# Patient Record
Sex: FEMALE | Race: BLACK OR AFRICAN AMERICAN | NOT HISPANIC OR LATINO | URBAN - METROPOLITAN AREA
[De-identification: names, ages, dates, MRNs, and addresses within clinical notes are randomized per-mention and may not be internally consistent; named-entity substitution may affect disease eponyms.]

---

## 2023-02-22 LAB
KEPPRA: 38 UG/ML (ref 10–40)
PHENYTOIN (UG/ML) IN SER/PLAS: 18.2 UG/ML (ref 10–20)

## 2023-09-27 DIAGNOSIS — I10 HYPERTENSION, UNSPECIFIED TYPE: ICD-10-CM

## 2023-09-27 DIAGNOSIS — E78.5 HYPERLIPIDEMIA, UNSPECIFIED HYPERLIPIDEMIA TYPE: Primary | ICD-10-CM

## 2023-09-27 RX ORDER — LOSARTAN POTASSIUM 25 MG/1
25 TABLET ORAL DAILY
Qty: 30 TABLET | Refills: 0 | Status: SHIPPED | OUTPATIENT
Start: 2023-09-27 | End: 2023-09-29

## 2023-09-27 RX ORDER — ATORVASTATIN CALCIUM 80 MG/1
80 TABLET, FILM COATED ORAL DAILY
Qty: 30 TABLET | Refills: 0 | Status: SHIPPED | OUTPATIENT
Start: 2023-09-27 | End: 2023-09-29

## 2023-09-27 RX ORDER — FAMOTIDINE 20 MG/1
20 TABLET, FILM COATED ORAL DAILY
COMMUNITY
Start: 2023-03-29 | End: 2023-09-27 | Stop reason: SDUPTHER

## 2023-09-27 RX ORDER — TRAZODONE HYDROCHLORIDE 50 MG/1
50 TABLET ORAL NIGHTLY
Qty: 30 TABLET | Refills: 0 | Status: SHIPPED | OUTPATIENT
Start: 2023-09-27 | End: 2023-09-29

## 2023-09-27 RX ORDER — ATORVASTATIN CALCIUM 80 MG/1
80 TABLET, FILM COATED ORAL DAILY
COMMUNITY
Start: 2023-03-02 | End: 2023-09-27 | Stop reason: SDUPTHER

## 2023-09-27 RX ORDER — TRAZODONE HYDROCHLORIDE 50 MG/1
50 TABLET ORAL NIGHTLY
COMMUNITY
Start: 2015-04-21 | End: 2023-09-27 | Stop reason: SDUPTHER

## 2023-09-27 RX ORDER — LOSARTAN POTASSIUM 25 MG/1
25 TABLET ORAL DAILY
COMMUNITY
Start: 2023-08-18 | End: 2023-09-27 | Stop reason: SDUPTHER

## 2023-09-27 RX ORDER — FAMOTIDINE 20 MG/1
20 TABLET, FILM COATED ORAL DAILY
Qty: 30 TABLET | Refills: 0 | Status: SHIPPED | OUTPATIENT
Start: 2023-09-27 | End: 2023-09-29

## 2023-09-28 DIAGNOSIS — I10 HYPERTENSION, UNSPECIFIED TYPE: ICD-10-CM

## 2023-09-28 DIAGNOSIS — E78.5 HYPERLIPIDEMIA, UNSPECIFIED HYPERLIPIDEMIA TYPE: ICD-10-CM

## 2023-09-29 DIAGNOSIS — E78.5 HYPERLIPIDEMIA, UNSPECIFIED HYPERLIPIDEMIA TYPE: ICD-10-CM

## 2023-09-29 DIAGNOSIS — I10 HYPERTENSION, UNSPECIFIED TYPE: ICD-10-CM

## 2023-09-29 RX ORDER — FAMOTIDINE 20 MG/1
20 TABLET, FILM COATED ORAL DAILY
Qty: 30 TABLET | Refills: 10 | Status: SHIPPED | OUTPATIENT
Start: 2023-09-29 | End: 2023-09-29 | Stop reason: SDUPTHER

## 2023-09-29 RX ORDER — ATORVASTATIN CALCIUM 80 MG/1
80 TABLET, FILM COATED ORAL DAILY
Qty: 14 TABLET | Refills: 0 | Status: SHIPPED | OUTPATIENT
Start: 2023-09-29 | End: 2023-11-27 | Stop reason: SDUPTHER

## 2023-09-29 RX ORDER — LOSARTAN POTASSIUM 25 MG/1
25 TABLET ORAL DAILY
Qty: 14 TABLET | Refills: 0 | Status: SHIPPED | OUTPATIENT
Start: 2023-09-29 | End: 2023-11-27 | Stop reason: SDUPTHER

## 2023-09-29 RX ORDER — LOSARTAN POTASSIUM 25 MG/1
25 TABLET ORAL DAILY
Qty: 30 TABLET | Refills: 10 | Status: SHIPPED | OUTPATIENT
Start: 2023-09-29 | End: 2023-09-29 | Stop reason: SDUPTHER

## 2023-09-29 RX ORDER — FAMOTIDINE 20 MG/1
20 TABLET, FILM COATED ORAL DAILY
Qty: 14 TABLET | Refills: 0 | Status: SHIPPED | OUTPATIENT
Start: 2023-09-29 | End: 2023-11-27 | Stop reason: SDUPTHER

## 2023-09-29 RX ORDER — ATORVASTATIN CALCIUM 80 MG/1
80 TABLET, FILM COATED ORAL DAILY
Qty: 30 TABLET | Refills: 10 | Status: SHIPPED | OUTPATIENT
Start: 2023-09-29 | End: 2023-09-29 | Stop reason: SDUPTHER

## 2023-09-29 RX ORDER — TRAZODONE HYDROCHLORIDE 50 MG/1
50 TABLET ORAL NIGHTLY
Qty: 14 TABLET | Refills: 0 | Status: SHIPPED | OUTPATIENT
Start: 2023-09-29 | End: 2023-11-27 | Stop reason: SDUPTHER

## 2023-09-29 RX ORDER — TRAZODONE HYDROCHLORIDE 50 MG/1
50 TABLET ORAL NIGHTLY
Qty: 30 TABLET | Refills: 10 | Status: SHIPPED | OUTPATIENT
Start: 2023-09-29 | End: 2023-09-29 | Stop reason: SDUPTHER

## 2023-10-03 DIAGNOSIS — G40.909 SEIZURE DISORDER (MULTI): Primary | ICD-10-CM

## 2023-10-03 RX ORDER — LEVETIRACETAM 1000 MG/1
1000 TABLET ORAL DAILY
Qty: 30 TABLET | Refills: 0 | Status: SHIPPED | OUTPATIENT
Start: 2023-10-03 | End: 2023-10-05

## 2023-10-03 RX ORDER — LEVETIRACETAM 1000 MG/1
1000 TABLET ORAL DAILY
COMMUNITY
End: 2023-10-03 | Stop reason: SDUPTHER

## 2023-10-04 DIAGNOSIS — G40.909 SEIZURE DISORDER (MULTI): ICD-10-CM

## 2023-10-05 RX ORDER — LEVETIRACETAM 1000 MG/1
1000 TABLET ORAL DAILY
Qty: 30 TABLET | Refills: 10 | Status: SHIPPED | OUTPATIENT
Start: 2023-10-05 | End: 2023-11-27 | Stop reason: WASHOUT

## 2023-10-06 ENCOUNTER — OFFICE VISIT (OUTPATIENT)
Dept: PRIMARY CARE | Facility: CLINIC | Age: 68
End: 2023-10-06
Payer: MEDICARE

## 2023-10-06 ENCOUNTER — LAB (OUTPATIENT)
Dept: LAB | Facility: LAB | Age: 68
End: 2023-10-06
Payer: MEDICARE

## 2023-10-06 VITALS — WEIGHT: 151 LBS | SYSTOLIC BLOOD PRESSURE: 139 MMHG | DIASTOLIC BLOOD PRESSURE: 83 MMHG | BODY MASS INDEX: 24.37 KG/M2

## 2023-10-06 DIAGNOSIS — I63.9 CEREBROVASCULAR ACCIDENT (CVA), UNSPECIFIED MECHANISM (MULTI): ICD-10-CM

## 2023-10-06 DIAGNOSIS — Z23 NEED FOR IMMUNIZATION AGAINST INFLUENZA: ICD-10-CM

## 2023-10-06 DIAGNOSIS — M17.0 PRIMARY OSTEOARTHRITIS OF BOTH KNEES: ICD-10-CM

## 2023-10-06 DIAGNOSIS — G40.909 SEIZURE DISORDER (MULTI): ICD-10-CM

## 2023-10-06 DIAGNOSIS — I10 ESSENTIAL HYPERTENSION: ICD-10-CM

## 2023-10-06 DIAGNOSIS — E78.49 OTHER HYPERLIPIDEMIA: ICD-10-CM

## 2023-10-06 DIAGNOSIS — D64.9 ANEMIA, UNSPECIFIED TYPE: ICD-10-CM

## 2023-10-06 DIAGNOSIS — Z12.11 SCREENING FOR COLON CANCER: ICD-10-CM

## 2023-10-06 DIAGNOSIS — E55.9 VITAMIN D DEFICIENCY: ICD-10-CM

## 2023-10-06 DIAGNOSIS — Z00.00 ENCOUNTER FOR ANNUAL WELLNESS VISIT (AWV) IN MEDICARE PATIENT: Primary | ICD-10-CM

## 2023-10-06 DIAGNOSIS — Z13.820 ENCOUNTER FOR OSTEOPOROSIS SCREENING IN ASYMPTOMATIC POSTMENOPAUSAL PATIENT: ICD-10-CM

## 2023-10-06 DIAGNOSIS — Z78.0 ENCOUNTER FOR OSTEOPOROSIS SCREENING IN ASYMPTOMATIC POSTMENOPAUSAL PATIENT: ICD-10-CM

## 2023-10-06 DIAGNOSIS — Z00.00 ENCOUNTER FOR ANNUAL WELLNESS VISIT (AWV) IN MEDICARE PATIENT: ICD-10-CM

## 2023-10-06 DIAGNOSIS — Z13.820 OSTEOPOROSIS SCREENING: ICD-10-CM

## 2023-10-06 DIAGNOSIS — J45.909 ASTHMA, UNSPECIFIED ASTHMA SEVERITY, UNSPECIFIED WHETHER COMPLICATED, UNSPECIFIED WHETHER PERSISTENT (HHS-HCC): ICD-10-CM

## 2023-10-06 PROBLEM — M17.9 OSTEOARTHRITIS, KNEE: Status: ACTIVE | Noted: 2023-10-06

## 2023-10-06 PROCEDURE — 82570 ASSAY OF URINE CREATININE: CPT

## 2023-10-06 PROCEDURE — 1170F FXNL STATUS ASSESSED: CPT | Performed by: INTERNAL MEDICINE

## 2023-10-06 PROCEDURE — 1159F MED LIST DOCD IN RCRD: CPT | Performed by: INTERNAL MEDICINE

## 2023-10-06 PROCEDURE — 3077F SYST BP >= 140 MM HG: CPT | Performed by: INTERNAL MEDICINE

## 2023-10-06 PROCEDURE — 80053 COMPREHEN METABOLIC PANEL: CPT

## 2023-10-06 PROCEDURE — 80061 LIPID PANEL: CPT

## 2023-10-06 PROCEDURE — 3075F SYST BP GE 130 - 139MM HG: CPT | Performed by: INTERNAL MEDICINE

## 2023-10-06 PROCEDURE — 36415 COLL VENOUS BLD VENIPUNCTURE: CPT

## 2023-10-06 PROCEDURE — 3079F DIAST BP 80-89 MM HG: CPT | Performed by: INTERNAL MEDICINE

## 2023-10-06 PROCEDURE — 1160F RVW MEDS BY RX/DR IN RCRD: CPT | Performed by: INTERNAL MEDICINE

## 2023-10-06 PROCEDURE — 90662 IIV NO PRSV INCREASED AG IM: CPT | Performed by: INTERNAL MEDICINE

## 2023-10-06 PROCEDURE — 1126F AMNT PAIN NOTED NONE PRSNT: CPT | Performed by: INTERNAL MEDICINE

## 2023-10-06 PROCEDURE — G0008 ADMIN INFLUENZA VIRUS VAC: HCPCS | Performed by: INTERNAL MEDICINE

## 2023-10-06 PROCEDURE — G0439 PPPS, SUBSEQ VISIT: HCPCS | Performed by: INTERNAL MEDICINE

## 2023-10-06 PROCEDURE — 82043 UR ALBUMIN QUANTITATIVE: CPT

## 2023-10-06 PROCEDURE — 1036F TOBACCO NON-USER: CPT | Performed by: INTERNAL MEDICINE

## 2023-10-06 PROCEDURE — 99214 OFFICE O/P EST MOD 30 MIN: CPT | Performed by: INTERNAL MEDICINE

## 2023-10-06 ASSESSMENT — ACTIVITIES OF DAILY LIVING (ADL)
BATHING: NEEDS ASSISTANCE
TAKING_MEDICATION: NEEDS ASSISTANCE
MANAGING_FINANCES: NEEDS ASSISTANCE
GROCERY_SHOPPING: NEEDS ASSISTANCE
DRESSING: NEEDS ASSISTANCE
DOING_HOUSEWORK: NEEDS ASSISTANCE

## 2023-10-06 ASSESSMENT — ENCOUNTER SYMPTOMS
TREMORS: 0
HEADACHES: 0
PALPITATIONS: 0
DIZZINESS: 0
NECK STIFFNESS: 0
FREQUENCY: 0
FEVER: 0
CHEST TIGHTNESS: 0
NAUSEA: 0
UNEXPECTED WEIGHT CHANGE: 0
APPETITE CHANGE: 0
DIAPHORESIS: 0
BLOOD IN STOOL: 0
JOINT SWELLING: 1
CHILLS: 0
WOUND: 0
ARTHRALGIAS: 0
BACK PAIN: 0
OCCASIONAL FEELINGS OF UNSTEADINESS: 1
ACTIVITY CHANGE: 0
FATIGUE: 0
DYSURIA: 0
NECK PAIN: 0
SLEEP DISTURBANCE: 0
MYALGIAS: 0
HEMATURIA: 0
COUGH: 0
ABDOMINAL PAIN: 0
APNEA: 0
VOMITING: 0
WEAKNESS: 0
FLANK PAIN: 0
CONSTIPATION: 0
NERVOUS/ANXIOUS: 0
DIFFICULTY URINATING: 0
DIARRHEA: 0
ABDOMINAL DISTENTION: 0
WHEEZING: 0
SHORTNESS OF BREATH: 0

## 2023-10-06 ASSESSMENT — PATIENT HEALTH QUESTIONNAIRE - PHQ9
1. LITTLE INTEREST OR PLEASURE IN DOING THINGS: SEVERAL DAYS
SUM OF ALL RESPONSES TO PHQ9 QUESTIONS 1 AND 2: 2
2. FEELING DOWN, DEPRESSED OR HOPELESS: SEVERAL DAYS
10. IF YOU CHECKED OFF ANY PROBLEMS, HOW DIFFICULT HAVE THESE PROBLEMS MADE IT FOR YOU TO DO YOUR WORK, TAKE CARE OF THINGS AT HOME, OR GET ALONG WITH OTHER PEOPLE: NOT DIFFICULT AT ALL

## 2023-10-06 NOTE — PROGRESS NOTES
Subjective   Patient ID: Janine Sandoval is a 67 y.o. female who presents for No chief complaint on file..  Jaime Mehta, geovanny 66 y/o F  deaf/non verbal with PMHx of HTN, HLD, stroke s/p residual right sided weakness, depression, schizophrenia, bilateral chronic knee osteoarthritis, GERD, Anemia, and Asthma came to the clinic for a wellness visit. Patient deaf/non verbal and caretaker was present. Patient lives at assisted living facility. No acute concerns or new complaints except the patient pointed to her right knee. She does have bilateral knee OA and follows with orthopedic surgery and gets injections. She follows with Neurology as well. She does feel safe at the facility. She did have a fall in last year but no LOC or trauma. She does miss her parents and have depressed mood but not always. Eat and sleep good.   No recent illness, fever, chills, sweats, CP, SOB, palpitations, cough, N/V, abdominal pain, melena, hematochezia, diarrhea, constipation, hematuria, urinary symptoms, or leg swelling.         Review of Systems   Constitutional:  Negative for activity change, appetite change, chills, diaphoresis, fatigue, fever and unexpected weight change.   Eyes:  Negative for visual disturbance.   Respiratory:  Negative for apnea, cough, chest tightness, shortness of breath and wheezing.    Cardiovascular:  Negative for chest pain, palpitations and leg swelling.   Gastrointestinal:  Negative for abdominal distention, abdominal pain, blood in stool, constipation, diarrhea, nausea and vomiting.   Endocrine: Negative for cold intolerance and heat intolerance.   Genitourinary:  Negative for decreased urine volume, difficulty urinating, dysuria, enuresis, flank pain, frequency, hematuria and urgency.   Musculoskeletal:  Positive for joint swelling. Negative for arthralgias, back pain, gait problem, myalgias, neck pain and neck stiffness.   Skin:  Negative for rash and wound.   Neurological:  Negative for dizziness,  tremors, syncope, weakness and headaches.   Psychiatric/Behavioral:  Negative for behavioral problems and sleep disturbance. The patient is not nervous/anxious.        Objective   Physical Exam  Constitutional:       General: She is not in acute distress.     Appearance: She is not ill-appearing, toxic-appearing or diaphoretic.   HENT:      Head: Normocephalic and atraumatic.      Right Ear: There is no impacted cerumen.      Left Ear: There is no impacted cerumen.      Nose: Nose normal. No congestion or rhinorrhea.      Mouth/Throat:      Mouth: Mucous membranes are moist.      Pharynx: No oropharyngeal exudate or posterior oropharyngeal erythema.   Eyes:      General:         Right eye: No discharge.         Left eye: No discharge.      Extraocular Movements: Extraocular movements intact.      Conjunctiva/sclera: Conjunctivae normal.      Pupils: Pupils are equal, round, and reactive to light.   Neck:      Vascular: No carotid bruit.   Cardiovascular:      Rate and Rhythm: Normal rate and regular rhythm.      Heart sounds: No murmur heard.     No friction rub. No gallop.   Pulmonary:      Effort: No respiratory distress.      Breath sounds: No stridor. No wheezing, rhonchi or rales.   Chest:      Chest wall: No tenderness.   Abdominal:      General: Abdomen is flat. Bowel sounds are normal. There is no distension.      Palpations: Abdomen is soft. There is no mass.      Tenderness: There is no abdominal tenderness. There is no guarding or rebound.      Hernia: No hernia is present.   Musculoskeletal:         General: Swelling, tenderness and deformity present. No signs of injury. Normal range of motion.      Cervical back: Normal range of motion and neck supple. No rigidity or tenderness.      Right lower leg: No edema.      Left lower leg: No edema.   Lymphadenopathy:      Cervical: No cervical adenopathy.   Skin:     Coloration: Skin is not jaundiced or pale.      Findings: No bruising, erythema, lesion or  rash.   Neurological:      General: No focal deficit present.      Mental Status: She is oriented to person, place, and time. Mental status is at baseline.      Cranial Nerves: No cranial nerve deficit.      Sensory: No sensory deficit.      Motor: Weakness present.      Coordination: Coordination abnormal.      Gait: Gait abnormal.      Deep Tendon Reflexes: Reflexes abnormal.   Psychiatric:         Mood and Affect: Mood normal.         Behavior: Behavior normal.         Thought Content: Thought content normal.         Judgment: Judgment normal.         Assessment/Plan   Problem List Items Addressed This Visit       Seizure disorder (CMS/[facility])    Encounter for annual wellness visit (AWV) in Medicare patient - Primary    Relevant Orders    Albumin , Urine Random    Hemoglobin A1C    Lipid Panel    TSH with reflex to Free T4 if abnormal    Vitamin D 25-Hydroxy,Total (for eval of Vitamin D levels)    CBC    Comprehensive Metabolic Panel    XR DEXA bone density    Essential hypertension    Relevant Orders    Comprehensive Metabolic Panel    Other hyperlipidemia    Stroke (CMS/[facility])    Osteoarthritis, knee    Asthma    Anemia    Relevant Orders    CBC    Vitamin D deficiency    Relevant Orders    Vitamin D 25-Hydroxy,Total (for eval of Vitamin D levels)     Other Visit Diagnoses       Screening for colon cancer        Relevant Orders    Colonoscopy Screening    Osteoporosis screening        Relevant Orders    XR DEXA bone density    Encounter for osteoporosis screening in asymptomatic postmenopausal patient        Relevant Orders    XR DEXA bone density    Need for immunization against influenza        Relevant Orders    Flu vaccine, quadrivalent, high-dose, preservative free, age 65y+ (FLUZONE) (Completed)             geovanny Tariq 66 y/o F  deaf/non verbal with PMHx of HTN, HLD, stroke s/p residual right sided weakness, depression, schizophrenia, bilateral chronic knee osteoarthritis, GERD, Anemia, and Asthma  came to the clinic for a wellness visit. Patient deaf/non verbal and caretaker was present. Patient lives at assisted living facility.         #Anemia   -CBC today      #Asthma  - PRN albuterol nebulizer     #Chronic Back Pain  - Prev. prescribed lidocaine patch     #Depression  - Continue sertraline and trazodone    #Fatigue  - Anemia and thyroid work-up     #GERD  - Continue famotidine     #Stroke with residual weakness   - Continue aspirin and atorvastatin   - Follows with Neurology     #HTN  - BP today 141/83   - At facility BP in the 130s/80s   - Continue losartan     #Occasional Nausea  - Take Zofran as needed     #OA of Knees bilateral   - F/U with orthopedics, gets injections      #Schizophrenia  - Continue Haldol and Seroquel     #Epilepsy  - Continue Keppra and phenytoin  - F/U with neurology      #Health Maintenance  -Immunizations: Flu shot today / Discussed others   -Screen:   Mammo: UTD repeat 02/24   Colonoscopy due now; ordered today   Dexa due now; ordered today   -Wellness 2023 done today      Dispo: RTC in 3 months, completed blood work today. Will review labs and imaging when available.       Medicare Wellness Billing Compliance Satisfied    *This is a visual tool to show completion of required items on the day of the visit. Green checks will only appear on the date of visit.    Review all medications by prescribing practitioner or clinical pharmacist (such as prescriptions, OTCs, herbal therapies and supplements) documented in the medical record    Past Medical, Surgical, and Family History reviewed and updated in chart    Tobacco Use Reviewed    Alcohol Use Reviewed    Illicit Drug Use Reviewed    PHQ2/9    Falls in Last Year Reviewed    Home Safety Risk Factors Reviewed    Cognitive Impairment Reviewed    Patient Self Assessment and Health Status    Current Diet Reviewed    Exercise Frequency    ADL - Hearing Impairment    ADL - Bathing    ADL - Dressing    ADL - Walks in Home     IADL - Managing Finances    IADL - Grocery Shopping    IADL - Taking Medications    IADL - Doing Housework

## 2023-10-06 NOTE — PROGRESS NOTES
I saw and evaluated the patient. I personally obtained the key and critical portions of the history and physical exam or was physically present for key and critical portions performed by the resident/fellow. I reviewed the resident/fellow's documentation and discussed the patient with the resident/fellow. I agree with the resident/fellow's medical decision making as documented in the note.    Jesi Ramsey MD

## 2023-10-07 LAB
ALBUMIN SERPL BCP-MCNC: 3.9 G/DL (ref 3.4–5)
ALP SERPL-CCNC: 102 U/L (ref 33–136)
ALT SERPL W P-5'-P-CCNC: 30 U/L (ref 7–45)
ANION GAP SERPL CALC-SCNC: 19 MMOL/L
AST SERPL W P-5'-P-CCNC: 21 U/L (ref 9–39)
BILIRUB SERPL-MCNC: 0.3 MG/DL (ref 0–1.2)
BUN SERPL-MCNC: 17 MG/DL (ref 6–23)
CALCIUM SERPL-MCNC: 9.2 MG/DL (ref 8.6–10.6)
CHLORIDE SERPL-SCNC: 106 MMOL/L (ref 98–107)
CHOLEST SERPL-MCNC: 114 MG/DL (ref 0–199)
CHOLESTEROL/HDL RATIO: 2.2
CO2 SERPL-SCNC: 19 MMOL/L (ref 21–32)
CREAT SERPL-MCNC: 0.61 MG/DL (ref 0.5–1.05)
CREAT UR-MCNC: 50.4 MG/DL (ref 20–320)
GFR SERPL CREATININE-BSD FRML MDRD: >90 ML/MIN/1.73M*2
GLUCOSE SERPL-MCNC: 138 MG/DL (ref 74–99)
HDLC SERPL-MCNC: 51.5 MG/DL
LDLC SERPL CALC-MCNC: 50 MG/DL (ref 140–190)
MICROALBUMIN UR-MCNC: <7 MG/L
MICROALBUMIN/CREAT UR: NORMAL MG/G{CREAT}
NON HDL CHOLESTEROL: 63 MG/DL (ref 0–149)
POTASSIUM SERPL-SCNC: 3.8 MMOL/L (ref 3.5–5.3)
PROT SERPL-MCNC: 6.7 G/DL (ref 6.4–8.2)
SODIUM SERPL-SCNC: 140 MMOL/L (ref 136–145)
TRIGL SERPL-MCNC: 61 MG/DL (ref 0–149)
VLDL: 12 MG/DL (ref 0–40)

## 2023-10-07 NOTE — RESULT ENCOUNTER NOTE
I reviewed your results.  Everything looks good.  Please continue with current treatment.  Best,  Dr. Dennison

## 2023-10-17 DIAGNOSIS — Z12.11 COLON CANCER SCREENING: ICD-10-CM

## 2023-10-17 RX ORDER — POLYETHYLENE GLYCOL 3350, SODIUM SULFATE ANHYDROUS, SODIUM BICARBONATE, SODIUM CHLORIDE, POTASSIUM CHLORIDE 236; 22.74; 6.74; 5.86; 2.97 G/4L; G/4L; G/4L; G/4L; G/4L
4000 POWDER, FOR SOLUTION ORAL ONCE
Qty: 4000 ML | Refills: 0 | Status: SHIPPED | OUTPATIENT
Start: 2023-10-17 | End: 2023-10-17

## 2023-10-30 PROBLEM — R32 URINARY INCONTINENCE: Status: ACTIVE | Noted: 2023-10-30

## 2023-10-30 PROBLEM — Z00.00 ENCOUNTER FOR ANNUAL WELLNESS VISIT (AWV) IN MEDICARE PATIENT: Status: RESOLVED | Noted: 2023-10-06 | Resolved: 2023-10-30

## 2023-10-30 PROBLEM — E78.5 HYPERLIPEMIA: Status: ACTIVE | Noted: 2023-10-30

## 2023-10-30 PROBLEM — E78.49 OTHER HYPERLIPIDEMIA: Status: RESOLVED | Noted: 2023-10-06 | Resolved: 2023-10-30

## 2023-10-30 PROBLEM — H61.21 IMPACTED CERUMEN OF RIGHT EAR: Status: ACTIVE | Noted: 2023-10-30

## 2023-10-30 PROBLEM — Z86.73 HISTORY OF STROKE: Status: RESOLVED | Noted: 2023-10-30 | Resolved: 2023-10-30

## 2023-10-30 PROBLEM — M19.90 ARTHRITIS, DEGENERATIVE: Status: ACTIVE | Noted: 2017-10-09

## 2023-10-30 PROBLEM — R05.3 CHRONIC COUGH: Status: ACTIVE | Noted: 2023-10-30

## 2023-10-30 PROBLEM — H90.3 BILATERAL SENSORINEURAL HEARING LOSS: Status: ACTIVE | Noted: 2023-10-30

## 2023-10-30 PROBLEM — Z86.73 HISTORY OF STROKE: Status: ACTIVE | Noted: 2023-10-30

## 2023-10-30 PROBLEM — K21.9 GASTROESOPHAGEAL REFLUX DISEASE WITHOUT ESOPHAGITIS: Status: ACTIVE | Noted: 2023-10-30

## 2023-10-30 PROBLEM — T43.505A NEUROLEPTIC INDUCED PARKINSONISM (MULTI): Status: ACTIVE | Noted: 2022-09-21

## 2023-10-30 PROBLEM — G40.909 SEIZURE DISORDER (MULTI): Status: RESOLVED | Noted: 2023-10-03 | Resolved: 2023-10-30

## 2023-10-30 PROBLEM — F32.A DEPRESSION: Status: ACTIVE | Noted: 2023-10-30

## 2023-10-30 PROBLEM — R07.9 CHEST PAIN: Status: ACTIVE | Noted: 2023-10-30

## 2023-10-30 PROBLEM — G21.11 NEUROLEPTIC INDUCED PARKINSONISM (MULTI): Status: ACTIVE | Noted: 2022-09-21

## 2023-10-30 RX ORDER — HYPROMELLOSE 2910 5 MG/ML
SOLUTION OPHTHALMIC
COMMUNITY
Start: 2015-10-01 | End: 2024-01-26 | Stop reason: WASHOUT

## 2023-10-30 RX ORDER — SERTRALINE HYDROCHLORIDE 100 MG/1
100 TABLET, FILM COATED ORAL
COMMUNITY
Start: 2023-08-21 | End: 2023-11-27 | Stop reason: SDUPTHER

## 2023-10-30 RX ORDER — ALBUTEROL SULFATE 0.83 MG/ML
SOLUTION RESPIRATORY (INHALATION)
COMMUNITY
Start: 2022-01-13 | End: 2023-11-27 | Stop reason: SDUPTHER

## 2023-10-30 RX ORDER — QUETIAPINE FUMARATE 300 MG/1
TABLET, FILM COATED ORAL
COMMUNITY
End: 2023-11-27 | Stop reason: WASHOUT

## 2023-10-30 RX ORDER — ONDANSETRON 4 MG/1
TABLET, ORALLY DISINTEGRATING ORAL
COMMUNITY
Start: 2020-05-25 | End: 2024-01-26 | Stop reason: WASHOUT

## 2023-10-30 RX ORDER — BENZTROPINE MESYLATE 0.5 MG/1
TABLET ORAL
COMMUNITY
End: 2023-11-27 | Stop reason: WASHOUT

## 2023-10-30 RX ORDER — BENZTROPINE MESYLATE 1 MG/1
TABLET ORAL
COMMUNITY
Start: 2017-04-04 | End: 2023-11-27 | Stop reason: WASHOUT

## 2023-10-30 RX ORDER — HALOPERIDOL DECANOATE 50 MG/ML
INJECTION INTRAMUSCULAR
COMMUNITY
Start: 2023-03-08 | End: 2024-01-26 | Stop reason: WASHOUT

## 2023-10-30 RX ORDER — LIDOCAINE 50 MG/G
1 PATCH TOPICAL EVERY 24 HOURS
COMMUNITY
Start: 2022-11-08

## 2023-10-30 RX ORDER — DICLOFENAC SODIUM 10 MG/G
GEL TOPICAL 4 TIMES DAILY
COMMUNITY

## 2023-10-30 RX ORDER — PHENYTOIN SODIUM 300 MG/1
300 CAPSULE, EXTENDED RELEASE ORAL NIGHTLY
COMMUNITY
Start: 2023-07-28 | End: 2023-11-27 | Stop reason: WASHOUT

## 2023-10-30 RX ORDER — PHENYTOIN SODIUM 100 MG/1
CAPSULE, EXTENDED RELEASE ORAL
COMMUNITY
End: 2023-11-27 | Stop reason: WASHOUT

## 2023-10-30 RX ORDER — ACETAMINOPHEN 500 MG
TABLET ORAL
COMMUNITY
Start: 2022-11-08 | End: 2023-11-27 | Stop reason: SDUPTHER

## 2023-10-30 RX ORDER — HALOPERIDOL DECANOATE 100 MG/ML
INJECTION INTRAMUSCULAR
COMMUNITY
Start: 2023-04-06 | End: 2024-02-02

## 2023-10-30 RX ORDER — QUETIAPINE FUMARATE 200 MG/1
1 TABLET, FILM COATED ORAL NIGHTLY
COMMUNITY
Start: 2015-07-08 | End: 2023-11-27 | Stop reason: WASHOUT

## 2023-10-30 RX ORDER — CLOPIDOGREL BISULFATE 75 MG/1
TABLET ORAL
COMMUNITY
Start: 2017-07-21 | End: 2023-11-27 | Stop reason: SDUPTHER

## 2023-10-30 RX ORDER — PHENYTOIN 50 MG/1
TABLET, CHEWABLE ORAL
COMMUNITY
Start: 2015-03-20 | End: 2023-11-27 | Stop reason: WASHOUT

## 2023-10-30 RX ORDER — LEVETIRACETAM 500 MG/1
TABLET ORAL
COMMUNITY
Start: 2016-07-28 | End: 2023-11-27 | Stop reason: WASHOUT

## 2023-10-30 RX ORDER — CHOLECALCIFEROL (VITAMIN D3) 50 MCG
2000 TABLET ORAL
COMMUNITY
Start: 2022-09-22 | End: 2023-11-27 | Stop reason: SDUPTHER

## 2023-10-30 RX ORDER — HALOPERIDOL 10 MG/1
5 TABLET ORAL 2 TIMES DAILY
COMMUNITY
End: 2024-01-26 | Stop reason: WASHOUT

## 2023-10-30 RX ORDER — PRAVASTATIN SODIUM 80 MG/1
TABLET ORAL
COMMUNITY
Start: 2014-09-04 | End: 2023-11-27 | Stop reason: WASHOUT

## 2023-10-30 RX ORDER — BENZTROPINE MESYLATE 2 MG/1
1 TABLET ORAL DAILY
COMMUNITY
Start: 2015-01-05 | End: 2023-11-27 | Stop reason: WASHOUT

## 2023-10-31 ENCOUNTER — DOCUMENTATION (OUTPATIENT)
Dept: GASTROENTEROLOGY | Facility: CLINIC | Age: 68
End: 2023-10-31

## 2023-10-31 ENCOUNTER — APPOINTMENT (OUTPATIENT)
Dept: GASTROENTEROLOGY | Facility: EXTERNAL LOCATION | Age: 68
End: 2023-10-31
Payer: MEDICARE

## 2023-10-31 NOTE — PROGRESS NOTES
Patient came in for an open-access screening colonoscopy today to [facility].  She is nonverbal and deaf, and came with a caregiver from her group home, however we were unable to effectively communicate.  Unfortunately, her past history was not accurately relayed to us at the time of scheduling the procedure.  After a long discussion with patient, caregiver, and nursing staff, we did not feel that the patient understood the procedure or the risks (or was unable to communicate to us that she understood), and we did not feel comfortable obtaining informed consent.  Because the procedure is elective, we decided to cancel the procedure until we could get a better sense of how we can better communicate with her or a guardian to obtain consent.    Additionally, she was noted to have bruises covering her back when she was in her hospital gown.  Because of this, we will be notifying adult protective services.

## 2023-11-09 DIAGNOSIS — Z12.11 SCREENING FOR COLON CANCER: Primary | ICD-10-CM

## 2023-11-09 RX ORDER — POLYETHYLENE GLYCOL 3350, SODIUM SULFATE ANHYDROUS, SODIUM BICARBONATE, SODIUM CHLORIDE, POTASSIUM CHLORIDE 236; 22.74; 6.74; 5.86; 2.97 G/4L; G/4L; G/4L; G/4L; G/4L
4000 POWDER, FOR SOLUTION ORAL ONCE
Qty: 4000 ML | Refills: 0 | Status: SHIPPED | OUTPATIENT
Start: 2023-11-09 | End: 2023-11-09

## 2023-11-21 ENCOUNTER — APPOINTMENT (OUTPATIENT)
Dept: PRIMARY CARE | Facility: CLINIC | Age: 68
End: 2023-11-21
Payer: MEDICARE

## 2023-11-27 ENCOUNTER — OFFICE VISIT (OUTPATIENT)
Dept: PRIMARY CARE | Facility: CLINIC | Age: 68
End: 2023-11-27
Payer: MEDICARE

## 2023-11-27 ENCOUNTER — LAB (OUTPATIENT)
Dept: LAB | Facility: LAB | Age: 68
End: 2023-11-27
Payer: MEDICARE

## 2023-11-27 VITALS
DIASTOLIC BLOOD PRESSURE: 85 MMHG | WEIGHT: 152 LBS | HEART RATE: 86 BPM | BODY MASS INDEX: 24.53 KG/M2 | SYSTOLIC BLOOD PRESSURE: 148 MMHG

## 2023-11-27 DIAGNOSIS — T43.505A NEUROLEPTIC INDUCED PARKINSONISM (MULTI): ICD-10-CM

## 2023-11-27 DIAGNOSIS — G40.909 SEIZURE DISORDER (MULTI): ICD-10-CM

## 2023-11-27 DIAGNOSIS — I10 ESSENTIAL HYPERTENSION: ICD-10-CM

## 2023-11-27 DIAGNOSIS — E55.9 VITAMIN D DEFICIENCY: ICD-10-CM

## 2023-11-27 DIAGNOSIS — F32.A DEPRESSION, UNSPECIFIED DEPRESSION TYPE: ICD-10-CM

## 2023-11-27 DIAGNOSIS — G21.11 NEUROLEPTIC INDUCED PARKINSONISM (MULTI): ICD-10-CM

## 2023-11-27 DIAGNOSIS — I10 ESSENTIAL HYPERTENSION: Primary | ICD-10-CM

## 2023-11-27 DIAGNOSIS — I63.9 CEREBRAL INFARCTION, UNSPECIFIED MECHANISM (MULTI): ICD-10-CM

## 2023-11-27 DIAGNOSIS — I10 HYPERTENSION, UNSPECIFIED TYPE: ICD-10-CM

## 2023-11-27 DIAGNOSIS — T14.8XXA BRUISE: ICD-10-CM

## 2023-11-27 DIAGNOSIS — F20.0 PARANOID SCHIZOPHRENIA (MULTI): ICD-10-CM

## 2023-11-27 DIAGNOSIS — R73.03 PREDIABETES: ICD-10-CM

## 2023-11-27 DIAGNOSIS — G40.209 PARTIAL EPILEPSY WITH IMPAIRMENT OF CONSCIOUSNESS (MULTI): ICD-10-CM

## 2023-11-27 DIAGNOSIS — E78.5 HYPERLIPIDEMIA, UNSPECIFIED HYPERLIPIDEMIA TYPE: ICD-10-CM

## 2023-11-27 DIAGNOSIS — K21.9 GASTROESOPHAGEAL REFLUX DISEASE WITHOUT ESOPHAGITIS: ICD-10-CM

## 2023-11-27 DIAGNOSIS — J45.909 ASTHMA, UNSPECIFIED ASTHMA SEVERITY, UNSPECIFIED WHETHER COMPLICATED, UNSPECIFIED WHETHER PERSISTENT (HHS-HCC): ICD-10-CM

## 2023-11-27 DIAGNOSIS — R01.1 MURMUR, CARDIAC: ICD-10-CM

## 2023-11-27 LAB
25(OH)D3 SERPL-MCNC: 42 NG/ML (ref 30–100)
ALBUMIN SERPL BCP-MCNC: 3.8 G/DL (ref 3.4–5)
ALP SERPL-CCNC: 101 U/L (ref 33–136)
ALT SERPL W P-5'-P-CCNC: 37 U/L (ref 7–45)
ANION GAP SERPL CALC-SCNC: 14 MMOL/L (ref 10–20)
AST SERPL W P-5'-P-CCNC: 29 U/L (ref 9–39)
BILIRUB SERPL-MCNC: 0.3 MG/DL (ref 0–1.2)
BUN SERPL-MCNC: 11 MG/DL (ref 6–23)
CALCIUM SERPL-MCNC: 9.2 MG/DL (ref 8.6–10.6)
CHLORIDE SERPL-SCNC: 104 MMOL/L (ref 98–107)
CO2 SERPL-SCNC: 27 MMOL/L (ref 21–32)
CREAT SERPL-MCNC: 0.72 MG/DL (ref 0.5–1.05)
EST. AVERAGE GLUCOSE BLD GHB EST-MCNC: 117 MG/DL
GFR SERPL CREATININE-BSD FRML MDRD: >90 ML/MIN/1.73M*2
GLUCOSE SERPL-MCNC: 161 MG/DL (ref 74–99)
HBA1C MFR BLD: 5.7 %
POTASSIUM SERPL-SCNC: 3.8 MMOL/L (ref 3.5–5.3)
PROT SERPL-MCNC: 6.5 G/DL (ref 6.4–8.2)
SODIUM SERPL-SCNC: 141 MMOL/L (ref 136–145)
TSH SERPL-ACNC: 2.46 MIU/L (ref 0.44–3.98)

## 2023-11-27 PROCEDURE — 82306 VITAMIN D 25 HYDROXY: CPT

## 2023-11-27 PROCEDURE — 3079F DIAST BP 80-89 MM HG: CPT | Performed by: INTERNAL MEDICINE

## 2023-11-27 PROCEDURE — 99214 OFFICE O/P EST MOD 30 MIN: CPT | Performed by: INTERNAL MEDICINE

## 2023-11-27 PROCEDURE — 83036 HEMOGLOBIN GLYCOSYLATED A1C: CPT

## 2023-11-27 PROCEDURE — 1126F AMNT PAIN NOTED NONE PRSNT: CPT | Performed by: INTERNAL MEDICINE

## 2023-11-27 PROCEDURE — 84443 ASSAY THYROID STIM HORMONE: CPT

## 2023-11-27 PROCEDURE — 80053 COMPREHEN METABOLIC PANEL: CPT

## 2023-11-27 PROCEDURE — 1160F RVW MEDS BY RX/DR IN RCRD: CPT | Performed by: INTERNAL MEDICINE

## 2023-11-27 PROCEDURE — 36415 COLL VENOUS BLD VENIPUNCTURE: CPT

## 2023-11-27 PROCEDURE — 1159F MED LIST DOCD IN RCRD: CPT | Performed by: INTERNAL MEDICINE

## 2023-11-27 PROCEDURE — 3077F SYST BP >= 140 MM HG: CPT | Performed by: INTERNAL MEDICINE

## 2023-11-27 PROCEDURE — 1036F TOBACCO NON-USER: CPT | Performed by: INTERNAL MEDICINE

## 2023-11-27 RX ORDER — CHOLECALCIFEROL (VITAMIN D3) 50 MCG
2000 TABLET ORAL
Qty: 90 TABLET | Refills: 1 | Status: SHIPPED | OUTPATIENT
Start: 2023-11-27 | End: 2024-01-26 | Stop reason: SDUPTHER

## 2023-11-27 RX ORDER — PHENYTOIN SODIUM 100 MG/1
300 CAPSULE, EXTENDED RELEASE ORAL NIGHTLY
Qty: 180 CAPSULE | Refills: 2 | Status: SHIPPED | OUTPATIENT
Start: 2023-11-27 | End: 2024-01-26 | Stop reason: SDUPTHER

## 2023-11-27 RX ORDER — ACETAMINOPHEN 500 MG
TABLET ORAL
Qty: 180 TABLET | Refills: 1 | Status: SHIPPED | OUTPATIENT
Start: 2023-11-27 | End: 2024-05-17 | Stop reason: SDUPTHER

## 2023-11-27 RX ORDER — ATORVASTATIN CALCIUM 80 MG/1
80 TABLET, FILM COATED ORAL DAILY
Qty: 90 TABLET | Refills: 1 | Status: SHIPPED | OUTPATIENT
Start: 2023-11-27 | End: 2024-01-26 | Stop reason: SDUPTHER

## 2023-11-27 RX ORDER — TRAZODONE HYDROCHLORIDE 50 MG/1
50 TABLET ORAL NIGHTLY
Qty: 90 TABLET | Refills: 1 | Status: SHIPPED | OUTPATIENT
Start: 2023-11-27 | End: 2024-01-26 | Stop reason: SDUPTHER

## 2023-11-27 RX ORDER — FAMOTIDINE 20 MG/1
20 TABLET, FILM COATED ORAL DAILY
Qty: 90 TABLET | Refills: 1 | Status: SHIPPED | OUTPATIENT
Start: 2023-11-27 | End: 2024-01-26 | Stop reason: SDUPTHER

## 2023-11-27 RX ORDER — SERTRALINE HYDROCHLORIDE 100 MG/1
100 TABLET, FILM COATED ORAL
Qty: 90 TABLET | Refills: 1 | Status: SHIPPED | OUTPATIENT
Start: 2023-11-27 | End: 2024-01-26 | Stop reason: SDUPTHER

## 2023-11-27 RX ORDER — LEVETIRACETAM 1000 MG/1
1000 TABLET ORAL DAILY
Qty: 90 TABLET | Refills: 1 | Status: SHIPPED | OUTPATIENT
Start: 2023-11-27 | End: 2024-01-26 | Stop reason: SDUPTHER

## 2023-11-27 RX ORDER — CLOPIDOGREL BISULFATE 75 MG/1
TABLET ORAL
Qty: 90 TABLET | Refills: 1 | Status: SHIPPED | OUTPATIENT
Start: 2023-11-27 | End: 2024-01-26 | Stop reason: SDUPTHER

## 2023-11-27 RX ORDER — LOSARTAN POTASSIUM 25 MG/1
25 TABLET ORAL DAILY
Qty: 90 TABLET | Refills: 1 | Status: SHIPPED | OUTPATIENT
Start: 2023-11-27 | End: 2024-01-26 | Stop reason: SDUPTHER

## 2023-11-27 RX ORDER — BENZTROPINE MESYLATE 0.5 MG/1
TABLET ORAL
Qty: 90 TABLET | Refills: 4 | Status: SHIPPED | OUTPATIENT
Start: 2023-11-27 | End: 2023-11-30 | Stop reason: SDUPTHER

## 2023-11-27 RX ORDER — ALBUTEROL SULFATE 0.83 MG/ML
SOLUTION RESPIRATORY (INHALATION)
Qty: 75 ML | Refills: 2 | Status: SHIPPED | OUTPATIENT
Start: 2023-11-27

## 2023-11-27 RX ORDER — QUETIAPINE FUMARATE 300 MG/1
TABLET, FILM COATED ORAL
Qty: 90 TABLET | Refills: 1 | Status: SHIPPED | OUTPATIENT
Start: 2023-11-27 | End: 2024-01-26 | Stop reason: SDUPTHER

## 2023-11-27 ASSESSMENT — PATIENT HEALTH QUESTIONNAIRE - PHQ9
1. LITTLE INTEREST OR PLEASURE IN DOING THINGS: NOT AT ALL
2. FEELING DOWN, DEPRESSED OR HOPELESS: NOT AT ALL
SUM OF ALL RESPONSES TO PHQ9 QUESTIONS 1 AND 2: 0

## 2023-11-27 NOTE — ASSESSMENT & PLAN NOTE
Sees psych at [facility]  Refill meds for now: sertraline 100, trazodone 50   Consider adjusting medications (at psych office) to reduce falls

## 2023-11-27 NOTE — PROGRESS NOTES
Subjective   Patient ID: Janine Sandoval is a 68 y.o. female who presents for Med Refill.    68f who is deaf with HTN, HLD, stroke s/p residual right sided weakness, depression, schizophrenia, bilateral chronic knee osteoarthritis, GERD, Anemia, seizures, and Asthma who presents for a visit for a skin discoloration under her breast. Skip her caretaker is here with her and she arrived in a wheelchair.   Discoloration first noticed 2 weeks ago by other caretakers. Phone picture was viewed, it showed yellowish discoloration in lateral T7-T8 rib skin area under ~R breast that appears to be a healing bruise. Today, there is no discoloration anywhere in her thoracic region. Pt notes some pain to anterior chest wall, caretaker denies pt complained of any pain recently.   Pt noted to have fell in the bathroom from a mechanical fall a few weeks ago.   There is a request to refill all of her current medications this visit.       Med Refill         Review of Systems   Reason unable to perform ROS: pt deaf and nonverbal at baseline.   Skin:         Skin discoloration area        Objective   /85   Pulse 86   Wt 68.9 kg (152 lb)   BMI 24.53 kg/m²     Physical Exam  Vitals reviewed. Exam conducted with a chaperone present.   Constitutional:       General: She is not in acute distress.     Comments: Sitting in wheelchair.    HENT:      Head: Normocephalic and atraumatic.   Eyes:      Extraocular Movements: Extraocular movements intact.      Conjunctiva/sclera: Conjunctivae normal.   Cardiovascular:      Rate and Rhythm: Normal rate and regular rhythm.      Heart sounds: Murmur heard.      No friction rub. No gallop.      Comments: 2/6 systolic murmur   Pulmonary:      Effort: Pulmonary effort is normal.      Breath sounds: Normal breath sounds. No wheezing, rhonchi or rales.   Abdominal:      General: Bowel sounds are normal.      Palpations: Abdomen is soft. There is no mass.      Tenderness: There is no abdominal  tenderness. There is no guarding or rebound.   Musculoskeletal:         General: No tenderness.      Cervical back: Neck supple.      Right lower leg: No edema.      Left lower leg: No edema.   Skin:     General: Skin is warm and dry.      Findings: No bruising, lesion or rash.   Neurological:      Mental Status: She is alert. Mental status is at baseline.      Comments: Answering questions, following commands. Moving all extremities.    Psychiatric:         Mood and Affect: Mood and affect normal.         Assessment/Plan   Problem List Items Addressed This Visit       Essential hypertension - Primary     148/85 this visit  Refill losartan 25         Relevant Orders    Comprehensive metabolic panel    CBC    TSH with reflex to Free T4 if abnormal    Asthma     Refill albuterol          Relevant Medications    albuterol 2.5 mg /3 mL (0.083 %) nebulizer solution    Vitamin D deficiency     Refill vit D supplements  Recheck vit D          Relevant Medications    cholecalciferol (Vitamin D-3) 50 MCG (2000 UT) tablet    Other Relevant Orders    Vitamin D 25-Hydroxy,Total (for eval of Vitamin D levels)    Cerebral infarction (CMS/[facility])     Refill plavix 75          Relevant Medications    clopidogrel (Plavix) 75 mg tablet    Other Relevant Orders    CBC    Hemoglobin A1c    Depression     Sees psych at [facility]  Refill meds for now: sertraline 100, trazodone 50   Consider adjusting medications (at psych office) to reduce falls            Relevant Medications    sertraline (Zoloft) 100 mg tablet    Other Relevant Orders    TSH with reflex to Free T4 if abnormal    Gastroesophageal reflux disease without esophagitis     Refill famotidine            Relevant Medications    acetaminophen (Tylenol) 500 mg tablet    Neuroleptic induced parkinsonism (CMS/HCC)     Refill meds for now: benztropine TID   Consider adjusting medications (at neuro office) to reduce falls            Relevant Medications    benztropine (Cogentin) 0.5  mg tablet    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/HCC)     Refill meds for now: phenytoin 300mg and keppra 1000mg   Follow up with neuro          Relevant Medications    phenytoin ER (Dilantin) 100 mg capsule    Other Relevant Orders    CBC    Paranoid schizophrenia (CMS/HCC)     Sees psych at [facility]  Refill meds for now: quetiapine 300  Consider adjusting medications (at psych office) to reduce falls          Relevant Medications    QUEtiapine (SEROquel) 300 mg tablet    Hyperlipemia     Refill atorvastatin 80mg  Reviewed recent lipid labs          Relevant Medications    losartan (Cozaar) 25 mg tablet    famotidine (Pepcid) 20 mg tablet    atorvastatin (Lipitor) 80 mg tablet    traZODone (Desyrel) 50 mg tablet    Bruise     Reviewed note from 10/2023 where GI office noticed bruises, apparent APS contacted.   Area of skin discoloration from 2w ago likely a healing bruise also. Could be due to fall(s).   Review medications to reduce risk of falls. Consider seeing comprehensive geriatric clinic next visit to help.            Murmur, cardiac     2/6 systolic murmur this visit. Not known to have prior murmur  Consider echo next visit.             Other Visit Diagnoses       Seizure disorder (CMS/HCC)        Relevant Medications    levETIRAcetam (Keppra) 1,000 mg tablet    Hypertension, unspecified type        Relevant Medications    losartan (Cozaar) 25 mg tablet    famotidine (Pepcid) 20 mg tablet    atorvastatin (Lipitor) 80 mg tablet    traZODone (Desyrel) 50 mg tablet    Other Relevant Orders    Comprehensive metabolic panel    CBC    TSH with reflex to Free T4 if abnormal    Prediabetes        Relevant Orders    Hemoglobin A1c          #health Maintenance  Colonoscopy - no prior found. Will need to see GI again.   Mammogram - 2/2023, next 2/2024  DEXA - none found, has been ordered 6/2023 and 10/2023   PAP - none found.   Labs: done this visit.    Immunizations: Shingrix 1/2021, pneumonia  1/2021. Flu shot obtained this year. Next tetanus due 1/2024.        Patient was identified as a fall risk. Risk prevention instructions provided.

## 2023-11-27 NOTE — ASSESSMENT & PLAN NOTE
Refill meds for now: benztropine TID   Consider adjusting medications (at neuro office) to reduce falls

## 2023-11-27 NOTE — PATIENT INSTRUCTIONS

## 2023-11-27 NOTE — ASSESSMENT & PLAN NOTE
Reviewed note from 10/2023 where GI office noticed bruises, apparent APS contacted.   Area of skin discoloration from 2w ago likely a healing bruise also. Could be due to fall(s).   Review medications to reduce risk of falls. Consider seeing comprehensive geriatric clinic next visit to help.

## 2023-11-27 NOTE — ASSESSMENT & PLAN NOTE
Sees psych at [facility]  Refill meds for now: quetiapine 300  Consider adjusting medications (at psych office) to reduce falls

## 2023-11-30 DIAGNOSIS — T43.505A NEUROLEPTIC INDUCED PARKINSONISM (MULTI): ICD-10-CM

## 2023-11-30 DIAGNOSIS — G21.11 NEUROLEPTIC INDUCED PARKINSONISM (MULTI): ICD-10-CM

## 2023-11-30 RX ORDER — BENZTROPINE MESYLATE 0.5 MG/1
TABLET ORAL
Qty: 120 TABLET | Refills: 0 | Status: SHIPPED | OUTPATIENT
Start: 2023-11-30 | End: 2023-12-02

## 2023-12-01 DIAGNOSIS — G21.11 NEUROLEPTIC INDUCED PARKINSONISM (MULTI): ICD-10-CM

## 2023-12-01 DIAGNOSIS — T43.505A NEUROLEPTIC INDUCED PARKINSONISM (MULTI): ICD-10-CM

## 2023-12-02 RX ORDER — BENZTROPINE MESYLATE 0.5 MG/1
TABLET ORAL
Qty: 120 TABLET | Refills: 10 | Status: SHIPPED | OUTPATIENT
Start: 2023-12-02 | End: 2024-01-26 | Stop reason: SDUPTHER

## 2023-12-06 ENCOUNTER — OFFICE VISIT (OUTPATIENT)
Dept: GASTROENTEROLOGY | Facility: CLINIC | Age: 68
End: 2023-12-06
Payer: MEDICARE

## 2023-12-06 VITALS — BODY MASS INDEX: 24.43 KG/M2 | OXYGEN SATURATION: 94 % | WEIGHT: 152 LBS | HEIGHT: 66 IN | HEART RATE: 83 BPM

## 2023-12-06 DIAGNOSIS — Z12.11 SCREENING FOR COLON CANCER: Primary | ICD-10-CM

## 2023-12-06 PROCEDURE — 1036F TOBACCO NON-USER: CPT | Performed by: INTERNAL MEDICINE

## 2023-12-06 PROCEDURE — 1126F AMNT PAIN NOTED NONE PRSNT: CPT | Performed by: INTERNAL MEDICINE

## 2023-12-06 PROCEDURE — 1159F MED LIST DOCD IN RCRD: CPT | Performed by: INTERNAL MEDICINE

## 2023-12-06 PROCEDURE — 1160F RVW MEDS BY RX/DR IN RCRD: CPT | Performed by: INTERNAL MEDICINE

## 2023-12-06 NOTE — PROGRESS NOTES
Subjective     History of Present Illness:   Janine Sandoval is a 68 y.o. female who presents to GI clinic for counseling about screening colonoscopy.  Previously, she had been prepped and come for a colonoscopy to [facility] but there was no  available and it was unclear whether she understood what she was therefore or what was involved in the procedure.  She does not have any GI symptoms (visit conducted through a sign ).  Specifically, she does not have heartburn, abdominal pain, or problems with her bowel movement.  Went through the risks and benefits of colonoscopy including risks of bleeding, perforation, medication reaction, and the potential consequences of those.    Review of Systems  Review of Systems    Past Medical History   has a past medical history of Anemia, Depression, Gastro-esophageal reflux disease without esophagitis (12/21/2022), HL (hearing loss), Hyperlipidemia, unspecified (09/25/2019), Hypertension, and Personal history of transient ischemic attack (TIA), and cerebral infarction without residual deficits (12/21/2022).     Social History   reports that she has never smoked. She has never used smokeless tobacco. She reports that she does not drink alcohol and does not use drugs.     Family History  family history includes Glaucoma in her father; Parkinsonism in her father and mother; cardiac disorder in her father.     Allergies  Allergies   Allergen Reactions    Levofloxacin Dizziness     Question seizure       Medications  Current Outpatient Medications   Medication Instructions    acetaminophen (Tylenol) 500 mg tablet Take 1-2 Tablets by mouth every 8 hours as needed.    albuterol 2.5 mg /3 mL (0.083 %) nebulizer solution 1 VIAL VIA AEROSOL EVERY 4 HOURS AS NEEDED FOR SHORTNESS OF BREATH / CHEST TIGHTNESS    artificial tears, hypromellose, (Isopto Tears) 0.5 % ophthalmic solution PLACE 1 DROP INTO BOTH EYES 4 TIMES A DAY AS NEEDED AT 8:00AM, 12:00PM    atorvastatin  (LIPITOR) 80 mg, oral, Daily    benztropine (Cogentin) 0.5 mg tablet TAKE 1 TABLET BY MOUTH IN THE MORNING AND AFTERNOON ~108Q2 TAKE 2 TABLETS BY MOUTH EVERY NIGHT AT BEDTIME    cholecalciferol (VITAMIN D-3) 2,000 Units, oral, Daily RT    clopidogrel (Plavix) 75 mg tablet TAKE ONE TABLET BY MOUTH EVERY DAY (DUE FOR APPT)    diclofenac sodium (Voltaren) 1 % gel gel Topical, 4 times daily    famotidine (PEPCID) 20 mg, oral, Daily    haloperidol (HALDOL) 5 mg, oral, 2 times daily    haloperidol decanoate (Haldol Decanoate) 100 mg/mL injection Inject 2 mL ( 200 MG) into the muscle every 28 days.    haloperidol decanoate (Haldol Decanoate) 50 mg/mL injection     levETIRAcetam (KEPPRA) 1,000 mg, oral, Daily    lidocaine (Lidoderm) 5 % patch 1 patch, transdermal, Every 24 hours    losartan (COZAAR) 25 mg, oral, Daily    ondansetron ODT (Zofran-ODT) 4 mg disintegrating tablet     phenytoin ER (DILANTIN) 300 mg, oral, Nightly    QUEtiapine (SEROquel) 300 mg tablet TAKE 1 TABLET BY MOUTH DAILY *DOSE INCREASE*    sertraline (ZOLOFT) 100 mg, oral, Daily RT    traZODone (DESYREL) 50 mg, oral, Nightly        Objective   Visit Vitals  Pulse 83      Physical Exam    Appears comfortable.  Counseled.      Assessment/Plan   Janine Sandoval is a 68 y.o. female who presents to GI clinic for counseling for colonoscopy prep.    Explained risk benefits and alternatives and she would like to proceed.    Hold clopidogrel for 5 days prior.      Kody Watt MD

## 2023-12-15 ENCOUNTER — ANCILLARY PROCEDURE (OUTPATIENT)
Dept: RADIOLOGY | Facility: CLINIC | Age: 68
End: 2023-12-15
Payer: MEDICARE

## 2023-12-15 DIAGNOSIS — E55.9 VITAMIN D DEFICIENCY, UNSPECIFIED: ICD-10-CM

## 2023-12-15 DIAGNOSIS — Z00.00 ENCOUNTER FOR GENERAL ADULT MEDICAL EXAMINATION WITHOUT ABNORMAL FINDINGS: ICD-10-CM

## 2023-12-21 ENCOUNTER — OFFICE VISIT (OUTPATIENT)
Dept: GASTROENTEROLOGY | Facility: EXTERNAL LOCATION | Age: 68
End: 2023-12-21
Payer: MEDICARE

## 2023-12-21 DIAGNOSIS — Z12.11 SCREENING FOR COLON CANCER: ICD-10-CM

## 2023-12-21 DIAGNOSIS — D12.3 BENIGN NEOPLASM OF TRANSVERSE COLON: Primary | ICD-10-CM

## 2023-12-21 PROCEDURE — 1036F TOBACCO NON-USER: CPT | Performed by: INTERNAL MEDICINE

## 2023-12-21 PROCEDURE — 88305 TISSUE EXAM BY PATHOLOGIST: CPT | Performed by: PATHOLOGY

## 2023-12-21 PROCEDURE — 1160F RVW MEDS BY RX/DR IN RCRD: CPT | Performed by: INTERNAL MEDICINE

## 2023-12-21 PROCEDURE — 45385 COLONOSCOPY W/LESION REMOVAL: CPT | Performed by: INTERNAL MEDICINE

## 2023-12-21 PROCEDURE — 88305 TISSUE EXAM BY PATHOLOGIST: CPT

## 2023-12-21 PROCEDURE — 0753T DGTZ GLS MCRSCP SLD LEVEL IV: CPT

## 2023-12-21 PROCEDURE — 1159F MED LIST DOCD IN RCRD: CPT | Performed by: INTERNAL MEDICINE

## 2023-12-21 PROCEDURE — 1126F AMNT PAIN NOTED NONE PRSNT: CPT | Performed by: INTERNAL MEDICINE

## 2023-12-22 ENCOUNTER — LAB REQUISITION (OUTPATIENT)
Dept: LAB | Facility: HOSPITAL | Age: 68
End: 2023-12-22
Payer: MEDICARE

## 2024-01-02 LAB
LABORATORY COMMENT REPORT: NORMAL
PATH REPORT.FINAL DX SPEC: NORMAL
PATH REPORT.GROSS SPEC: NORMAL
PATH REPORT.RELEVANT HX SPEC: NORMAL
PATH REPORT.TOTAL CANCER: NORMAL

## 2024-01-26 ENCOUNTER — OFFICE VISIT (OUTPATIENT)
Dept: PRIMARY CARE | Facility: CLINIC | Age: 69
End: 2024-01-26
Payer: MEDICARE

## 2024-01-26 VITALS — SYSTOLIC BLOOD PRESSURE: 120 MMHG | WEIGHT: 153 LBS | BODY MASS INDEX: 24.69 KG/M2 | DIASTOLIC BLOOD PRESSURE: 80 MMHG

## 2024-01-26 DIAGNOSIS — K21.9 GASTROESOPHAGEAL REFLUX DISEASE WITHOUT ESOPHAGITIS: ICD-10-CM

## 2024-01-26 DIAGNOSIS — I10 ESSENTIAL HYPERTENSION: ICD-10-CM

## 2024-01-26 DIAGNOSIS — H91.3 DEAF-MUTISM: ICD-10-CM

## 2024-01-26 DIAGNOSIS — E78.5 HYPERLIPIDEMIA, UNSPECIFIED HYPERLIPIDEMIA TYPE: ICD-10-CM

## 2024-01-26 DIAGNOSIS — Z12.31 BREAST CANCER SCREENING BY MAMMOGRAM: Primary | ICD-10-CM

## 2024-01-26 DIAGNOSIS — G40.909 SEIZURE DISORDER (MULTI): ICD-10-CM

## 2024-01-26 DIAGNOSIS — I10 HYPERTENSION, UNSPECIFIED TYPE: ICD-10-CM

## 2024-01-26 DIAGNOSIS — I63.9 CEREBRAL INFARCTION, UNSPECIFIED MECHANISM (MULTI): ICD-10-CM

## 2024-01-26 DIAGNOSIS — F20.0 PARANOID SCHIZOPHRENIA (MULTI): ICD-10-CM

## 2024-01-26 DIAGNOSIS — T43.505A NEUROLEPTIC INDUCED PARKINSONISM (MULTI): ICD-10-CM

## 2024-01-26 DIAGNOSIS — G40.209 PARTIAL EPILEPSY WITH IMPAIRMENT OF CONSCIOUSNESS (MULTI): ICD-10-CM

## 2024-01-26 DIAGNOSIS — F32.A DEPRESSION, UNSPECIFIED DEPRESSION TYPE: ICD-10-CM

## 2024-01-26 DIAGNOSIS — E55.9 VITAMIN D DEFICIENCY: ICD-10-CM

## 2024-01-26 DIAGNOSIS — G21.11 NEUROLEPTIC INDUCED PARKINSONISM (MULTI): ICD-10-CM

## 2024-01-26 PROBLEM — D12.3 BENIGN NEOPLASM OF TRANSVERSE COLON: Status: ACTIVE | Noted: 2023-12-21

## 2024-01-26 PROBLEM — M54.9 CHRONIC BACK PAIN: Status: ACTIVE | Noted: 2022-10-08

## 2024-01-26 PROBLEM — R29.6 RECURRENT FALLS: Status: ACTIVE | Noted: 2024-01-26

## 2024-01-26 PROBLEM — G89.29 CHRONIC BACK PAIN: Status: ACTIVE | Noted: 2022-10-08

## 2024-01-26 PROBLEM — N18.9 ANEMIA DUE TO CHRONIC KIDNEY DISEASE: Status: ACTIVE | Noted: 2024-01-26

## 2024-01-26 PROBLEM — D63.1 ANEMIA DUE TO CHRONIC KIDNEY DISEASE: Status: ACTIVE | Noted: 2024-01-26

## 2024-01-26 PROCEDURE — 1036F TOBACCO NON-USER: CPT | Performed by: INTERNAL MEDICINE

## 2024-01-26 PROCEDURE — 3079F DIAST BP 80-89 MM HG: CPT | Performed by: INTERNAL MEDICINE

## 2024-01-26 PROCEDURE — 99214 OFFICE O/P EST MOD 30 MIN: CPT | Performed by: INTERNAL MEDICINE

## 2024-01-26 PROCEDURE — 1126F AMNT PAIN NOTED NONE PRSNT: CPT | Performed by: INTERNAL MEDICINE

## 2024-01-26 PROCEDURE — 3074F SYST BP LT 130 MM HG: CPT | Performed by: INTERNAL MEDICINE

## 2024-01-26 RX ORDER — BENZTROPINE MESYLATE 0.5 MG/1
TABLET ORAL
Qty: 120 TABLET | Refills: 5 | Status: SHIPPED | OUTPATIENT
Start: 2024-01-26

## 2024-01-26 RX ORDER — TRAZODONE HYDROCHLORIDE 50 MG/1
50 TABLET ORAL NIGHTLY
Qty: 90 TABLET | Refills: 1 | Status: SHIPPED | OUTPATIENT
Start: 2024-01-26 | End: 2024-05-17 | Stop reason: SDUPTHER

## 2024-01-26 RX ORDER — LOSARTAN POTASSIUM 25 MG/1
25 TABLET ORAL DAILY
Qty: 90 TABLET | Refills: 1 | Status: SHIPPED | OUTPATIENT
Start: 2024-01-26 | End: 2024-05-17 | Stop reason: SDUPTHER

## 2024-01-26 RX ORDER — ATORVASTATIN CALCIUM 80 MG/1
80 TABLET, FILM COATED ORAL DAILY
Qty: 90 TABLET | Refills: 1 | Status: SHIPPED | OUTPATIENT
Start: 2024-01-26 | End: 2024-05-17 | Stop reason: SDUPTHER

## 2024-01-26 RX ORDER — CHOLECALCIFEROL (VITAMIN D3) 50 MCG
2000 TABLET ORAL
Qty: 90 TABLET | Refills: 1 | Status: SHIPPED | OUTPATIENT
Start: 2024-01-26 | End: 2024-05-17 | Stop reason: SDUPTHER

## 2024-01-26 RX ORDER — FAMOTIDINE 20 MG/1
20 TABLET, FILM COATED ORAL DAILY
Qty: 90 TABLET | Refills: 1 | Status: SHIPPED | OUTPATIENT
Start: 2024-01-26 | End: 2024-05-17 | Stop reason: SDUPTHER

## 2024-01-26 RX ORDER — CLOPIDOGREL BISULFATE 75 MG/1
TABLET ORAL
Qty: 90 TABLET | Refills: 1 | Status: SHIPPED | OUTPATIENT
Start: 2024-01-26 | End: 2024-04-12 | Stop reason: SDUPTHER

## 2024-01-26 RX ORDER — LEVETIRACETAM 1000 MG/1
1000 TABLET ORAL DAILY
Qty: 90 TABLET | Refills: 1 | Status: SHIPPED | OUTPATIENT
Start: 2024-01-26

## 2024-01-26 RX ORDER — SERTRALINE HYDROCHLORIDE 100 MG/1
100 TABLET, FILM COATED ORAL
Qty: 90 TABLET | Refills: 1 | Status: SHIPPED | OUTPATIENT
Start: 2024-01-26 | End: 2024-05-17 | Stop reason: SDUPTHER

## 2024-01-26 RX ORDER — PHENYTOIN SODIUM 100 MG/1
300 CAPSULE, EXTENDED RELEASE ORAL NIGHTLY
Qty: 180 CAPSULE | Refills: 2 | Status: SHIPPED | OUTPATIENT
Start: 2024-01-26 | End: 2024-05-17 | Stop reason: SDUPTHER

## 2024-01-26 RX ORDER — QUETIAPINE FUMARATE 300 MG/1
TABLET, FILM COATED ORAL
Qty: 90 TABLET | Refills: 1 | Status: SHIPPED | OUTPATIENT
Start: 2024-01-26 | End: 2024-05-17 | Stop reason: SDUPTHER

## 2024-01-26 ASSESSMENT — ENCOUNTER SYMPTOMS: BACK PAIN: 1

## 2024-01-26 ASSESSMENT — PATIENT HEALTH QUESTIONNAIRE - PHQ9
2. FEELING DOWN, DEPRESSED OR HOPELESS: NOT AT ALL
1. LITTLE INTEREST OR PLEASURE IN DOING THINGS: NOT AT ALL
SUM OF ALL RESPONSES TO PHQ9 QUESTIONS 1 AND 2: 0

## 2024-01-26 NOTE — PATIENT INSTRUCTIONS

## 2024-01-26 NOTE — PROGRESS NOTES
Subjective   Patient ID: Janine Sandoval is a 68 y.o. female who presents for Follow-up and Med Refill.    68f who is deaf with HTN, HLD, hx stroke with residual right sided weakness, depression, schizophrenia, bilateral chronic knee osteoarthritis, GERD, Anemia, seizures, and Asthma who presents for a follow up visit. Grace, caretaker, here today with her. Alexa uses a phone parisa 1stdibs to communicate sign language with pt.    120/80 BP today.   Lost 30 to 40 lbs in last 1.5 years, knee pain has improved after.   FALLS : pt fell outside today, slid down to ground while walking on rollator. Does fall about once a month per caregiver. Said she had some lower back pain, though can walk.          Review of Systems   Musculoskeletal:  Positive for back pain.   All other systems reviewed and are negative.      Objective   /80   Wt 69.4 kg (153 lb)   BMI 24.69 kg/m²     Physical Exam  Vitals reviewed.   Constitutional:       General: She is not in acute distress.     Comments: Communicates with sign language and nods / head shaking.      HENT:      Head: Normocephalic and atraumatic.   Eyes:      Extraocular Movements: Extraocular movements intact.      Conjunctiva/sclera: Conjunctivae normal.   Cardiovascular:      Rate and Rhythm: Normal rate and regular rhythm.      Heart sounds: No murmur heard.     No friction rub. No gallop.   Pulmonary:      Effort: Pulmonary effort is normal.      Breath sounds: Normal breath sounds. No wheezing, rhonchi or rales.   Abdominal:      General: Bowel sounds are normal.      Palpations: Abdomen is soft. There is no mass.      Tenderness: There is no abdominal tenderness. There is no guarding or rebound.   Musculoskeletal:         General: No tenderness.      Cervical back: Neck supple.      Right lower leg: No edema.      Left lower leg: No edema.   Skin:     General: Skin is warm and dry.      Findings: No bruising or rash.   Neurological:      Mental Status: She is alert.  Mental status is at baseline.   Psychiatric:         Mood and Affect: Mood and affect normal.         Assessment/Plan   Problem List Items Addressed This Visit       Essential hypertension    Vitamin D deficiency    Relevant Medications    cholecalciferol (Vitamin D-3) 50 MCG (2000 UT) tablet    Cerebral infarction (CMS/HCC)    Relevant Medications    clopidogrel (Plavix) 75 mg tablet    Deaf-mutism    Depression    Relevant Medications    sertraline (Zoloft) 100 mg tablet    Gastroesophageal reflux disease without esophagitis     Famotidine 20 daily          Neuroleptic induced parkinsonism (CMS/HCC)    Relevant Medications    benztropine (Cogentin) 0.5 mg tablet    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/HCC)    Relevant Medications    phenytoin ER (Dilantin) 100 mg capsule    Paranoid schizophrenia (CMS/HCC)    Relevant Medications    QUEtiapine (SEROquel) 300 mg tablet    Hyperlipemia    Relevant Medications    traZODone (Desyrel) 50 mg tablet    losartan (Cozaar) 25 mg tablet    famotidine (Pepcid) 20 mg tablet    atorvastatin (Lipitor) 80 mg tablet    Breast cancer screening by mammogram - Primary    Relevant Orders    BI mammo bilateral screening tomosynthesis     Other Visit Diagnoses       Hypertension, unspecified type        Relevant Medications    traZODone (Desyrel) 50 mg tablet    losartan (Cozaar) 25 mg tablet    famotidine (Pepcid) 20 mg tablet    atorvastatin (Lipitor) 80 mg tablet    Seizure disorder (CMS/HCC)        Relevant Medications    levETIRAcetam (Keppra) 1,000 mg tablet             Patient was identified as a fall risk. Risk prevention instructions provided.    #health Maintenance  Colonoscopy - done 12/21/23. Path showed tubular adenoma. Repeat in 5 years.   Mammogram - 2/2023, next 2/2024, ordered.   DEXA - none found, has been ordered 6/2023 and 10/2023. Printed referral.   PAP - none found.   Labs: none this visit.  Consider adding hep C screening lab next visit.    Immunizations: Shingrix 1/2021, pneumonia 1/2021. Next tetanus due 8/2024.

## 2024-02-26 ENCOUNTER — HOSPITAL ENCOUNTER (OUTPATIENT)
Dept: RADIOLOGY | Facility: CLINIC | Age: 69
Discharge: HOME | End: 2024-02-26
Payer: MEDICARE

## 2024-02-26 VITALS — BODY MASS INDEX: 24.59 KG/M2 | WEIGHT: 153 LBS | HEIGHT: 66 IN

## 2024-02-26 DIAGNOSIS — Z12.31 BREAST CANCER SCREENING BY MAMMOGRAM: ICD-10-CM

## 2024-02-26 PROCEDURE — 77067 SCR MAMMO BI INCL CAD: CPT | Performed by: RADIOLOGY

## 2024-02-26 PROCEDURE — 77067 SCR MAMMO BI INCL CAD: CPT

## 2024-02-26 PROCEDURE — 77063 BREAST TOMOSYNTHESIS BI: CPT | Performed by: RADIOLOGY

## 2024-03-13 ENCOUNTER — TELEPHONE (OUTPATIENT)
Dept: PRIMARY CARE | Facility: CLINIC | Age: 69
End: 2024-03-13
Payer: MEDICARE

## 2024-03-13 DIAGNOSIS — G47.00 INSOMNIA, UNSPECIFIED TYPE: Primary | ICD-10-CM

## 2024-03-13 RX ORDER — MELATONIN 5 MG
5 CAPSULE ORAL DAILY
Qty: 90 CAPSULE | Refills: 0 | Status: SHIPPED | OUTPATIENT
Start: 2024-03-13 | End: 2024-05-07

## 2024-03-13 NOTE — TELEPHONE ENCOUNTER
Caretaker is calling she is having difficulty sleeping wanted to know if you could put in a script for melatonin.    Pharmacy is [facility]

## 2024-04-12 DIAGNOSIS — I63.9 CEREBRAL INFARCTION, UNSPECIFIED MECHANISM (MULTI): ICD-10-CM

## 2024-04-12 RX ORDER — CLOPIDOGREL BISULFATE 75 MG/1
75 TABLET ORAL DAILY
Qty: 90 TABLET | Refills: 0 | Status: SHIPPED | OUTPATIENT
Start: 2024-04-12 | End: 2024-05-17 | Stop reason: SDUPTHER

## 2024-05-07 DIAGNOSIS — G47.00 INSOMNIA, UNSPECIFIED TYPE: ICD-10-CM

## 2024-05-07 RX ORDER — MELATONIN 5 MG
5 CAPSULE ORAL DAILY
Qty: 30 CAPSULE | Refills: 10 | Status: SHIPPED | OUTPATIENT
Start: 2024-05-07 | End: 2024-05-07

## 2024-05-07 RX ORDER — MELATONIN 5 MG
5 CAPSULE ORAL DAILY
Qty: 30 CAPSULE | Refills: 0 | Status: SHIPPED | OUTPATIENT
Start: 2024-05-07 | End: 2024-05-17 | Stop reason: SDUPTHER

## 2024-05-17 ENCOUNTER — LAB (OUTPATIENT)
Dept: LAB | Facility: LAB | Age: 69
End: 2024-05-17
Payer: COMMERCIAL

## 2024-05-17 ENCOUNTER — OFFICE VISIT (OUTPATIENT)
Dept: PRIMARY CARE | Facility: CLINIC | Age: 69
End: 2024-05-17
Payer: COMMERCIAL

## 2024-05-17 VITALS — DIASTOLIC BLOOD PRESSURE: 80 MMHG | SYSTOLIC BLOOD PRESSURE: 130 MMHG | BODY MASS INDEX: 24.55 KG/M2 | WEIGHT: 152 LBS

## 2024-05-17 DIAGNOSIS — F20.0 PARANOID SCHIZOPHRENIA (MULTI): ICD-10-CM

## 2024-05-17 DIAGNOSIS — I10 HYPERTENSION, UNSPECIFIED TYPE: ICD-10-CM

## 2024-05-17 DIAGNOSIS — N39.0 URINARY TRACT INFECTION WITHOUT HEMATURIA, SITE UNSPECIFIED: ICD-10-CM

## 2024-05-17 DIAGNOSIS — G40.909 NONINTRACTABLE EPILEPSY WITHOUT STATUS EPILEPTICUS, UNSPECIFIED EPILEPSY TYPE (MULTI): ICD-10-CM

## 2024-05-17 DIAGNOSIS — I63.9 CEREBRAL INFARCTION, UNSPECIFIED MECHANISM (MULTI): ICD-10-CM

## 2024-05-17 DIAGNOSIS — K21.9 GASTROESOPHAGEAL REFLUX DISEASE WITHOUT ESOPHAGITIS: ICD-10-CM

## 2024-05-17 DIAGNOSIS — G40.209 PARTIAL EPILEPSY WITH IMPAIRMENT OF CONSCIOUSNESS (MULTI): ICD-10-CM

## 2024-05-17 DIAGNOSIS — E55.9 VITAMIN D DEFICIENCY: ICD-10-CM

## 2024-05-17 DIAGNOSIS — I10 ESSENTIAL HYPERTENSION: Primary | ICD-10-CM

## 2024-05-17 DIAGNOSIS — G47.00 INSOMNIA, UNSPECIFIED TYPE: ICD-10-CM

## 2024-05-17 DIAGNOSIS — I10 ESSENTIAL HYPERTENSION: ICD-10-CM

## 2024-05-17 DIAGNOSIS — E78.5 HYPERLIPIDEMIA, UNSPECIFIED HYPERLIPIDEMIA TYPE: ICD-10-CM

## 2024-05-17 DIAGNOSIS — F32.A DEPRESSION, UNSPECIFIED DEPRESSION TYPE: ICD-10-CM

## 2024-05-17 LAB
ALBUMIN SERPL BCP-MCNC: 4.1 G/DL (ref 3.4–5)
ALP SERPL-CCNC: 86 U/L (ref 33–136)
ALT SERPL W P-5'-P-CCNC: 30 U/L (ref 7–45)
ANION GAP SERPL CALC-SCNC: 16 MMOL/L (ref 10–20)
AST SERPL W P-5'-P-CCNC: 20 U/L (ref 9–39)
BILIRUB SERPL-MCNC: 0.3 MG/DL (ref 0–1.2)
BUN SERPL-MCNC: 15 MG/DL (ref 6–23)
CALCIUM SERPL-MCNC: 9 MG/DL (ref 8.6–10.6)
CHLORIDE SERPL-SCNC: 107 MMOL/L (ref 98–107)
CO2 SERPL-SCNC: 23 MMOL/L (ref 21–32)
CREAT SERPL-MCNC: 0.65 MG/DL (ref 0.5–1.05)
EGFRCR SERPLBLD CKD-EPI 2021: >90 ML/MIN/1.73M*2
GLUCOSE SERPL-MCNC: 113 MG/DL (ref 74–99)
PHENYTOIN SERPL-MCNC: 22.3 UG/ML (ref 10–20)
POTASSIUM SERPL-SCNC: 4.1 MMOL/L (ref 3.5–5.3)
PROT SERPL-MCNC: 6.7 G/DL (ref 6.4–8.2)
SODIUM SERPL-SCNC: 142 MMOL/L (ref 136–145)

## 2024-05-17 PROCEDURE — 99214 OFFICE O/P EST MOD 30 MIN: CPT | Performed by: INTERNAL MEDICINE

## 2024-05-17 PROCEDURE — 1159F MED LIST DOCD IN RCRD: CPT | Performed by: INTERNAL MEDICINE

## 2024-05-17 PROCEDURE — 3075F SYST BP GE 130 - 139MM HG: CPT | Performed by: INTERNAL MEDICINE

## 2024-05-17 PROCEDURE — 1160F RVW MEDS BY RX/DR IN RCRD: CPT | Performed by: INTERNAL MEDICINE

## 2024-05-17 PROCEDURE — 1036F TOBACCO NON-USER: CPT | Performed by: INTERNAL MEDICINE

## 2024-05-17 PROCEDURE — 80053 COMPREHEN METABOLIC PANEL: CPT

## 2024-05-17 PROCEDURE — 3079F DIAST BP 80-89 MM HG: CPT | Performed by: INTERNAL MEDICINE

## 2024-05-17 PROCEDURE — 80185 ASSAY OF PHENYTOIN TOTAL: CPT

## 2024-05-17 PROCEDURE — 93000 ELECTROCARDIOGRAM COMPLETE: CPT | Performed by: INTERNAL MEDICINE

## 2024-05-17 PROCEDURE — 36415 COLL VENOUS BLD VENIPUNCTURE: CPT

## 2024-05-17 PROCEDURE — 80186 ASSAY OF PHENYTOIN FREE: CPT

## 2024-05-17 RX ORDER — SERTRALINE HYDROCHLORIDE 100 MG/1
100 TABLET, FILM COATED ORAL
Qty: 90 TABLET | Refills: 1 | Status: SHIPPED | OUTPATIENT
Start: 2024-05-17

## 2024-05-17 RX ORDER — TRAZODONE HYDROCHLORIDE 50 MG/1
50 TABLET ORAL NIGHTLY
Qty: 90 TABLET | Refills: 1 | Status: SHIPPED | OUTPATIENT
Start: 2024-05-17

## 2024-05-17 RX ORDER — LOSARTAN POTASSIUM 25 MG/1
25 TABLET ORAL DAILY
Qty: 90 TABLET | Refills: 1 | Status: SHIPPED | OUTPATIENT
Start: 2024-05-17

## 2024-05-17 RX ORDER — CLOPIDOGREL BISULFATE 75 MG/1
75 TABLET ORAL DAILY
Qty: 90 TABLET | Refills: 0 | Status: SHIPPED | OUTPATIENT
Start: 2024-05-17

## 2024-05-17 RX ORDER — FAMOTIDINE 20 MG/1
20 TABLET, FILM COATED ORAL DAILY
Qty: 90 TABLET | Refills: 1 | Status: SHIPPED | OUTPATIENT
Start: 2024-05-17

## 2024-05-17 RX ORDER — MELATONIN 5 MG
5 CAPSULE ORAL DAILY
Qty: 30 CAPSULE | Refills: 0 | Status: SHIPPED | OUTPATIENT
Start: 2024-05-17

## 2024-05-17 RX ORDER — ATORVASTATIN CALCIUM 80 MG/1
80 TABLET, FILM COATED ORAL DAILY
Qty: 90 TABLET | Refills: 1 | Status: SHIPPED | OUTPATIENT
Start: 2024-05-17

## 2024-05-17 RX ORDER — CHOLECALCIFEROL (VITAMIN D3) 50 MCG
2000 TABLET ORAL
Qty: 90 TABLET | Refills: 1 | Status: SHIPPED | OUTPATIENT
Start: 2024-05-17

## 2024-05-17 RX ORDER — ACETAMINOPHEN 500 MG
TABLET ORAL
Qty: 180 TABLET | Refills: 1 | Status: SHIPPED | OUTPATIENT
Start: 2024-05-17

## 2024-05-17 RX ORDER — PHENYTOIN SODIUM 100 MG/1
300 CAPSULE, EXTENDED RELEASE ORAL NIGHTLY
Qty: 180 CAPSULE | Refills: 2 | Status: SHIPPED | OUTPATIENT
Start: 2024-05-17

## 2024-05-17 RX ORDER — QUETIAPINE FUMARATE 300 MG/1
TABLET, FILM COATED ORAL
Qty: 90 TABLET | Refills: 1 | Status: SHIPPED | OUTPATIENT
Start: 2024-05-17

## 2024-05-17 NOTE — PROGRESS NOTES
Chief Complaint:   Chief Complaint   Patient presents with    Follow-up    Med Refill      HPI    Janine Sandoval is a 68 y.o. year old female who presents to the clinic for a regular follow-up visit. She is deaf-mutism at baseline and therefore her caretaker provides the majority of the information. She states that over the last few weeks the patient has been more irritable. It seems like she is suffering from worsening hallucinations as well. She is not mean to her because she seems to trust her (the caretaker) but to other staff she has been more aggressive.    Denies chest pain/pressure, abdominal pain, nausea, vomiting, fever, chills, headaches.     Past Medical History   Past Medical History:   Diagnosis Date    Anemia     Depression     Gastro-esophageal reflux disease without esophagitis 12/21/2022    Gastroesophageal reflux disease without esophagitis    HL (hearing loss)     Hyperlipidemia, unspecified 09/25/2019    Hyperlipemia    Hypertension     Personal history of transient ischemic attack (TIA), and cerebral infarction without residual deficits 12/21/2022    History of stroke      Past Surgical History:   History reviewed. No pertinent surgical history.  Family History:   Family History   Problem Relation Name Age of Onset    Parkinsonism Mother      Other (cardiac disorder) Father      Glaucoma Father      Parkinsonism Father       Social History:   Tobacco Use: Low Risk  (5/17/2024)    Patient History     Smoking Tobacco Use: Never     Smokeless Tobacco Use: Never     Passive Exposure: Not on file      Social History     Substance and Sexual Activity   Alcohol Use Never      Allergies:   Allergies   Allergen Reactions    Levofloxacin Dizziness     Question seizure      Objective   Vitals:    05/17/24 0904   BP: 130/80      BMI Readings from Last 15 Encounters:   05/17/24 24.55 kg/m²   02/26/24 24.71 kg/m²   01/26/24 24.69 kg/m²   12/06/23 24.53 kg/m²   11/27/23 24.53 kg/m²   10/06/23 24.37 kg/m²    02/20/23 27.92 kg/m²   12/21/22 27.92 kg/m²      68.9 kg (152 lb) (5/17/2024  9:04 AM)    Physical Exam  Physical Exam  HENT:      Mouth/Throat:      Mouth: Mucous membranes are moist.      Pharynx: Oropharynx is clear.   Cardiovascular:      Rate and Rhythm: Normal rate and regular rhythm.      Pulses: Normal pulses.      Heart sounds: Normal heart sounds.   Pulmonary:      Effort: Pulmonary effort is normal.      Breath sounds: Normal breath sounds.   Abdominal:      General: Abdomen is flat. Bowel sounds are normal.      Palpations: Abdomen is soft.   Neurological:      Mental Status: She is alert.        Labs:  CBC:  Recent Labs     10/08/22  1046   WBC 5.2   HGB 11.7*   HCT 37.6      MCV 92     CMP:  Recent Labs     11/27/23  1011 10/06/23  0944 10/08/22  1046    140 137   K 3.8 3.8 4.2    106 103   CO2 27 19* 26   ANIONGAP 14 19 12   BUN 11 17 11   CREATININE 0.72 0.61 0.65   EGFR >90 >90  --    GLUCOSE 161* 138* 123*     Recent Labs     11/27/23  1011 10/06/23  0944 10/08/22  1046   ALBUMIN 3.8 3.9 4.0   ALKPHOS 101 102 99   ALT 37 30 24   AST 29 21 28   BILITOT 0.3 0.3 0.4     Calcium/Phos:   Lab Results   Component Value Date    CALCIUM 9.2 11/27/2023     ENDO:  Recent Labs     11/27/23  1011 02/04/21  1443   TSH 2.46  --    HGBA1C 5.7* 6.0*      CARDIAC:   Recent Labs     10/06/23  0944   CHOL 114   LDLCALC 50*   HDL 51.5   TRIG 61     No data recorded    Current Medications:  Current Outpatient Medications   Medication Sig Dispense Refill    acetaminophen (Tylenol) 500 mg tablet Take 1-2 Tablets by mouth every 8 hours as needed. 180 tablet 1    albuterol 2.5 mg /3 mL (0.083 %) nebulizer solution 1 VIAL VIA AEROSOL EVERY 4 HOURS AS NEEDED FOR SHORTNESS OF BREATH / CHEST TIGHTNESS 75 mL 2    atorvastatin (Lipitor) 80 mg tablet Take 1 tablet (80 mg) by mouth once daily. 90 tablet 1    benztropine (Cogentin) 0.5 mg tablet TAKE 1 TABLET BY MOUTH IN THE MORNING AND AFTERNOON ~108Q2 TAKE 2  TABLETS BY MOUTH EVERY NIGHT AT BEDTIME 120 tablet 5    cholecalciferol (Vitamin D-3) 50 MCG (2000 UT) tablet Take 1 tablet (2,000 Units) by mouth once daily. 90 tablet 1    clopidogrel (Plavix) 75 mg tablet Take 1 tablet (75 mg) by mouth once daily. 90 tablet 0    diclofenac sodium (Voltaren) 1 % gel gel Apply topically 4 times a day.      famotidine (Pepcid) 20 mg tablet Take 1 tablet (20 mg) by mouth once daily. 90 tablet 1    haloperidol decanoate (Haldol Decanoate) 100 mg/mL injection Inject 2 mL ( 200 MG) into the muscle every 28 days.      levETIRAcetam (Keppra) 1,000 mg tablet Take 1 tablet (1,000 mg) by mouth once daily. 90 tablet 1    lidocaine (Lidoderm) 5 % patch Place 1 patch on the skin once every 24 hours.      losartan (Cozaar) 25 mg tablet Take 1 tablet (25 mg) by mouth once daily. 90 tablet 1    melatonin 5 mg capsule TAKE 1 CAPSULE BY MOUTH ONCE DAILY 30 capsule 0    phenytoin ER (Dilantin) 100 mg capsule Take 3 capsules (300 mg) by mouth once daily at bedtime. 180 capsule 2    QUEtiapine (SEROquel) 300 mg tablet TAKE 1 TABLET BY MOUTH DAILY *DOSE INCREASE* 90 tablet 1    sertraline (Zoloft) 100 mg tablet Take 1 tablet (100 mg) by mouth once daily. 90 tablet 1    traZODone (Desyrel) 50 mg tablet Take 1 tablet (50 mg) by mouth once daily at bedtime. 90 tablet 1     No current facility-administered medications for this visit.     Assessment and Plan  Janine was seen today for follow-up and med refill.  Diagnoses and all orders for this visit:  Essential hypertension (Primary)  -     Comprehensive metabolic panel; Future  -     Albumin , Urine Random; Future  Hyperlipidemia, unspecified hyperlipidemia type  Urinary tract infection without hematuria, site unspecified  -     Urinalysis with Reflex Culture and Microscopic; Future  Paranoid schizophrenia (Multi)  Nonintractable epilepsy without status epilepticus, unspecified epilepsy type (Multi)  -     Phenytoin level, free; Future  -     Phenytoin  level, total; Future     # HTN  - Current Regimen: Losartan 25 mg every day   - Last CMP (11/2023): Cr 0.72, GFR > 90  - Last Urine Albumin (10/2023): < 7    # Schizophrenia   # Depression  - Mentions worsening hallucinations  - Haldol 100 mg every 28 days   - c/w Sertraline 100 mg every day + Seroquel 100 mg every day   [ ] UA to rule out UTI as a cause     # Extrapyramidal Side Effects from 1st Generation Antipsychotic  - c/w Benztropine     # Partial Epilepsy   - c/w Phenytoin  mg every day + Keppra 1000 mg every day     # HLD   - Atorvastatin 80 mg tablet     # Pre-diabetes   - HbA1C (11/2023): 5.7    Labs:  - CBC, CMP, Urine Albumin, TSH w/ T4, Lipid Panel, HbA1c, Vitamin D (11/2023), repeat 11/2024  - CMP, Urine Albumin today    Routine Screening:  - Mammogram (2/2024): no mammographic evidence of malignancy, repeat 2/2025  - Colonoscopy/Cologuard: requisite form provided on previous visit   - DEXA Scan: requisite form provided on previous visit     Immunizations:  Immunization History   Administered Date(s) Administered    Flu vaccine (IIV4), preservative free *Check age/dose* 03/03/2019, 01/26/2021    Flu vaccine, quadrivalent, high-dose, preservative free, age 65y+ (FLUZONE) 03/14/2022, 11/08/2022, 10/06/2023    Influenza Whole 11/15/2012    Influenza, seasonal, injectable 02/22/2012, 10/21/2013, 09/24/2014, 10/24/2015, 09/01/2016    Moderna SARS-CoV-2 Vaccination 01/23/2021, 02/19/2021    Pneumococcal conjugate vaccine, 13-valent (PREVNAR 13) 01/26/2021    Pneumococcal polysaccharide vaccine, 23-valent, age 2 years and older (PNEUMOVAX 23) 03/14/2022    Td (adult), unspecified 06/14/1996    Tdap vaccine, age 7 year and older (BOOSTRIX, ADACEL) 08/01/2014, 08/08/2014    Zoster vaccine, recombinant, adult (SHINGRIX) 01/26/2021    Zoster, live 08/15/2016     Please follow up in 3 months.

## 2024-05-17 NOTE — PROGRESS NOTES
I reviewed the resident/fellow's documentation and discussed the patient with the resident/fellow. I agree with the resident/fellow's medical decision making as documented in the note.  As the attending physician, I certify that I personally reviewed the patient's history and personally examined the patient to confirm the physical findings described above, and that I reviewed the relevant imaging studies and available reports. I also discussed the differential diagnosis and all of the proposed management plans with the patient and individuals accompanying the patient to this visit. They had the opportunity to ask questions about the proposed management plans and to have those questions answered.     Behavioral changes over the past few weeks  No changes in meds  Check UA, rule out UTI  Check electrolytes  If all normal, discuss with psych about addressing meds    Jesi Ramsey MD

## 2024-05-18 LAB
APPEARANCE UR: CLEAR
BILIRUB UR STRIP.AUTO-MCNC: NEGATIVE MG/DL
COLOR UR: ABNORMAL
GLUCOSE UR STRIP.AUTO-MCNC: NORMAL MG/DL
HOLD SPECIMEN: NORMAL
KETONES UR STRIP.AUTO-MCNC: NEGATIVE MG/DL
LEUKOCYTE ESTERASE UR QL STRIP.AUTO: ABNORMAL
NITRITE UR QL STRIP.AUTO: NEGATIVE
PH UR STRIP.AUTO: 6.5 [PH]
PROT UR STRIP.AUTO-MCNC: NEGATIVE MG/DL
RBC # UR STRIP.AUTO: NEGATIVE /UL
RBC #/AREA URNS AUTO: NORMAL /HPF
SP GR UR STRIP.AUTO: 1.01
SQUAMOUS #/AREA URNS AUTO: NORMAL /HPF
UROBILINOGEN UR STRIP.AUTO-MCNC: NORMAL MG/DL
WBC #/AREA URNS AUTO: NORMAL /HPF

## 2024-05-18 PROCEDURE — 87086 URINE CULTURE/COLONY COUNT: CPT

## 2024-05-18 PROCEDURE — 81001 URINALYSIS AUTO W/SCOPE: CPT

## 2024-05-20 LAB — BACTERIA UR CULT: NO GROWTH

## 2024-05-21 LAB
PHENYTOIN FREE SERPL-MCNC: 2 UG/ML (ref 1–2)
SCAN RESULT: NORMAL

## 2024-05-24 ENCOUNTER — APPOINTMENT (OUTPATIENT)
Dept: PRIMARY CARE | Facility: CLINIC | Age: 69
End: 2024-05-24
Payer: COMMERCIAL

## 2024-07-29 DIAGNOSIS — G62.9 NEUROPATHY: Primary | ICD-10-CM

## 2024-07-29 DIAGNOSIS — I10 HYPERTENSION, UNSPECIFIED TYPE: ICD-10-CM

## 2024-07-29 DIAGNOSIS — G40.909 SEIZURE DISORDER (MULTI): ICD-10-CM

## 2024-07-29 DIAGNOSIS — E78.5 HYPERLIPIDEMIA, UNSPECIFIED HYPERLIPIDEMIA TYPE: ICD-10-CM

## 2024-07-29 RX ORDER — LIDOCAINE 50 MG/G
1 PATCH TOPICAL DAILY
Qty: 30 PATCH | Refills: 0 | Status: SHIPPED | OUTPATIENT
Start: 2024-07-29 | End: 2025-07-29

## 2024-07-29 RX ORDER — TRAZODONE HYDROCHLORIDE 50 MG/1
50 TABLET ORAL NIGHTLY
Qty: 90 TABLET | Refills: 0 | Status: SHIPPED | OUTPATIENT
Start: 2024-07-29

## 2024-07-29 RX ORDER — FAMOTIDINE 20 MG/1
20 TABLET, FILM COATED ORAL DAILY
Qty: 90 TABLET | Refills: 0 | Status: SHIPPED | OUTPATIENT
Start: 2024-07-29

## 2024-07-29 RX ORDER — LEVETIRACETAM 1000 MG/1
1000 TABLET ORAL DAILY
Qty: 90 TABLET | Refills: 0 | Status: SHIPPED | OUTPATIENT
Start: 2024-07-29

## 2024-07-29 RX ORDER — LOSARTAN POTASSIUM 25 MG/1
25 TABLET ORAL DAILY
Qty: 90 TABLET | Refills: 0 | Status: SHIPPED | OUTPATIENT
Start: 2024-07-29

## 2024-07-29 RX ORDER — ATORVASTATIN CALCIUM 80 MG/1
80 TABLET, FILM COATED ORAL DAILY
Qty: 90 TABLET | Refills: 0 | Status: SHIPPED | OUTPATIENT
Start: 2024-07-29

## 2024-09-16 ENCOUNTER — APPOINTMENT (OUTPATIENT)
Dept: CARDIOLOGY | Facility: HOSPITAL | Age: 69
End: 2024-09-16
Payer: MEDICARE

## 2024-09-16 ENCOUNTER — APPOINTMENT (OUTPATIENT)
Dept: RADIOLOGY | Facility: HOSPITAL | Age: 69
End: 2024-09-16
Payer: MEDICARE

## 2024-09-16 ENCOUNTER — HOSPITAL ENCOUNTER (OUTPATIENT)
Facility: HOSPITAL | Age: 69
Setting detail: OBSERVATION
Discharge: HOME | End: 2024-09-17
Attending: EMERGENCY MEDICINE | Admitting: INTERNAL MEDICINE
Payer: MEDICARE

## 2024-09-16 DIAGNOSIS — R29.6 FREQUENT FALLS: ICD-10-CM

## 2024-09-16 DIAGNOSIS — R53.1 WEAKNESS: ICD-10-CM

## 2024-09-16 DIAGNOSIS — S09.90XA INJURY OF HEAD, INITIAL ENCOUNTER: ICD-10-CM
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
## 2024-09-16 DIAGNOSIS — R07.9 CHEST PAIN, UNSPECIFIED TYPE: ICD-10-CM

## 2024-09-16 LAB
ALBUMIN SERPL BCP-MCNC: 3.9 G/DL (ref 3.4–5)
ALP SERPL-CCNC: 87 U/L (ref 33–136)
ALT SERPL W P-5'-P-CCNC: 34 U/L (ref 7–45)
ANION GAP SERPL CALC-SCNC: 10 MMOL/L (ref 10–20)
APPEARANCE UR: CLEAR
AST SERPL W P-5'-P-CCNC: 18 U/L (ref 9–39)
ATRIAL RATE: 80 BPM
BASOPHILS # BLD AUTO: 0.01 X10*3/UL (ref 0–0.1)
BASOPHILS NFR BLD AUTO: 0.2 %
BILIRUB SERPL-MCNC: 0.4 MG/DL (ref 0–1.2)
BILIRUB UR STRIP.AUTO-MCNC: NEGATIVE MG/DL
BNP SERPL-MCNC: 20 PG/ML (ref 0–99)
BUN SERPL-MCNC: 12 MG/DL (ref 6–23)
CALCIUM SERPL-MCNC: 8.6 MG/DL (ref 8.6–10.3)
CARDIAC TROPONIN I PNL SERPL HS: 15 NG/L (ref 0–13)
CARDIAC TROPONIN I PNL SERPL HS: 16 NG/L (ref 0–13)
CARDIAC TROPONIN I PNL SERPL HS: 17 NG/L (ref 0–13)
CHLORIDE SERPL-SCNC: 104 MMOL/L (ref 98–107)
CO2 SERPL-SCNC: 31 MMOL/L (ref 21–32)
COLOR UR: COLORLESS
CREAT SERPL-MCNC: 0.95 MG/DL (ref 0.5–1.05)
EGFRCR SERPLBLD CKD-EPI 2021: 65 ML/MIN/1.73M*2
EOSINOPHIL # BLD AUTO: 0.09 X10*3/UL (ref 0–0.7)
EOSINOPHIL NFR BLD AUTO: 1.7 %
ERYTHROCYTE [DISTWIDTH] IN BLOOD BY AUTOMATED COUNT: 11.9 % (ref 11.5–14.5)
GLUCOSE SERPL-MCNC: 153 MG/DL (ref 74–99)
GLUCOSE UR STRIP.AUTO-MCNC: NORMAL MG/DL
HCT VFR BLD AUTO: 34.3 % (ref 36–46)
HGB BLD-MCNC: 11 G/DL (ref 12–16)
HOLD SPECIMEN: NORMAL
IMM GRANULOCYTES # BLD AUTO: 0.01 X10*3/UL (ref 0–0.7)
IMM GRANULOCYTES NFR BLD AUTO: 0.2 % (ref 0–0.9)
KETONES UR STRIP.AUTO-MCNC: NEGATIVE MG/DL
LACTATE SERPL-SCNC: 0.9 MMOL/L (ref 0.4–2)
LEUKOCYTE ESTERASE UR QL STRIP.AUTO: NEGATIVE
LEVETIRACETAM SERPL-MCNC: 6 UG/ML (ref 10–40)
LYMPHOCYTES # BLD AUTO: 1.89 X10*3/UL (ref 1.2–4.8)
LYMPHOCYTES NFR BLD AUTO: 34.7 %
MCH RBC QN AUTO: 29.6 PG (ref 26–34)
MCHC RBC AUTO-ENTMCNC: 32.1 G/DL (ref 32–36)
MCV RBC AUTO: 92 FL (ref 80–100)
MONOCYTES # BLD AUTO: 0.51 X10*3/UL (ref 0.1–1)
MONOCYTES NFR BLD AUTO: 9.4 %
NEUTROPHILS # BLD AUTO: 2.93 X10*3/UL (ref 1.2–7.7)
NEUTROPHILS NFR BLD AUTO: 53.8 %
NITRITE UR QL STRIP.AUTO: NEGATIVE
NRBC BLD-RTO: 0 /100 WBCS (ref 0–0)
P AXIS: 90 DEGREES
P OFFSET: 181 MS
P ONSET: 124 MS
PH UR STRIP.AUTO: 6.5 [PH]
PHENYTOIN SERPL-MCNC: 20.5 UG/ML (ref 10–20)
PLATELET # BLD AUTO: 130 X10*3/UL (ref 150–450)
POTASSIUM SERPL-SCNC: 3.5 MMOL/L (ref 3.5–5.3)
PR INTERVAL: 194 MS
PROT SERPL-MCNC: 6.7 G/DL (ref 6.4–8.2)
PROT UR STRIP.AUTO-MCNC: NEGATIVE MG/DL
Q ONSET: 221 MS
QRS COUNT: 13 BEATS
QRS DURATION: 100 MS
QT INTERVAL: 380 MS
QTC CALCULATION(BAZETT): 438 MS
QTC FREDERICIA: 418 MS
R AXIS: 109 DEGREES
RBC # BLD AUTO: 3.72 X10*6/UL (ref 4–5.2)
RBC # UR STRIP.AUTO: NEGATIVE /UL
SODIUM SERPL-SCNC: 141 MMOL/L (ref 136–145)
SP GR UR STRIP.AUTO: 1.01
T AXIS: 106 DEGREES
T OFFSET: 411 MS
UROBILINOGEN UR STRIP.AUTO-MCNC: NORMAL MG/DL
VENTRICULAR RATE: 80 BPM
WBC # BLD AUTO: 5.4 X10*3/UL (ref 4.4–11.3)

## 2024-09-16 PROCEDURE — 80177 DRUG SCRN QUAN LEVETIRACETAM: CPT | Mod: AHULAB

## 2024-09-16 PROCEDURE — 36415 COLL VENOUS BLD VENIPUNCTURE: CPT

## 2024-09-16 PROCEDURE — 71046 X-RAY EXAM CHEST 2 VIEWS: CPT

## 2024-09-16 PROCEDURE — 99222 1ST HOSP IP/OBS MODERATE 55: CPT | Performed by: INTERNAL MEDICINE

## 2024-09-16 PROCEDURE — 80185 ASSAY OF PHENYTOIN TOTAL: CPT

## 2024-09-16 PROCEDURE — 73030 X-RAY EXAM OF SHOULDER: CPT | Mod: RIGHT SIDE | Performed by: RADIOLOGY

## 2024-09-16 PROCEDURE — 80053 COMPREHEN METABOLIC PANEL: CPT

## 2024-09-16 PROCEDURE — G0378 HOSPITAL OBSERVATION PER HR: HCPCS

## 2024-09-16 PROCEDURE — 72125 CT NECK SPINE W/O DYE: CPT | Performed by: RADIOLOGY

## 2024-09-16 PROCEDURE — 83605 ASSAY OF LACTIC ACID: CPT

## 2024-09-16 PROCEDURE — 83880 ASSAY OF NATRIURETIC PEPTIDE: CPT

## 2024-09-16 PROCEDURE — 73502 X-RAY EXAM HIP UNI 2-3 VIEWS: CPT | Mod: RT

## 2024-09-16 PROCEDURE — 99285 EMERGENCY DEPT VISIT HI MDM: CPT | Mod: 25

## 2024-09-16 PROCEDURE — 73030 X-RAY EXAM OF SHOULDER: CPT | Mod: RT

## 2024-09-16 PROCEDURE — 96372 THER/PROPH/DIAG INJ SC/IM: CPT | Performed by: HOSPITALIST

## 2024-09-16 PROCEDURE — 2500000004 HC RX 250 GENERAL PHARMACY W/ HCPCS (ALT 636 FOR OP/ED): Performed by: HOSPITALIST

## 2024-09-16 PROCEDURE — 84484 ASSAY OF TROPONIN QUANT: CPT

## 2024-09-16 PROCEDURE — 73552 X-RAY EXAM OF FEMUR 2/>: CPT | Mod: RIGHT SIDE | Performed by: RADIOLOGY

## 2024-09-16 PROCEDURE — 2500000001 HC RX 250 WO HCPCS SELF ADMINISTERED DRUGS (ALT 637 FOR MEDICARE OP): Performed by: HOSPITALIST

## 2024-09-16 PROCEDURE — 84484 ASSAY OF TROPONIN QUANT: CPT | Performed by: EMERGENCY MEDICINE

## 2024-09-16 PROCEDURE — 2500000002 HC RX 250 W HCPCS SELF ADMINISTERED DRUGS (ALT 637 FOR MEDICARE OP, ALT 636 FOR OP/ED): Performed by: HOSPITALIST

## 2024-09-16 PROCEDURE — 71046 X-RAY EXAM CHEST 2 VIEWS: CPT | Mod: FOREIGN READ | Performed by: RADIOLOGY

## 2024-09-16 PROCEDURE — 93005 ELECTROCARDIOGRAM TRACING: CPT

## 2024-09-16 PROCEDURE — 70450 CT HEAD/BRAIN W/O DYE: CPT

## 2024-09-16 PROCEDURE — 70450 CT HEAD/BRAIN W/O DYE: CPT | Performed by: RADIOLOGY

## 2024-09-16 PROCEDURE — 85025 COMPLETE CBC W/AUTO DIFF WBC: CPT

## 2024-09-16 PROCEDURE — 73552 X-RAY EXAM OF FEMUR 2/>: CPT | Mod: RT

## 2024-09-16 PROCEDURE — 73502 X-RAY EXAM HIP UNI 2-3 VIEWS: CPT | Mod: RIGHT SIDE | Performed by: RADIOLOGY

## 2024-09-16 PROCEDURE — 2500000001 HC RX 250 WO HCPCS SELF ADMINISTERED DRUGS (ALT 637 FOR MEDICARE OP): Performed by: EMERGENCY MEDICINE

## 2024-09-16 PROCEDURE — 81003 URINALYSIS AUTO W/O SCOPE: CPT

## 2024-09-16 PROCEDURE — 72125 CT NECK SPINE W/O DYE: CPT

## 2024-09-16 RX ORDER — CHOLECALCIFEROL (VITAMIN D3) 25 MCG
2000 TABLET ORAL DAILY
Status: DISCONTINUED | OUTPATIENT
Start: 2024-09-16 | End: 2024-09-17 | Stop reason: HOSPADM

## 2024-09-16 RX ORDER — ALBUTEROL SULFATE 0.83 MG/ML
2.5 SOLUTION RESPIRATORY (INHALATION) EVERY 4 HOURS PRN
Status: DISCONTINUED | OUTPATIENT
Start: 2024-09-16 | End: 2024-09-16

## 2024-09-16 RX ORDER — ACETAMINOPHEN 325 MG/1
650 TABLET ORAL EVERY 4 HOURS PRN
Status: DISCONTINUED | OUTPATIENT
Start: 2024-09-16 | End: 2024-09-17 | Stop reason: HOSPADM

## 2024-09-16 RX ORDER — BENZTROPINE MESYLATE 1 MG/1
0.5 TABLET ORAL 2 TIMES DAILY
Status: DISCONTINUED | OUTPATIENT
Start: 2024-09-16 | End: 2024-09-17 | Stop reason: HOSPADM

## 2024-09-16 RX ORDER — ENOXAPARIN SODIUM 100 MG/ML
40 INJECTION SUBCUTANEOUS EVERY 24 HOURS
Status: DISCONTINUED | OUTPATIENT
Start: 2024-09-16 | End: 2024-09-17 | Stop reason: HOSPADM

## 2024-09-16 RX ORDER — BENZTROPINE MESYLATE 1 MG/1
1 TABLET ORAL NIGHTLY
Status: DISCONTINUED | OUTPATIENT
Start: 2024-09-16 | End: 2024-09-17 | Stop reason: HOSPADM

## 2024-09-16 RX ORDER — PHENYTOIN SODIUM 100 MG/1
300 CAPSULE, EXTENDED RELEASE ORAL NIGHTLY
Status: DISCONTINUED | OUTPATIENT
Start: 2024-09-16 | End: 2024-09-17 | Stop reason: HOSPADM

## 2024-09-16 RX ORDER — ALBUTEROL SULFATE 0.83 MG/ML
2.5 SOLUTION RESPIRATORY (INHALATION) EVERY 2 HOUR PRN
Status: DISCONTINUED | OUTPATIENT
Start: 2024-09-16 | End: 2024-09-17 | Stop reason: HOSPADM

## 2024-09-16 RX ORDER — BENZTROPINE MESYLATE 1 MG/1
0.5 TABLET ORAL EVERY MORNING
Status: DISCONTINUED | OUTPATIENT
Start: 2024-09-16 | End: 2024-09-16

## 2024-09-16 RX ORDER — LEVETIRACETAM 500 MG/1
1000 TABLET ORAL DAILY
Status: DISCONTINUED | OUTPATIENT
Start: 2024-09-16 | End: 2024-09-17 | Stop reason: HOSPADM

## 2024-09-16 RX ORDER — LOSARTAN POTASSIUM 25 MG/1
25 TABLET ORAL DAILY
Status: DISCONTINUED | OUTPATIENT
Start: 2024-09-16 | End: 2024-09-17 | Stop reason: HOSPADM

## 2024-09-16 RX ORDER — SERTRALINE HYDROCHLORIDE 50 MG/1
100 TABLET, FILM COATED ORAL DAILY
Status: DISCONTINUED | OUTPATIENT
Start: 2024-09-16 | End: 2024-09-17 | Stop reason: HOSPADM

## 2024-09-16 RX ORDER — FAMOTIDINE 20 MG/1
20 TABLET, FILM COATED ORAL DAILY
Status: DISCONTINUED | OUTPATIENT
Start: 2024-09-16 | End: 2024-09-17 | Stop reason: HOSPADM

## 2024-09-16 RX ORDER — TALC
3 POWDER (GRAM) TOPICAL NIGHTLY PRN
Status: DISCONTINUED | OUTPATIENT
Start: 2024-09-16 | End: 2024-09-17 | Stop reason: HOSPADM

## 2024-09-16 RX ORDER — CLOPIDOGREL BISULFATE 75 MG/1
75 TABLET ORAL DAILY
Status: DISCONTINUED | OUTPATIENT
Start: 2024-09-16 | End: 2024-09-17 | Stop reason: HOSPADM

## 2024-09-16 RX ORDER — POLYETHYLENE GLYCOL 3350 17 G/17G
17 POWDER, FOR SOLUTION ORAL DAILY
Status: DISCONTINUED | OUTPATIENT
Start: 2024-09-16 | End: 2024-09-17 | Stop reason: HOSPADM

## 2024-09-16 RX ORDER — TALC
5 POWDER (GRAM) TOPICAL DAILY
Status: DISCONTINUED | OUTPATIENT
Start: 2024-09-16 | End: 2024-09-17 | Stop reason: HOSPADM

## 2024-09-16 RX ORDER — ONDANSETRON HYDROCHLORIDE 2 MG/ML
4 INJECTION, SOLUTION INTRAVENOUS EVERY 8 HOURS PRN
Status: DISCONTINUED | OUTPATIENT
Start: 2024-09-16 | End: 2024-09-17 | Stop reason: HOSPADM

## 2024-09-16 RX ORDER — ONDANSETRON 4 MG/1
4 TABLET, ORALLY DISINTEGRATING ORAL EVERY 8 HOURS PRN
Status: DISCONTINUED | OUTPATIENT
Start: 2024-09-16 | End: 2024-09-17 | Stop reason: HOSPADM

## 2024-09-16 RX ORDER — ATORVASTATIN CALCIUM 80 MG/1
80 TABLET, FILM COATED ORAL DAILY
Status: DISCONTINUED | OUTPATIENT
Start: 2024-09-16 | End: 2024-09-17 | Stop reason: HOSPADM

## 2024-09-16 RX ORDER — QUETIAPINE FUMARATE 25 MG/1
TABLET, FILM COATED ORAL
COMMUNITY
Start: 2024-08-23

## 2024-09-16 RX ORDER — TRAZODONE HYDROCHLORIDE 50 MG/1
50 TABLET ORAL NIGHTLY
Status: DISCONTINUED | OUTPATIENT
Start: 2024-09-16 | End: 2024-09-17 | Stop reason: HOSPADM

## 2024-09-16 SDOH — SOCIAL STABILITY: SOCIAL INSECURITY: DO YOU FEEL UNSAFE GOING BACK TO THE PLACE WHERE YOU ARE LIVING?: UNABLE TO ASSESS

## 2024-09-16 SDOH — SOCIAL STABILITY: SOCIAL INSECURITY: HAVE YOU HAD ANY THOUGHTS OF HARMING ANYONE ELSE?: UNABLE TO ASSESS

## 2024-09-16 SDOH — SOCIAL STABILITY: SOCIAL INSECURITY: HAS ANYONE EVER THREATENED TO HURT YOUR FAMILY OR YOUR PETS?: UNABLE TO ASSESS

## 2024-09-16 SDOH — SOCIAL STABILITY: SOCIAL INSECURITY: DOES ANYONE TRY TO KEEP YOU FROM HAVING/CONTACTING OTHER FRIENDS OR DOING THINGS OUTSIDE YOUR HOME?: UNABLE TO ASSESS

## 2024-09-16 SDOH — SOCIAL STABILITY: SOCIAL INSECURITY: ARE YOU OR HAVE YOU BEEN THREATENED OR ABUSED PHYSICALLY, EMOTIONALLY, OR SEXUALLY BY ANYONE?: UNABLE TO ASSESS

## 2024-09-16 SDOH — SOCIAL STABILITY: SOCIAL INSECURITY: HAVE YOU HAD THOUGHTS OF HARMING ANYONE ELSE?: NO

## 2024-09-16 SDOH — SOCIAL STABILITY: SOCIAL INSECURITY: ABUSE: ADULT

## 2024-09-16 SDOH — SOCIAL STABILITY: SOCIAL INSECURITY: WERE YOU ABLE TO COMPLETE ALL THE BEHAVIORAL HEALTH SCREENINGS?: YES

## 2024-09-16 SDOH — SOCIAL STABILITY: SOCIAL INSECURITY: DO YOU FEEL ANYONE HAS EXPLOITED OR TAKEN ADVANTAGE OF YOU FINANCIALLY OR OF YOUR PERSONAL PROPERTY?: UNABLE TO ASSESS

## 2024-09-16 SDOH — SOCIAL STABILITY: SOCIAL INSECURITY: ARE THERE ANY APPARENT SIGNS OF INJURIES/BEHAVIORS THAT COULD BE RELATED TO ABUSE/NEGLECT?: UNABLE TO ASSESS

## 2024-09-16 ASSESSMENT — PATIENT HEALTH QUESTIONNAIRE - PHQ9
2. FEELING DOWN, DEPRESSED OR HOPELESS: NOT AT ALL
1. LITTLE INTEREST OR PLEASURE IN DOING THINGS: NOT AT ALL
SUM OF ALL RESPONSES TO PHQ9 QUESTIONS 1 & 2: 0

## 2024-09-16 ASSESSMENT — COGNITIVE AND FUNCTIONAL STATUS - GENERAL
DRESSING REGULAR LOWER BODY CLOTHING: A LITTLE
PERSONAL GROOMING: A LITTLE
WALKING IN HOSPITAL ROOM: A LOT
TURNING FROM BACK TO SIDE WHILE IN FLAT BAD: A LITTLE
DRESSING REGULAR UPPER BODY CLOTHING: A LITTLE
HELP NEEDED FOR BATHING: A LITTLE
EATING MEALS: A LITTLE
MOBILITY SCORE: 18
MOVING TO AND FROM BED TO CHAIR: A LITTLE
HELP NEEDED FOR BATHING: A LITTLE
STANDING UP FROM CHAIR USING ARMS: A LOT
CLIMB 3 TO 5 STEPS WITH RAILING: TOTAL
STANDING UP FROM CHAIR USING ARMS: A LITTLE
MOVING FROM LYING ON BACK TO SITTING ON SIDE OF FLAT BED WITH BEDRAILS: A LITTLE
DRESSING REGULAR UPPER BODY CLOTHING: A LITTLE
TURNING FROM BACK TO SIDE WHILE IN FLAT BAD: A LITTLE
DAILY ACTIVITIY SCORE: 18
WALKING IN HOSPITAL ROOM: A LITTLE
MOVING TO AND FROM BED TO CHAIR: A LITTLE
DRESSING REGULAR LOWER BODY CLOTHING: A LITTLE
PERSONAL GROOMING: A LITTLE
MOBILITY SCORE: 14
DAILY ACTIVITIY SCORE: 18
PATIENT BASELINE BEDBOUND: NO
TOILETING: A LOT
CLIMB 3 TO 5 STEPS WITH RAILING: A LITTLE
TOILETING: A LITTLE
MOVING FROM LYING ON BACK TO SITTING ON SIDE OF FLAT BED WITH BEDRAILS: A LITTLE

## 2024-09-16 ASSESSMENT — PAIN SCALES - GENERAL
PAINLEVEL_OUTOF10: 0 - NO PAIN
PAINLEVEL_OUTOF10: 9
PAINLEVEL_OUTOF10: 0 - NO PAIN

## 2024-09-16 ASSESSMENT — ACTIVITIES OF DAILY LIVING (ADL)
HEARING - RIGHT EAR: DEAF
PATIENT'S MEMORY ADEQUATE TO SAFELY COMPLETE DAILY ACTIVITIES?: NO
TOILETING: NEEDS ASSISTANCE
HEARING - LEFT EAR: DEAF
ASSISTIVE_DEVICE: WALKER
GROOMING: NEEDS ASSISTANCE
DRESSING YOURSELF: NEEDS ASSISTANCE
FEEDING YOURSELF: NEEDS ASSISTANCE
BATHING: NEEDS ASSISTANCE
LACK_OF_TRANSPORTATION: NO
WALKS IN HOME: NEEDS ASSISTANCE
JUDGMENT_ADEQUATE_SAFELY_COMPLETE_DAILY_ACTIVITIES: NO
ADEQUATE_TO_COMPLETE_ADL: YES

## 2024-09-16 ASSESSMENT — LIFESTYLE VARIABLES
AUDIT-C TOTAL SCORE: 0
AUDIT-C TOTAL SCORE: 0
SKIP TO QUESTIONS 9-10: 1
HOW OFTEN DO YOU HAVE 6 OR MORE DRINKS ON ONE OCCASION: NEVER
HOW MANY STANDARD DRINKS CONTAINING ALCOHOL DO YOU HAVE ON A TYPICAL DAY: PATIENT DOES NOT DRINK
HOW OFTEN DO YOU HAVE A DRINK CONTAINING ALCOHOL: NEVER

## 2024-09-16 ASSESSMENT — PAIN - FUNCTIONAL ASSESSMENT
PAIN_FUNCTIONAL_ASSESSMENT: 0-10
PAIN_FUNCTIONAL_ASSESSMENT: 0-10

## 2024-09-16 ASSESSMENT — PAIN DESCRIPTION - LOCATION: LOCATION: HIP

## 2024-09-16 ASSESSMENT — PAIN DESCRIPTION - ORIENTATION: ORIENTATION: RIGHT

## 2024-09-16 NOTE — ED TRIAGE NOTES
From [facility], Pt brought in by caregiver (Bernardo Zavala), c/o multiple falls over the past week. She last fell this evening about 1930, unwitnessed fall, but heard by the person that brought her to the ER. The caregiver states she found the pt sitting on the ground with her rollator next to her. She states at the time, the pt was denying any pain/symptoms. States she was able to get her to stand back up and eat dinner then laid back in bed at about 2130. States at about 2300, the pt started moaning in pain. C/o pain to her right thigh/hip, chest, neck and back. Also reporting a HA as well. Caregiver states she gave the pt tylenol at 2310 and then around midnight vomited the medication back up.     Of note, the pt is deaf, will need language interpretor. Info obtained from caregiver.     Pt is awake and alert. She does follow commands appropriately. She is tender to the right hip with palpation. Sensation is intact throughout. No obvious distress.    Of note, the caregiver did state that the pt was recently started on haldol injections.

## 2024-09-16 NOTE — PROGRESS NOTES
Group Home with aides      09/16/24 0710   Current Planned Discharge Disposition   Current Planned Discharge Disposition Home Health

## 2024-09-16 NOTE — PROGRESS NOTES
09/16/24 0710   [facility] Disability Status   Are you deaf or do you have serious difficulty hearing? Y  (deaf/ mutism)   Are you blind or do you have serious difficulty seeing, even when wearing glasses? N   Because of a physical, mental, or emotional condition, do you have serious difficulty concentrating, remembering, or making decisions? (5 years old or older) Y  (paranoid schizophrenia, mild intellectual disabled)   Do you have serious difficulty walking or climbing stairs? Y  (rollator, multiple falls this week)   Do you have serious difficulty dressing or bathing? Y   Because of a physical, mental, or emotional condition, do you have serious difficulty doing errands alone such as visiting the doctor? Y

## 2024-09-16 NOTE — PROGRESS NOTES
Pharmacy Medication History Review    Janine Sandoval is a 68 y.o. female admitted for Frequent falls. Pharmacy reviewed the patient's [id number] medications and allergies for accuracy.    The list below reflectives the updated PTA list. Please review each medication in order reconciliation for additional clarification and justification.  Prior to Admission Medications   Prescriptions Last Dose Informant     QUEtiapine (SEROquel) 25 mg tablet Past Week      Sig: TAKE 1 TABLET BY MOUTH AT 7AM AND TAKE 2 TABLETS BY MOUTH AT 3PM FOR RESTLESSNESS, AGITATION   QUEtiapine (SEROquel) 300 mg tablet Past Week      Sig: TAKE 1 TABLET BY MOUTH DAILY *DOSE INCREASE*   Patient taking differently: TAKE 1 TABLET BY MOUTH AT BEDTIME *DOSE INCREASE*   acetaminophen (Tylenol) 500 mg tablet       Sig: Take 1-2 Tablets by mouth every 8 hours as needed.   albuterol 2.5 mg /3 mL (0.083 %) nebulizer solution       Si VIAL VIA AEROSOL EVERY 4 HOURS AS NEEDED FOR SHORTNESS OF BREATH / CHEST TIGHTNESS   atorvastatin (Lipitor) 80 mg tablet Past Week      Sig: Take 1 tablet (80 mg) by mouth once daily.   benztropine (Cogentin) 0.5 mg tablet Past Week      Sig: TAKE 1 TABLET BY MOUTH IN THE MORNING AND AFTERNOON ~108Q2 TAKE 2 TABLETS BY MOUTH EVERY NIGHT AT BEDTIME   Patient taking differently: TAKE 1 TABLET BY MOUTH IN THE MORNING AND AFTERNOON  AND TAKE 2 TABLETS BY MOUTH EVERY NIGHT AT BEDTIME   cholecalciferol (Vitamin D-3) 50 MCG (2000 UT) tablet Past Week      Sig: Take 1 tablet (2,000 Units) by mouth once daily.   clopidogrel (Plavix) 75 mg tablet Past Week      Sig: Take 1 tablet (75 mg) by mouth once daily.   diclofenac sodium (Voltaren) 1 % gel gel Past Week      Sig: Apply topically 4 times a day.   famotidine (Pepcid) 20 mg tablet Past Week      Sig: Take 1 tablet (20 mg) by mouth once daily.   haloperidol decanoate (Haldol Decanoate) 100 mg/mL injection 2024      Sig: Inject 2 mL ( 200 MG) into the muscle every  28 days.   levETIRAcetam (Keppra) 1,000 mg tablet Past Week      Sig: Take 1 tablet (1,000 mg) by mouth once daily.   lidocaine (Lidoderm) 5 % patch       Sig: Place 1 patch on the skin once every 24 hours.   lidocaine (Lidoderm) 5 % patch       Sig: Place 1 patch over 12 hours on the skin once daily. Apply to painful area 12 hours per day, remove for 12 hours.   losartan (Cozaar) 25 mg tablet Past Week      Sig: Take 1 tablet (25 mg) by mouth once daily.   melatonin 5 mg capsule Past Week      Sig: Take 1 capsule (5 mg) by mouth once daily.   Patient taking differently: Take 1 capsule (5 mg) by mouth once daily at bedtime.   phenytoin ER (Dilantin) 100 mg capsule Past Week      Sig: Take 3 capsules (300 mg) by mouth once daily at bedtime.   sertraline (Zoloft) 100 mg tablet Past Week      Sig: Take 1 tablet (100 mg) by mouth once daily.   traZODone (Desyrel) 50 mg tablet Past Week      Sig: Take 1 tablet (50 mg) by mouth once daily at bedtime.      Facility-Administered Medications: None      Allergies  Reviewed by Suze Llanos on 9/16/2024        Severity Reactions Comments    Levofloxacin Not Specified Dizziness Question seizure            Below are additional concerns with the patient's PTA list.  The following updates were made to the Prior to Admission medication list:     Source of Information:     Medications ADDED:   QUETIAPINE 25MG  Medications CHANGED:  N/A  Medications REMOVED:   N/A  Medications NOT TAKING:   N/A    Allergy reviewed : Yes    Comments: Pharmacy verified most recent fills.      Suze Llanos

## 2024-09-16 NOTE — PROGRESS NOTES
Transitional Care Coordination Progress Note:  Plan per Medical/Surgical team: treatment of falls X 6 @ home  Status: Observation  Payor source: medicare A/B, medicaid  Discharge disposition:  Group Home with aides   Potential Barriers: trop 17, 15  ADOD: 9/16/2024   JAEL Mack RN, BSN Transitional Care Coordinator ED# [phone]      09/16/24 0711   Discharge Planning   Living Arrangements Other (Comment)   Support Systems Family members   Assistance Needed started new Haldol injections @ [facility]   Type of Residence Private residence   Number of Stairs to Enter Residence 3   Number of Stairs Within Residence 0   Do you have animals or pets at home? No   Home or Post Acute Services In home services   Type of Home Care Services Home health aide   Expected Discharge Disposition Home Health   Does the patient need discharge transport arranged? No   Financial Resource Strain   How hard is it for you to pay for the very basics like food, housing, medical care, and heating? Not hard   Housing Stability   In the last 12 months, was there a time when you were not able to pay the mortgage or rent on time? N   In the past 12 months, how many times have you moved where you were living? 1   At any time in the past 12 months, were you homeless or living in a shelter (including now)? N   Transportation Needs   In the past 12 months, has lack of transportation kept you from medical appointments or from getting medications? no   In the past 12 months, has lack of transportation kept you from meetings, work, or from getting things needed for daily living? No

## 2024-09-16 NOTE — ED PROVIDER NOTES
HPI   Chief Complaint   Patient presents with    Fall    Hip Pain    Headache    Neck Pain    Chest Pain       68-year-old female presents the ED today with a chief concern of fall.  Patient has a history of stroke with right-sided residual deficits, GERD, paranoid schizophrenia, history of cardiac murmur.  Patient is accompanied by one of her caregiver.  Patient reports that she lives at home.  She reports thats she has fallen about 6 times over the past 2 weeks.  Reports that typically it is her falling out of bed onto the ground.  She reports that she has hit her head multiple times.  Patient reports that she does not really know if she passed out or not but she fell asleep after she fell to the ground once.  She denies any fevers.  Denies nausea or vomiting or diarrhea.  Denies abdominal pain.  She reports that she has right sided shoulder pain and right leg pain.  Patient is denying any chest pain to me.  Reports headache.  No other symptoms or concerns this time.      History provided by:  Patient  History limited by:  Age (deaf)   used: Yes            Patient History   Past Medical History:   Diagnosis Date    Anemia     Depression     Gastro-esophageal reflux disease without esophagitis 12/21/2022    Gastroesophageal reflux disease without esophagitis    HL (hearing loss)     Hyperlipidemia, unspecified 09/25/2019    Hyperlipemia    Hypertension     Personal history of transient ischemic attack (TIA), and cerebral infarction without residual deficits 12/21/2022    History of stroke     No past surgical history on file.  Family History   Problem Relation Name Age of Onset    Parkinsonism Mother      Other (cardiac disorder) Father      Glaucoma Father      Parkinsonism Father       Social History     Tobacco Use    Smoking status: Never    Smokeless tobacco: Never   Vaping Use    Vaping status: Unknown   Substance Use Topics    Alcohol use: Never    Drug use: Never       Physical Exam   ED  Triage Vitals [09/16/24 0116]   Temperature Heart Rate Respirations BP   36.7 °C (98 °F) 83 19 143/83      Pulse Ox Temp Source Heart Rate Source Patient Position   98 % Oral -- --      BP Location FiO2 (%)     -- --       Physical Exam  Constitutional: Vital signs per nursing notes.  Well developed, well nourished.  No acute distress.    Eyes: PERRL; conjunctivae and lids normal  ENT: Ears normal externally; face symmetric. voice normal  Neck: neck supple, no meningismus; trachea midline without deviation  Respiratory: normal respiratory effort and excursion; no rales, rhonchi, or wheezes; equal air entry  Cardiovascular: RRR, 2+ pulses extremities systolic murmur appreciate   Neurological: normal speech; CN II-XII grossly intact, normal motor and sensory function  GI: no distention, soft, nontender  : Deferred  Musculoskeletal: normal gait and station; normal digits and nails; no pelvic instability.  Normal range of motion of right hip and knee.  2+ symmetric dorsalis status pulses.  5/5 strength in upper and lower extremities bilaterally.  Sensation intact in upper and lower extremities bilaterally.  Full range of motion of right shoulder and elbow no focal tenderness.  No C-spine tenderness.  Skin: normal to inspection; normal to palpation; no rash      ED Course & MDM   ED Course as of 09/16/24 0745   Mon Sep 16, 2024   0221  used  []   030 IMPRESSION:  No evidence of acute fracture of the cervical spine.      Multilevel degenerative change of the cervical spine.      MACRO:  None      Signed by: Facundo Olivera 9/16/2024 2:48 AM  Dictation workstation:   [id number]   [MC]   8278 FINDINGS:    There is no displaced fracture.  The alignment is anatomic.   Generalized osseous demineralization is noted.  No soft tissue  abnormality is seen.  IMPRESSION:  No acute osseous abnormality identified.  Signed by Steve Otoole   [MC]   3706 IMPRESSION:  Moderate joint space narrowing in the right hip and mild  joint space  narrowing in the left hip.  No acute osseous abnormality identified.  Signed by Steve Otoole   []   0403 IMPRESSION:  No acute osseous abnormality identified.  Moderate joint space  narrowing and mild age-related osteoarthritic change of the right  glenohumeral joint.  Signed by Steve Otoole   []   0403 IMPRESSION:  Cardiomegaly.  No definite acute cardiopulmonary disease.  Signed by Steve Otoole   []   0743 IMPRESSION:  No evidence of acute intracranial hemorrhage or depressed calvarial  fracture.      Hypodensity in left corona radiata and basal ganglia compatible with  chronic ischemic change.              MACRO:  None      Signed by: Facundo Olivera 9/16/2024 2:46 AM  Dictation workstation:   [id number]   []      ED Course User Index  [] Abhilash Reyes PA-C         Diagnoses as of 09/16/24 0745   Fall, initial encounter   Chest pain, unspecified type   Injury of head, initial encounter                 No data recorded     [place] Coma Scale Score: 9 (09/16/24 0143 : Ivette Prescott RN)                           Medical Decision Making  68-year-old female presents the ED today with a chief concern of fall.  Vital signs reassuring.  Patient overall appears well and is nontoxic-appearing. The patient has full range of motion of the neck without any meningismus.  Satting well on room air.  Nontoxic.  Not tachycardic.  Afebrile.   was used.  EKG shows no ischemic changes.  Troponins are stable at 17, 15, and 16.  Remainder of labs reassuring.  Pending levetiracetam level.  This is a send out.  Phenytoin level is similar and unchanged.  CBC shows no evidence of leukocytosis.  CMP shows no VICK or acute liver injury.  Glucose is 153.  Urinalysis shows no UTI.  BNP is normal.  CT head and C-spine are unremarkable.  No evidence of acute intracranial hemorrhage or depressed calvarial skull fracture.  Does have chronic ischemic changes noted.  CT C-spine unremarkable.  Chest  x-ray shows cardiomegaly.  X-ray of right shoulder, hip, femur normal.  No fractures noted.  She is freely moving remainder of extremities without any difficulty.  Did not sustain any other injuries.  Patient has no signs of PE.  Do-nothing further workup with D-dimer or CT PE is indicated at this time.  Would like to admit for cardiac evaluation as well as PT OT.  She is neuro intact to my exam.  At the time of signout we were pending admission.  Handoff to Dr. Bentley at 6AM.  Patient was also seen and evaluated by attending physician.    Differential diagnosis: Fracture, strain, sprain, ACS, cardiac etiology    Disposition/treatment  1. See above           Procedure  Procedures     Abhilash Reyes PA-C  09/16/24 0746       Abhilash Reyes PA-C  09/17/24 0516

## 2024-09-16 NOTE — CARE PLAN
The patient's goals for the shift include      The clinical goals for the shift include safety      Problem: Fall/Injury  Goal: Not fall by end of shift  Outcome: Progressing  Goal: Be free from injury by end of the shift  Outcome: Progressing  Goal: Verbalize understanding of personal risk factors for fall in the hospital  Outcome: Progressing  Goal: Verbalize understanding of risk factor reduction measures to prevent injury from fall in the home  Outcome: Progressing  Goal: Use assistive devices by end of the shift  Outcome: Progressing  Goal: Pace activities to prevent fatigue by end of the shift  Outcome: Progressing

## 2024-09-16 NOTE — H&P
History Of Present Illness  Janine Sandoval is a 68 y.o. female speech and hearing impaired, with past medical history of hypertension, hyperlipidemia, paranoid schizophrenia, seizure disorder, GERD, CVA, depression, who is presenting to [facility] ED from group home with complaint of post mechanical fall pain.  Patient was seen and examined at bedside in ED, with use of  via [employer].  Since stated that she was brought from group home to the ED for evaluation after she experienced muscle tightness in her right lower extremity, and try to get some help unfortunately she tried to get out of bed on her own and fell landing on her right side, with head touching the side of the bed.  Stated that she was not able to get up on her own, was helped by group home staff, experienced pain on the lateral aspect the lower extremity and at the ankle, and denied loss of consciousness.  Patient stated shortly afterwards she had 2 episode of nausea and vomiting, and this is what prompted the staff to bring her to the hospital for evaluation.  Stated that she did not feel like she had a seizure episode, as it has been years since the last time she experienced 1.  On review of systems patient admitted to right shoulder pain and lateral aspect of the right lower extremity pain, he denied having any other symptom other does mention above.  At baseline she stated that she walks with a walker, with a steady gait, denies any recent fall while ambulating.  In the ED patient reportedly with stable vitals on arrival, lab work shows CBC without leukocytosis, normal CMP, and no UTI on UA.  Imaging of the head and C-spine were unremarkable with no acute intracranial hemorrhage, depressed calvarial skull fracture or dislocation.  Chest x-ray showed cardiomegaly, x-ray of the right shoulder, right hip, right femur or normal.     Past Medical History  She has a past medical history of Anemia, Depression,  Gastro-esophageal reflux disease without esophagitis (12/21/2022), HL (hearing loss), Hyperlipidemia, unspecified (09/25/2019), Hypertension, Personal history of transient ischemic attack (TIA), and cerebral infarction without residual deficits (12/21/2022), and Stroke (Multi).    Surgical History  She has no past surgical history on file.     Social History  She reports that she has never smoked. She has never used smokeless tobacco. She reports that she does not drink alcohol and does not use drugs.    Family History  Family History   Problem Relation Name Age of Onset    Parkinsonism Mother      Other (cardiac disorder) Father      Glaucoma Father      Parkinsonism Father          Allergies  Levofloxacin    Review of Systems  A 10 point review of system was conducted with  over Stacey, with patient admitting to symptoms as described in the HPI above, he denied having any other symptom at that time.  Physical Exam   Constitutional: Pleasant elderly female, hearing and speech impaired, alert, cooperative not in acute distress  Eyes: PERRLA, clear sclera  ENMT: Moist mucosal membranes, no exudate  Head / Neck: Atraumatic, normocephalic, supple neck, JVP not visualized  Lungs: Patent airways, CTABL  Heart: RRR, S1S2, no murmurs appreciated, palpable pulses in all extremities  GI: Soft, NT, ND, bowel sounds present in all quadrants  MSK: Moves all extremities freely, but with restriction  of ROM in the right shoulder, no joint edema  Extremities: Tenderness with range of motion testing on the right shoulder, no peripheral edema  : No Staples catheter inserted  Breast: Deferred  Neurological: AAO x 3 to person, place and date, facial muscles symmetrical, sensation intact, strength 4/4, no acute focal neurological deficits appreciated  Psychological: Appropriate mood and behavior    Last Recorded Vitals  /60 (BP Location: Right arm, Patient Position: Lying)   Pulse 81   Temp 36.4 °C (97.5  °F) (Temporal)   Resp 18   Wt 68.9 kg (152 lb)   SpO2 99%     Relevant Results    Scheduled medications  atorvastatin, 80 mg, oral, Daily  benztropine, 0.5 mg, oral, BID  benztropine, 1 mg, oral, Nightly  cholecalciferol, 2,000 Units, oral, Daily  clopidogrel, 75 mg, oral, Daily  enoxaparin, 40 mg, subcutaneous, q24h  famotidine, 20 mg, oral, Daily  levETIRAcetam, 1,000 mg, oral, Daily  losartan, 25 mg, oral, Daily  melatonin, 4.5 mg, oral, Daily  phenytoin ER, 300 mg, oral, Nightly  polyethylene glycol, 17 g, oral, Daily  sertraline, 100 mg, oral, Daily  traZODone, 50 mg, oral, Nightly      Continuous medications     PRN medications  PRN medications: acetaminophen, albuterol, melatonin, ondansetron ODT **OR** ondansetron           Assessment/Plan   Assessment & Plan  Frequent falls    68 y.o. female speech and hearing impaired, with past medical history of hypertension, hyperlipidemia, paranoid schizophrenia, seizure disorder, GERD, CVA, depression, who is presenting to [facility] ED from group home with complaint of post mechanical fall pain.    Mechanical fall: Experience muscle stiffness, possible spasm, and fell while trying to get out of bed  -Skeletal survey imaging negative for fracture or dislocation  -CT of the head or cervical spine negative for intracranial process  -PT OT ordered, elevation, assessment recommendation pending    Hypertension: Elevated on presentation, now stable  -Losartan 25 mg daily    Schizophrenia  -Continue Cogentin 0.5 mg twice daily    Seizure disorder  -Keppra 1000 mg daily  -Keppra level pending    Depression  -Sertraline 100 mg daily    Hyperlipidemia  -Atorvastatin 80 mg daily    History of CVA  -Atorvastatin as above, Plavix 75 mg daily    GERD  -Famotidine 20 mg daily    Diet  -Regular    DVT prophylaxis  -Lovenox 40 mg subcu daily    Disposition: Presenting with post mechanical fall pain in the right lower extremity, need further management, PT OT evaluation  pending, discharge back to group home pending PT OT final recommendations.     Total time 60-minute dedicated to the overall care of this patient included use of sign language communication with .    Az Stuart DO

## 2024-09-17 VITALS
TEMPERATURE: 97.3 F | SYSTOLIC BLOOD PRESSURE: 151 MMHG | OXYGEN SATURATION: 96 % | WEIGHT: 152 LBS | RESPIRATION RATE: 17 BRPM | HEART RATE: 87 BPM | BODY MASS INDEX: 25.33 KG/M2 | DIASTOLIC BLOOD PRESSURE: 88 MMHG | HEIGHT: 65 IN

## 2024-09-17 LAB
ERYTHROCYTE [DISTWIDTH] IN BLOOD BY AUTOMATED COUNT: 11.8 % (ref 11.5–14.5)
HCT VFR BLD AUTO: 35.1 % (ref 36–46)
HGB BLD-MCNC: 11.4 G/DL (ref 12–16)
HOLD SPECIMEN: NORMAL
MCH RBC QN AUTO: 30 PG (ref 26–34)
MCHC RBC AUTO-ENTMCNC: 32.5 G/DL (ref 32–36)
MCV RBC AUTO: 92 FL (ref 80–100)
NRBC BLD-RTO: 0 /100 WBCS (ref 0–0)
PLATELET # BLD AUTO: 175 X10*3/UL (ref 150–450)
RBC # BLD AUTO: 3.8 X10*6/UL (ref 4–5.2)
WBC # BLD AUTO: 6.7 X10*3/UL (ref 4.4–11.3)

## 2024-09-17 PROCEDURE — 96372 THER/PROPH/DIAG INJ SC/IM: CPT | Performed by: HOSPITALIST

## 2024-09-17 PROCEDURE — 2500000002 HC RX 250 W HCPCS SELF ADMINISTERED DRUGS (ALT 637 FOR MEDICARE OP, ALT 636 FOR OP/ED): Performed by: INTERNAL MEDICINE

## 2024-09-17 PROCEDURE — 2500000004 HC RX 250 GENERAL PHARMACY W/ HCPCS (ALT 636 FOR OP/ED): Performed by: HOSPITALIST

## 2024-09-17 PROCEDURE — 85027 COMPLETE CBC AUTOMATED: CPT | Performed by: INTERNAL MEDICINE

## 2024-09-17 PROCEDURE — G0378 HOSPITAL OBSERVATION PER HR: HCPCS

## 2024-09-17 PROCEDURE — 2500000001 HC RX 250 WO HCPCS SELF ADMINISTERED DRUGS (ALT 637 FOR MEDICARE OP): Performed by: HOSPITALIST

## 2024-09-17 PROCEDURE — 97161 PT EVAL LOW COMPLEX 20 MIN: CPT | Mod: GP

## 2024-09-17 PROCEDURE — 2500000002 HC RX 250 W HCPCS SELF ADMINISTERED DRUGS (ALT 637 FOR MEDICARE OP, ALT 636 FOR OP/ED): Performed by: HOSPITALIST

## 2024-09-17 PROCEDURE — 2500000001 HC RX 250 WO HCPCS SELF ADMINISTERED DRUGS (ALT 637 FOR MEDICARE OP): Performed by: EMERGENCY MEDICINE

## 2024-09-17 PROCEDURE — 97163 PT EVAL HIGH COMPLEX 45 MIN: CPT | Mod: GP

## 2024-09-17 PROCEDURE — 36415 COLL VENOUS BLD VENIPUNCTURE: CPT | Performed by: INTERNAL MEDICINE

## 2024-09-17 PROCEDURE — 99239 HOSP IP/OBS DSCHRG MGMT >30: CPT | Performed by: INTERNAL MEDICINE

## 2024-09-17 RX ORDER — QUETIAPINE FUMARATE 300 MG/1
300 TABLET, FILM COATED ORAL NIGHTLY
Status: DISCONTINUED | OUTPATIENT
Start: 2024-09-17 | End: 2024-09-17 | Stop reason: HOSPADM

## 2024-09-17 RX ORDER — QUETIAPINE FUMARATE 25 MG/1
50 TABLET, FILM COATED ORAL EVERY 24 HOURS
Status: DISCONTINUED | OUTPATIENT
Start: 2024-09-17 | End: 2024-09-17 | Stop reason: HOSPADM

## 2024-09-17 RX ORDER — CALCIUM CARBONATE 160(400)MG
TABLET,CHEWABLE ORAL
Qty: 1 EACH | Refills: 0 | Status: SHIPPED | OUTPATIENT
Start: 2024-09-17

## 2024-09-17 RX ORDER — CALCIUM CARBONATE 160(400)MG
TABLET,CHEWABLE ORAL
Qty: 1 EACH | Refills: 0 | Status: SHIPPED | OUTPATIENT
Start: 2024-09-17 | End: 2024-09-17

## 2024-09-17 RX ORDER — QUETIAPINE FUMARATE 25 MG/1
25 TABLET, FILM COATED ORAL DAILY
Status: DISCONTINUED | OUTPATIENT
Start: 2024-09-17 | End: 2024-09-17 | Stop reason: HOSPADM

## 2024-09-17 RX ORDER — CALCIUM CARBONATE 160(400)MG
1 TABLET,CHEWABLE ORAL DAILY PRN
Qty: 1 EACH | Refills: 0 | Status: SHIPPED | OUTPATIENT
Start: 2024-09-17 | End: 2024-09-17

## 2024-09-17 ASSESSMENT — PAIN SCALES - WONG BAKER: WONGBAKER_NUMERICALRESPONSE: HURTS EVEN MORE

## 2024-09-17 ASSESSMENT — COGNITIVE AND FUNCTIONAL STATUS - GENERAL
WALKING IN HOSPITAL ROOM: A LITTLE
MOVING FROM LYING ON BACK TO SITTING ON SIDE OF FLAT BED WITH BEDRAILS: A LITTLE
WALKING IN HOSPITAL ROOM: A LITTLE
TOILETING: A LITTLE
MOVING FROM LYING ON BACK TO SITTING ON SIDE OF FLAT BED WITH BEDRAILS: A LITTLE
EATING MEALS: A LITTLE
TURNING FROM BACK TO SIDE WHILE IN FLAT BAD: A LITTLE
STANDING UP FROM CHAIR USING ARMS: A LITTLE
TURNING FROM BACK TO SIDE WHILE IN FLAT BAD: A LITTLE
DRESSING REGULAR LOWER BODY CLOTHING: A LITTLE
MOBILITY SCORE: 17
CLIMB 3 TO 5 STEPS WITH RAILING: A LITTLE
HELP NEEDED FOR BATHING: A LITTLE
DRESSING REGULAR UPPER BODY CLOTHING: A LITTLE
STANDING UP FROM CHAIR USING ARMS: A LITTLE
CLIMB 3 TO 5 STEPS WITH RAILING: A LOT
PERSONAL GROOMING: A LITTLE
MOBILITY SCORE: 18
MOVING TO AND FROM BED TO CHAIR: A LITTLE
MOVING TO AND FROM BED TO CHAIR: A LITTLE
DAILY ACTIVITIY SCORE: 18

## 2024-09-17 ASSESSMENT — PAIN - FUNCTIONAL ASSESSMENT
PAIN_FUNCTIONAL_ASSESSMENT: WONG-BAKER FACES
PAIN_FUNCTIONAL_ASSESSMENT: 0-10

## 2024-09-17 ASSESSMENT — ACTIVITIES OF DAILY LIVING (ADL): ADL_ASSISTANCE: NEEDS ASSISTANCE

## 2024-09-17 ASSESSMENT — PAIN SCALES - GENERAL: PAINLEVEL_OUTOF10: 0 - NO PAIN

## 2024-09-17 NOTE — PROGRESS NOTES
09/17/24 1451   Discharge Planning   Home or Post Acute Services In home services   Type of Home Care Services Home PT;Home OT   Expected Discharge Disposition Home H     Spoke to patient's caregiver, Stevenalessandro at bedside--they do want [facility] for pt/ot for her--they are open to any agency that accepts her insurance.  Referrals sent in [facility].    [facility] is able to accept.  Gave them the address of the [address]  Gave contact info  #1 Sherita Ravi [phone]  #2 Bernardo [phone]    Patient to discharge today.

## 2024-09-17 NOTE — DISCHARGE SUMMARY
Discharge Diagnosis  Frequent falls    Issues Requiring Follow-Up  PCP, Physical therapy    Discharge Meds     Medication List      START taking these medications     Ultra-Light Rollator misc; Generic drug: walker; 1 each once daily as   needed (Ambulation).     CHANGE how you take these medications     benztropine 0.5 mg tablet; Commonly known as: Cogentin; TAKE 1 TABLET BY   MOUTH IN THE MORNING AND AFTERNOON ~108Q2 TAKE 2 TABLETS BY MOUTH EVERY   NIGHT AT BEDTIME; What changed: additional instructions   lidocaine 5 % patch; Commonly known as: Lidoderm; Place 1 patch over 12   hours on the skin once daily. Apply to painful area 12 hours per day,   remove for 12 hours.; What changed: Another medication with the same name   was removed. Continue taking this medication, and follow the directions   you see here.   melatonin 5 mg capsule; Take 1 capsule (5 mg) by mouth once daily.; What   changed: when to take this   * QUEtiapine 300 mg tablet; Commonly known as: SEROquel; TAKE 1 TABLET   BY MOUTH DAILY *DOSE INCREASE*; What changed: additional instructions   * QUEtiapine 25 mg tablet; Commonly known as: SEROquel; What changed:   Another medication with the same name was changed. Make sure you   understand how and when to take each.  * This list has 2 medication(s) that are the same as other medications   prescribed for you. Read the directions carefully, and ask your doctor or   other care provider to review them with you.     CONTINUE taking these medications     acetaminophen 500 mg tablet; Commonly known as: Tylenol; Take 1-2   Tablets by mouth every 8 hours as needed.   albuterol 2.5 mg /3 mL (0.083 %) nebulizer solution; 1 VIAL VIA AEROSOL   EVERY 4 HOURS AS NEEDED FOR SHORTNESS OF BREATH / CHEST TIGHTNESS   atorvastatin 80 mg tablet; Commonly known as: Lipitor; Take 1 tablet (80   mg) by mouth once daily.   cholecalciferol 50 MCG (2000 UT) tablet; Commonly known as: Vitamin D-3;   Take 1 tablet (2,000 Units) by  mouth once daily.   clopidogrel 75 mg tablet; Commonly known as: Plavix; Take 1 tablet (75   mg) by mouth once daily.   diclofenac sodium 1 % gel; Commonly known as: Voltaren   famotidine 20 mg tablet; Commonly known as: Pepcid; Take 1 tablet (20   mg) by mouth once daily.   haloperidol decanoate 100 mg/mL injection; Commonly known as: Haldol   Decanoate   levETIRAcetam 1,000 mg tablet; Commonly known as: Keppra; Take 1 tablet   (1,000 mg) by mouth once daily.   losartan 25 mg tablet; Commonly known as: Cozaar; Take 1 tablet (25 mg)   by mouth once daily.   phenytoin  mg capsule; Commonly known as: Dilantin; Take 3   capsules (300 mg) by mouth once daily at bedtime.   sertraline 100 mg tablet; Commonly known as: Zoloft; Take 1 tablet (100   mg) by mouth once daily.   traZODone 50 mg tablet; Commonly known as: Desyrel; Take 1 tablet (50   mg) by mouth once daily at bedtime.       Test Results Pending At Discharge  Pending Labs       No current pending labs.            Hospital Course   Janine Sandoval is a 68 y.o. female speech and hearing impaired, with past medical history of hypertension, hyperlipidemia, paranoid schizophrenia, seizure disorder, GERD, CVA, depression, who is presenting to [facility] ED from group home with complaint of post mechanical fall pain.  Patient was seen and examined at bedside in ED, with use of  via [employer].  Since stated that she was brought from group home to the ED for evaluation after she experienced muscle tightness in her right lower extremity, and try to get some help unfortunately she tried to get out of bed on her own and fell landing on her right side, with head touching the side of the bed.  Stated that she was not able to get up on her own, was helped by group home staff, experienced pain on the lateral aspect the lower extremity and at the ankle, and denied loss of consciousness.  Patient stated shortly afterwards she had 2 episode  of nausea and vomiting, and this is what prompted the staff to bring her to the hospital for evaluation.  Stated that she did not feel like she had a seizure episode, as it has been years since the last time she experienced 1.  On review of systems patient admitted to right shoulder pain and lateral aspect of the right lower extremity pain, he denied having any other symptom other does mention above.  At baseline she stated that she walks with a walker, with a steady gait, denies any recent fall while ambulating.  In the ED patient reportedly with stable vitals on arrival, lab work shows CBC without leukocytosis, normal CMP, and no UTI on UA.  Imaging of the head and C-spine were unremarkable with no acute intracranial hemorrhage, depressed calvarial skull fracture or dislocation.  Chest x-ray showed cardiomegaly, x-ray of the right shoulder, right hip, right femur or normal.    Pt seen for further management, with consultation of PT/OT who upon evaluation and assessment found her with decreased strength, Decreased range of motion, Decreased endurance, Impaired balance, Decreased mobility, Decreased coordination, Decreased cognition, Impaired hearing. Rehab Prognosis: Fair.  Recommendations: Low intensity level of continued care. Equipment Recommended upon Discharge: Wheeled walker PT Recommended Transfer Status: Contact guard, Assist x1.  On hospital day 2 patient was found [facility].   Prescription for wheeled walker provided to be taken to pharmacy or durable medical equipment company.   Plan discussed with group home staff member.     Greater than 30 minutes were dedicated to this discharge that included discussion of discharge planning with group home staff, TCC and coordinating care.      Pertinent Physical Exam At Time of Discharge  Physical Exam  Constitutional: Pleasant elderly female, hearing and speech impaired, alert, cooperative not in acute  distress  Eyes: PERRLA, clear sclera  ENMT: Moist mucosal membranes, no exudate  Head / Neck: Atraumatic, normocephalic, supple neck, JVP not visualized  Lungs: Patent airways, CTABL  Heart: RRR, S1S2, no murmurs appreciated, palpable pulses in all extremities  GI: Soft, NT, ND, bowel sounds present in all quadrants  MSK: Moves all extremities freely, but with restriction  of ROM in the right shoulder, no joint edema  Extremities: Tenderness with range of motion testing on the right shoulder, no peripheral edema  : No Staples catheter inserted  Breast: Deferred  Neurological: AAO x 3 to person, place and date, facial muscles symmetrical, sensation intact, strength 4/4, no acute focal neurological deficits appreciated  Psychological: Appropriate mood and behavior    Outpatient Follow-Up  Future Appointments   Date Time Provider Department Center   9/18/2024  9:20 AM Jesi Ramsey MD [id number] [facility]         Az Stuart DO

## 2024-09-17 NOTE — PROGRESS NOTES
Physical Therapy    Physical Therapy Evaluation    Patient Name: Janine Sandoval  MRN: [id number]  Department: [place] [id number]  Room: [address] [place] [address]  Today's Date: 9/17/2024   Time Calculation  Start Time: 1125  Stop Time: 1145  Time Calculation (min): 20 min      Assessment/Plan   PT Assessment  PT Assessment Results: Decreased strength, Decreased range of motion, Decreased endurance, Impaired balance, Decreased mobility, Decreased coordination, Decreased cognition, Impaired hearing, Pain  Rehab Prognosis: Fair  Barriers to Discharge: Need for increased ambulation distance for endurance and dynamic balance, pain, ROM deficit in RUE.  Evaluation/Treatment Tolerance: Patient tolerated treatment well, Patient limited by pain  Medical Staff Made Aware: Yes  Strengths: Access to adaptive/assistive products, Support of Caregivers  Barriers to Participation: Ability to acquire knowledge, Comorbidities  End of Session Communication: Bedside nurse, Care Coordinator  Assessment Comment: PT eval completed. Pt limited in endurance and pain control, noted dizziness throughout session. Some hands on assist needed throughout session, but anticipate improved participation during next session. Needing increased participation in PT plan of care for progression toward functional independence with mobility.    End of Session Patient Position: Bed, 3 rail up, Alarm on  IP OR SWING BED PT PLAN  Inpatient or Swing Bed: Inpatient  PT Plan  Treatment/Interventions: Bed mobility, Transfer training, Gait training, Balance training, Neuromuscular re-education, Strengthening, Endurance training, Range of motion, Therapeutic exercise, Therapeutic activity, Positioning, Postural re-education, Stair training  PT Plan: Ongoing PT  PT Frequency: 3 times per week  PT Discharge Recommendations: Low intensity level of continued care  Equipment Recommended upon Discharge: Wheeled walker  PT Recommended Transfer Status: Contact guard, Assist x1  PT - OK to  Discharge: Yes (Per PT POC)      Subjective   General Visit Information:  General  Reason for Referral: Impaired mobility, complaint of post mechanical fall pain  Referred By: Iris Tuttle MD  Past Medical History Relevant to Rehab:   Past Medical History:   Diagnosis Date    Anemia     Depression     Gastro-esophageal reflux disease without esophagitis 12/21/2022    Gastroesophageal reflux disease without esophagitis    HL (hearing loss)     Hyperlipidemia, unspecified 09/25/2019    Hyperlipemia    Hypertension     Personal history of transient ischemic attack (TIA), and cerebral infarction without residual deficits 12/21/2022    History of stroke    Stroke (Multi)     No past surgical history on file.   Family/Caregiver Present: Yes  Caregiver Feedback: Caregiver asking appropriate questions and providing pt information regarding homeliving as well as assisting with communication to pt.  Prior to Session Communication: Bedside nurse  Patient Position Received: Bed, 3 rail up, Alarm on  Preferred Learning Style: kinesthetic, visual  General Comment: Pt supine in bed upon PT entry, cleared for therapy by nursing, pt agreeable for PT eval.  Home Living:  Home Living  Type of Home: Group home  Home Adaptive Equipment: Walker rolling or standard (Upright rollator)  Home Layout: One level  Home Access: Stairs to enter with rails  Entrance Stairs-Rails: Both  Entrance Stairs-Number of Steps: 2  Bathroom Shower/Tub: Walk-in shower  Bathroom Toilet: Handicapped height  Bathroom Equipment: Grab bars in shower, Built-in shower seat  Home Living Comments: Pt recieves assist when using entrance stairs  Prior Level of Function:  Prior Function Per Pt/Caregiver Report  Level of [place]: Needs assistance with ADLs, Needs assistance with homemaking, Needs assistance with functional transfers  Receives Help From:  (Group home aides)  ADL Assistance: Needs assistance  Homemaking Assistance: Needs assistance  Ambulatory  Assistance: Needs assistance (Able to ambulate with upright walker with supervision per pt caregiver report)  Precautions:  Precautions  Hearing/Visual Limitations: Deaf  Medical Precautions: Fall precautions    Vital Signs (Past 2hrs)        Date/Time Vitals Session Patient Position Pulse Resp SpO2 BP MAP (mmHg)    09/17/24 1217 --  --  87  17  96 %  151/88  106                         Objective   Pain:  Pain Assessment  Pain Assessment: Green-Baker FACES  Green-Baker FACES Pain Rating: Hurts even more  Pain Type: Acute pain  Pain Location: Leg  Pain Orientation: Right  Pain Interventions: Repositioned, Ambulation/increased activity  Cognition:  Cognition  Overall Cognitive Status: Impaired (Per pt chart, pt has hx of paranoid schizophrenia)  Orientation Level: Unable to assess (Not formally assessed but per pt caregiver report pt is normally A+O x 4)    General Assessments:  General Observation  General Observation: Pt cooperative with PT jose enrique, noted residual R sided weakness due to hx of CVA, increased time needed for bed mobility, transfer and OOB mobility.       Activity Tolerance  Endurance: Tolerates less than 10 min exercise, no significant change in vital signs    Sensation  Light Touch: No apparent deficits    Strength  Strength Comments: Pt able to perform bed mobility and transfers with little to no hands on assist at this time.  Strength  Strength Comments: Pt able to perform bed mobility and transfers with little to no hands on assist at this time.    Perception  Inattention/Neglect: Appears intact      Coordination  Movements are Fluid and Coordinated: Yes    Postural Control  Postural Control: Within Functional Limits    Static Sitting Balance  Static Sitting-Balance Support: Bilateral upper extremity supported, Feet supported  Static Sitting-Level of Assistance: Contact guard  Static Sitting-Comment/Number of Minutes: Pt seated at EOB </= 4 minutes, CGA needed for periodic retropulsive movement of  trunk throughout.  Dynamic Sitting Balance  Dynamic Sitting-Balance Support: Feet supported  Dynamic Sitting-Level of Assistance: Contact guard  Dynamic Sitting-Balance: Lateral lean, Forward lean  Dynamic Sitting-Comments: Pt performed lateral and forward weight shifts performing dynamic UE and LE movements while seated at EOB    Static Standing Balance  Static Standing-Balance Support: Bilateral upper extremity supported  Static Standing-Level of Assistance: Contact guard  Static Standing-Comment/Number of Minutes: Pt used FWW for BLUE support.  Dynamic Standing Balance  Dynamic Standing-Balance Support: Bilateral upper extremity supported  Dynamic Standing-Level of Assistance: Contact guard  Dynamic Standing-Balance: Forward lean, Turning  Dynamic Standing-Comments: Pt performed turning and forward weight shifts while amubulating. Noted increased dizziness in upright positioning, Pt able to self correct minor LOB.  Functional Assessments:  Bed Mobility  Bed Mobility: Yes  Bed Mobility 1  Bed Mobility 1: Supine to sitting  Level of Assistance 1: Contact guard  Bed Mobility Comments 1: Pt used therapist hand for upward trunk propulsion, HOB elevated, bedrails used.  Bed Mobility 2  Bed Mobility  2: Sitting to supine  Level of Assistance 2: Minimum assistance  Bed Mobility Comments 2: Pt needed minimal hands on assist for BLE to get back into bed due to increased pain  Bed Mobility 3  Bed Mobility 3: Scooting  Level of Assistance 3: Maximum assistance, +2  Bed Mobility Comments 3: Pt able to assist with crossing arms and flexing head forward, PT and SPT required for full assist with scooting.    Transfers  Transfer: Yes  Transfer 1  Technique 1: Sit to stand, Stand to sit  Transfer Device 1: Gait belt, Walker (FWW)  Transfer Level of Assistance 1: Contact guard  Trials/Comments 1: Pt able to complete with minimal hands on assist, utilized BLE against EOB for increased stability and decrease of retropulsive movement.  Decreased eccentric control when returning to seated. Cueing needed for hand placement and sequencing.    Ambulation/Gait Training  Ambulation/Gait Training Performed: Yes  Ambulation/Gait Training 1  Surface 1: Level tile  Device 1: Rolling walker  Gait Support Devices: Gait belt  Assistance 1: Contact guard  Quality of Gait 1: Wide base of support, Diminished heel strike, Inconsistent stride length, Decreased step length, Forward flexed posture  Comments/Distance (ft) 1: Pt ambulated in room 4ft x 2, total 8ft, decreased step height and R foot drag due to residual CVA, CGA needed for pt safety.    Stairs  Stairs: No  Extremity/Trunk Assessments:  RUE   RUE : Exceptions to WFL (R shouder flexion </= 90 deg)  LUE   LUE: Within Functional Limits  RLE   RLE : Exceptions to WFL (Residual weakness distally 3/5, proximally 4+/5, able to complete functional tasks)  LLE   LLE : Within Functional Limits  Outcome Measures:  [facility] Basic Mobility  Turning from your back to your side while in a flat bed without using bedrails: A little  Moving from lying on your back to sitting on the side of a flat bed without using bedrails: A little  Moving to and from bed to chair (including a wheelchair): A little  Standing up from a chair using your arms (e.g. wheelchair or bedside chair): A little  To walk in hospital room: A little  Climbing 3-5 steps with railing: A lot  Basic Mobility - Total Score: 17    Encounter Problems       Encounter Problems (Active)       Balance       STG - Maintains dynamic standing balance with upper extremity support At [facility], safely, without LOB, device PRN        Start:  09/17/24    Expected End:  10/01/24       INTERVENTIONS:  1. Practice standing with minimal support.  2. Educate patient about standing tolerance.  3. Educate patient about independence with gait, transfers, and ADL's.  4. Educate patient about use of assistive device.  5. Educate patient about self-directed care.             Mobility       STG - Patient will ambulate At [facility] using Wheeled walker for 100' without LOB or gross gait deviations        Start:  09/17/24    Expected End:  10/01/24            Endurance - Pt to tolerate >/= 20 minutes therex, theract, gait and/or NMR with </= 5 minutes of rest breaks        Start:  09/17/24    Expected End:  10/01/24               PT Transfers       STG - Patient will perform bed mobility At [facility], safely, without LOB, device PRN        Start:  09/17/24    Expected End:  10/01/24            STG - Patient will transfer sit to and from stand At [facility], safely, without LOB, device PRN        Start:  09/17/24    Expected End:  10/01/24               Safety       Pt will verbalize and apply safety awareness and precautions 100% of time throughout entire session         Start:  09/17/24    Expected End:  10/01/24                   Education Documentation  Body Mechanics, taught by CHARLIE Weber at 9/17/2024  1:31 PM.  Learner: Caregiver, Patient  Readiness: Acceptance  Method: Explanation, Demonstration  Response: Needs Reinforcement    Mobility Training, taught by CHARLIE Weber at 9/17/2024  1:31 PM.  Learner: Caregiver, Patient  Readiness: Acceptance  Method: Explanation, Demonstration  Response: Needs Reinforcement    Education Comments  No comments found.

## 2024-09-17 NOTE — CARE PLAN
The patient's goals for the shift include      The clinical goals for the shift include Pt. will remain safe throughout shift    Problem: Fall/Injury  Goal: Not fall by end of shift  Outcome: Progressing     Problem: Skin  Goal: Prevent/manage excess moisture  Flowsheets (Taken 9/17/2024 1531)  Prevent/manage excess moisture: Moisturize dry skin

## 2024-09-17 NOTE — PROGRESS NOTES
Transitional Care Coordination Progress Note:  Plan per Medical/Surgical team: treatment of falls X 6 @ home  Status: Observation  Payor source: medicare A/B, medicaid  Discharge disposition:  Group Home with aides   Potential Barriers:PT/OT evals pending  ADOD: 9/17/2024   JAEL Mack RN, BSN Transitional Care Coordinator ED# [phone]     09/17/24 0733   Discharge Planning   Type of Residence [facility]   Care Facility Name [facility]

## 2024-09-17 NOTE — NURSING NOTE
Patient has discharged to home with a script for a rollator. Provided a couple locations where the patient's caregiver Joel can pick  up the rollator -- [employer] or any durable medical supply company like [employer]. Discharge instructions reviewed with the caregiver Joel Maldonado verbalized understanding.

## 2024-09-18 ENCOUNTER — APPOINTMENT (OUTPATIENT)
Dept: PRIMARY CARE | Facility: CLINIC | Age: 69
End: 2024-09-18
Payer: COMMERCIAL

## 2024-09-18 DIAGNOSIS — G62.9 NEUROPATHY: ICD-10-CM

## 2024-09-18 RX ORDER — LIDOCAINE 50 MG/G
1 PATCH TOPICAL DAILY
Qty: 30 PATCH | OUTPATIENT
Start: 2024-09-18 | End: 2025-09-18

## 2024-09-25 DIAGNOSIS — J45.909 ASTHMA, UNSPECIFIED ASTHMA SEVERITY, UNSPECIFIED WHETHER COMPLICATED, UNSPECIFIED WHETHER PERSISTENT (HHS-HCC): ICD-10-CM

## 2024-09-25 DIAGNOSIS — I63.9 CEREBROVASCULAR ACCIDENT (CVA), UNSPECIFIED MECHANISM (MULTI): Primary | ICD-10-CM

## 2024-10-05 NOTE — PROGRESS NOTES
Emergency Medicine Transition of Care Note.    I received Janine Sandoval in signout from Dr. Jose Cloud.  Please see the previous ED provider note for all HPI, PE and MDM up to the time of signout at 6 AM. This is in addition to the primary record.    In brief Janine Sandoval is an 68 y.o. female presenting for   Chief Complaint   Patient presents with    Fall    Hip Pain    Headache    Neck Pain    Chest Pain     At the time of signout we were awaiting: Disposition    ED Course as of 10/05/24 1833   Mon Sep 16, 2024   0221  used  []   0308 IMPRESSION:  No evidence of acute fracture of the cervical spine.      Multilevel degenerative change of the cervical spine.      MACRO:  None      Signed by: Facundo Olivera 9/16/2024 2:48 AM  Dictation workstation:   [id number]   []   0403 FINDINGS:    There is no displaced fracture.  The alignment is anatomic.   Generalized osseous demineralization is noted.  No soft tissue  abnormality is seen.  IMPRESSION:  No acute osseous abnormality identified.  Signed by Steve Otoole   []   0403 IMPRESSION:  Moderate joint space narrowing in the right hip and mild joint space  narrowing in the left hip.  No acute osseous abnormality identified.  Signed by Steve Otoole   []   0403 IMPRESSION:  No acute osseous abnormality identified.  Moderate joint space  narrowing and mild age-related osteoarthritic change of the right  glenohumeral joint.  Signed by Steve Otoole   []   0403 IMPRESSION:  Cardiomegaly.  No definite acute cardiopulmonary disease.  Signed by Steve Otoole   []   0743 IMPRESSION:  No evidence of acute intracranial hemorrhage or depressed calvarial  fracture.      Hypodensity in left corona radiata and basal ganglia compatible with  chronic ischemic change.              MACRO:  None      Signed by: Facundo Olivera 9/16/2024 2:46 AM  Dictation workstation:   [id number]   []      ED Course User Index  [] Abhilash Reyes PA-C         Diagnoses as of  10/05/24 1833   Fall, initial encounter   Chest pain, unspecified type   Injury of head, initial encounter       Medical Decision Making  Patient's labs and imaging reviewed and as per recommendation of Dr. Cloud patient will be hospitalized for further care.    Final diagnoses:   [W19.XXXA] Fall, initial encounter   [R07.9] Chest pain, unspecified type   [S09.90XA] Injury of head, initial encounter           Procedure  Procedures    Kay Bentley MD

## 2024-10-14 ENCOUNTER — APPOINTMENT (OUTPATIENT)
Dept: PHARMACY | Facility: HOSPITAL | Age: 69
End: 2024-10-14
Payer: COMMERCIAL

## 2024-10-14 DIAGNOSIS — I63.9 CEREBROVASCULAR ACCIDENT (CVA), UNSPECIFIED MECHANISM (MULTI): ICD-10-CM

## 2024-10-14 DIAGNOSIS — J45.909 ASTHMA, UNSPECIFIED ASTHMA SEVERITY, UNSPECIFIED WHETHER COMPLICATED, UNSPECIFIED WHETHER PERSISTENT (HHS-HCC): ICD-10-CM

## 2024-10-14 RX ORDER — QUETIAPINE FUMARATE 50 MG/1
50 TABLET, FILM COATED ORAL DAILY
COMMUNITY
End: 2024-10-14 | Stop reason: ENTERED-IN-ERROR

## 2024-10-14 NOTE — PROGRESS NOTES
"  Pharmacy Post-Discharge Visit    Janine Sandoval is a 69 y.o. female was referred to Clinical Pharmacy Team to complete a post-discharge medication optimization and monitoring visit.  The patient was referred for their No chief complaint on file..    Pt is here for First appointment.     Admission Date: 2024  Discharge Date: 2024    Referring Provider: Jesi Ramsey, *  PCP: Jesi Ramsey MD  Last Visit: 2024  Next visit: 10/22/2024    Subjective   HPI  ASCVD ASSESSMENT  Current Regimen:  Anticoagulant?: No  Antiplatelet?: Yes - Clopidogrel 75 mg daily  Statin?: Yes - Atorvastatin 80 mg daily     HTN history:  HTN diagnosis: yes  Current Regimen  Losartan 25 mg daily  BP Cuff at home? yes, but does not always get recorded without an order for BP monitoring.   HTN at goal? no  Most recent in office BP from 2024 was 151/88    HLD history:  Diagnosis? yes  At goal? yes  Current LDL: 50 mg/dL  Current T mg/dL    DM history:  Diagnosis? no    Smoking history:  She has never smoked.    Asthma Severity Assessment    Primary Symptoms: Shortness of breath, chest tightness  Aggravating Factors: seasonal allergies,   -Allergies: Yes, describe: Pollens  -Allergy Control Measures: Avoiding the allergens  Alleviating Factors: Albuterol nebulizer    ASTHMA SEVERITY:  -Symptoms/week: < or = 2 days/week - patient only has symptoms with seasonal allergies, not a regular need for the patient to treat  -Nighttime awakenings: < or = 2 times/month   -CLAY use: < or = 2 days/week  -Interference with normal activity: none  -Asthma Exacerbations (requiring oral steroids): 0-1/year  Pulmonary Functions Testing Results:    No results found for: \"FEV1\", \"FVC\", \"HWL2MLX\", \"TLC\", \"DLCO\"    Asthma Severity: Intermittent     Notable Medication changes following discharge  Change: Benztropine; Quetiapine     Medication Reconciliation  Added: Patient is listed as also taking quetiapine 50 mg in the " afternoons    Drug Interactions  The following drug interactions were noted: Haloperidol, quetiapine, levetiracetam, sertraline, and trazodone can all prolong QT interval, monitor for arrhythmias/torsades de pointes. Levetiracetam, trazodone, sertraline and quetiapine can all have CNS depressant effects and can also increase the risk of Serotonin Syndrome.     Medication System Management  Patient's preferred pharmacy: [facility]  Adherence/Organization: No concerns at this time  Affordability/Accessibility: No concerns at this time      Objective   Allergies   Allergen Reactions    Levofloxacin Dizziness     Question seizure     Social History     Social History Narrative    Not on file      Medication Review  Current Outpatient Medications   Medication Instructions    acetaminophen (Tylenol) 500 mg tablet Take 1-2 Tablets by mouth every 8 hours as needed.    albuterol 2.5 mg /3 mL (0.083 %) nebulizer solution 1 VIAL VIA AEROSOL EVERY 4 HOURS AS NEEDED FOR SHORTNESS OF BREATH / CHEST TIGHTNESS    atorvastatin (LIPITOR) 80 mg, oral, Daily    benztropine (Cogentin) 0.5 mg tablet TAKE 1 TABLET BY MOUTH IN THE MORNING AND AFTERNOON ~108Q2 TAKE 2 TABLETS BY MOUTH EVERY NIGHT AT BEDTIME    cholecalciferol (VITAMIN D-3) 2,000 Units, oral, Daily RT    clopidogrel (PLAVIX) 75 mg, oral, Daily    famotidine (PEPCID) 20 mg, oral, Daily    haloperidol decanoate (Haldol Decanoate) 100 mg/mL injection Inject 2 mL ( 200 MG) into the muscle every 28 days.    levETIRAcetam (KEPPRA) 1,000 mg, oral, Daily    lidocaine (Lidoderm) 5 % patch 1 patch, transdermal, Daily, Apply to painful area 12 hours per day, remove for 12 hours.    losartan (COZAAR) 25 mg, oral, Daily    melatonin 5 mg, oral, Daily    phenytoin ER (DILANTIN) 300 mg, oral, Nightly    QUEtiapine (SEROquel) 25 mg tablet TAKE 1 TABLET BY MOUTH AT 7AM ~301Q2 TAKE 2 TABLETS BY MOUTH AT 3PM FOR RESTLESSNESS, AGITATION    QUEtiapine (SEROquel) 300 mg tablet TAKE 1  TABLET BY MOUTH DAILY *DOSE INCREASE*    QUEtiapine (SEROQUEL) 50 mg, oral, Daily, Patient takes in the afternoons.    sertraline (ZOLOFT) 100 mg, oral, Daily RT    traZODone (DESYREL) 50 mg, oral, Nightly    walker (Ultra-Light Rollator) misc Use as directed      Vitals  BP Readings from Last 2 Encounters:   09/17/24 151/88   05/17/24 130/80     BMI Readings from Last 1 Encounters:   09/16/24 25.29 kg/m²      Labs  A1C  Lab Results   Component Value Date    HGBA1C 5.7 (H) 11/27/2023    HGBA1C 6.0 (H) 02/04/2021     BMP  Lab Results   Component Value Date    CALCIUM 8.6 09/16/2024     09/16/2024    K 3.5 09/16/2024    CO2 31 09/16/2024     09/16/2024    BUN 12 09/16/2024    CREATININE 0.95 09/16/2024    EGFR 65 09/16/2024     LFTs  Lab Results   Component Value Date    ALT 34 09/16/2024    AST 18 09/16/2024    ALKPHOS 87 09/16/2024    BILITOT 0.4 09/16/2024     FLP  Lab Results   Component Value Date    TRIG 61 10/06/2023    CHOL 114 10/06/2023    LDLCALC 50 (L) 10/06/2023    HDL 51.5 10/06/2023     Urine Microalbumin  Lab Results   Component Value Date    MICROALBCREA  10/06/2023      Comment:      One or more analytes used in this calculation is outside of the analytical measurement range. Calculation cannot be performed.     Wt Readings from Last 3 Encounters:   09/16/24 68.9 kg (152 lb)   05/17/24 68.9 kg (152 lb)   02/26/24 69.4 kg (153 lb)      There is no height or weight on file to calculate BMI.     Assessment/Plan   Problem List Items Addressed This Visit       Stroke (Multi)     Patient is currently taking clopidogrel 75 mg for anti-platelet activity post-stroke. She is also taking atorvastatin 80 mg daily for lipid lowering. She is also on losartan 25 mg daily for BP lowering. Per care givers at the facility where she currently lives, she was unable to give me recent recorded BP for the patient because it does not always get recorded into their MAR without an order to monitor BP.  Patient's  most recent in office BP was elevated.     PLAN:  Continue  Atorvastatin 80 mg; take 1 tablet daily  Clopidogrel 75 mg; take 1 tablet daily  Losartan 25 mg; take 1 tablet daily         Asthma     Patient with Asthma that is well controlled and stable. Based on asthma severity, patient's asthma is classified as mild intermittent.    Medication Changes:  CONTINUE  Albuterol 2.5 mg/3 mL nebulizer solution; use 1 vial in nebulizer Q4H PRN shortness of breath and chest tightness    Future Considerations:  Continue to monitor around the change of seasons/ seasonal allergy to ensure patient's asthma remains well controlled.    Monitoring and Education:  Answered all questions/concerns            Clinical Pharmacist follow-up: if needed, Telehealth visit    Continue all meds under the continuation of care with the referring provider and clinical pharmacy team.    Thank you,  Radha Jovel, PharmD  Clinical Pharmacist - Primary Care  [phone]  10/14/2024    Verbal consent to manage patient's drug therapy was obtained from an individual authorized to act on behalf of a patient. They were informed they may decline to participate or withdraw from participation in pharmacy services at any time.

## 2024-10-14 NOTE — Clinical Note
For your awareness. Spoke with care-giver for post discharge clinical pharmacy appointment. She was mentioning that patient is having some arthritis-like pain in knees and was wanting to re-order Voltaren gel during patient's upcoming appointment. Please reach out for any questions or concerns

## 2024-10-14 NOTE — ASSESSMENT & PLAN NOTE
Patient is currently taking clopidogrel 75 mg for anti-platelet activity post-stroke. She is also taking atorvastatin 80 mg daily for lipid lowering. She is also on losartan 25 mg daily for BP lowering. Per care givers at the facility where she currently lives, she was unable to give me recent recorded BP for the patient because it does not always get recorded into their MAR without an order to monitor BP.  Patient's most recent in office BP was elevated.     PLAN:  Continue  Atorvastatin 80 mg; take 1 tablet daily  Clopidogrel 75 mg; take 1 tablet daily  Losartan 25 mg; take 1 tablet daily

## 2024-10-14 NOTE — ASSESSMENT & PLAN NOTE
Patient with Asthma that is well controlled and stable. Based on asthma severity, patient's asthma is classified as mild intermittent.    Medication Changes:  CONTINUE  Albuterol 2.5 mg/3 mL nebulizer solution; use 1 vial in nebulizer Q4H PRN shortness of breath and chest tightness    Future Considerations:  Continue to monitor around the change of seasons/ seasonal allergy to ensure patient's asthma remains well controlled.    Monitoring and Education:  Answered all questions/concerns

## 2024-10-22 ENCOUNTER — APPOINTMENT (OUTPATIENT)
Dept: PRIMARY CARE | Facility: CLINIC | Age: 69
End: 2024-10-22
Payer: MEDICARE

## 2024-10-22 ENCOUNTER — LAB (OUTPATIENT)
Dept: LAB | Facility: LAB | Age: 69
End: 2024-10-22
Payer: COMMERCIAL

## 2024-10-22 VITALS
BODY MASS INDEX: 26.66 KG/M2 | SYSTOLIC BLOOD PRESSURE: 148 MMHG | HEART RATE: 81 BPM | DIASTOLIC BLOOD PRESSURE: 79 MMHG | WEIGHT: 160 LBS | HEIGHT: 65 IN

## 2024-10-22 DIAGNOSIS — F32.A DEPRESSION, UNSPECIFIED DEPRESSION TYPE: ICD-10-CM

## 2024-10-22 DIAGNOSIS — E78.5 HYPERLIPIDEMIA, UNSPECIFIED HYPERLIPIDEMIA TYPE: ICD-10-CM

## 2024-10-22 DIAGNOSIS — R73.9 HYPERGLYCEMIA, UNSPECIFIED: ICD-10-CM

## 2024-10-22 DIAGNOSIS — Z23 NEED FOR IMMUNIZATION AGAINST INFLUENZA: ICD-10-CM

## 2024-10-22 DIAGNOSIS — G21.11 NEUROLEPTIC INDUCED PARKINSONISM: ICD-10-CM

## 2024-10-22 DIAGNOSIS — H91.3 DEAF-MUTISM: ICD-10-CM

## 2024-10-22 DIAGNOSIS — E66.01 MORBID (SEVERE) OBESITY DUE TO EXCESS CALORIES (MULTI): Primary | ICD-10-CM

## 2024-10-22 DIAGNOSIS — I69.359 HEMIPARESIS AFFECTING DOMINANT SIDE AS LATE EFFECT OF STROKE (MULTI): ICD-10-CM

## 2024-10-22 DIAGNOSIS — G40.209 PARTIAL EPILEPSY WITH IMPAIRMENT OF CONSCIOUSNESS (MULTI): ICD-10-CM

## 2024-10-22 DIAGNOSIS — K21.9 GASTROESOPHAGEAL REFLUX DISEASE WITHOUT ESOPHAGITIS: ICD-10-CM

## 2024-10-22 DIAGNOSIS — I10 ESSENTIAL HYPERTENSION: ICD-10-CM

## 2024-10-22 DIAGNOSIS — Z23 FLU VACCINE NEED: ICD-10-CM

## 2024-10-22 DIAGNOSIS — E66.01 MORBID (SEVERE) OBESITY DUE TO EXCESS CALORIES (MULTI): ICD-10-CM

## 2024-10-22 DIAGNOSIS — E55.9 VITAMIN D DEFICIENCY: ICD-10-CM

## 2024-10-22 DIAGNOSIS — R01.1 MURMUR, CARDIAC: ICD-10-CM

## 2024-10-22 DIAGNOSIS — T43.505A NEUROLEPTIC INDUCED PARKINSONISM: ICD-10-CM

## 2024-10-22 DIAGNOSIS — F20.0 PARANOID SCHIZOPHRENIA (MULTI): ICD-10-CM

## 2024-10-22 PROCEDURE — 1159F MED LIST DOCD IN RCRD: CPT | Performed by: INTERNAL MEDICINE

## 2024-10-22 PROCEDURE — 3078F DIAST BP <80 MM HG: CPT | Performed by: INTERNAL MEDICINE

## 2024-10-22 PROCEDURE — 1170F FXNL STATUS ASSESSED: CPT | Performed by: INTERNAL MEDICINE

## 2024-10-22 PROCEDURE — 99214 OFFICE O/P EST MOD 30 MIN: CPT | Performed by: INTERNAL MEDICINE

## 2024-10-22 PROCEDURE — 3077F SYST BP >= 140 MM HG: CPT | Performed by: INTERNAL MEDICINE

## 2024-10-22 PROCEDURE — G0439 PPPS, SUBSEQ VISIT: HCPCS | Performed by: INTERNAL MEDICINE

## 2024-10-22 PROCEDURE — 1036F TOBACCO NON-USER: CPT | Performed by: INTERNAL MEDICINE

## 2024-10-22 PROCEDURE — G0008 ADMIN INFLUENZA VIRUS VAC: HCPCS | Performed by: INTERNAL MEDICINE

## 2024-10-22 PROCEDURE — 90662 IIV NO PRSV INCREASED AG IM: CPT | Performed by: INTERNAL MEDICINE

## 2024-10-22 PROCEDURE — 3008F BODY MASS INDEX DOCD: CPT | Performed by: INTERNAL MEDICINE

## 2024-10-22 ASSESSMENT — PATIENT HEALTH QUESTIONNAIRE - PHQ9
2. FEELING DOWN, DEPRESSED OR HOPELESS: NOT AT ALL
SUM OF ALL RESPONSES TO PHQ9 QUESTIONS 1 AND 2: 0
1. LITTLE INTEREST OR PLEASURE IN DOING THINGS: NOT AT ALL
10. IF YOU CHECKED OFF ANY PROBLEMS, HOW DIFFICULT HAVE THESE PROBLEMS MADE IT FOR YOU TO DO YOUR WORK, TAKE CARE OF THINGS AT HOME, OR GET ALONG WITH OTHER PEOPLE: SOMEWHAT DIFFICULT
SUM OF ALL RESPONSES TO PHQ9 QUESTIONS 1 AND 2: 1
2. FEELING DOWN, DEPRESSED OR HOPELESS: SEVERAL DAYS
1. LITTLE INTEREST OR PLEASURE IN DOING THINGS: NOT AT ALL
1. LITTLE INTEREST OR PLEASURE IN DOING THINGS: NOT AT ALL

## 2024-10-22 ASSESSMENT — ENCOUNTER SYMPTOMS
LOSS OF SENSATION IN FEET: 1
OCCASIONAL FEELINGS OF UNSTEADINESS: 1
DEPRESSION: 0

## 2024-10-22 ASSESSMENT — ACTIVITIES OF DAILY LIVING (ADL)
BATHING: NEEDS ASSISTANCE
MANAGING_FINANCES: TOTAL CARE
DOING_HOUSEWORK: TOTAL CARE
GROCERY_SHOPPING: TOTAL CARE
TAKING_MEDICATION: TOTAL CARE
DRESSING: NEEDS ASSISTANCE

## 2024-10-22 ASSESSMENT — LIFESTYLE VARIABLES
AUDIT-C TOTAL SCORE: 0
SKIP TO QUESTIONS 9-10: 1
HOW OFTEN DO YOU HAVE SIX OR MORE DRINKS ON ONE OCCASION: NEVER
HOW OFTEN DO YOU HAVE A DRINK CONTAINING ALCOHOL: NEVER
HOW MANY STANDARD DRINKS CONTAINING ALCOHOL DO YOU HAVE ON A TYPICAL DAY: PATIENT DOES NOT DRINK

## 2024-10-22 NOTE — PROGRESS NOTES
Merced Sandoval is a 69 y.o. female presenting for medicare wellness. She notes that she has fallen > 7 times in the last several weeks, requiring admission to the hospital. Her movement has been becoming more stiff over the last several months and recently completed PT/OT for a foot-drop. She has some emotional concerns, recently learning about the passing of a family member, which upset her.    Medicare Wellness Billing Compliance Satisfied    *This is a visual tool to show completion of required items on the day of the visit. Green checks will only appear on the date of visit.    Review all medications by prescribing practitioner or clinical pharmacist (such as prescriptions, OTCs, herbal therapies and supplements) documented in the medical record    Past Medical, Surgical, and Family History reviewed and updated in chart    Tobacco Use Reviewed    Alcohol Use Reviewed    Illicit Drug Use Reviewed    PHQ2/9    Falls in Last Year Reviewed    Home Safety Risk Factors Reviewed    Cognitive Impairment Reviewed    Patient Self Assessment and Health Status    Current Diet Reviewed    Exercise Frequency    ADL - Hearing Impairment    ADL - Bathing    ADL - Dressing    ADL - Walks in Home    IADL - Managing Finances    IADL - Grocery Shopping    IADL - Taking Medications    IADL - Doing Housework      Objective    Physical Exam  Constitutional:       General: She is not in acute distress.     Appearance: Normal appearance. She is obese. She is not ill-appearing.   HENT:      Head: Normocephalic and atraumatic.      Nose: Nose normal.      Mouth/Throat:      Mouth: Mucous membranes are moist.      Pharynx: Oropharynx is clear.   Eyes:      Extraocular Movements:      Right eye: Normal extraocular motion and no nystagmus.      Left eye: Normal extraocular motion and no nystagmus.   Cardiovascular:      Rate and Rhythm: Normal rate and regular rhythm.      Pulses: Normal pulses.       "Heart sounds: Normal heart sounds.   Pulmonary:      Effort: Pulmonary effort is normal.      Breath sounds: Normal breath sounds.   Abdominal:      General: Abdomen is flat. Bowel sounds are normal.      Palpations: Abdomen is soft.   Musculoskeletal:         General: No swelling.      Right lower leg: No edema.      Left lower leg: No edema.      Comments: Stiff with rigidity  Slow ambulation  Requires support to stand and walk   Skin:     General: Skin is warm and dry.      Capillary Refill: Capillary refill takes less than 2 seconds.   Neurological:      Mental Status: She is alert. Mental status is at baseline.      Cranial Nerves: No cranial nerve deficit.      Sensory: No sensory deficit.      Motor: Weakness present.      Coordination: Coordination abnormal.      Gait: Gait abnormal.      Comments: Tremor with muscle use  Intention tremor  Unable to complete cerebellar assessment     Heart Rate:  [81] 81  BP: (148)/(79) 148/79  Vitals:    10/22/24 1200   Weight: 72.6 kg (160 lb)     Labs:   CBC:  Recent Labs     09/17/24  0358 09/16/24  0322 10/08/22  1046   WBC 6.7 5.4 5.2   HGB 11.4* 11.0* 11.7*   HCT 35.1* 34.3* 37.6    130* 199   MCV 92 92 92     CMP:  Recent Labs     09/16/24  0322 05/17/24  1626 11/27/23  1011    142 141   K 3.5 4.1 3.8    107 104   CO2 31 23 27   ANIONGAP 10 16 14   BUN 12 15 11   CREATININE 0.95 0.65 0.72   EGFR 65 >90 >90   GLUCOSE 153* 113* 161*     Recent Labs     09/16/24  0322 05/17/24  1626 11/27/23  1011   ALBUMIN 3.9 4.1 3.8   ALKPHOS 87 86 101   ALT 34 30 37   AST 18 20 29   BILITOT 0.4 0.3 0.3     Calcium/Phos:   Lab Results   Component Value Date    CALCIUM 8.6 09/16/2024      COAG: No results for input(s): \"INR\", \"DDIMERVTE\" in the last 58755 hours.  ENDO:  Recent Labs     11/27/23  1011 02/04/21  1443   TSH 2.46  --    HGBA1C 5.7* 6.0*      CARDIAC:   Recent Labs     09/16/24  0638 09/16/24  0446 09/16/24  0322   TROPHS 16* 15* 17*   BNP  --   --  20 "     Recent Labs     10/06/23  0944   CHOL 114   LDLCALC 50*   HDL 51.5   TRIG 61     Imaging:  No results found.    Meds:    Current Outpatient Medications:     acetaminophen (Tylenol) 500 mg tablet, Take 1-2 Tablets by mouth every 8 hours as needed., Disp: 180 tablet, Rfl: 1    albuterol 2.5 mg /3 mL (0.083 %) nebulizer solution, 1 VIAL VIA AEROSOL EVERY 4 HOURS AS NEEDED FOR SHORTNESS OF BREATH / CHEST TIGHTNESS, Disp: 75 mL, Rfl: 2    atorvastatin (Lipitor) 80 mg tablet, Take 1 tablet (80 mg) by mouth once daily., Disp: 90 tablet, Rfl: 0    benztropine (Cogentin) 0.5 mg tablet, TAKE 1 TABLET BY MOUTH IN THE MORNING AND AFTERNOON ~108Q2 TAKE 2 TABLETS BY MOUTH EVERY NIGHT AT BEDTIME (Patient taking differently: TAKE 1 TABLET BY MOUTH IN THE MORNING AND AFTERNOON  AND TAKE 2 TABLETS BY MOUTH EVERY NIGHT AT BEDTIME), Disp: 120 tablet, Rfl: 5    cholecalciferol (Vitamin D-3) 50 MCG (2000 UT) tablet, Take 1 tablet (2,000 Units) by mouth once daily., Disp: 90 tablet, Rfl: 1    clopidogrel (Plavix) 75 mg tablet, Take 1 tablet (75 mg) by mouth once daily., Disp: 90 tablet, Rfl: 0    famotidine (Pepcid) 20 mg tablet, Take 1 tablet (20 mg) by mouth once daily., Disp: 90 tablet, Rfl: 0    haloperidol decanoate (Haldol Decanoate) 100 mg/mL injection, Inject 2 mL ( 200 MG) into the muscle every 28 days., Disp: , Rfl:     levETIRAcetam (Keppra) 1,000 mg tablet, Take 1 tablet (1,000 mg) by mouth once daily., Disp: 90 tablet, Rfl: 0    lidocaine (Lidoderm) 5 % patch, Place 1 patch over 12 hours on the skin once daily. Apply to painful area 12 hours per day, remove for 12 hours., Disp: 30 patch, Rfl: 0    losartan (Cozaar) 25 mg tablet, Take 1 tablet (25 mg) by mouth once daily., Disp: 90 tablet, Rfl: 0    melatonin 5 mg capsule, Take 1 capsule (5 mg) by mouth once daily. (Patient taking differently: Take 1 capsule (5 mg) by mouth once daily at bedtime.), Disp: 30 capsule, Rfl: 0    phenytoin ER (Dilantin) 100 mg capsule, Take 3  capsules (300 mg) by mouth once daily at bedtime., Disp: 180 capsule, Rfl: 2    QUEtiapine (SEROquel) 25 mg tablet, TAKE 1 TABLET BY MOUTH AT 7AM ~301Q2 TAKE 2 TABLETS BY MOUTH AT 3PM FOR RESTLESSNESS, AGITATION, Disp: , Rfl:     QUEtiapine (SEROquel) 300 mg tablet, TAKE 1 TABLET BY MOUTH DAILY *DOSE INCREASE* (Patient taking differently: TAKE 1 TABLET BY MOUTH AT BEDTIME *DOSE INCREASE*), Disp: 90 tablet, Rfl: 1    sertraline (Zoloft) 100 mg tablet, Take 1 tablet (100 mg) by mouth once daily., Disp: 90 tablet, Rfl: 1    traZODone (Desyrel) 50 mg tablet, Take 1 tablet (50 mg) by mouth once daily at bedtime., Disp: 90 tablet, Rfl: 0    walker (Ultra-Light Rollator) misc, Use as directed, Disp: 1 each, Rfl: 0    Assessment    Janine Sandoval is a 69 y.o. female with a history of HTN, HLD, hyperglycemia, schizophrenia, MDD, CVA with documented hemiparesis, GERD, neuroleptic parkinsonism, and epilepsy presents for wellness. Notes frequent falls as well as dysphagia with concern for aspiration.    # Falls  :: Concern for medication induced parkinsonism vs recurrent stroke vs other etiology  - Reached out to psychiatry ([facility] [phone]) for recommendations, callback pending  - Vitamin B1, B2, and B12 levels  - Refer to neurology    # Dysphagia  :: Unknown etiology  - Refer to speech therapy  - Check with psych for possible adverse effects of psychotropic medications    # Routine health maintenance  - Urine albumin  - Lipids  - Vitamin D  - TSH  - A1c  - Levetiracetam and phenytoin levels  - Dexa to be scheduled at next visit  - Mammogram due 1/2025  - CBC and CMP due 3/2025    Helio Reyes MD MS  PGY2 Internal Medicine

## 2024-10-22 NOTE — PROGRESS NOTES
I reviewed the resident/fellow's documentation and discussed the patient with the resident/fellow. I agree with the resident/fellow's medical decision making as documented in the note.  As the attending physician, I certify that I personally reviewed the patient's history and personally examined the patient to confirm the physical findings described above, and that I reviewed the relevant imaging studies and available reports. I also discussed the differential diagnosis and all of the proposed management plans with the patient and individuals accompanying the patient to this visit. They had the opportunity to ask questions about the proposed management plans and to have those questions answered.     Jesi Ramsey MD

## 2024-11-04 ENCOUNTER — LAB (OUTPATIENT)
Dept: LAB | Facility: LAB | Age: 69
End: 2024-11-04
Payer: MEDICARE

## 2024-11-04 LAB
CHOLEST SERPL-MCNC: 122 MG/DL (ref 0–199)
CHOLESTEROL/HDL RATIO: 2.1
HDLC SERPL-MCNC: 57 MG/DL
LDLC SERPL CALC-MCNC: 52 MG/DL
NON HDL CHOLESTEROL: 65 MG/DL (ref 0–149)
PHENYTOIN SERPL-MCNC: 22 UG/ML (ref 10–20)
TRIGL SERPL-MCNC: 64 MG/DL (ref 0–149)
VLDL: 13 MG/DL (ref 0–40)

## 2024-11-04 PROCEDURE — 80177 DRUG SCRN QUAN LEVETIRACETAM: CPT

## 2024-11-04 PROCEDURE — 83036 HEMOGLOBIN GLYCOSYLATED A1C: CPT

## 2024-11-05 ENCOUNTER — APPOINTMENT (OUTPATIENT)
Dept: NEUROLOGY | Facility: CLINIC | Age: 69
End: 2024-11-05
Payer: MEDICARE

## 2024-11-05 LAB
EST. AVERAGE GLUCOSE BLD GHB EST-MCNC: 108 MG/DL
HBA1C MFR BLD: 5.4 %
LEVETIRACETAM SERPL-MCNC: 17 UG/ML (ref 10–40)

## 2024-11-06 ENCOUNTER — APPOINTMENT (OUTPATIENT)
Dept: NEUROLOGY | Facility: HOSPITAL | Age: 69
End: 2024-11-06
Payer: MEDICARE

## 2024-11-08 LAB
VIT B1 PYROPHOSHATE BLD-SCNC: 109 NMOL/L (ref 70–180)
VIT B2 SERPL-SCNC: 12 NMOL/L (ref 5–50)

## 2024-11-14 DIAGNOSIS — J45.909 ASTHMA, UNSPECIFIED ASTHMA SEVERITY, UNSPECIFIED WHETHER COMPLICATED, UNSPECIFIED WHETHER PERSISTENT (HHS-HCC): ICD-10-CM

## 2024-11-14 RX ORDER — ALBUTEROL SULFATE 0.83 MG/ML
SOLUTION RESPIRATORY (INHALATION)
Qty: 75 ML | Refills: 2 | Status: SHIPPED | OUTPATIENT
Start: 2024-11-14

## 2024-11-20 ENCOUNTER — APPOINTMENT (OUTPATIENT)
Dept: PRIMARY CARE | Facility: CLINIC | Age: 69
End: 2024-11-20
Payer: MEDICARE

## 2024-11-20 DIAGNOSIS — K21.9 GASTROESOPHAGEAL REFLUX DISEASE WITHOUT ESOPHAGITIS: ICD-10-CM

## 2024-11-20 DIAGNOSIS — T43.505A NEUROLEPTIC INDUCED PARKINSONISM: ICD-10-CM

## 2024-11-20 DIAGNOSIS — K59.03 DRUG INDUCED CONSTIPATION: Primary | ICD-10-CM

## 2024-11-20 DIAGNOSIS — G40.209 PARTIAL EPILEPSY WITH IMPAIRMENT OF CONSCIOUSNESS (MULTI): ICD-10-CM

## 2024-11-20 DIAGNOSIS — R29.6 RECURRENT FALLS WHILE WALKING: Primary | ICD-10-CM

## 2024-11-20 DIAGNOSIS — R94.31 ABNORMAL EKG: ICD-10-CM

## 2024-11-20 DIAGNOSIS — F20.0 PARANOID SCHIZOPHRENIA (MULTI): ICD-10-CM

## 2024-11-20 DIAGNOSIS — F32.A DEPRESSION, UNSPECIFIED DEPRESSION TYPE: ICD-10-CM

## 2024-11-20 DIAGNOSIS — G40.909 SEIZURE DISORDER (MULTI): ICD-10-CM

## 2024-11-20 DIAGNOSIS — E55.9 VITAMIN D DEFICIENCY: ICD-10-CM

## 2024-11-20 DIAGNOSIS — G21.11 NEUROLEPTIC INDUCED PARKINSONISM: ICD-10-CM

## 2024-11-20 DIAGNOSIS — H61.23 EXCESSIVE CERUMEN IN BOTH EAR CANALS: ICD-10-CM

## 2024-11-20 DIAGNOSIS — G62.9 NEUROPATHY: ICD-10-CM

## 2024-11-20 DIAGNOSIS — H91.3 DEAF-MUTISM: ICD-10-CM

## 2024-11-20 PROCEDURE — G2211 COMPLEX E/M VISIT ADD ON: HCPCS | Performed by: INTERNAL MEDICINE

## 2024-11-20 PROCEDURE — 99215 OFFICE O/P EST HI 40 MIN: CPT | Performed by: INTERNAL MEDICINE

## 2024-11-20 RX ORDER — DOCUSATE SODIUM 100 MG/1
CAPSULE, LIQUID FILLED ORAL
Qty: 90 CAPSULE | Refills: 1 | Status: SHIPPED | OUTPATIENT
Start: 2024-11-20

## 2024-11-20 RX ORDER — ACETAMINOPHEN 500 MG
TABLET ORAL
Qty: 180 TABLET | Refills: 1 | Status: SHIPPED | OUTPATIENT
Start: 2024-11-20

## 2024-11-20 RX ORDER — LIDOCAINE 50 MG/G
PATCH TOPICAL
Qty: 30 PATCH | Refills: 0 | Status: SHIPPED | OUTPATIENT
Start: 2024-11-20

## 2024-11-20 NOTE — PROGRESS NOTES
Primary Care Visit Note    Patient: Janine Sandoval, Age: 69 y.o., SEX: female , MRN:[id number],   PCP: Jesi Ramsey MD        Resident:  Neo Torres MD   Preferred Pharmacy:   [facility] - [place], [place] - [address]  [address] [address]  Phone: [phone] Fax: [phone]    [facility] [address] [place] [address] & [address] [address]  Phone: [phone] Fax: [phone]    [facility] - [place] - [facility]  [place] [address]  Phone: [phone] Fax: [phone]          Chief Complaint     Patient: Janine Sandoval is a 69 y.o. female who presented to the clinic for No chief complaint on file.       Subjective      HPI    Janine Sandoval is a 69 y.o. year old female patient with a PMH significant for HTN, HLD, Bilateral sensorineural hearing loss, vitamin D deficiency, glaucoma, paranoid schizophrenia, arthritis, cryptogenic stroke with hemiparesis, epilepsy, neuroleptic induced parkinsonism, recurrent falls, colonic polyposis, GERD with dysphagia presents to the clinic for follow-up    During the visit, patient was accompanied with home health aide and Stacey Machine with interpretor.  History was mostly taken from the home health care provider, questions from the patient were answered by the patient and interpreted by the Stacey machine interpretor    Patient was previously seen with the complaint of dysphagia.  Patient continues to have the same complaint.  Discussion was done for the patient to schedule an appointment with the speech therapy    Patient continues to have recurrent falls.  Since her last visit patient has fallen at least once.  Prior to falling she endorsed no lightheadedness but upon talking with the care provider, she stated that there are times when  patients has small shuffling gait and other times she has wide-based gait.  She fell and initially endorsed no pain or discomfort.  A day later, she developed pain and now has difficulty ambulating.  Patient also drags her right extremity.    Patient also endorsed and Stacey  stated that patient has right ear pain and discomfort    12 point review of systems negative unless mentioned above    Past History     PMHx:    has a past medical history of Anemia, Depression, Gastro-esophageal reflux disease without esophagitis (12/21/2022), HL (hearing loss), Hyperlipidemia, unspecified (09/25/2019), Hypertension, Personal history of transient ischemic attack (TIA), and cerebral infarction without residual deficits (12/21/2022), and Stroke (Multi).   Allergies:   Allergies   Allergen Reactions    Levofloxacin Dizziness     Question seizure      Surgical Hx:   No past surgical history on file.   Social HX:   Social History     Tobacco Use    Smoking status: Never     Passive exposure: Never    Smokeless tobacco: Never   Vaping Use    Vaping status: Unknown   Substance Use Topics    Alcohol use: Never    Drug use: Never      MEDS:   Current Outpatient Medications   Medication Instructions    acetaminophen (Tylenol) 500 mg tablet Take 1 tablet by mouth every 4 hours as needed for mild to moderate pain. Can take 2 tablets by motuh every 8 hours as needed for moderate tto severe pain.    albuterol 2.5 mg /3 mL (0.083 %) nebulizer solution 1 VIAL VIA AEROSOL EVERY 4 HOURS AS NEEDED FOR SHORTNESS OF BREATH / CHEST TIGHTNESS    atorvastatin (LIPITOR) 80 mg, oral, Daily    benztropine (Cogentin) 0.5 mg tablet TAKE 1 TABLET BY MOUTH IN THE MORNING AND AFTERNOON ~108Q2 TAKE 2 TABLETS BY MOUTH EVERY NIGHT AT BEDTIME    cholecalciferol (VITAMIN D-3) 2,000 Units, oral, Daily RT    clopidogrel (PLAVIX) 75 mg, oral, Daily    famotidine (PEPCID) 20 mg, oral, Daily    haloperidol decanoate (Haldol Decanoate) 100 mg/mL injection  Inject 2 mL ( 200 MG) into the muscle every 28 days.    levETIRAcetam (KEPPRA) 1,000 mg, oral, Daily    lidocaine (Lidoderm) 5 % patch Apply topically to the painful area once per day. Leave in place for 12 hours per day. Remove after 12 hours. Do not reapply until 12 hours have lapsed    losartan (COZAAR) 25 mg, oral, Daily    melatonin 5 mg, oral, Daily    phenytoin ER (DILANTIN) 300 mg, oral, Nightly    QUEtiapine (SEROquel) 25 mg tablet TAKE 1 TABLET BY MOUTH AT 7AM ~301Q2 TAKE 2 TABLETS BY MOUTH AT 3PM FOR RESTLESSNESS, AGITATION    QUEtiapine (SEROquel) 300 mg tablet TAKE 1 TABLET BY MOUTH DAILY *DOSE INCREASE*    sertraline (ZOLOFT) 100 mg, oral, Daily RT    traZODone (DESYREL) 50 mg, oral, Nightly    walker (Ultra-Light Rollator) misc Use as directed      Objective      There were no vitals taken for this visit.   BMI Readings from Last 15 Encounters:   10/22/24 26.63 kg/m²   09/16/24 25.29 kg/m²   05/17/24 24.55 kg/m²   02/26/24 24.71 kg/m²   01/26/24 24.69 kg/m²   12/06/23 24.53 kg/m²   11/27/23 24.53 kg/m²   10/06/23 24.37 kg/m²   02/20/23 27.92 kg/m²   12/21/22 27.92 kg/m²      Lab Results   Component Value Date    HGBA1C 5.4 11/04/2024      Lab Results   Component Value Date    HGBA1C 5.4 11/04/2024    HGBA1C 5.7 (H) 11/27/2023    HGBA1C 6.0 (H) 02/04/2021     Lab Results   Component Value Date    LDLCALC 52 11/04/2024    CREATININE 0.95 09/16/2024      Lab Results   Component Value Date    WBC 6.7 09/17/2024    HGB 11.4 (L) 09/17/2024    HCT 35.1 (L) 09/17/2024    MCV 92 09/17/2024     09/17/2024        Chemistry    Lab Results   Component Value Date/Time     09/16/2024 0322    K 3.5 09/16/2024 0322     09/16/2024 0322    CO2 31 09/16/2024 0322    BUN 12 09/16/2024 0322    CREATININE 0.95 09/16/2024 0322    Lab Results   Component Value Date/Time    CALCIUM 8.6 09/16/2024 0322    ALKPHOS 87 09/16/2024 0322    AST 18 09/16/2024 0322    ALT 34 09/16/2024 0322    BILITOT 0.4 09/16/2024  0322        Physical Exam  Physical Exam  Constitutional:       Appearance: Normal appearance.   HENT:      Head: Normocephalic and atraumatic.   Cardiovascular:      Rate and Rhythm: Normal rate and regular rhythm.      Heart sounds: Murmur heard.      No friction rub. No gallop.   Pulmonary:      Breath sounds: Normal breath sounds. No stridor. No wheezing or rhonchi.   Abdominal:      General: Abdomen is flat. Bowel sounds are normal.      Palpations: Abdomen is soft. There is no mass.      Tenderness: There is no abdominal tenderness. There is no guarding.   Musculoskeletal:         General: Swelling present. No tenderness. Normal range of motion.      Cervical back: Normal range of motion.   Neurological:      Mental Status: She is alert.   Psychiatric:         Mood and Affect: Mood normal.         Behavior: Behavior normal.        Assessment and Plan     Assessment/Plan    The following medical issues were discussed during this encounter  :     Diagnoses and all orders for this visit:    # Recurrent falls while walking (Primary)  -     Seems to be in ongoing and chronic problem, patient does have drug-induced parkinsonism which could be a precipitating factor.  Could also have cardiac or neurological pathology.  -     Refilled acetaminophen (Tylenol) 500 mg tablet; Take 1 tablet by mouth every 4 hours as needed for mild to moderate pain. Can take 2 tablets by motuh every 8 hours as needed for moderate tto severe pain.  -     Refilled lidocaine (Lidoderm) 5 % patch; Apply topically to the painful area once per day. Leave in place for 12 hours per day. Remove after 12 hours. Do not reapply until 12 hours have lapsed  -     Ordered MR lumbar spine w and wo IV contrast; Future  -     Ordered MR sacrum coccyx w and wo IV contrast; Future  -     Ordered MR pelvis w and wo IV contrast; Future  -     Ordered MR brain w and wo IV contrast; Future  -     Ordered Transthoracic Echo (TTE) Complete; Future  -     Ordered  Referral to Clinical Pharmacy for medication reconciliation and interactions that could be precipitating patient to have recurrent falls    # Dysphagia  -     Ongoing chronic complaint complaint.  -  Referral for speech therapy was placed last visit, patient will be scheduled  -  Drug-induced parkinsonism could be precipitated    # Faint Murmur  # Abnormal EKG  # Bilateral lower extremity edema  -     Reviewed EKG from 09/16/2024: Normal sinus rhythm with a rate of 80 bpm.  No ST segment elevation.  -     Ordered transthoracic Echo (TTE) Complete; Future    # Depression, unspecified depression type  # Seizure disorder (Multi)  # Paranoid schizophrenia (Multi)  -     Labs review showed that patient's phenytoin level is elevated at 22.0, Keppra level is normal at 17  -     In regards to her psych meds, patient is currently on phenytoin 300 mg every night, Seroquel 300 mg at bedtime, sertraline 100 mg every day, trazodone 50 mg at night, Keppra 1000 mg every day.  Possibly also on haloperidol injections?  -     Last visit, psychiatry at [facility] was contacted for recommendations regarding psych medications.  Still no response  -     Ordered MR brain w and wo IV contrast; Future  -     Referral to Clinical Pharmacy; Future    # Neuroleptic induced parkinsonism  -     Patient was referred to neurologist last visit, patient has a scheduled appointment for January 27 3:30 PM   -     Referral to Clinical Pharmacy for meds reconciliation.  Antipsychotic meds are likely precipitating medication induced parkinsonism  -  Patient is currently on benztropine 0.5 mg 1 tablet in the morning, 1 tablet in the afternoon and 2 tablets at bedtime    # Excessive cerumen in both ear canals  -     Patient had complaint of right ear ache  -     Upon examination, cerumen was seen and impacted in bilateral ears  -  Attempted to remove the cerumen with ear lavage, patient stated that she was in pain  -  Placed a referral for ENT for patient  to get ear lavage    Health maintenance  The following screening tests were ordered:  Reviewed CBC from 09/17/2024: H/H11 (WNL)/35.1.  Reviewed CMP from 09/16/2024: Glucose 153.  Reviewed TSH from 11/27/2023: 2.46 (WNL).  Reviewed vitamin D from 11/27/2023: 42 (WNL)   reviewed lipid panel from 11/04/2024: Cholesterol 122, HDL 57, LDL 52, VLDL 13, triglycerides 64 (WNL)  Reviewed HbA1c from 11/04/2024: 5.4 (WNL)  Reviewed urine albumin to creatinine ratio from 10/6/2023: Albumin less than 7  Reviewed mammogram from 02/26/2024: No mammographic evidence of malignancy.  Repeat due 02/2025 reviewed DEXA bone scan from  Bone density scan will be ordered at next visit    Immunizations: UTD     Please return on March 14 10:20 AM or if symptoms worsen     Neo Torres MD  Internal Medicine, PGY- 2  11/20/24 at 4:35 PM

## 2024-11-20 NOTE — PROGRESS NOTES
I reviewed the resident/fellow's documentation and discussed the patient with the resident/fellow. I agree with the resident/fellow's medical decision making as documented in the note.  As the attending physician, I certify that I personally reviewed the patient's history and personally examined the patient to confirm the physical findings described above, and that I reviewed the relevant imaging studies and available reports. I also discussed the differential diagnosis and all of the proposed management plans with the patient and individuals accompanying the patient to this visit. They had the opportunity to ask questions about the proposed management plans and to have those questions answered.   Worsening clinical course  Broad differential, which includes:  CVA  Medication toxicity  Metabolic abnormalities  UTIs  Functional decline  Lumbar pathology    Recommend :  MRI of the brain and L spine  Psychiatry review of meds and de escalate therapy, reduce the dose of antipsychotics  Neuro evaluation  Urinalysis  Urine culture    MDM  1) COMPLEXITY: MORE THAN 1 STABLE CHRONIC CONDITION ADDRESSED OR 1 ACUTE ILLNESS ADDRESSED   2)DATA: TESTS INTERPRETED AND OR ORDERED, TOOK INDEPENDENT HISTORY OR RECORDS REVIEWED  3)RISK: HIGH RISK DUE TO NATURE OF MEDICAL CONDITIONS/COMORBIDITY OR MEDICATIONS ORDERED OR SURGICAL OR PROCEDURE REFERRAL    Jesi Ramsey MD

## 2024-11-20 NOTE — PATIENT INSTRUCTIONS
- Please schedule an appointment with speech therapy for dysphagia  - Please schedule an appointment with ENT to get your bilateral ear wax removal  - Please schedule an appointment with your psychiatry.  Your psych meds might be too much.  Please have them give us a call so that we can discuss your meds  - A clinical pharmacy referral has been placed to you to go over your meds and ensure adequate medication reconciliation

## 2024-11-21 DIAGNOSIS — F32.A DEPRESSION, UNSPECIFIED DEPRESSION TYPE: ICD-10-CM

## 2024-11-21 RX ORDER — CHOLECALCIFEROL (VITAMIN D3) 50 MCG
2000 TABLET ORAL DAILY
Qty: 30 TABLET | Refills: 10 | Status: SHIPPED | OUTPATIENT
Start: 2024-11-21

## 2024-11-22 RX ORDER — SERTRALINE HYDROCHLORIDE 100 MG/1
100 TABLET, FILM COATED ORAL DAILY
Qty: 30 TABLET | Refills: 2 | Status: SHIPPED | OUTPATIENT
Start: 2024-11-22

## 2024-11-26 DIAGNOSIS — G62.9 NEUROPATHY: ICD-10-CM

## 2024-11-27 RX ORDER — LIDOCAINE 50 MG/G
PATCH TOPICAL
Qty: 30 PATCH | Refills: 10 | Status: SHIPPED | OUTPATIENT
Start: 2024-11-27

## 2024-12-04 DIAGNOSIS — R29.6 RECURRENT FALLS WHILE WALKING: ICD-10-CM

## 2024-12-04 DIAGNOSIS — K21.9 GASTROESOPHAGEAL REFLUX DISEASE WITHOUT ESOPHAGITIS: ICD-10-CM

## 2024-12-05 RX ORDER — ACETAMINOPHEN 500 MG
TABLET ORAL
Qty: 180 TABLET | Refills: 10 | Status: SHIPPED | OUTPATIENT
Start: 2024-12-05

## 2024-12-09 ENCOUNTER — APPOINTMENT (OUTPATIENT)
Dept: OTOLARYNGOLOGY | Facility: CLINIC | Age: 69
End: 2024-12-09
Payer: MEDICARE

## 2024-12-10 ENCOUNTER — APPOINTMENT (OUTPATIENT)
Dept: SPEECH THERAPY | Facility: CLINIC | Age: 69
End: 2024-12-10
Payer: MEDICARE

## 2024-12-11 ENCOUNTER — HOSPITAL ENCOUNTER (OUTPATIENT)
Dept: RADIOLOGY | Facility: CLINIC | Age: 69
Discharge: HOME | End: 2024-12-11
Payer: MEDICARE

## 2024-12-11 ENCOUNTER — APPOINTMENT (OUTPATIENT)
Dept: RADIOLOGY | Facility: CLINIC | Age: 69
End: 2024-12-11
Payer: MEDICARE

## 2024-12-11 ENCOUNTER — APPOINTMENT (OUTPATIENT)
Dept: PRIMARY CARE | Facility: CLINIC | Age: 69
End: 2024-12-11
Payer: COMMERCIAL

## 2024-12-11 VITALS — BODY MASS INDEX: 27.46 KG/M2 | WEIGHT: 165 LBS

## 2024-12-11 DIAGNOSIS — R29.6 RECURRENT FALLS WHILE WALKING: ICD-10-CM

## 2024-12-11 DIAGNOSIS — G40.909 SEIZURE DISORDER (MULTI): ICD-10-CM

## 2024-12-11 PROCEDURE — 2550000001 HC RX 255 CONTRASTS

## 2024-12-11 PROCEDURE — 72197 MRI PELVIS W/O & W/DYE: CPT | Performed by: RADIOLOGY

## 2024-12-11 PROCEDURE — 72197 MRI PELVIS W/O & W/DYE: CPT

## 2024-12-11 PROCEDURE — 72158 MRI LUMBAR SPINE W/O & W/DYE: CPT

## 2024-12-11 PROCEDURE — A9575 INJ GADOTERATE MEGLUMI 0.1ML: HCPCS

## 2024-12-11 RX ORDER — LEVETIRACETAM 1000 MG/1
1000 TABLET ORAL DAILY
Qty: 90 TABLET | Refills: 0 | Status: SHIPPED | OUTPATIENT
Start: 2024-12-11

## 2024-12-11 RX ORDER — LEVETIRACETAM 1000 MG/1
1000 TABLET ORAL DAILY
Qty: 90 TABLET | Refills: 0 | Status: SHIPPED | OUTPATIENT
Start: 2024-12-11 | End: 2024-12-11 | Stop reason: SDUPTHER

## 2024-12-11 RX ORDER — GADOTERATE MEGLUMINE 376.9 MG/ML
0.2 INJECTION INTRAVENOUS
Status: COMPLETED | OUTPATIENT
Start: 2024-12-11 | End: 2024-12-11

## 2024-12-11 NOTE — RESULT ENCOUNTER NOTE
Results reviewed.  There are some minor abnormalities, which are not concerning.  No changes in medications are needed at this time.  Please continue with current treatment.    Best,  Dr. Dennison

## 2024-12-12 ENCOUNTER — APPOINTMENT (OUTPATIENT)
Dept: RADIOLOGY | Facility: CLINIC | Age: 69
End: 2024-12-12
Payer: MEDICARE

## 2024-12-12 ENCOUNTER — APPOINTMENT (OUTPATIENT)
Dept: RADIOLOGY | Facility: HOSPITAL | Age: 69
End: 2024-12-12
Payer: MEDICARE

## 2024-12-18 ENCOUNTER — HOSPITAL ENCOUNTER (OUTPATIENT)
Dept: RADIOLOGY | Facility: HOSPITAL | Age: 69
Discharge: HOME | End: 2024-12-18
Payer: MEDICARE

## 2024-12-18 DIAGNOSIS — G40.909 SEIZURE DISORDER (MULTI): ICD-10-CM

## 2024-12-18 DIAGNOSIS — R29.6 RECURRENT FALLS WHILE WALKING: ICD-10-CM

## 2024-12-18 PROCEDURE — 70553 MRI BRAIN STEM W/O & W/DYE: CPT | Performed by: RADIOLOGY

## 2024-12-18 PROCEDURE — 70553 MRI BRAIN STEM W/O & W/DYE: CPT

## 2024-12-18 PROCEDURE — 2550000001 HC RX 255 CONTRASTS

## 2024-12-18 PROCEDURE — A9575 INJ GADOTERATE MEGLUMI 0.1ML: HCPCS

## 2024-12-18 RX ORDER — GADOTERATE MEGLUMINE 376.9 MG/ML
15 INJECTION INTRAVENOUS
Status: COMPLETED | OUTPATIENT
Start: 2024-12-18 | End: 2024-12-18

## 2024-12-27 DIAGNOSIS — J45.909 ASTHMA, UNSPECIFIED ASTHMA SEVERITY, UNSPECIFIED WHETHER COMPLICATED, UNSPECIFIED WHETHER PERSISTENT (HHS-HCC): ICD-10-CM

## 2024-12-27 RX ORDER — ALBUTEROL SULFATE 0.83 MG/ML
SOLUTION RESPIRATORY (INHALATION)
Qty: 75 ML | Refills: 2 | Status: SHIPPED | OUTPATIENT
Start: 2024-12-27

## 2025-01-02 ENCOUNTER — APPOINTMENT (OUTPATIENT)
Dept: OTOLARYNGOLOGY | Facility: CLINIC | Age: 70
End: 2025-01-02
Payer: MEDICARE

## 2025-01-07 ENCOUNTER — HOSPITAL ENCOUNTER (INPATIENT)
Facility: HOSPITAL | Age: 70
End: 2025-01-07
Attending: EMERGENCY MEDICINE | Admitting: STUDENT IN AN ORGANIZED HEALTH CARE EDUCATION/TRAINING PROGRAM
Payer: MEDICARE

## 2025-01-07 ENCOUNTER — APPOINTMENT (OUTPATIENT)
Dept: CARDIOLOGY | Facility: HOSPITAL | Age: 70
End: 2025-01-07
Payer: MEDICARE

## 2025-01-07 DIAGNOSIS — R29.6 RECURRENT FALLS WHILE WALKING: ICD-10-CM

## 2025-01-07 DIAGNOSIS — R46.89 AGGRESSIVE BEHAVIOR: ICD-10-CM

## 2025-01-07 DIAGNOSIS — G40.919 BREAKTHROUGH SEIZURE (MULTI): ICD-10-CM

## 2025-01-07 DIAGNOSIS — R94.31 ABNORMAL EKG: ICD-10-CM

## 2025-01-07 DIAGNOSIS — R56.9 SEIZURE (MULTI): ICD-10-CM

## 2025-01-07 DIAGNOSIS — K86.89 PANCREATIC DUCT DILATED (HHS-HCC): Primary | ICD-10-CM

## 2025-01-07 LAB
ALBUMIN SERPL BCP-MCNC: 4.3 G/DL (ref 3.4–5)
ALP SERPL-CCNC: 90 U/L (ref 33–136)
ALT SERPL W P-5'-P-CCNC: 37 U/L (ref 7–45)
AMPHETAMINES UR QL SCN: NORMAL
ANION GAP SERPL CALC-SCNC: 11 MMOL/L (ref 10–20)
APAP SERPL-MCNC: <10 UG/ML
APPEARANCE UR: CLEAR
AST SERPL W P-5'-P-CCNC: 34 U/L (ref 9–39)
BACTERIA #/AREA URNS AUTO: ABNORMAL /HPF
BARBITURATES UR QL SCN: NORMAL
BASOPHILS # BLD AUTO: 0.03 X10*3/UL (ref 0–0.1)
BASOPHILS NFR BLD AUTO: 0.6 %
BENZODIAZ UR QL SCN: NORMAL
BILIRUB SERPL-MCNC: 0.5 MG/DL (ref 0–1.2)
BILIRUB UR STRIP.AUTO-MCNC: NEGATIVE MG/DL
BUN SERPL-MCNC: 11 MG/DL (ref 6–23)
BZE UR QL SCN: NORMAL
CALCIUM SERPL-MCNC: 9.3 MG/DL (ref 8.6–10.3)
CANNABINOIDS UR QL SCN: NORMAL
CARDIAC TROPONIN I PNL SERPL HS: 14 NG/L (ref 0–13)
CHLORIDE SERPL-SCNC: 103 MMOL/L (ref 98–107)
CO2 SERPL-SCNC: 30 MMOL/L (ref 21–32)
COLOR UR: ABNORMAL
CREAT SERPL-MCNC: 0.65 MG/DL (ref 0.5–1.05)
EGFRCR SERPLBLD CKD-EPI 2021: >90 ML/MIN/1.73M*2
EOSINOPHIL # BLD AUTO: 0.11 X10*3/UL (ref 0–0.7)
EOSINOPHIL NFR BLD AUTO: 2 %
ERYTHROCYTE [DISTWIDTH] IN BLOOD BY AUTOMATED COUNT: 11.9 % (ref 11.5–14.5)
ETHANOL SERPL-MCNC: <10 MG/DL
FENTANYL+NORFENTANYL UR QL SCN: NORMAL
GLUCOSE SERPL-MCNC: 132 MG/DL (ref 74–99)
GLUCOSE UR STRIP.AUTO-MCNC: NORMAL MG/DL
HCT VFR BLD AUTO: 38.3 % (ref 36–46)
HGB BLD-MCNC: 12.7 G/DL (ref 12–16)
IMM GRANULOCYTES # BLD AUTO: 0.01 X10*3/UL (ref 0–0.7)
IMM GRANULOCYTES NFR BLD AUTO: 0.2 % (ref 0–0.9)
KETONES UR STRIP.AUTO-MCNC: NEGATIVE MG/DL
LEUKOCYTE ESTERASE UR QL STRIP.AUTO: ABNORMAL
LIPASE SERPL-CCNC: 5 U/L (ref 9–82)
LYMPHOCYTES # BLD AUTO: 2.47 X10*3/UL (ref 1.2–4.8)
LYMPHOCYTES NFR BLD AUTO: 45.4 %
MCH RBC QN AUTO: 29.7 PG (ref 26–34)
MCHC RBC AUTO-ENTMCNC: 33.2 G/DL (ref 32–36)
MCV RBC AUTO: 90 FL (ref 80–100)
METHADONE UR QL SCN: NORMAL
MONOCYTES # BLD AUTO: 0.34 X10*3/UL (ref 0.1–1)
MONOCYTES NFR BLD AUTO: 6.3 %
MUCOUS THREADS #/AREA URNS AUTO: ABNORMAL /LPF
NEUTROPHILS # BLD AUTO: 2.48 X10*3/UL (ref 1.2–7.7)
NEUTROPHILS NFR BLD AUTO: 45.5 %
NITRITE UR QL STRIP.AUTO: NEGATIVE
NRBC BLD-RTO: 0 /100 WBCS (ref 0–0)
OPIATES UR QL SCN: NORMAL
OXYCODONE+OXYMORPHONE UR QL SCN: NORMAL
PCP UR QL SCN: NORMAL
PH UR STRIP.AUTO: 6 [PH]
PLATELET # BLD AUTO: 190 X10*3/UL (ref 150–450)
POTASSIUM SERPL-SCNC: 4.6 MMOL/L (ref 3.5–5.3)
PROT SERPL-MCNC: 7.6 G/DL (ref 6.4–8.2)
PROT UR STRIP.AUTO-MCNC: NEGATIVE MG/DL
RBC # BLD AUTO: 4.28 X10*6/UL (ref 4–5.2)
RBC # UR STRIP.AUTO: NEGATIVE /UL
RBC #/AREA URNS AUTO: ABNORMAL /HPF
SALICYLATES SERPL-MCNC: <3 MG/DL
SODIUM SERPL-SCNC: 139 MMOL/L (ref 136–145)
SP GR UR STRIP.AUTO: 1.01
SQUAMOUS #/AREA URNS AUTO: ABNORMAL /HPF
UROBILINOGEN UR STRIP.AUTO-MCNC: NORMAL MG/DL
WBC # BLD AUTO: 5.4 X10*3/UL (ref 4.4–11.3)
WBC #/AREA URNS AUTO: ABNORMAL /HPF

## 2025-01-07 PROCEDURE — 83690 ASSAY OF LIPASE: CPT | Performed by: EMERGENCY MEDICINE

## 2025-01-07 PROCEDURE — 99285 EMERGENCY DEPT VISIT HI MDM: CPT | Performed by: EMERGENCY MEDICINE

## 2025-01-07 PROCEDURE — 83735 ASSAY OF MAGNESIUM: CPT | Performed by: INTERNAL MEDICINE

## 2025-01-07 PROCEDURE — 85025 COMPLETE CBC W/AUTO DIFF WBC: CPT | Performed by: EMERGENCY MEDICINE

## 2025-01-07 PROCEDURE — 80143 DRUG ASSAY ACETAMINOPHEN: CPT | Performed by: EMERGENCY MEDICINE

## 2025-01-07 PROCEDURE — 80053 COMPREHEN METABOLIC PANEL: CPT | Performed by: EMERGENCY MEDICINE

## 2025-01-07 PROCEDURE — 80320 DRUG SCREEN QUANTALCOHOLS: CPT | Performed by: EMERGENCY MEDICINE

## 2025-01-07 PROCEDURE — 80307 DRUG TEST PRSMV CHEM ANLYZR: CPT | Performed by: EMERGENCY MEDICINE

## 2025-01-07 PROCEDURE — 84484 ASSAY OF TROPONIN QUANT: CPT | Performed by: EMERGENCY MEDICINE

## 2025-01-07 PROCEDURE — 81001 URINALYSIS AUTO W/SCOPE: CPT | Performed by: EMERGENCY MEDICINE

## 2025-01-07 PROCEDURE — 36415 COLL VENOUS BLD VENIPUNCTURE: CPT | Performed by: EMERGENCY MEDICINE

## 2025-01-07 PROCEDURE — 87086 URINE CULTURE/COLONY COUNT: CPT | Mod: AHULAB | Performed by: EMERGENCY MEDICINE

## 2025-01-07 PROCEDURE — 93005 ELECTROCARDIOGRAM TRACING: CPT

## 2025-01-07 SDOH — HEALTH STABILITY: MENTAL HEALTH: BEHAVIORAL HEALTH(WDL): EXCEPTIONS TO WDL

## 2025-01-07 SDOH — HEALTH STABILITY: MENTAL HEALTH
OTHER SUICIDE PRECAUTIONS INCLUDE: PATIENT PLACED IN AN EASILY OBSERVABLE ROOM WITH DOOR/CURTAIN REMAINING OPEN;PATIENT PLACED IN GOWN (SNAPS OR PAPER GOWNS PREFERRED) AND WANDED;REMAINING RISKS IDENTIFIED AND MITIGATED;PATIENT PLACED IN PSYCH SAFE ROOM (IF AVAILABLE);PROVIDER NOTIFIED

## 2025-01-07 SDOH — HEALTH STABILITY: MENTAL HEALTH: BEHAVIORS/MOOD: AGITATED;SAD;TEARFUL;SLEEPING;RESTLESS;CRYING;COMBATIVE

## 2025-01-07 SDOH — HEALTH STABILITY: MENTAL HEALTH: FOR HIGH RISK PATIENTS: ALL INTERVENTIONS ABOVE, PLUS:

## 2025-01-07 ASSESSMENT — PAIN SCALES - WONG BAKER: WONGBAKER_NUMERICALRESPONSE: HURTS EVEN MORE

## 2025-01-07 ASSESSMENT — PAIN - FUNCTIONAL ASSESSMENT: PAIN_FUNCTIONAL_ASSESSMENT: WONG-BAKER FACES

## 2025-01-07 ASSESSMENT — PAIN DESCRIPTION - LOCATION: LOCATION: ABDOMEN

## 2025-01-08 ENCOUNTER — APPOINTMENT (OUTPATIENT)
Dept: RADIOLOGY | Facility: HOSPITAL | Age: 70
End: 2025-01-08
Payer: MEDICARE

## 2025-01-08 LAB
ANION GAP SERPL CALC-SCNC: 22 MMOL/L (ref 10–20)
ATRIAL RATE: 70 BPM
BASOPHILS # BLD AUTO: 0.04 X10*3/UL (ref 0–0.1)
BASOPHILS NFR BLD AUTO: 0.3 %
BUN SERPL-MCNC: 13 MG/DL (ref 6–23)
CALCIUM SERPL-MCNC: 9.6 MG/DL (ref 8.6–10.3)
CARDIAC TROPONIN I PNL SERPL HS: 15 NG/L (ref 0–13)
CHLORIDE SERPL-SCNC: 103 MMOL/L (ref 98–107)
CO2 SERPL-SCNC: 19 MMOL/L (ref 21–32)
CREAT SERPL-MCNC: 0.79 MG/DL (ref 0.5–1.05)
EGFRCR SERPLBLD CKD-EPI 2021: 81 ML/MIN/1.73M*2
EOSINOPHIL # BLD AUTO: 0.03 X10*3/UL (ref 0–0.7)
EOSINOPHIL NFR BLD AUTO: 0.3 %
ERYTHROCYTE [DISTWIDTH] IN BLOOD BY AUTOMATED COUNT: 11.9 % (ref 11.5–14.5)
GLUCOSE SERPL-MCNC: 218 MG/DL (ref 74–99)
HCT VFR BLD AUTO: 40.1 % (ref 36–46)
HGB BLD-MCNC: 13.1 G/DL (ref 12–16)
IMM GRANULOCYTES # BLD AUTO: 0.05 X10*3/UL (ref 0–0.7)
IMM GRANULOCYTES NFR BLD AUTO: 0.4 % (ref 0–0.9)
LYMPHOCYTES # BLD AUTO: 3.71 X10*3/UL (ref 1.2–4.8)
LYMPHOCYTES NFR BLD AUTO: 31.5 %
MAGNESIUM SERPL-MCNC: 1.91 MG/DL (ref 1.6–2.4)
MCH RBC QN AUTO: 30.1 PG (ref 26–34)
MCHC RBC AUTO-ENTMCNC: 32.7 G/DL (ref 32–36)
MCV RBC AUTO: 92 FL (ref 80–100)
MONOCYTES # BLD AUTO: 0.94 X10*3/UL (ref 0.1–1)
MONOCYTES NFR BLD AUTO: 8 %
NEUTROPHILS # BLD AUTO: 7.01 X10*3/UL (ref 1.2–7.7)
NEUTROPHILS NFR BLD AUTO: 59.5 %
NRBC BLD-RTO: 0 /100 WBCS (ref 0–0)
P AXIS: 74 DEGREES
P OFFSET: 179 MS
P ONSET: 122 MS
PLATELET # BLD AUTO: 249 X10*3/UL (ref 150–450)
POTASSIUM SERPL-SCNC: 4 MMOL/L (ref 3.5–5.3)
PR INTERVAL: 200 MS
Q ONSET: 222 MS
QRS COUNT: 12 BEATS
QRS DURATION: 110 MS
QT INTERVAL: 426 MS
QTC CALCULATION(BAZETT): 460 MS
QTC FREDERICIA: 448 MS
R AXIS: 80 DEGREES
RBC # BLD AUTO: 4.35 X10*6/UL (ref 4–5.2)
SODIUM SERPL-SCNC: 140 MMOL/L (ref 136–145)
T AXIS: 26 DEGREES
T OFFSET: 435 MS
VENTRICULAR RATE: 70 BPM
WBC # BLD AUTO: 11.8 X10*3/UL (ref 4.4–11.3)

## 2025-01-08 PROCEDURE — 96365 THER/PROPH/DIAG IV INF INIT: CPT

## 2025-01-08 PROCEDURE — 2500000004 HC RX 250 GENERAL PHARMACY W/ HCPCS (ALT 636 FOR OP/ED)

## 2025-01-08 PROCEDURE — 2500000004 HC RX 250 GENERAL PHARMACY W/ HCPCS (ALT 636 FOR OP/ED): Performed by: GENERAL PRACTICE

## 2025-01-08 PROCEDURE — 96375 TX/PRO/DX INJ NEW DRUG ADDON: CPT

## 2025-01-08 PROCEDURE — 80048 BASIC METABOLIC PNL TOTAL CA: CPT | Performed by: GENERAL PRACTICE

## 2025-01-08 PROCEDURE — 36415 COLL VENOUS BLD VENIPUNCTURE: CPT | Performed by: GENERAL PRACTICE

## 2025-01-08 PROCEDURE — 2550000001 HC RX 255 CONTRASTS: Performed by: EMERGENCY MEDICINE

## 2025-01-08 PROCEDURE — 74177 CT ABD & PELVIS W/CONTRAST: CPT | Performed by: RADIOLOGY

## 2025-01-08 PROCEDURE — 74177 CT ABD & PELVIS W/CONTRAST: CPT

## 2025-01-08 PROCEDURE — 85025 COMPLETE CBC W/AUTO DIFF WBC: CPT | Performed by: GENERAL PRACTICE

## 2025-01-08 RX ORDER — LEVETIRACETAM 10 MG/ML
1000 INJECTION INTRAVASCULAR ONCE
Status: COMPLETED | OUTPATIENT
Start: 2025-01-08 | End: 2025-01-08

## 2025-01-08 RX ORDER — LORAZEPAM 2 MG/ML
INJECTION INTRAMUSCULAR
Status: COMPLETED
Start: 2025-01-08 | End: 2025-01-08

## 2025-01-08 RX ORDER — LORAZEPAM 2 MG/ML
2 INJECTION INTRAMUSCULAR ONCE
Status: COMPLETED | OUTPATIENT
Start: 2025-01-08 | End: 2025-01-08

## 2025-01-08 RX ORDER — LEVETIRACETAM 500 MG/1
1000 TABLET ORAL DAILY
Status: DISCONTINUED | OUTPATIENT
Start: 2025-01-09 | End: 2025-01-09

## 2025-01-08 RX ORDER — PHENYTOIN SODIUM 100 MG/1
300 CAPSULE, EXTENDED RELEASE ORAL NIGHTLY
Status: DISCONTINUED | OUTPATIENT
Start: 2025-01-08 | End: 2025-01-09

## 2025-01-08 RX ADMIN — LORAZEPAM 2 MG: 2 INJECTION INTRAMUSCULAR at 21:30

## 2025-01-08 RX ADMIN — LORAZEPAM 2 MG: 2 INJECTION INTRAMUSCULAR at 21:04

## 2025-01-08 RX ADMIN — LORAZEPAM 2 MG: 2 INJECTION INTRAMUSCULAR; INTRAVENOUS at 21:04

## 2025-01-08 RX ADMIN — PHENYTOIN SODIUM 300 MG: 50 INJECTION INTRAMUSCULAR; INTRAVENOUS at 23:38

## 2025-01-08 RX ADMIN — LORAZEPAM 2 MG: 2 INJECTION INTRAMUSCULAR; INTRAVENOUS at 21:30

## 2025-01-08 RX ADMIN — IOHEXOL 75 ML: 350 INJECTION, SOLUTION INTRAVENOUS at 00:16

## 2025-01-08 RX ADMIN — LEVETIRACETAM 1000 MG: 10 INJECTION INTRAVENOUS at 21:01

## 2025-01-08 ASSESSMENT — COLUMBIA-SUICIDE SEVERITY RATING SCALE - C-SSRS
1. SINCE LAST CONTACT, HAVE YOU WISHED YOU WERE DEAD OR WISHED YOU COULD GO TO SLEEP AND NOT WAKE UP?: YES
2. HAVE YOU ACTUALLY HAD ANY THOUGHTS OF KILLING YOURSELF?: YES
1. SINCE LAST CONTACT, HAVE YOU WISHED YOU WERE DEAD OR WISHED YOU COULD GO TO SLEEP AND NOT WAKE UP?: NO
6. HAVE YOU EVER DONE ANYTHING, STARTED TO DO ANYTHING, OR PREPARED TO DO ANYTHING TO END YOUR LIFE?: NO
5. HAVE YOU STARTED TO WORK OUT OR WORKED OUT THE DETAILS OF HOW TO KILL YOURSELF? DO YOU INTEND TO CARRY OUT THIS PLAN?: NO
6. HAVE YOU EVER DONE ANYTHING, STARTED TO DO ANYTHING, OR PREPARED TO DO ANYTHING TO END YOUR LIFE?: NO
2. HAVE YOU ACTUALLY HAD ANY THOUGHTS OF KILLING YOURSELF?: NO

## 2025-01-08 ASSESSMENT — ACTIVITIES OF DAILY LIVING (ADL): LACK_OF_TRANSPORTATION: PATIENT UNABLE TO ANSWER

## 2025-01-08 NOTE — DISCHARGE INSTRUCTIONS
Patient needs to follow-up with GI for further testing on the pancreatic duct. GI referral has been made. Please call [phone] if no one reaches out to get this scheduled. She was also started on Creon given the appearance of her pancrease to see if she would benefit from pancreatic enzyme replacement

## 2025-01-08 NOTE — ED TRIAGE NOTES
69yF from group home BIB caregivers with a c/o of worsening aggression x1 week. Pt hitting self in head and hitting table today. Other c/o abd pain and decreased PO intake. Hx of schizophrenia, epilepsy, CVA, and deaf (uses sign language). Recent Seroquel dose change.

## 2025-01-08 NOTE — ED PROVIDER NOTES
HPI   Chief Complaint   Patient presents with    Aggressive Behavior     69yF from group home BIB caregivers with a c/o of worsening aggression x1 week. Pt hitting self in head and hitting table today. Other c/o abd pain and decreased PO intake. Hx of schizophrenia, epilepsy, CVA, and deaf (uses sign language). Recent Seroquel dose change.       The patient is deaf and only uses a American sign language.  History obtained through use of a .  Patient lives at a group home for schizophrenia.  She sometimes does have trouble moving secondary to her mood.  She apparently was getting aggressive towards her roommate.  Has brain not been eating drinking and has been refusing her medications because of her burning lower abdominal pain.  She says it is worse with urination.  Denies any diarrhea constipation or any vomiting.  Denies any fever cough              Patient History   Past Medical History:   Diagnosis Date    Anemia     Depression     Gastro-esophageal reflux disease without esophagitis 12/21/2022    Gastroesophageal reflux disease without esophagitis    HL (hearing loss)     Hyperlipidemia, unspecified 09/25/2019    Hyperlipemia    Hypertension     Personal history of transient ischemic attack (TIA), and cerebral infarction without residual deficits 12/21/2022    History of stroke    Stroke (Multi)      No past surgical history on file.  Family History   Problem Relation Name Age of Onset    Parkinsonism Mother      Other (cardiac disorder) Father      Glaucoma Father      Parkinsonism Father       Social History     Tobacco Use    Smoking status: Never     Passive exposure: Never    Smokeless tobacco: Never   Vaping Use    Vaping status: Unknown   Substance Use Topics    Alcohol use: Never    Drug use: Never       Physical Exam   ED Triage Vitals [01/07/25 2020]   Temperature Heart Rate Respirations BP   36.6 °C (97.9 °F) 72 18 156/80      Pulse Ox Temp Source Heart Rate Source Patient Position    96 % Temporal Monitor Sitting      BP Location FiO2 (%)     Left arm --       Physical Exam  Vitals and nursing note reviewed.   Constitutional:       General: She is not in acute distress.     Appearance: She is well-developed.   HENT:      Head: Normocephalic and atraumatic.   Eyes:      Conjunctiva/sclera: Conjunctivae normal.   Cardiovascular:      Rate and Rhythm: Normal rate and regular rhythm.      Heart sounds: No murmur heard.  Pulmonary:      Effort: Pulmonary effort is normal. No respiratory distress.      Breath sounds: Normal breath sounds.   Abdominal:      Palpations: Abdomen is soft.      Tenderness: There is no abdominal tenderness.   Musculoskeletal:         General: No swelling.      Cervical back: Neck supple.   Skin:     General: Skin is warm and dry.      Capillary Refill: Capillary refill takes less than 2 seconds.   Neurological:      Mental Status: She is alert.   Psychiatric:         Mood and Affect: Mood normal.           ED Course & MDM   Diagnoses as of 01/10/25 0501   Pancreatic duct dilated (HHS-HCC)   Aggressive behavior   Seizure (Multi)                 No data recorded     [place] Coma Scale Score: 8 (01/09/25 1300 : Pam Bruno RN)       NIH Stroke Scale: 20 (01/09/25 1300 : Pam Bruno RN)                   Medical Decision Making  The patient is low abdominal pain without any significant guarding or rebound.  The patient has no fever cough.  She does states that her feet are sore but there is no external signs of injury no erythema or edema or swelling.  States this is on the bottom of her feet.  Consider plantar fasciitis for this.  Will rule out any surgical disease of her abdomen with CT scan.  Will obtain blood and urinalysis.  Ultimately feel the patient may need to be discussed with EPAT if her CT and lab work unremarkable.  Patient CT scan does show some pancreatic ductal dilation.  Patient has normal lipase.  Patient to follow-up with GI as an outpatient for  that CT finding.  His GI referral was ordered.  However the patient's abnormal behavior needs to be just with the EPAT.  I feel patient is medically clear for psychiatric evaluation.    EKG interpreted by myself.  Normal sinus rhythm at a rate of 70 bpm.  Normal intervals.  Normal axis.  No signs of acute ischemia.    Procedure  Procedures     Kody Medina MD  01/08/25 0551       Kody Medina MD  01/08/25 0627       Kody Medina MD  01/10/25 0500

## 2025-01-08 NOTE — PROGRESS NOTES
"EPAT - Social Work Psychiatric Assessment  Arrival Details  Mode of Arrival: Vehicle   Admission Source: Group Home   Admission Type: Involuntary   EPAT Assessment Start Date: 01/08/2024  EPAT Assessment Start Time: 0250  [facility]     History of Present Illness     Admission Reason: Evaluation     HPI: 69 year old Black female group home resident brought to the ED by her caregivers due to recent change in behaviors.  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 She signs and can read and write. She is living at [facility] with one other resident who is her roommate.  She has been recently combative towards her roommate, seemingly confused, banging on the table and on her head at the Group Home.  She apparently with observed walking briskly up to her roommate and then attempted to put her outstretched hand over her roommate's face.  Her caregivers were able to redirect her but were alarmed as the patient is usually docile.  She has history of Schizophrenia, Seizures, and prior CVA.  She is gait impaired and using a Rolator.   Her staff do report some history of the patient thinking she cannot walk.  The patient both complained about abdominal pain and then denied reporting her pain as \"0\".  The patient is seen with the use of a an  which she usually does well with.  However, she is currently just glaring at the  and this interviewer.  She had reported SI upon arrival to the Emergency Department which was very surprising to her staff.  Not sure she is reliable at this point.  She has not had a psychiatric inpatient admission since 2003.  Her staff does report she hears voices.  They have observed that she is usually worse in the week prior to her injection.  She is due for injection on 1/14.  She regularly attends a Day Program. The patient has also had some reduction in her Seroquel and Trazodone a few weeks complaining she was always tired. She is very " calm currently but completely not engaging.  She did very briefly nod her head yes to SI and AH.       Psychiatric Symptoms  Anxiety Symptoms: No problems observed or reported   Depression Symptoms: Impaired concentration, Increased irritability, feelings of helplessness, SI  Toña Symptoms: No problems observed or reported     Psychosis Symptoms  Hallucination Type: Audio  Delusion Type: Unable to assess      Additional Symptoms - Adult  Generalized Anxiety Disorder: No problems observed or reported   Obsessive Compulsive Disorder: No problems reported or observed.  Panic Attack: No problems reported or observed.  Post Traumatic Stress Disorder: No history  Delirium: No problems reported or observed.  Review of Symptoms Comments: see narrative      Past Psychiatric History/Meds/Treatments:  Past Psychiatric History: [facility] 2003  Past Psychiatric Meds/Treatments: -  benztropine 0.5 MG tablets: Take 1 tablet by mouth in AM and 2 tablets by mouth at bedtime. haldol decanoate 100 MG/ML injection: Inject 2mL (200 mg) into the muscle every 28 days for thought disturbance. (Due 1/14/2025), sertraline 100 MG tablet, quetiapine 200 MG, quetiapine 25 MG tablets: Take 1 tablet by mouth at 7 AM and 2 (two) tablets by mouth at 3 PM, trazodone 50 mg tablet: Take 1 tablet by mouth at bedtime as needed for sleep     Past Violence/Victimization History: denies      Current Mental Health Contacts   Name/Phone Number: n/a   Last Appointment Date: n/a  Provider Name/Phone Number: Elena Molinalian Roy APRN-CNP [phone]  Provider Last Appointment Date: 12/17/2024     Support System: some contact with family, most support from caregivers at      Living Arrangement: Group Home     Home Safety  Feels Safe Living in Home: yes  Potentially Unsafe Housing Conditions: n/a  Home Safety :      Income Information  Employment Status for: Patient  Employment Status: Disabled   Income Source: [employer]  "Service/Education History  Current or Previous  Service: None   Experience: Other n/a  Education Level: Other: Graduated from [employer]   History of Learning Problems: No  History of School Behavior Problems: No     Social/Cultural History  Social History: Born and raised locally by parents ( since the 80s). Patient is her own guardian.  Two sisters (chart history indicates foster sibs??), two bio brothers now .  Living currently at a  and attending a day program.  She did previously work at a bank.  Family did take her out for [place].  Identifies as Rastafarian.    Cultural Requests During Hospitalization: Unreported  Spiritual Requests During Hospitalization: Unreported  Important Activities: Unable to assess.       Legal  Legal Comments: No legal history      Drug Screening  Have you used any substances (cannabis, cocaine, heroin, hallucinogens, inhalants, etc.) in the past 12 months?: No  Have you used any prescription drugs other than prescribed in the past 12 months?: denies   Is a toxicology screen needed?: Yes     Behavioral Health  Behavioral Health(WDL):see narrative   Behaviors/Mood: Not engaging,   Affect: FLAT     Orientation  Orientation Level: Oriented X4 at triage      General Appearance  Motor Activity: psychomotor retardation   Speech Pattern: Mute from birth   General Attitude: Calm but not engaging   Appearance/Hygiene: Unremarkable      Thought Process  Coherency: Unable to assess   Content: had complained of pain but reported level \"0\", denied and then endorsed SI. May be internally preoccupied   Delusions: Unable to assess   Perception: AH?  Hallucination: audio   Judgment/Insight: Impaired   Confusion: some confusion   Cognition: Poor historian, does follow commands      Sleep Pattern  Sleep Pattern: sleeps through the night per caregivers      Risk Factors  Self Harm/Suicidal Ideation Plan: Nods \"yes\" to SI but not able to fully assess   Previous Self " "Harm/Suicidal Plans: Denies   Risk Factors: Major mental illness,   Description of Thoughts/Ideas Leaving Unit Now: Unable to assess      Violence Risk Assessment  Assessment of Violence: Intrusive towards roommate requiring intervention   Thoughts of Harm to Others: No     Ability to Assess Risk Screen  Risk Screen - Ability to Assess: Unable to fully screen   Ask Suicide-Screening Questions  1. In the past few weeks, have you wished you were dead?: yes  2. In the past few weeks, have you felt that you or your family would be better off if you were dead?: No  3. In the past week, have you been having thoughts about killing yourself?: has answered both yes and no  4. Have you ever tried to kill yourself?: No  How did you try to kill yourself?: Denies   When did you try to kill yourself?: Denies   5. Are you having thoughts of killing yourself right now?: shakes head \"yes\"  Calculated Risk Score: Potential Risk  [place] Suicide Severity Rating Scale (Screener/Recent Self-Report)  1. Wish to be Dead (Past 1 Month): Yes  2. Non-Specific Active Suicidal Thoughts (Past 1 Month): yes  6. Suicidal Behavior (Lifetime): No  6. Suicidal Behavior (3 Months): No  6. Suicidal Behavior (Description): n/a  Calculated C-SSRS Risk Score (Lifetime/Recent): Low Risk  Step 1: Risk Factors  Current & Past Psychiatric Dx: Mood disorder, psychosis,   Presenting Symptoms: irritability at GH   Family History: denies   Precipitants/Stressors: medical issues   Change in Treatment: n/a  Access to Lethal Methods : No  Step 2: Protective Factors   Protective Factors Internal: Temple beliefs   Protective Factors External: Supportive social network or family or friends  Step 3: Suicidal Ideation Intensity  Most Severe Suicidal Ideation Identified: Possible SI  How Many Times Have You Had These Thoughts: one time a week   When You Have the Thoughts How Long do They Last : 1-4 hours   Could/Can You Stop Thinking About Killing Yourself or Wanting " "to Die if You Want to: does not try to control thoughts   Are There Things - Anyone or Anything - That Stopped You From Wanting to Die or Acting on: Deterrents definitely stopped you from attempting suicide  What Sort of Reasons Did You Have For Thinking About Wanting to Die or Killing Yourself: does not apply  Total Score: 5  Step 5: Documentation  Risk Level: Low suicide risk     Psychiatric Impression and Plan of Care     Assessment and Plan: [facility] brings 69 year old female to the Emergency Department for concern of change in behaviors and mental status.  The patient was born deaf and mute but knows ASL and can read/write.   staff reports concern she is not at her baseline.  The patient has a history of Schizophrenia diagnosed when she was 30.  She has has 1 psychiatric admission in 2003.  Staff reported that she briskly walked up to her roommate and was attempting to put her hand over the roommate's face resulting in staff intervening and able to redirect her .  She has been crying out and hitting herself as well.  Staff report also that the patient just had a decrease in her Seroquel.  They report she can be prone to be more \"off\" closer to her injection time.  She is due for her next injection on 1/14.  In assessment she only has nodded yes to SI and to AH.  We have an  however the patient is simply glaring and except for these few head nods is not engaging.  She had initially reported pain but then rated her pain as a \"0\".  She has not been agitated at the ED and has not required medications.  Given this noted change from her baseline along with some concern for SI and AH she is recommended for inpatient admission for stabilization and further evaluation.       Specific Resources Provided to Patient: Patient recommended for inpatient hospitalization.  CM Notified: -  PHP/IOP Recommended: Not at this time  Specific Information Provided for PHP/IOP: None at this time.  Plan " Comments: see narrative      Outcome/Disposition  Patient's Perception of Outcome Achieved: unable to assess   Assessment, Recommendations and Risk Level Reviewed with: Dr. Hollins   Contact Name: [facility]  Contact Number(s): [phone]   Contact Relationship: Care givers  EPAT Assessment Completed Date: 01/08/25  EPAT Assessment Completed Time: 03:30  Patient Disposition: Inpatient psychiatric admission.

## 2025-01-08 NOTE — PROGRESS NOTES
Accepted @ [facility] in [place].   [address] [place] [address]  [phone]     01/08/25 1527   Discharge Planning   Living Arrangements Other (Comment)   Support Systems /   Assistance Needed EPAT for psych placement   Type of Residence Group home   Do you have animals or pets at home? No   Care Facility Name [facility] in [place] [phone]   Home or Post Acute Services Community services   Expected Discharge Disposition Psych   Does the patient need discharge transport arranged? Yes   RoundTrip coordination needed? Yes   Has discharge transport been arranged? Yes   What day is the transport expected? 01/08/25   Financial Resource Strain   How hard is it for you to pay for the very basics like food, housing, medical care, and heating? Pt Unable   Housing Stability   In the last 12 months, was there a time when you were not able to pay the mortgage or rent on time? Pt Unable   In the past 12 months, how many times have you moved where you were living? 1   At any time in the past 12 months, were you homeless or living in a shelter (including now)? Pt Unable   Transportation Needs   In the past 12 months, has lack of transportation kept you from medical appointments or from getting medications? Pt Unable   In the past 12 months, has lack of transportation kept you from meetings, work, or from getting things needed for daily living? Pt Unable   Stroke Family Assessment   Stroke Family Assessment Needed No   Intensity of Service   Intensity of Service 0-30 min

## 2025-01-09 ENCOUNTER — APPOINTMENT (OUTPATIENT)
Dept: RADIOLOGY | Facility: HOSPITAL | Age: 70
End: 2025-01-09
Payer: MEDICARE

## 2025-01-09 PROBLEM — K86.89 PANCREATIC DUCT DILATED (HHS-HCC): Status: ACTIVE | Noted: 2025-01-09

## 2025-01-09 LAB
ANION GAP SERPL CALC-SCNC: 9 MMOL/L (ref 10–20)
BACTERIA UR CULT: NORMAL
BUN SERPL-MCNC: 13 MG/DL (ref 6–23)
CALCIUM SERPL-MCNC: 9.2 MG/DL (ref 8.6–10.3)
CHLORIDE SERPL-SCNC: 106 MMOL/L (ref 98–107)
CO2 SERPL-SCNC: 31 MMOL/L (ref 21–32)
CREAT SERPL-MCNC: 0.76 MG/DL (ref 0.5–1.05)
EGFRCR SERPLBLD CKD-EPI 2021: 85 ML/MIN/1.73M*2
ERYTHROCYTE [DISTWIDTH] IN BLOOD BY AUTOMATED COUNT: 11.9 % (ref 11.5–14.5)
GLUCOSE SERPL-MCNC: 144 MG/DL (ref 74–99)
HCT VFR BLD AUTO: 35.7 % (ref 36–46)
HGB BLD-MCNC: 11.9 G/DL (ref 12–16)
LEVETIRACETAM SERPL-MCNC: 9 UG/ML (ref 10–40)
MCH RBC QN AUTO: 29.3 PG (ref 26–34)
MCHC RBC AUTO-ENTMCNC: 33.3 G/DL (ref 32–36)
MCV RBC AUTO: 88 FL (ref 80–100)
NRBC BLD-RTO: 0 /100 WBCS (ref 0–0)
PHENYTOIN SERPL-MCNC: 15.7 UG/ML (ref 10–20)
PLATELET # BLD AUTO: 183 X10*3/UL (ref 150–450)
POTASSIUM SERPL-SCNC: 3.4 MMOL/L (ref 3.5–5.3)
RBC # BLD AUTO: 4.06 X10*6/UL (ref 4–5.2)
SODIUM SERPL-SCNC: 143 MMOL/L (ref 136–145)
WBC # BLD AUTO: 8.1 X10*3/UL (ref 4.4–11.3)

## 2025-01-09 PROCEDURE — 2500000004 HC RX 250 GENERAL PHARMACY W/ HCPCS (ALT 636 FOR OP/ED): Performed by: STUDENT IN AN ORGANIZED HEALTH CARE EDUCATION/TRAINING PROGRAM

## 2025-01-09 PROCEDURE — 99222 1ST HOSP IP/OBS MODERATE 55: CPT | Performed by: INTERNAL MEDICINE

## 2025-01-09 PROCEDURE — 80177 DRUG SCRN QUAN LEVETIRACETAM: CPT | Mod: AHULAB | Performed by: STUDENT IN AN ORGANIZED HEALTH CARE EDUCATION/TRAINING PROGRAM

## 2025-01-09 PROCEDURE — 70450 CT HEAD/BRAIN W/O DYE: CPT | Performed by: RADIOLOGY

## 2025-01-09 PROCEDURE — 70496 CT ANGIOGRAPHY HEAD: CPT | Performed by: RADIOLOGY

## 2025-01-09 PROCEDURE — 85027 COMPLETE CBC AUTOMATED: CPT | Performed by: INTERNAL MEDICINE

## 2025-01-09 PROCEDURE — 70498 CT ANGIOGRAPHY NECK: CPT | Performed by: RADIOLOGY

## 2025-01-09 PROCEDURE — 2500000004 HC RX 250 GENERAL PHARMACY W/ HCPCS (ALT 636 FOR OP/ED): Performed by: INTERNAL MEDICINE

## 2025-01-09 PROCEDURE — 2500000002 HC RX 250 W HCPCS SELF ADMINISTERED DRUGS (ALT 637 FOR MEDICARE OP, ALT 636 FOR OP/ED): Performed by: INTERNAL MEDICINE

## 2025-01-09 PROCEDURE — 70496 CT ANGIOGRAPHY HEAD: CPT

## 2025-01-09 PROCEDURE — 80048 BASIC METABOLIC PNL TOTAL CA: CPT | Performed by: INTERNAL MEDICINE

## 2025-01-09 PROCEDURE — 36415 COLL VENOUS BLD VENIPUNCTURE: CPT | Performed by: STUDENT IN AN ORGANIZED HEALTH CARE EDUCATION/TRAINING PROGRAM

## 2025-01-09 PROCEDURE — 2060000001 HC INTERMEDIATE ICU ROOM DAILY

## 2025-01-09 PROCEDURE — 70450 CT HEAD/BRAIN W/O DYE: CPT

## 2025-01-09 PROCEDURE — 36415 COLL VENOUS BLD VENIPUNCTURE: CPT | Performed by: INTERNAL MEDICINE

## 2025-01-09 PROCEDURE — 2500000001 HC RX 250 WO HCPCS SELF ADMINISTERED DRUGS (ALT 637 FOR MEDICARE OP): Performed by: INTERNAL MEDICINE

## 2025-01-09 PROCEDURE — 80185 ASSAY OF PHENYTOIN TOTAL: CPT | Performed by: STUDENT IN AN ORGANIZED HEALTH CARE EDUCATION/TRAINING PROGRAM

## 2025-01-09 PROCEDURE — 2550000001 HC RX 255 CONTRASTS: Performed by: STUDENT IN AN ORGANIZED HEALTH CARE EDUCATION/TRAINING PROGRAM

## 2025-01-09 PROCEDURE — 99222 1ST HOSP IP/OBS MODERATE 55: CPT | Performed by: STUDENT IN AN ORGANIZED HEALTH CARE EDUCATION/TRAINING PROGRAM

## 2025-01-09 RX ORDER — QUETIAPINE FUMARATE 25 MG/1
25 TABLET, FILM COATED ORAL 3 TIMES DAILY
Status: DISCONTINUED | OUTPATIENT
Start: 2025-01-09 | End: 2025-01-10

## 2025-01-09 RX ORDER — LEVETIRACETAM 500 MG/1
1000 TABLET ORAL DAILY
Status: DISCONTINUED | OUTPATIENT
Start: 2025-01-09 | End: 2025-01-09

## 2025-01-09 RX ORDER — QUETIAPINE FUMARATE 50 MG/1
50 TABLET, FILM COATED ORAL
Status: DISPENSED | OUTPATIENT
Start: 2025-01-09

## 2025-01-09 RX ORDER — ONDANSETRON 4 MG/1
4 TABLET, FILM COATED ORAL EVERY 8 HOURS PRN
Status: ACTIVE | OUTPATIENT
Start: 2025-01-09

## 2025-01-09 RX ORDER — BENZTROPINE MESYLATE 1 MG/1
0.5 TABLET ORAL 2 TIMES DAILY PRN
Status: DISCONTINUED | OUTPATIENT
Start: 2025-01-09 | End: 2025-01-09

## 2025-01-09 RX ORDER — HEPARIN SODIUM 5000 [USP'U]/ML
5000 INJECTION, SOLUTION INTRAVENOUS; SUBCUTANEOUS EVERY 8 HOURS SCHEDULED
Status: DISPENSED | OUTPATIENT
Start: 2025-01-09

## 2025-01-09 RX ORDER — TALC
6 POWDER (GRAM) TOPICAL NIGHTLY
Status: DISCONTINUED | OUTPATIENT
Start: 2025-01-09 | End: 2025-01-09

## 2025-01-09 RX ORDER — PANTOPRAZOLE SODIUM 40 MG/1
40 TABLET, DELAYED RELEASE ORAL
Status: DISPENSED | OUTPATIENT
Start: 2025-01-09

## 2025-01-09 RX ORDER — LOSARTAN POTASSIUM 25 MG/1
25 TABLET ORAL DAILY
Status: DISPENSED | OUTPATIENT
Start: 2025-01-09

## 2025-01-09 RX ORDER — LIDOCAINE 560 MG/1
1 PATCH PERCUTANEOUS; TOPICAL; TRANSDERMAL DAILY
Status: DISPENSED | OUTPATIENT
Start: 2025-01-09

## 2025-01-09 RX ORDER — QUETIAPINE FUMARATE 25 MG/1
25 TABLET, FILM COATED ORAL
Status: DISPENSED | OUTPATIENT
Start: 2025-01-09

## 2025-01-09 RX ORDER — OLANZAPINE 10 MG/2ML
2.5 INJECTION, POWDER, FOR SOLUTION INTRAMUSCULAR ONCE AS NEEDED
Status: COMPLETED | OUTPATIENT
Start: 2025-01-09 | End: 2025-01-09

## 2025-01-09 RX ORDER — ALBUTEROL SULFATE 0.83 MG/ML
2.5 SOLUTION RESPIRATORY (INHALATION) EVERY 2 HOUR PRN
Status: ACTIVE | OUTPATIENT
Start: 2025-01-09

## 2025-01-09 RX ORDER — LEVETIRACETAM 10 MG/ML
1000 INJECTION INTRAVASCULAR ONCE
Status: COMPLETED | OUTPATIENT
Start: 2025-01-09 | End: 2025-01-09

## 2025-01-09 RX ORDER — ACETAMINOPHEN 160 MG/5ML
650 SOLUTION ORAL EVERY 4 HOURS PRN
Status: ACTIVE | OUTPATIENT
Start: 2025-01-09

## 2025-01-09 RX ORDER — ALBUTEROL SULFATE 0.83 MG/ML
2.5 SOLUTION RESPIRATORY (INHALATION) EVERY 6 HOURS PRN
Status: DISCONTINUED | OUTPATIENT
Start: 2025-01-09 | End: 2025-01-09

## 2025-01-09 RX ORDER — PANTOPRAZOLE SODIUM 40 MG/10ML
40 INJECTION, POWDER, LYOPHILIZED, FOR SOLUTION INTRAVENOUS
Status: DISPENSED | OUTPATIENT
Start: 2025-01-09

## 2025-01-09 RX ORDER — TRAZODONE HYDROCHLORIDE 50 MG/1
50 TABLET ORAL NIGHTLY
Status: DISPENSED | OUTPATIENT
Start: 2025-01-09

## 2025-01-09 RX ORDER — QUETIAPINE FUMARATE 100 MG/1
300 TABLET, FILM COATED ORAL NIGHTLY
Status: DISCONTINUED | OUTPATIENT
Start: 2025-01-09 | End: 2025-01-09

## 2025-01-09 RX ORDER — LEVETIRACETAM 500 MG/1
1000 TABLET ORAL DAILY
Status: DISCONTINUED | OUTPATIENT
Start: 2025-01-10 | End: 2025-01-10

## 2025-01-09 RX ORDER — ATORVASTATIN CALCIUM 80 MG/1
80 TABLET, FILM COATED ORAL DAILY
Status: DISPENSED | OUTPATIENT
Start: 2025-01-09

## 2025-01-09 RX ORDER — SERTRALINE HYDROCHLORIDE 50 MG/1
100 TABLET, FILM COATED ORAL DAILY
Status: DISPENSED | OUTPATIENT
Start: 2025-01-09

## 2025-01-09 RX ORDER — OXYCODONE HYDROCHLORIDE 5 MG/1
5 TABLET ORAL ONCE
Status: ACTIVE | OUTPATIENT
Start: 2025-01-09

## 2025-01-09 RX ORDER — CHOLECALCIFEROL (VITAMIN D3) 25 MCG
2000 TABLET ORAL DAILY
Status: DISPENSED | OUTPATIENT
Start: 2025-01-09

## 2025-01-09 RX ORDER — ACETAMINOPHEN 650 MG/1
650 SUPPOSITORY RECTAL EVERY 4 HOURS PRN
Status: ACTIVE | OUTPATIENT
Start: 2025-01-09

## 2025-01-09 RX ORDER — ACETAMINOPHEN 325 MG/1
650 TABLET ORAL EVERY 4 HOURS PRN
Status: ACTIVE | OUTPATIENT
Start: 2025-01-09

## 2025-01-09 RX ORDER — BENZTROPINE MESYLATE 1 MG/1
1 TABLET ORAL NIGHTLY
Status: DISPENSED | OUTPATIENT
Start: 2025-01-09

## 2025-01-09 RX ORDER — CLOPIDOGREL BISULFATE 75 MG/1
75 TABLET ORAL DAILY
Status: DISPENSED | OUTPATIENT
Start: 2025-01-09

## 2025-01-09 RX ORDER — PHENYTOIN SODIUM 100 MG/1
300 CAPSULE, EXTENDED RELEASE ORAL NIGHTLY
Status: DISCONTINUED | OUTPATIENT
Start: 2025-01-09 | End: 2025-01-12

## 2025-01-09 RX ORDER — QUETIAPINE FUMARATE 25 MG/1
25 TABLET, FILM COATED ORAL 2 TIMES DAILY
Status: DISCONTINUED | OUTPATIENT
Start: 2025-01-09 | End: 2025-01-09

## 2025-01-09 RX ORDER — ONDANSETRON HYDROCHLORIDE 2 MG/ML
4 INJECTION, SOLUTION INTRAVENOUS EVERY 8 HOURS PRN
Status: ACTIVE | OUTPATIENT
Start: 2025-01-09

## 2025-01-09 RX ORDER — DOCUSATE SODIUM 100 MG/1
100 CAPSULE, LIQUID FILLED ORAL 2 TIMES DAILY
Status: DISCONTINUED | OUTPATIENT
Start: 2025-01-09 | End: 2025-01-09

## 2025-01-09 RX ORDER — QUETIAPINE FUMARATE 200 MG/1
200 TABLET, FILM COATED ORAL NIGHTLY
Status: ON HOLD | COMMUNITY

## 2025-01-09 RX ORDER — QUETIAPINE FUMARATE 100 MG/1
200 TABLET, FILM COATED ORAL NIGHTLY
Status: DISPENSED | OUTPATIENT
Start: 2025-01-09

## 2025-01-09 RX ORDER — HYDROMORPHONE HYDROCHLORIDE 0.2 MG/ML
0.2 INJECTION INTRAMUSCULAR; INTRAVENOUS; SUBCUTANEOUS ONCE
Status: DISPENSED | OUTPATIENT
Start: 2025-01-09

## 2025-01-09 RX ORDER — BENZTROPINE MESYLATE 1 MG/1
0.5 TABLET ORAL DAILY
Status: DISPENSED | OUTPATIENT
Start: 2025-01-09

## 2025-01-09 RX ADMIN — BENZTROPINE MESYLATE 0.5 MG: 1 TABLET ORAL at 10:22

## 2025-01-09 RX ADMIN — LOSARTAN POTASSIUM 25 MG: 50 TABLET, FILM COATED ORAL at 10:22

## 2025-01-09 RX ADMIN — HEPARIN SODIUM 5000 UNITS: 5000 INJECTION, SOLUTION INTRAVENOUS; SUBCUTANEOUS at 05:51

## 2025-01-09 RX ADMIN — QUETIAPINE FUMARATE 25 MG: 100 TABLET ORAL at 10:24

## 2025-01-09 RX ADMIN — CLOPIDOGREL 75 MG: 75 TABLET ORAL at 10:22

## 2025-01-09 RX ADMIN — OLANZAPINE 2.5 MG: 10 INJECTION, POWDER, FOR SOLUTION INTRAMUSCULAR at 18:40

## 2025-01-09 RX ADMIN — LEVETIRACETAM 1000 MG: 10 INJECTION INTRAVENOUS at 21:56

## 2025-01-09 RX ADMIN — ATORVASTATIN CALCIUM 80 MG: 80 TABLET, FILM COATED ORAL at 10:22

## 2025-01-09 RX ADMIN — PANTOPRAZOLE SODIUM 40 MG: 40 TABLET, DELAYED RELEASE ORAL at 10:21

## 2025-01-09 RX ADMIN — LEVETIRACETAM 1000 MG: 500 TABLET, FILM COATED ORAL at 10:22

## 2025-01-09 RX ADMIN — IOHEXOL 75 ML: 350 INJECTION, SOLUTION INTRAVENOUS at 13:07

## 2025-01-09 RX ADMIN — SERTRALINE 100 MG: 50 TABLET, FILM COATED ORAL at 10:21

## 2025-01-09 SDOH — HEALTH STABILITY: MENTAL HEALTH: BEHAVIORS/MOOD: AGITATED

## 2025-01-09 SDOH — HEALTH STABILITY: MENTAL HEALTH: BEHAVIORAL HEALTH(WDL): EXCEPTIONS TO WDL

## 2025-01-09 ASSESSMENT — ENCOUNTER SYMPTOMS
AGITATION: 1
MUSCULOSKELETAL NEGATIVE: 1
SEIZURES: 1
FATIGUE: 1
CARDIOVASCULAR NEGATIVE: 1
APPETITE CHANGE: 1
ACTIVITY CHANGE: 1
GASTROINTESTINAL NEGATIVE: 1
ALLERGIC/IMMUNOLOGIC NEGATIVE: 1
EYES NEGATIVE: 1
ENDOCRINE NEGATIVE: 1
HEMATOLOGIC/LYMPHATIC NEGATIVE: 1
RESPIRATORY NEGATIVE: 1

## 2025-01-09 ASSESSMENT — COLUMBIA-SUICIDE SEVERITY RATING SCALE - C-SSRS
2. HAVE YOU ACTUALLY HAD ANY THOUGHTS OF KILLING YOURSELF?: NO
6. HAVE YOU EVER DONE ANYTHING, STARTED TO DO ANYTHING, OR PREPARED TO DO ANYTHING TO END YOUR LIFE?: NO
5. HAVE YOU STARTED TO WORK OUT OR WORKED OUT THE DETAILS OF HOW TO KILL YOURSELF? DO YOU INTEND TO CARRY OUT THIS PLAN?: NO

## 2025-01-09 NOTE — ED NOTES
Rapid Response called on patient at this time for new onset of left facial droop. Pt became increasingly lethargic after signing to be put on the bedpan. Dr. Farmer at bedside. Pt taken to CT for stroke eval.      Pam Bruno RN  01/09/25 7658

## 2025-01-09 NOTE — PROGRESS NOTES
Transitional Care Coordination Progress Note:  Plan per Medical/Surgical team:   Came to ED for treatment of aggressive behavior with EPAT to place, admitted inpatient for tx of seizures with Keppra & ativan, neuro & psych consults  Status: Inpatient   Payor source: medicare A/B  Discharge disposition: From [facility]  NEW psych placement  ?[facility]  [address] [place], [place] [id number] [phone]  Potential Barriers:Needs medical clearance due to seizures  ADOD: 1/11/2025  JAEL Mack RN, BSN Transitional Care Coordinator ED# [phone]      01/09/25 0738   Discharge Planning   Assistance Needed NEW psych placement  ?[facility] in [place] Needs medical clearance due to seizures  Neuro & psych consults   Expected Discharge Disposition Psych   Does the patient need discharge transport arranged? Yes   RoundTrip coordination needed? Yes   Has discharge transport been arranged? No   Stroke Family Assessment   Stroke Family Assessment Needed No   Intensity of Service   Intensity of Service 0-30 min

## 2025-01-09 NOTE — TREATMENT PLAN
Brief post-rounds update:    -See my significant event note for stroke alert  -Seroquel was initially held due to concerning level of lethargy, which was likely 2/2 medication side effect in addition to post-ictal state. However, pt became very agitated in the afternoon, so seroquel was added back at lower dose 25mg TID. Psych consulted, awaiting recs  -Neurology consulted for breakthrough seizure. Recommending brain MRI and EEG, both pending  -Keppra and phenytoin levels within range  -Will continue to monitor mental status closely    Iwona Farmer MD

## 2025-01-09 NOTE — NURSING NOTE
Rapid Response Nurse Note:     Janine Sanodval is a 69 y.o. female on day 0 of admission presenting with Pancreatic duct dilated (HHS-HCC).    Rounded on patient due to recent stroke alert, reviewed patient's chart and checked with bedside nurse for any questions or concerns. There were none voiced at this time.    The patient's current RADAR score is 1    BP (!) 183/82   Pulse (!) 103   Temp 36.2 °C (97.2 °F) (Temporal)   Resp 15   Wt 74.8 kg (165 lb)   SpO2 98%     No signs/symptoms of distress at this time. Bedside nurse notified to escalate concerns if patient condition changes      Gill Crews RN

## 2025-01-09 NOTE — ED PROVIDER NOTES
Patient was signed out to me pending EPAT placement.  I was emergently called to the room because the patient had a generalized tonic-clonic seizure.  I reviewed her records and she does have a history of epilepsy and takes Keppra and phenytoin.  She reportedly has been refusing her medications for the past several days and has not received them since arriving to the ED.  Her first seizure resolved spontaneously and lasted approximately 1 minute.  She had another generalized tonic-clonic seizure very shortly thereafter which resolved with 4 mg of Ativan.  I provided IV Keppra and phenytoin.  The patient had no further seizure activity in the ED.  No major electrolyte abnormalities.  She has a mild leukocytosis.  She is afebrile.  The patient was admitted to the hospitalist service on stepdown     Ashwin Perez DO  01/09/25 0143

## 2025-01-09 NOTE — PROGRESS NOTES
Spiritual Care Visit  Spiritual Care Request    Reason for Visit:  Routine Visit: Introduction  Continue Visiting: Yes  Crisis Visit: Critical care   Request Received From:  Referral From: Verbal  Focus of Care:  Visited With: Patient   Care Provided for Crisis Visit: Responded to the crisis/alert (Rapid Response)   Refer to :  Referral To:    Sense of Community and or Synagogue Affiliation:  None   Outcome:  Other health caregiver(s) were attending to the pt.   Spiritual Care Annotation    Annotation:  There will be another visit scheduled.     2nd Attempt: 2:07 pm  The pt was sleeping/resting and not disturbed.      Piper Amaral

## 2025-01-09 NOTE — SIGNIFICANT EVENT
Rapid response/stroke alert called for new facial droop. Brief neuro exam done by Dr. Peterson showed facial droop on the L and decreased strength RUE>LUE. Pt has history of stroke with ?residual R sided deficits. Upon chart review, there is also a question of previous intracerebral hemorrhage which was noted in a previous MRI but not in any other chart review or note documentation. Otherwise, pt is able to sign using both hands and moves all 4 extremities. Pt indicated that she is not able to see intermittently (when trying to communicate with VIVIAN) the VIVIAN screen, but then when the screen was brought closer, she indicated improvement. Pt's mental status is also currently waxing and waning- likely 2/2 postictal and medication side effect as she was just given seroquel. Overall, low NIH score.    Was taken immediately for CTH and CT angio. Afterward, pt's R eye and L eye were individually covered, and pt was asked if she could see. She nodded yes when her L eye was covered, but then when her R eye was covered, she signed [id number]. It was unclear whether this was acute or chronic. Pt is able to track people across a room and also able to see the VIVIAN screen to sign- also EOMI. Due to these reasons, I contacted [facility] stroke and discussed the aforementioned with Dr. Cartagena. Due the grayness of the situation and unclear chronicity of vision problems, Dr. Cartagena recommended against TNK at this time.    Iwona Farmer MD

## 2025-01-09 NOTE — PROGRESS NOTES
Pharmacy Medication History     Source of Information: Per pharmacy    Additional concerns with the patient's PTA list.   N/a  The following updates were made to the Prior to Admission medication list:     Medications ADDED:   N/a  Medications CHANGED:  Seroquel at bedime is only 200mg now. They decreased dose  Medications REMOVED:   N/a  Medications NOT TAKING:   Seroquel 300     Allergy reviewed : N/A    Meds 2 Beds : N/A    Outpatient pharmacy confirmed and updated in chart : N/A    Pharmacy name: [facility]    The list below reflectives the updated PTA list. Please review each medication in order reconciliation for additional clarification and justification.    Prior to Admission Medications   Prescriptions Last Dose Informant   QUEtiapine (SEROquel) 200 mg tablet     Sig: Take 1 tablet (200 mg) by mouth once daily at bedtime.   QUEtiapine (SEROquel) 25 mg tablet     Sig: Take 1-2 tablets (25-50 mg) by mouth 2 times a day. 1 tablet in the morning (7am) and 2 tablets at 3pm for restlessness/agitation              acetaminophen (Tylenol) 500 mg tablet     Sig: TAKE 1 TABLET BY MOUTH EVERY 4 HOURS AS NEEDED FOR MILD-MODERATE PAIN, CAN TAKE 2 TABLETS EVERY 8 HOURS AS NEEDED FOR MODERATE-SEVERE PAIN   Patient taking differently: Take 1 tablet (500 mg) by mouth every 4 hours if needed for mild pain (1 - 3). TAKE 1 TABLET BY MOUTH EVERY 4 HOURS AS NEEDED FOR MILD-MODERATE PAIN, CAN TAKE 2 TABLETS EVERY 8 HOURS AS NEEDED FOR MODERATE-SEVERE PAIN   albuterol 2.5 mg /3 mL (0.083 %) nebulizer solution     Si VIAL VIA AEROSOL EVERY 4 HOURS AS NEEDED FOR SHORTNESS OF BREATH / CHEST TIGHTNESS   Patient taking differently: Take 3 mL (2.5 mg) by nebulization every 4 hours if needed for wheezing or shortness of breath. 1 VIAL VIA AEROSOL EVERY 4 HOURS AS NEEDED FOR SHORTNESS OF BREATH / CHEST TIGHTNESS   atorvastatin (Lipitor) 80 mg tablet     Sig: Take 1 tablet (80 mg) by mouth once daily.   benztropine  (Cogentin) 0.5 mg tablet     Sig: TAKE 1 TABLET BY MOUTH IN THE MORNING AND AFTERNOON ~108Q2 TAKE 2 TABLETS BY MOUTH EVERY NIGHT AT BEDTIME   Patient taking differently: Take 1-2 tablets (0.5-1 mg) by mouth 2 times a day. TAKE 1 TABLET BY MOUTH IN THE MORNING AND AFTERNOON  AND TAKE 2 TABLETS BY MOUTH EVERY NIGHT AT BEDTIME   cholecalciferol (Vitamin D-3) 50 MCG (2000 UT) tablet     Sig: TAKE 1 TABLET BY MOUTH ONCE DAILY   clopidogrel (Plavix) 75 mg tablet     Sig: Take 1 tablet (75 mg) by mouth once daily.   docusate sodium (Colace) 100 mg capsule     Sig: Take 1 capsule by mouth every day.  In case patient develops diarrhea, stop taking immediately   famotidine (Pepcid) 20 mg tablet     Sig: Take 1 tablet (20 mg) by mouth once daily.   haloperidol decanoate (Haldol Decanoate) 100 mg/mL injection     Sig: Inject 2 mL ( 200 MG) into the muscle every 28 days.   levETIRAcetam (Keppra) 1,000 mg tablet     Sig: Take 1 tablet (1,000 mg) by mouth once daily.   lidocaine (Lidoderm) 5 % patch     Sig: APPLY 1 PATCH TOPICALLY TO PAINFUL AREA ONCE DAILY *LEAVE IN PLACE FOR 12 HOURS, REMOVE AFTER 12 HOURS. DO NOT REAPPLY UNTIL 12 HOURS HAVE LAPSED*   Patient taking differently: Place 1 patch on the skin once daily. APPLY 1 PATCH TOPICALLY TO PAINFUL AREA ONCE DAILY *LEAVE IN PLACE FOR 12 HOURS, REMOVE AFTER 12 HOURS. DO NOT REAPPLY UNTIL 12 HOURS HAVE LAPSED*   losartan (Cozaar) 25 mg tablet     Sig: Take 1 tablet (25 mg) by mouth once daily.   melatonin 5 mg capsule     Sig: Take 1 capsule (5 mg) by mouth once daily.   Patient taking differently: Take 1 capsule (5 mg) by mouth once daily at bedtime.   phenytoin ER (Dilantin) 100 mg capsule     Sig: Take 3 capsules (300 mg) by mouth once daily at bedtime.   sertraline (Zoloft) 100 mg tablet     Sig: TAKE 1 TABLET BY MOUTH ONCE DAILY   traZODone (Desyrel) 50 mg tablet     Sig: Take 1 tablet (50 mg) by mouth once daily at bedtime.   walker (Ultra-Light Rollator) misc     Sig:  Use as directed      Facility-Administered Medications: None       The list below reflectives the updated allergy list. Please review each documented allergy for additional clarification and justification.    Allergies   Allergen Reactions    Levofloxacin Dizziness     Question seizure          01/09/25 at 8:24 AM - Blanche Perkins

## 2025-01-09 NOTE — H&P
History Of Present Illness  Janine Sandoval is a 69 y.o. female with a past medical history of depression, GERD, schizophrenia, hyperlipidemia, hypertension, deafness, stroke and seizures who resides in a group home and was brought in by caregivers for aggressive behavior for the past week.  Apparently the patient was hanging herself in the head today at the group home she recently had her Seroquel dosing.  The patient was awaiting EPAT-placement when slightly the ER physician was called into the room because the patient had a generalized tonic-clonic seizure.  She has a history of epilepsy and takes Keppra and phenytoin.  She has been refusing her medication for the past several days.  Her seizure in the ER resolved spontaneously and lasted approximately 1 minute.  She then had another generalized tonic-clonic seizure which resolved after 4 mg of Ativan.  The patient also received Keppra and phenytoin IV.  No further seizure activity was noted.  She has mild leukocytosis with a white count of 11.8.  Her vital signs indicate she is afebrile.  She is being admitted to the stepdown unit for breakthrough seizures due to medication noncompliance    Past Medical History  Past Medical History:   Diagnosis Date    Anemia     Depression     Gastro-esophageal reflux disease without esophagitis 12/21/2022    Gastroesophageal reflux disease without esophagitis    HL (hearing loss)     Hyperlipidemia, unspecified 09/25/2019    Hyperlipemia    Hypertension     Personal history of transient ischemic attack (TIA), and cerebral infarction without residual deficits 12/21/2022    History of stroke    Stroke (Multi)         Surgical History  No past surgical history on file.      Social History  She reports that she has never smoked. She has never been exposed to tobacco smoke. She has never used smokeless tobacco. She reports that she does not drink alcohol and does not use drugs.    Family History  Family History   Problem  Relation Name Age of Onset    Parkinsonism Mother      Other (cardiac disorder) Father      Glaucoma Father      Parkinsonism Father          Allergies  Levofloxacin    Review of Systems   Constitutional:  Positive for activity change, appetite change and fatigue.   HENT:  Positive for hearing loss.    Eyes: Negative.    Respiratory: Negative.     Cardiovascular: Negative.    Gastrointestinal: Negative.    Endocrine: Negative.    Genitourinary: Negative.    Musculoskeletal: Negative.    Skin: Negative.    Allergic/Immunologic: Negative.    Neurological:  Positive for seizures.   Hematological: Negative.    Psychiatric/Behavioral:  Positive for agitation and behavioral problems.    All other systems reviewed and are negative.       Physical Exam  Vitals and nursing note reviewed.   Constitutional:       Appearance: Normal appearance. She is ill-appearing.   HENT:      Head: Normocephalic.      Right Ear: External ear normal.      Left Ear: External ear normal.      Ears:      Comments: Hearing loss.  Communicates via sign language     Nose: Nose normal.      Mouth/Throat:      Mouth: Mucous membranes are dry.      Pharynx: Oropharynx is clear.   Eyes:      Extraocular Movements: Extraocular movements intact.      Conjunctiva/sclera: Conjunctivae normal.      Pupils: Pupils are equal, round, and reactive to light.   Cardiovascular:      Rate and Rhythm: Normal rate and regular rhythm.   Pulmonary:      Effort: Pulmonary effort is normal.      Breath sounds: Normal breath sounds.   Abdominal:      General: Abdomen is flat. Bowel sounds are normal.      Palpations: Abdomen is soft.   Musculoskeletal:         General: Normal range of motion.   Skin:     General: Skin is warm and dry.   Neurological:      General: No focal deficit present.      Mental Status: She is alert. She is disoriented.   Psychiatric:      Comments: Agitated          Last Recorded Vitals  Blood pressure 163/79, pulse 96, temperature 36.7 °C (98 °F),  "temperature source Temporal, resp. rate 16, height 1.651 m (5' 5\"), weight 74.8 kg (165 lb), SpO2 95%.    Relevant Results  Meds:  Scheduled medications  atorvastatin, 80 mg, oral, Daily  cholecalciferol, 2,000 Units, oral, Daily  clopidogrel, 75 mg, oral, Daily  docusate sodium, 100 mg, oral, BID  heparin (porcine), 5,000 Units, subcutaneous, q8h JEANETTE  levETIRAcetam, 1,000 mg, oral, Daily  lidocaine, 1 patch, transdermal, Daily  losartan, 25 mg, oral, Daily  melatonin, 6 mg, oral, Nightly  pantoprazole, 40 mg, oral, Daily before breakfast   Or  pantoprazole, 40 mg, intravenous, Daily before breakfast  perflutren lipid microspheres, 0.5-10 mL of dilution, intravenous, Once in imaging  perflutren protein A microsphere, 0.5 mL, intravenous, Once in imaging  phenytoin ER, 300 mg, oral, Nightly  QUEtiapine, 25 mg, oral, BID  QUEtiapine, 300 mg, oral, Nightly  sertraline, 100 mg, oral, Daily  sulfur hexafluoride microsphr, 2 mL, intravenous, Once in imaging  traZODone, 50 mg, oral, Nightly      Continuous medications     PRN medications  PRN medications: acetaminophen **OR** acetaminophen **OR** acetaminophen, albuterol, benztropine, ondansetron **OR** ondansetron   Current Outpatient Medications   Medication Instructions    acetaminophen (Tylenol) 500 mg tablet TAKE 1 TABLET BY MOUTH EVERY 4 HOURS AS NEEDED FOR MILD-MODERATE PAIN, CAN TAKE 2 TABLETS EVERY 8 HOURS AS NEEDED FOR MODERATE-SEVERE PAIN    albuterol 2.5 mg /3 mL (0.083 %) nebulizer solution 1 VIAL VIA AEROSOL EVERY 4 HOURS AS NEEDED FOR SHORTNESS OF BREATH / CHEST TIGHTNESS    atorvastatin (LIPITOR) 80 mg, oral, Daily    benztropine (Cogentin) 0.5 mg tablet TAKE 1 TABLET BY MOUTH IN THE MORNING AND AFTERNOON ~108Q2 TAKE 2 TABLETS BY MOUTH EVERY NIGHT AT BEDTIME    cholecalciferol (VITAMIN D-3) 2,000 Units, oral, Daily    clopidogrel (PLAVIX) 75 mg, oral, Daily    docusate sodium (Colace) 100 mg capsule Take 1 capsule by mouth every day.  In case patient " develops diarrhea, stop taking immediately    famotidine (PEPCID) 20 mg, oral, Daily    haloperidol decanoate (Haldol Decanoate) 100 mg/mL injection Inject 2 mL ( 200 MG) into the muscle every 28 days.    levETIRAcetam (KEPPRA) 1,000 mg, oral, Daily    lidocaine (Lidoderm) 5 % patch APPLY 1 PATCH TOPICALLY TO PAINFUL AREA ONCE DAILY *LEAVE IN PLACE FOR 12 HOURS, REMOVE AFTER 12 HOURS. DO NOT REAPPLY UNTIL 12 HOURS HAVE LAPSED*    losartan (COZAAR) 25 mg, oral, Daily    melatonin 5 mg, oral, Daily    phenytoin ER (DILANTIN) 300 mg, oral, Nightly    QUEtiapine (SEROquel) 25 mg tablet TAKE 1 TABLET BY MOUTH AT 7AM ~301Q2 TAKE 2 TABLETS BY MOUTH AT 3PM FOR RESTLESSNESS, AGITATION    QUEtiapine (SEROquel) 300 mg tablet TAKE 1 TABLET BY MOUTH DAILY *DOSE INCREASE*    sertraline (ZOLOFT) 100 mg, oral, Daily    traZODone (DESYREL) 50 mg, oral, Nightly    walker (Ultra-Light Rollator) misc Use as directed        Labs:  Results for orders placed or performed during the hospital encounter of 01/07/25 (from the past 24 hours)   CBC and Auto Differential   Result Value Ref Range    WBC 11.8 (H) 4.4 - 11.3 x10*3/uL    nRBC 0.0 0.0 - 0.0 /100 WBCs    RBC 4.35 4.00 - 5.20 x10*6/uL    Hemoglobin 13.1 12.0 - 16.0 g/dL    Hematocrit 40.1 36.0 - 46.0 %    MCV 92 80 - 100 fL    MCH 30.1 26.0 - 34.0 pg    MCHC 32.7 32.0 - 36.0 g/dL    RDW 11.9 11.5 - 14.5 %    Platelets 249 150 - 450 x10*3/uL    Neutrophils % 59.5 40.0 - 80.0 %    Immature Granulocytes %, Automated 0.4 0.0 - 0.9 %    Lymphocytes % 31.5 13.0 - 44.0 %    Monocytes % 8.0 2.0 - 10.0 %    Eosinophils % 0.3 0.0 - 6.0 %    Basophils % 0.3 0.0 - 2.0 %    Neutrophils Absolute 7.01 1.20 - 7.70 x10*3/uL    Immature Granulocytes Absolute, Automated 0.05 0.00 - 0.70 x10*3/uL    Lymphocytes Absolute 3.71 1.20 - 4.80 x10*3/uL    Monocytes Absolute 0.94 0.10 - 1.00 x10*3/uL    Eosinophils Absolute 0.03 0.00 - 0.70 x10*3/uL    Basophils Absolute 0.04 0.00 - 0.10 x10*3/uL   Basic  metabolic panel   Result Value Ref Range    Glucose 218 (H) 74 - 99 mg/dL    Sodium 140 136 - 145 mmol/L    Potassium 4.0 3.5 - 5.3 mmol/L    Chloride 103 98 - 107 mmol/L    Bicarbonate 19 (L) 21 - 32 mmol/L    Anion Gap 22 (H) 10 - 20 mmol/L    Urea Nitrogen 13 6 - 23 mg/dL    Creatinine 0.79 0.50 - 1.05 mg/dL    eGFR 81 >60 mL/min/1.73m*2    Calcium 9.6 8.6 - 10.3 mg/dL      Imaging:  Electrocardiogram, 12-lead    Result Date: 1/8/2025  Normal sinus rhythm with sinus arrhythmia Cannot rule out Anterior infarct , age undetermined Abnormal ECG When compared with ECG of 16-SEP-2024 01:36, No significant change was found    CT abdomen pelvis w IV contrast    Result Date: 1/8/2025  Interpreted By:  Leyda Hernadez, STUDY: CT ABDOMEN PELVIS W IV CONTRAST;  1/8/2025 1:00 am   INDICATION: Signs/Symptoms: Lower abdominal pain.   COMPARISON: None.   ACCESSION NUMBER(S): [id number]   ORDERING CLINICIAN: MARIAJOSE OKEEFE   TECHNIQUE: Axial CT images of the abdomen and pelvis with coronal and sagittal reconstructed images obtained after intravenous administration of 75 mL of Omnipaque 350   FINDINGS: LOWER CHEST: Bibasilar atelectasis.   ABDOMEN:   LIVER: Normal morphology. Liver is hypodense relative to the spleen which can be seen in the setting of steatosis. BILE DUCTS: Normal caliber. GALLBLADDER: No calcified gallstones. No wall thickening. PANCREAS: There is severe atrophy of the pancreas with ductal dilatation measuring up to 1.3 cm. There is prominent soft tissue in the region of the pancreatic head. No surrounding inflammatory changes seen. SPLEEN: Within normal limits. ADRENALS: Nodular thickening of the adrenal glands may relate to hyperplasia or adenomatous change. KIDNEYS and URETERS: Symmetric renal enhancement. No hydronephrosis or perinephric fluid collection.   VESSELS:  Calcific atherosclerosis of the aorta. No aortic aneurysm. Incidental note is made of circumaortic left renal vein. RETROPERITONEUM: No  pathologically enlarged retroperitoneal lymph nodes.   PELVIS:   REPRODUCTIVE ORGANS: Uterus is present with a 2.9 cm hypo dense lesion in the left uterine fundus suspicious for fibroid changes. No adnexal mass. BLADDER: Within normal limits.   BOWEL: Small hiatal hernia. Stomach is under distended with apparent wall thickening. Visualized loops of bowel are without evidence for obstruction. Moderate to large stool burden. Normal appendix. Scattered colonic diverticula without evidence for acute diverticulitis. PERITONEUM: No ascites or free air, no fluid collection.   ABDOMINAL WALL: Injection granulomas noted in the gluteal fat bilaterally. BONES: Multilevel degenerative changes of the spine.       There is severe atrophy of the pancreatic body and tail with dilatation of the main pancreatic duct measuring up to 1.3 cm. There is prominent soft tissue in the region of the pancreatic head. No surrounding inflammatory change is seen. The peripancreatic vasculature is patent. These findings may relate to main duct IPMN versus obstructing pancreatic head mass. GI consultation and further evaluation with MRCP/ERCP is recommended.   Hepatic steatosis.   Diverticulosis without evidence for acute diverticulitis.   Fibroid changes of the uterus.   Additional findings as described above.   MACRO: None   Signed by: Leyda Hernadez 1/8/2025 1:28 AM Dictation workstation:   [id number]      Assessment/Plan   Breakthrough seizures secondary to missed antiepileptics  Plan:  Resume home Keppra, phenytoin  Neurology consultation    Schizophrenia with agitation.  Depression  Plan:  Continue home doses of Seroquel, Cogentin  Takes monthly Haldol  Psychiatry consultation  Continue home sertraline 100 mg orally daily  Resume trazodone 50 mg orally nightly     hyperlipidemia  Plan:  Atorvastatin 80 mg orally daily    GERD  Plan:  Protonix    Hypertension  Resume home losartan 25 mg orally daily    History of CVA  Resume Plavix 75 mg  orally daily  High intensity atorvastatin 80 mg orally daily    DVT prophylaxis  Heparin 5000 units subcutaneously every 8 hours  SCDs      I spent 60 minutes in the professional and overall care of this patient.      Merrill Parra DO

## 2025-01-09 NOTE — PROGRESS NOTES
01/09/25 0742   [facility] Disability Status   Are you deaf or do you have serious difficulty hearing? Y  (uses sign language)   Are you blind or do you have serious difficulty seeing, even when wearing glasses? N   Because of a physical, mental, or emotional condition, do you have serious difficulty concentrating, remembering, or making decisions? (5 years old or older) Y  (Hx of schizophrenia, epilepsy, CVA)   Do you have serious difficulty walking or climbing stairs? Y   Do you have serious difficulty dressing or bathing? N   Because of a physical, mental, or emotional condition, do you have serious difficulty doing errands alone such as visiting the doctor? Y

## 2025-01-09 NOTE — CONSULTS
Consults    History Of Present Illness  Janine Sandoval is a 69 y.o. female presenting with past medical history of depression, GERD, osteopenia, dyslipidemia, hypertension, deafness, stroke and seizures, was aggressive at the group home therefore was brought to the ED.  She was found out that she was hanging herself in the head today at the group home and recently had her Seroquel dosing.  Patient was awaiting EPAT placement when ER physician was called because patient had generalized tonic-clonic seizure.  She does have a history of epilepsy and supposed to be on Keppra and phenytoin.  She had been refusing her medications for several days therefore the seizure was thought to be medication noncompliance.  She was given load of Keppra and 4 mg of Ativan in the ED.  She also received IV dose of phenytoin in the ED.  Seizure was aborted.  Had leukocytosis with white cell count of 11.8.  She was further admitted.  In house apparently in the morning she was eating breakfast but subsequently she said that she was weaker on the right side of her face in addition to that she also could not see from the right side of her eye.  Subsequently examination was not reliable because she was not in the right state of mind that is she was somnolent.  Given such complaint and somnolence stroke alert was called.  Stat brain MRI was ordered and EEG was also ordered..  Neurology was consulted for further input.  Past Medical History  Past Medical History:   Diagnosis Date    Anemia     Depression     Gastro-esophageal reflux disease without esophagitis 12/21/2022    Gastroesophageal reflux disease without esophagitis    HL (hearing loss)     Hyperlipidemia, unspecified 09/25/2019    Hyperlipemia    Hypertension     Personal history of transient ischemic attack (TIA), and cerebral infarction without residual deficits 12/21/2022    History of stroke    Stroke (Multi)      Surgical History  No past surgical history on file.  Social  "History  Social History     Tobacco Use    Smoking status: Never     Passive exposure: Never    Smokeless tobacco: Never   Vaping Use    Vaping status: Unknown   Substance Use Topics    Alcohol use: Never    Drug use: Never     Allergies  Levofloxacin  (Not in a hospital admission)      Review of Systems as noted in HPI  Neurological Exam alert, oriented x 1-2 follows very simple commands.  No facial asymmetry noted.  Speech appears okay however difficult to comment on the quality given her cooperation.  No abnormal involuntary face movements.  Power intact in all 4 extremities proximally and distally sensations intact in all 4 extremities proximally and distally.  No abnormal involuntary limb movements noted.  Gait deferred.  Physical Exam  Last Recorded Vitals  Blood pressure (!) 183/82, pulse (!) 103, temperature 36.2 °C (97.2 °F), temperature source Temporal, resp. rate 15, height 1.651 m (5' 5\"), weight 74.8 kg (165 lb), SpO2 98%.    Relevant Results    NIH Stroke Scale  1A. Level of Consciousness: Requires Repeated Stimulation to Arouse or Responds to Pain  1B. Ask Month and Age: No Questions Right  1C. Blink Eyes & Squeeze Hands: Performs 1 Task  2. Best Gaze: Normal  3. Visual: Partial Hemianopia  4. Facial Palsy: Minor Paralysis  5A. Motor - Left Arm: No Effort Against Gravity  5B. Motor - Right Arm: Some Effort Against Gravity  6A. Motor - Left Leg: Some Effort Against Gravity  6B. Motor - Right Leg: Some Effort Against Gravity  7. Limb Ataxia: Absent  8. Sensory Loss: Normal  9. Best Language: Severe Aphasia  10. Dysarthria: Severe Dysarthria  11. Extinction and Inattention: No Abnormality  NIH Stroke Scale: 20           [place] Coma Scale  Best Eye Response: To pain  Best Verbal Response: Incomprehensible sounds  Best Motor Response: Withdraws to pain  [place] Coma Scale Score: 8                 I have personally reviewed the following imaging results CT brain attack angio head and neck W and WO IV " contrast    Result Date: 1/9/2025  Interpreted By:  Justin Quiñonez, STUDY: CT BRAIN ATTACK ANGIO HEAD AND NECK W AND WO IV CONTRAST;  1/9/2025 1:07 pm   INDICATION: Signs/Symptoms:c/f stroke.     COMPARISON: CT head today.   ACCESSION NUMBER(S): [id number]   ORDERING CLINICIAN: RUBEN CASILLAS   TECHNIQUE: 75 ML of Omnipaque 350 was administered intravenously and axial images of the head and neck were acquired.  Coronal, sagittal, and 3-D reconstructions were provided for review.   FINDINGS:   CTA COW: No major vascular occlusion. Moderate atherosclerotic changes through the carotid siphons. No aneurysms.  No vascular malformations. Patent dural venous sinuses and intracranial deep venous structures.   CTA CAROTID: No aortic aneurysm or dissection. No significant stenoses at the origins of the great vessels from the aortic arch. No significant stenoses of the brachycephalic artery or bilateral subclavian arteries.   No significant stenoses bilateral carotid arterial systems. No significant stenoses bilateral vertebral arterial systems.   NONVASCULAR NECK: No soft tissue mass. No adenopathy. Salivary glands are unremarkable. Thyroid gland unremarkable. Bones intact.         1. No intracranial major vascular occlusion. 2. No flow-limiting stenosis or significant plaque irregularity in the neck.     MACRO: None   Signed by: Justin Quiñonez 1/9/2025 1:26 PM Dictation workstation:   [id number]    CT brain attack head wo IV contrast    Result Date: 1/9/2025  Interpreted By:  Jony Cortés, STUDY: CT BRAIN ATTACK HEAD WO IV CONTRAST;  1/9/2025 1:06 pm   INDICATION: Signs/Symptoms:c/f stroke.   COMPARISON: 09/16/2024   ACCESSION NUMBER(S): [id number]   ORDERING CLINICIAN: RUBEN CASILLAS   TECHNIQUE: Sequential trans axial images were obtained  .   FINDINGS: INTRACRANIAL:   CORTICAL SULCI AND EXTRA-AXIAL SPACES:  Unremarkable.   VENTRICULAR SYSTEM:  There is mild relative prominence of the left lateral ventricle compared to  the right, probably at least in part due to ex vacuo dilatation from adjacent remote infarction described below.   CEREBRAL PARENCHYMA:  There is hypodensity at the left corona radiata centrally and lenticulostriate nucleus. This is more defined and smaller than on the prior examination consistent with evolution of a nonhemorrhagic infarction. There is also focal hypodensity at the rostrum of the corpus callosum on image 43 of series 202 similar to the prior exam probably remote lacunar infarction. Otherwisethere is no evidence of definite subacute infarction, intracranial hemorrhage or mass.   EXTRACRANIAL: Visualized paranasal sinuses and mastoids are clear. The calvarium is intact. There is opacification of right mastoid air cells progressed from the prior examination, most consistent with mastoiditis or effusions.       Remote left cerebral infarction, otherwise without acute findings.   MACRO: Jony Cortés discussed the significance and urgency of this critical finding by telephone with  RUBEN CASILLAS on 1/9/2025 at 1:21 pm. (**-RCF-**) Findings:  See findings.   Signed by: Jony Cortés 1/9/2025 1:22 PM Dictation workstation:   [id number]    Electrocardiogram, 12-lead    Result Date: 1/8/2025  Normal sinus rhythm with sinus arrhythmia Cannot rule out Anterior infarct , age undetermined Abnormal ECG When compared with ECG of 16-SEP-2024 01:36, No significant change was found    CT abdomen pelvis w IV contrast    Result Date: 1/8/2025  Interpreted By:  Leyda Hernadez, STUDY: CT ABDOMEN PELVIS W IV CONTRAST;  1/8/2025 1:00 am   INDICATION: Signs/Symptoms: Lower abdominal pain.   COMPARISON: None.   ACCESSION NUMBER(S): [id number]   ORDERING CLINICIAN: MARIAJOSE OKEEFE   TECHNIQUE: Axial CT images of the abdomen and pelvis with coronal and sagittal reconstructed images obtained after intravenous administration of 75 mL of Omnipaque 350   FINDINGS: LOWER CHEST: Bibasilar atelectasis.   ABDOMEN:   LIVER: Normal  morphology. Liver is hypodense relative to the spleen which can be seen in the setting of steatosis. BILE DUCTS: Normal caliber. GALLBLADDER: No calcified gallstones. No wall thickening. PANCREAS: There is severe atrophy of the pancreas with ductal dilatation measuring up to 1.3 cm. There is prominent soft tissue in the region of the pancreatic head. No surrounding inflammatory changes seen. SPLEEN: Within normal limits. ADRENALS: Nodular thickening of the adrenal glands may relate to hyperplasia or adenomatous change. KIDNEYS and URETERS: Symmetric renal enhancement. No hydronephrosis or perinephric fluid collection.   VESSELS:  Calcific atherosclerosis of the aorta. No aortic aneurysm. Incidental note is made of circumaortic left renal vein. RETROPERITONEUM: No pathologically enlarged retroperitoneal lymph nodes.   PELVIS:   REPRODUCTIVE ORGANS: Uterus is present with a 2.9 cm hypo dense lesion in the left uterine fundus suspicious for fibroid changes. No adnexal mass. BLADDER: Within normal limits.   BOWEL: Small hiatal hernia. Stomach is under distended with apparent wall thickening. Visualized loops of bowel are without evidence for obstruction. Moderate to large stool burden. Normal appendix. Scattered colonic diverticula without evidence for acute diverticulitis. PERITONEUM: No ascites or free air, no fluid collection.   ABDOMINAL WALL: Injection granulomas noted in the gluteal fat bilaterally. BONES: Multilevel degenerative changes of the spine.       There is severe atrophy of the pancreatic body and tail with dilatation of the main pancreatic duct measuring up to 1.3 cm. There is prominent soft tissue in the region of the pancreatic head. No surrounding inflammatory change is seen. The peripancreatic vasculature is patent. These findings may relate to main duct IPMN versus obstructing pancreatic head mass. GI consultation and further evaluation with MRCP/ERCP is recommended.   Hepatic steatosis.    Diverticulosis without evidence for acute diverticulitis.   Fibroid changes of the uterus.   Additional findings as described above.   MACRO: None   Signed by: Leyda Hernadez 1/8/2025 1:28 AM Dictation workstation:   [id number]    MR brain w and wo IV contrast    Result Date: 12/19/2024  Interpreted By:  Bharat Barnard, STUDY: MR BRAIN W AND WO IV CONTRAST;  12/18/2024 11:50 am   INDICATION: Signs/Symptoms:recurrent falls, concern for SOL in brain with c/f fracture/ slipped disc in lumbar spine and pelvis. ,R29.6 Repeated falls,G40.909 Epilepsy, unspecified, not intractable, without status epilepticus   COMPARISON: CT September 16.   ACCESSION NUMBER(S): [id number]   ORDERING CLINICIAN: ANNA RYAN   TECHNIQUE: The brain was studied in the sagittal axial and coronal planes utilizing diffusion, gradient echo T2 weighted FLAIR, T1 and T2 weighted images   Following intravenous injection of gadolinium contrast, T1 weighted fat suppressed multiplanar images were also performed.    Following intravenous injection of gadolinium contrast, T1 weighted fat suppressed multiplanar images were also performed.   FINDINGS: Focal encephalomalacia with hemosiderin deposition in the left basal ganglia consistent with previous hemorrhage is unchanged from the previous exam. There is slight prominence of the cortical sulci and sylvian fissures. There is mild ventricular dilatation.  There are scattered and confluence foci of abnormal signal within the periventricular and subcortical white matter bilaterally.  These are compatible with minimal small vessel ischemic changes.  These nonspecific findings could also be produced by a demyelinating or post inflammatory process.   There is bifrontal hyperostosis greater on the left than the right. There is fluid opacification of the right mastoid air cells and middle ear. The visualized skull base paranasal sinuses and orbital structures are unremarkable. Diffusion weighted  images and associated ADC maps of the brain were unremarkable.  There is no evidence of diffusion restriction to suggest the presence of acute infarction. Gradient echo T2 weighted images fail to demonstrate hemosiderin deposition or other evidence of hemorrhage.   Following intravenous injection of there is no abnormal enhancement. There is normal contrast opacification of the dural venous sinuses.   IMPRESSION * There is no evidence of mass, acute cerebral infarction or acute hemorrhage. *Previous left basal ganglia hemorrhage or hemorrhagic infarction unchanged from the previous exam *Fluid opacification of the right mastoid air cells and middle ear     MACRO: none   Signed by: Bharat Barnard 12/19/2024 8:28 AM Dictation workstation:   [id number]    MR lumbar spine w and wo IV contrast    Result Date: 12/12/2024  Interpreted By:  Dami Shen and Ebai Jerky STUDY: MR LUMBAR SPINE W AND WO IV CONTRAST;  12/11/2024 10:59 am   INDICATION: Signs/Symptoms:recurrent falls, concern for SOL in brain with c/f fracture/ slipped disc in lumbar spine and pelvis.   ,R29.6 Repeated falls,G40.909 Epilepsy, unspecified, not intractable, without status epilepticus   COMPARISON: None.   ACCESSION NUMBER(S): [id number]   ORDERING CLINICIAN: ANNA RYAN   TECHNIQUE: Sagittal T1, T2, STIR, axial T1 and T2 weighted images of the lumbar spine were acquired.   FINDINGS: Transitional lumbosacral anatomy with partial lumbarization of the S1-S2 disc space. Last well-formed disc space labeled L5-S1.   Alignment: Lumbar spine levoscoliosis centered at L2-3. Otherwise, the alignment is within normal limits.   Vertebrae/Intervertebral Discs: The vertebral bodies demonstrate expected height. No evidence of an acute fracture. Nonspecific heterogeneous appearance of the marked signal. No suspicious marrow replacing lesions. Scattered benign intraosseous hemangiomas are noted. Multilevel disc desiccation disc height loss with  endplate degenerative changes and osteophytic spurring. No striking endplate edema. Multilevel Schmorl's nodes are seen.   Conus medullaris: The lower thoracic cord appears unremarkable. The conus medullaris terminates at L2.   T9-T10: Sagittal images only demonstrates a small central disc herniation without significant spinal canal neuroforaminal stenosis.   T10-T11: Sagittal images only demonstrates a small disc bulge without significant spinal canal or neuroforaminal stenosis.   T11-T12: Sagittal images only demonstrate small disc bulge without significant spinal canal or neuroforaminal stenosis.   T12-L1: Small disc bulge with a superimposed left paracentral disc herniation without significant spinal canal or neuroforaminal stenosis.   L1-2: Disc bulge with osteophyte spurring and mild facet osteoarthrosis without significant spinal canal stenosis. No significant neuroforaminal stenosis. Patch that   L2-3: Disc bulge with osteophyte spurring mild ligamentum flavum hypertrophy and facet osteoarthrosis contributing to effacement of the bilateral subarticular recess without significant spinal canal stenosis. Minimal bilateral neuroforaminal stenosis.   L3-4: Small disc bulge with small annular fissures. Facet osteoarthrosis and ligamentum flavum hypertrophy contributes to mild to moderate spinal canal stenosis and effacement of the bilateral subarticular recess. Mild bilateral neuroforaminal stenosis. Trace bilateral facet joint effusions, likely reactive.   L4-5: Disc bulge, facet osteoarthrosis and ligamentum flavum hypertrophy contributes to mild canal stenosis and effacement of the bilateral subarticular recess. Mild bilateral neuroforaminal stenosis. Tiny bilateral synovial cyst along the posterior articulation. Mild bilateral periarticular edema, likely secondary to degenerative facet osteoarthrosis. Small bilateral facet joint effusions, likely reactive.   L5-S1: Disc bulge and facet osteoarthrosis  contributes to mild effacement of the bilateral subarticular recess without significant spinal canal stenosis. No significant neuroforaminal stenosis. Small bilateral synovial cyst along the posterior articulation.   Moderate fatty atrophy of the bilateral posterior paraspinal musculature. The prevertebral musculatures are within normal limits.       1. No acute fracture or traumatic subluxation of the lumbar spine. 2. Multilevel degenerative changes of the lumbar spine contributing to spinal canal stenosis most pronounced at L3-L4. No high-grade neuroforaminal stenosis.   I personally reviewed the images/study and I agree with the findings as stated by Resident Po Lujan.   MACRO: None   Signed by: Dami Shen 12/12/2024 10:19 AM Dictation workstation:   [id number]    MR pelvis w and wo IV contrast    Result Date: 12/11/2024  Interpreted By:  Luciano Katz  and Ludy Patel STUDY: MRI of the sacrum and coccyx  without and with IV contrast.   INDICATION: Signs/Symptoms:recurrent falls, concern for SOL in brain with c/f fracture/ slipped disc in lumbar spine and pelvis.   ,R29.6 Repeated falls,G40.909 Epilepsy, unspecified, not intractable, without status epilepticus   COMPARISON: None.   ACCESSION NUMBER(S): [id number]; [id number]   ORDERING CLINICIAN: ANNA RYAN   TECHNIQUE: Multisequential MRI of the pelvis, sacrum, and coccyx was performed without and with IV contrast.   FINDINGS: Note: Lumbar spine is further discussed on dedicated imaging from the same date.   PELVIS:   TENDONS/MUSCLES: The insertions of the gluteus medius and gluteus minimus tendons are intact bilaterally. The insertions of the iliopsoas tendons are intact bilaterally. The adductor and hamstring tendon origins are intact bilaterally. No significant muscular atrophy or edema noted.   JOINTS: Mild degenerative changes seen of the hips. There is no significant hip joint effusion. There is no evidence of avascular necrosis.  The sacroiliac joints appear unremarkable without evidence of erosion or significant degenerative change. The pubic symphysis is unremarkable. The lower lumbar spine is grossly unremarkable.   OSSEOUS STRUCTURES: No focal marrow replacing lesions are identified. There is no fracture.   INTERNAL ORGANS: Evaluation of the internal organs of the pelvis is limited on this study tailored for evaluation of the musculoskeletal system. Evaluation of the intrapelvic organs demonstrates a moderate-to-large colonic stool burden. There is diverticulosis coli. Additionally, there is a T2 hypointense/T1 isointense subserosal leiomyoma at the uterine fundus.     SACRUM:   SACROILIAC JOINTS: No periarticular bone marrow edema involving the synovial or ligamentous regions of the bilateral sacroiliac joints. No erosions or joint surface irregularity. No osteophyte formation. No ankylosis. No synovitis, capsulitis, or increased synovial joint fluid.   OSSEOUS STRUCTURES: No stress fractures involving the sacrum or the iliac bones. No marrow replacing lesions. No abnormal osseous marrow edema.       Mild degenerative change of the hips without osseous injury evident. Other findings as above.   I personally reviewed the images/study and I agree with the findings as stated. This study was interpreted at [place], [place].   MACRO: None   Signed by: Luciano Katz 12/11/2024 4:27 PM Dictation workstation:   [id number]    MR sacrum coccyx w and wo IV contrast    Result Date: 12/11/2024  Interpreted By:  Luciano Katz  and Ludy Patel STUDY: MRI of the sacrum and coccyx  without and with IV contrast.   INDICATION: Signs/Symptoms:recurrent falls, concern for SOL in brain with c/f fracture/ slipped disc in lumbar spine and pelvis.   ,R29.6 Repeated falls,G40.909 Epilepsy, unspecified, not intractable, without status epilepticus   COMPARISON: None.   ACCESSION NUMBER(S): [id number];  [id number]   ORDERING CLINICIAN: ANNA RYAN   TECHNIQUE: Multisequential MRI of the pelvis, sacrum, and coccyx was performed without and with IV contrast.   FINDINGS: Note: Lumbar spine is further discussed on dedicated imaging from the same date.   PELVIS:   TENDONS/MUSCLES: The insertions of the gluteus medius and gluteus minimus tendons are intact bilaterally. The insertions of the iliopsoas tendons are intact bilaterally. The adductor and hamstring tendon origins are intact bilaterally. No significant muscular atrophy or edema noted.   JOINTS: Mild degenerative changes seen of the hips. There is no significant hip joint effusion. There is no evidence of avascular necrosis. The sacroiliac joints appear unremarkable without evidence of erosion or significant degenerative change. The pubic symphysis is unremarkable. The lower lumbar spine is grossly unremarkable.   OSSEOUS STRUCTURES: No focal marrow replacing lesions are identified. There is no fracture.   INTERNAL ORGANS: Evaluation of the internal organs of the pelvis is limited on this study tailored for evaluation of the musculoskeletal system. Evaluation of the intrapelvic organs demonstrates a moderate-to-large colonic stool burden. There is diverticulosis coli. Additionally, there is a T2 hypointense/T1 isointense subserosal leiomyoma at the uterine fundus.     SACRUM:   SACROILIAC JOINTS: No periarticular bone marrow edema involving the synovial or ligamentous regions of the bilateral sacroiliac joints. No erosions or joint surface irregularity. No osteophyte formation. No ankylosis. No synovitis, capsulitis, or increased synovial joint fluid.   OSSEOUS STRUCTURES: No stress fractures involving the sacrum or the iliac bones. No marrow replacing lesions. No abnormal osseous marrow edema.       Mild degenerative change of the hips without osseous injury evident. Other findings as above.   I personally reviewed the images/study and I agree with the  findings as stated. This study was interpreted at [facility], [place], [place].   MACRO: None   Signed by: Luciano Katz 12/11/2024 4:27 PM Dictation workstation:   [id number]  .      Assessment/Plan   The patient is a 69-year-old woman with a history of depression, GERD, schizophrenia, epilepsy, noncompliance presenting for breakthrough seizure.  Neurology was consulted for breakthrough seizure and also for concern of the stroke.  This was given the fact that she complained that she had 1 side of face weak and could not see from the right eye however this examination could not be reproduced given her mental state.  Pending MRI brain stroke protocol and routine EEG.  The routine EEG is particularly for her mental status which is possibly from the postictal state but cannot be certain.  If MRI is unremarkable and EEG is unremarkable then we will not have any additional changes in her regimen she will continue her home dose of antiseizure medications.  Will follow through study please contact if you have any questions.  I spent 60 minutes in the professional and overall care of this patient.      Aasef G Shaikh, MD PhD

## 2025-01-10 ENCOUNTER — APPOINTMENT (OUTPATIENT)
Dept: RADIOLOGY | Facility: HOSPITAL | Age: 70
End: 2025-01-10
Payer: MEDICARE

## 2025-01-10 ENCOUNTER — APPOINTMENT (OUTPATIENT)
Dept: NEUROLOGY | Facility: HOSPITAL | Age: 70
End: 2025-01-10
Payer: MEDICARE

## 2025-01-10 PROBLEM — G40.919 BREAKTHROUGH SEIZURE (MULTI): Status: ACTIVE | Noted: 2025-01-10

## 2025-01-10 LAB
ANION GAP SERPL CALC-SCNC: 13 MMOL/L (ref 10–20)
BASOPHILS # BLD AUTO: 0.02 X10*3/UL (ref 0–0.1)
BASOPHILS NFR BLD AUTO: 0.3 %
BUN SERPL-MCNC: 15 MG/DL (ref 6–23)
CALCIUM SERPL-MCNC: 9.5 MG/DL (ref 8.6–10.3)
CHLORIDE SERPL-SCNC: 107 MMOL/L (ref 98–107)
CO2 SERPL-SCNC: 27 MMOL/L (ref 21–32)
CREAT SERPL-MCNC: 0.69 MG/DL (ref 0.5–1.05)
EGFRCR SERPLBLD CKD-EPI 2021: >90 ML/MIN/1.73M*2
EOSINOPHIL # BLD AUTO: 0.14 X10*3/UL (ref 0–0.7)
EOSINOPHIL NFR BLD AUTO: 1.8 %
ERYTHROCYTE [DISTWIDTH] IN BLOOD BY AUTOMATED COUNT: 11.9 % (ref 11.5–14.5)
GLUCOSE SERPL-MCNC: 130 MG/DL (ref 74–99)
HCT VFR BLD AUTO: 38.7 % (ref 36–46)
HGB BLD-MCNC: 12.9 G/DL (ref 12–16)
IMM GRANULOCYTES # BLD AUTO: 0.01 X10*3/UL (ref 0–0.7)
IMM GRANULOCYTES NFR BLD AUTO: 0.1 % (ref 0–0.9)
LYMPHOCYTES # BLD AUTO: 2.31 X10*3/UL (ref 1.2–4.8)
LYMPHOCYTES NFR BLD AUTO: 30.4 %
MCH RBC QN AUTO: 29.7 PG (ref 26–34)
MCHC RBC AUTO-ENTMCNC: 33.3 G/DL (ref 32–36)
MCV RBC AUTO: 89 FL (ref 80–100)
MONOCYTES # BLD AUTO: 0.65 X10*3/UL (ref 0.1–1)
MONOCYTES NFR BLD AUTO: 8.6 %
NEUTROPHILS # BLD AUTO: 4.46 X10*3/UL (ref 1.2–7.7)
NEUTROPHILS NFR BLD AUTO: 58.8 %
NRBC BLD-RTO: 0 /100 WBCS (ref 0–0)
PLATELET # BLD AUTO: 174 X10*3/UL (ref 150–450)
POTASSIUM SERPL-SCNC: 3.7 MMOL/L (ref 3.5–5.3)
RBC # BLD AUTO: 4.34 X10*6/UL (ref 4–5.2)
SODIUM SERPL-SCNC: 143 MMOL/L (ref 136–145)
WBC # BLD AUTO: 7.6 X10*3/UL (ref 4.4–11.3)

## 2025-01-10 PROCEDURE — 95819 EEG AWAKE AND ASLEEP: CPT | Performed by: PSYCHIATRY & NEUROLOGY

## 2025-01-10 PROCEDURE — 70551 MRI BRAIN STEM W/O DYE: CPT | Performed by: RADIOLOGY

## 2025-01-10 PROCEDURE — 99232 SBSQ HOSP IP/OBS MODERATE 35: CPT | Performed by: STUDENT IN AN ORGANIZED HEALTH CARE EDUCATION/TRAINING PROGRAM

## 2025-01-10 PROCEDURE — 2060000001 HC INTERMEDIATE ICU ROOM DAILY

## 2025-01-10 PROCEDURE — 2500000002 HC RX 250 W HCPCS SELF ADMINISTERED DRUGS (ALT 637 FOR MEDICARE OP, ALT 636 FOR OP/ED): Performed by: INTERNAL MEDICINE

## 2025-01-10 PROCEDURE — 95819 EEG AWAKE AND ASLEEP: CPT

## 2025-01-10 PROCEDURE — 36415 COLL VENOUS BLD VENIPUNCTURE: CPT | Performed by: STUDENT IN AN ORGANIZED HEALTH CARE EDUCATION/TRAINING PROGRAM

## 2025-01-10 PROCEDURE — 85025 COMPLETE CBC W/AUTO DIFF WBC: CPT | Performed by: STUDENT IN AN ORGANIZED HEALTH CARE EDUCATION/TRAINING PROGRAM

## 2025-01-10 PROCEDURE — 70551 MRI BRAIN STEM W/O DYE: CPT

## 2025-01-10 PROCEDURE — 2500000004 HC RX 250 GENERAL PHARMACY W/ HCPCS (ALT 636 FOR OP/ED): Performed by: STUDENT IN AN ORGANIZED HEALTH CARE EDUCATION/TRAINING PROGRAM

## 2025-01-10 PROCEDURE — 2500000004 HC RX 250 GENERAL PHARMACY W/ HCPCS (ALT 636 FOR OP/ED): Performed by: INTERNAL MEDICINE

## 2025-01-10 PROCEDURE — 2500000001 HC RX 250 WO HCPCS SELF ADMINISTERED DRUGS (ALT 637 FOR MEDICARE OP): Performed by: INTERNAL MEDICINE

## 2025-01-10 PROCEDURE — 2500000002 HC RX 250 W HCPCS SELF ADMINISTERED DRUGS (ALT 637 FOR MEDICARE OP, ALT 636 FOR OP/ED): Performed by: STUDENT IN AN ORGANIZED HEALTH CARE EDUCATION/TRAINING PROGRAM

## 2025-01-10 PROCEDURE — 82248 BILIRUBIN DIRECT: CPT | Performed by: NURSE PRACTITIONER

## 2025-01-10 PROCEDURE — 80053 COMPREHEN METABOLIC PANEL: CPT | Performed by: STUDENT IN AN ORGANIZED HEALTH CARE EDUCATION/TRAINING PROGRAM

## 2025-01-10 RX ORDER — LEVETIRACETAM 10 MG/ML
1000 INJECTION INTRAVASCULAR EVERY 24 HOURS
Status: DISPENSED | OUTPATIENT
Start: 2025-01-10

## 2025-01-10 RX ORDER — QUETIAPINE FUMARATE 100 MG/1
100 TABLET, FILM COATED ORAL NIGHTLY
Status: DISCONTINUED | OUTPATIENT
Start: 2025-01-10 | End: 2025-01-12

## 2025-01-10 RX ADMIN — HEPARIN SODIUM 5000 UNITS: 5000 INJECTION, SOLUTION INTRAVENOUS; SUBCUTANEOUS at 05:56

## 2025-01-10 RX ADMIN — ATORVASTATIN CALCIUM 80 MG: 80 TABLET, FILM COATED ORAL at 12:16

## 2025-01-10 RX ADMIN — LEVETIRACETAM 1000 MG: 10 INJECTION INTRAVENOUS at 21:43

## 2025-01-10 RX ADMIN — BENZTROPINE MESYLATE 1 MG: 1 TABLET ORAL at 21:56

## 2025-01-10 RX ADMIN — HEPARIN SODIUM 5000 UNITS: 5000 INJECTION, SOLUTION INTRAVENOUS; SUBCUTANEOUS at 23:13

## 2025-01-10 RX ADMIN — Medication 2000 UNITS: at 12:15

## 2025-01-10 RX ADMIN — PHENYTOIN SODIUM 300 MG: 100 CAPSULE ORAL at 21:56

## 2025-01-10 RX ADMIN — QUETIAPINE FUMARATE 25 MG: 25 TABLET ORAL at 12:15

## 2025-01-10 RX ADMIN — HEPARIN SODIUM 5000 UNITS: 5000 INJECTION, SOLUTION INTRAVENOUS; SUBCUTANEOUS at 13:33

## 2025-01-10 RX ADMIN — TRAZODONE HYDROCHLORIDE 50 MG: 50 TABLET ORAL at 21:56

## 2025-01-10 RX ADMIN — CLOPIDOGREL 75 MG: 75 TABLET ORAL at 12:15

## 2025-01-10 RX ADMIN — PANTOPRAZOLE SODIUM 40 MG: 40 INJECTION, POWDER, FOR SOLUTION INTRAVENOUS at 12:15

## 2025-01-10 RX ADMIN — SERTRALINE 100 MG: 50 TABLET, FILM COATED ORAL at 12:16

## 2025-01-10 SDOH — SOCIAL STABILITY: SOCIAL INSECURITY: HAS ANYONE EVER THREATENED TO HURT YOUR FAMILY OR YOUR PETS?: NO

## 2025-01-10 SDOH — SOCIAL STABILITY: SOCIAL INSECURITY: ARE THERE ANY APPARENT SIGNS OF INJURIES/BEHAVIORS THAT COULD BE RELATED TO ABUSE/NEGLECT?: NO

## 2025-01-10 SDOH — SOCIAL STABILITY: SOCIAL INSECURITY: DO YOU FEEL UNSAFE GOING BACK TO THE PLACE WHERE YOU ARE LIVING?: NO

## 2025-01-10 SDOH — SOCIAL STABILITY: SOCIAL INSECURITY: HAVE YOU HAD ANY THOUGHTS OF HARMING ANYONE ELSE?: NO

## 2025-01-10 SDOH — SOCIAL STABILITY: SOCIAL INSECURITY: ABUSE: ADULT

## 2025-01-10 SDOH — SOCIAL STABILITY: SOCIAL INSECURITY: HAVE YOU HAD THOUGHTS OF HARMING ANYONE ELSE?: NO

## 2025-01-10 SDOH — SOCIAL STABILITY: SOCIAL INSECURITY: ARE YOU OR HAVE YOU BEEN THREATENED OR ABUSED PHYSICALLY, EMOTIONALLY, OR SEXUALLY BY ANYONE?: NO

## 2025-01-10 SDOH — SOCIAL STABILITY: SOCIAL INSECURITY: WERE YOU ABLE TO COMPLETE ALL THE BEHAVIORAL HEALTH SCREENINGS?: YES

## 2025-01-10 SDOH — SOCIAL STABILITY: SOCIAL INSECURITY: DOES ANYONE TRY TO KEEP YOU FROM HAVING/CONTACTING OTHER FRIENDS OR DOING THINGS OUTSIDE YOUR HOME?: NO

## 2025-01-10 SDOH — SOCIAL STABILITY: SOCIAL INSECURITY: DO YOU FEEL ANYONE HAS EXPLOITED OR TAKEN ADVANTAGE OF YOU FINANCIALLY OR OF YOUR PERSONAL PROPERTY?: NO

## 2025-01-10 ASSESSMENT — COGNITIVE AND FUNCTIONAL STATUS - GENERAL
TURNING FROM BACK TO SIDE WHILE IN FLAT BAD: A LITTLE
DRESSING REGULAR UPPER BODY CLOTHING: A LITTLE
DAILY ACTIVITIY SCORE: 19
HELP NEEDED FOR BATHING: A LITTLE
PERSONAL GROOMING: A LITTLE
STANDING UP FROM CHAIR USING ARMS: A LITTLE
MOBILITY SCORE: 18
MOVING FROM LYING ON BACK TO SITTING ON SIDE OF FLAT BED WITH BEDRAILS: A LITTLE
MOVING TO AND FROM BED TO CHAIR: A LITTLE
TOILETING: A LITTLE
CLIMB 3 TO 5 STEPS WITH RAILING: A LITTLE
WALKING IN HOSPITAL ROOM: A LITTLE
DRESSING REGULAR LOWER BODY CLOTHING: A LITTLE

## 2025-01-10 ASSESSMENT — COLUMBIA-SUICIDE SEVERITY RATING SCALE - C-SSRS
2. HAVE YOU ACTUALLY HAD ANY THOUGHTS OF KILLING YOURSELF?: NO
1. IN THE PAST MONTH, HAVE YOU WISHED YOU WERE DEAD OR WISHED YOU COULD GO TO SLEEP AND NOT WAKE UP?: NO
6. HAVE YOU EVER DONE ANYTHING, STARTED TO DO ANYTHING, OR PREPARED TO DO ANYTHING TO END YOUR LIFE?: NO

## 2025-01-10 ASSESSMENT — PATIENT HEALTH QUESTIONNAIRE - PHQ9
SUM OF ALL RESPONSES TO PHQ9 QUESTIONS 1 & 2: 0
1. LITTLE INTEREST OR PLEASURE IN DOING THINGS: NOT AT ALL
2. FEELING DOWN, DEPRESSED OR HOPELESS: NOT AT ALL

## 2025-01-10 ASSESSMENT — LIFESTYLE VARIABLES
SUBSTANCE_ABUSE_PAST_12_MONTHS: NO
AUDIT-C TOTAL SCORE: 0
HOW MANY STANDARD DRINKS CONTAINING ALCOHOL DO YOU HAVE ON A TYPICAL DAY: PATIENT DOES NOT DRINK
HOW OFTEN DO YOU HAVE 6 OR MORE DRINKS ON ONE OCCASION: NEVER
PRESCIPTION_ABUSE_PAST_12_MONTHS: NO
AUDIT-C TOTAL SCORE: 0
SKIP TO QUESTIONS 9-10: 1
HOW OFTEN DO YOU HAVE A DRINK CONTAINING ALCOHOL: NEVER

## 2025-01-10 ASSESSMENT — ACTIVITIES OF DAILY LIVING (ADL)
ADEQUATE_TO_COMPLETE_ADL: YES
JUDGMENT_ADEQUATE_SAFELY_COMPLETE_DAILY_ACTIVITIES: YES
PATIENT'S MEMORY ADEQUATE TO SAFELY COMPLETE DAILY ACTIVITIES?: YES

## 2025-01-10 ASSESSMENT — PAIN SCALES - GENERAL: PAINLEVEL_OUTOF10: 0 - NO PAIN

## 2025-01-10 NOTE — PROGRESS NOTES
emergency Medicine Transition of Care Note.    I received Janine Sandoval in signout from Dr. Medina.  Please see the previous ED provider note for all HPI, PE and MDM up to the time of signout at 6 AM. This is in addition to the primary record.    In brief Janine Sandoval is an 69 y.o. female presenting for   Chief Complaint   Patient presents with    Aggressive Behavior     69yF from group home BIB caregivers with a c/o of worsening aggression x1 week. Pt hitting self in head and hitting table today. Other c/o abd pain and decreased PO intake. Hx of schizophrenia, epilepsy, CVA, and deaf (uses sign language). Recent Seroquel dose change.     At the time of signout we were awaiting: EPAT placement    Diagnoses as of 01/09/25 1919   Pancreatic duct dilated ([facility])   Aggressive behavior   Seizure (Multi)       Medical Decision Making  Patient remained stable hemodynamically my shift remained calm and cooperative not requiring any active management.  On assessment she has no complaints.  Awaiting EPAT placement signed out to oncoming colleague.    Final diagnoses:   [K86.89] Pancreatic duct dilated ([facility]-HCC)   [R46.89] Aggressive behavior   [R56.9] Seizure (Multi)           Procedure  Procedures    Kay Bentley MD

## 2025-01-10 NOTE — NURSING NOTE
Rapid Response Nurse Note:     Janine Sandoval is a 69 y.o. female on day 1 of admission presenting with No Principal Problem: There is no principal problem currently on the Problem List. Please update the Problem List and refresh..    Rounded on patient due to recent rapid response, reviewed patient chart and checked with bedside nurse for any questions or concerns none voiced at this time.    Patient current RADAR score is 0    /86   Pulse 88   Temp 36.6 °C (97.8 °F) (Temporal)   Resp 17   Wt 74.8 kg (165 lb)   SpO2 96%     No signs/symptoms of distress at this time. Bedside nurse notified to escalate concerns if patient condition changes      Abbi Iyer RN

## 2025-01-10 NOTE — PROGRESS NOTES
Janine Sandoval is a 69 y.o. female on day 1 of admission presenting with Pancreatic duct dilated ([facility]).      Subjective   NAEO. Pt was resting comfortably.       Objective     Last Recorded Vitals  /86   Pulse 88   Temp 36.6 °C (97.8 °F) (Temporal)   Resp 17   Wt 74.8 kg (165 lb)   SpO2 96%   Intake/Output last 3 Shifts:  No intake or output data in the 24 hours ending 01/10/25 1753    Admission Weight  Weight: 74.8 kg (165 lb) (01/07/25 2020)    Daily Weight  01/07/25 : 74.8 kg (165 lb)    Image Results  EEG  IMPRESSION    Impression  This routine awake and drowsy EEG is indicative of mild diffuse encephalopathy. No epileptiform discharges or lateralizing signs recorded.    A full report will be scanned into the patient's chart at a later time.    This report has been interpreted and electronically signed by    Physical Exam  Vitals and nursing note reviewed.   Constitutional:       Appearance: Normal appearance. She is ill-appearing.   HENT:      Head: Normocephalic.      Mouth: Mucous membranes are dry.      Pharynx: Oropharynx is clear.   Cardiovascular:      Rate and Rhythm: Normal rate and regular rhythm.   Pulmonary:      Effort: Pulmonary effort is normal.      Breath sounds: Normal breath sounds.   Abdominal:      General: Abdomen is flat. Bowel sounds are normal.      Palpations: Abdomen is soft.   Musculoskeletal:         General: Normal range of motion.   Skin:     General: Skin is warm and dry.   Neurological:      General: No focal deficit present. No facial droop notable today.    Relevant Results  Scheduled medications  atorvastatin, 80 mg, oral, Daily  benztropine, 0.5 mg, oral, Daily  benztropine, 1 mg, oral, Nightly  cholecalciferol, 2,000 Units, oral, Daily  clopidogrel, 75 mg, oral, Daily  heparin (porcine), 5,000 Units, subcutaneous, q8h JEANETTE  HYDROmorphone, 0.2 mg, intravenous, Once  levETIRAcetam, 1,000 mg, intravenous, q24h  lidocaine, 1 patch, transdermal, Daily  losartan,  25 mg, oral, Daily  oxyCODONE, 5 mg, oral, Once  pantoprazole, 40 mg, oral, Daily before breakfast   Or  pantoprazole, 40 mg, intravenous, Daily before breakfast  perflutren lipid microspheres, 0.5-10 mL of dilution, intravenous, Once in imaging  perflutren protein A microsphere, 0.5 mL, intravenous, Once in imaging  phenytoin ER, 300 mg, oral, Nightly  QUEtiapine, 100 mg, oral, Nightly  [Held by provider] QUEtiapine, 200 mg, oral, Nightly  QUEtiapine, 25 mg, oral, Daily  QUEtiapine, 50 mg, oral, Daily  sertraline, 100 mg, oral, Daily  sulfur hexafluoride microsphr, 2 mL, intravenous, Once in imaging  traZODone, 50 mg, oral, Nightly      Continuous medications     PRN medications  PRN medications: acetaminophen **OR** acetaminophen **OR** acetaminophen, albuterol, ondansetron **OR** ondansetron    Results for orders placed or performed during the hospital encounter of 01/07/25 (from the past 24 hours)   Basic Metabolic Panel   Result Value Ref Range    Glucose 130 (H) 74 - 99 mg/dL    Sodium 143 136 - 145 mmol/L    Potassium 3.7 3.5 - 5.3 mmol/L    Chloride 107 98 - 107 mmol/L    Bicarbonate 27 21 - 32 mmol/L    Anion Gap 13 10 - 20 mmol/L    Urea Nitrogen 15 6 - 23 mg/dL    Creatinine 0.69 0.50 - 1.05 mg/dL    eGFR >90 >60 mL/min/1.73m*2    Calcium 9.5 8.6 - 10.3 mg/dL   CBC and Auto Differential   Result Value Ref Range    WBC 7.6 4.4 - 11.3 x10*3/uL    nRBC 0.0 0.0 - 0.0 /100 WBCs    RBC 4.34 4.00 - 5.20 x10*6/uL    Hemoglobin 12.9 12.0 - 16.0 g/dL    Hematocrit 38.7 36.0 - 46.0 %    MCV 89 80 - 100 fL    MCH 29.7 26.0 - 34.0 pg    MCHC 33.3 32.0 - 36.0 g/dL    RDW 11.9 11.5 - 14.5 %    Platelets 174 150 - 450 x10*3/uL    Neutrophils % 58.8 40.0 - 80.0 %    Immature Granulocytes %, Automated 0.1 0.0 - 0.9 %    Lymphocytes % 30.4 13.0 - 44.0 %    Monocytes % 8.6 2.0 - 10.0 %    Eosinophils % 1.8 0.0 - 6.0 %    Basophils % 0.3 0.0 - 2.0 %    Neutrophils Absolute 4.46 1.20 - 7.70 x10*3/uL    Immature Granulocytes  Absolute, Automated 0.01 0.00 - 0.70 x10*3/uL    Lymphocytes Absolute 2.31 1.20 - 4.80 x10*3/uL    Monocytes Absolute 0.65 0.10 - 1.00 x10*3/uL    Eosinophils Absolute 0.14 0.00 - 0.70 x10*3/uL    Basophils Absolute 0.02 0.00 - 0.10 x10*3/uL     Assessment & Plan  Breakthrough seizure (Multi)    # Breakthrough seizures secondary to missed antiepileptics  -Resume home Keppra, phenytoin (keppra currently IV due to agitation, pt refusing PO medications intermittently)  -Neurology consultation  >>pending MRI read  >>EEG routine showing mild diffuse encephalopathy. No epileptiform discharges or lateralizing signs recorded.      # Schizophrenia with agitation.  Depression  -Continue home doses of Cogentin  -Evening seroquel dose reduced due to extreme lethargy on 1/9. Other seroquel doses during the day were also held but re-resumed on 1/10  -Takes monthly Haldol  -Psychiatry consultation  -Continue home sertraline 100 mg orally daily  -Resume trazodone 50 mg orally nightly      # Facial droop, NOW RESOLVED  -Stroke alert called 1/9/25 for new L sided facial droop and concern for L eye blindness  -Discussed with [facility] stroke who advised against TNK  -CT negative for new stroke    # hyperlipidemia  -Atorvastatin 80 mg orally daily     # GERD  -Protonix     # Hypertension  -Resume home losartan 25 mg orally daily     # History of CVA  ?residual R sided weakness  -Resume Plavix 75 mg orally daily  -High intensity atorvastatin 80 mg orally daily     DVT prophylaxis  Heparin 5000 units subcutaneously every 8 hours  SCDs    Discharge: discussed with neurology, if MRI negative, OK for discharge back to group home on home antiepileptics and antipsychotics. Continue plavix    No AM labs as we are only awaiting imaging results and to decrease agitation without cause. Bloodwork has been completely normal he last 2 days      Iwona Farmer MD

## 2025-01-10 NOTE — ED NOTES
"This RN assessed pt using  on the caesar. Pt previously expressed she was in pain to the , so this RN contacted the hospitalist Merrill Parra. The hospitalist called in IV pain medication for the pt. When this RN instructed the  to tell the pt I had her pain medications for her, the pt told the  \"no. No pain meds.\" This RN confirmed with the pt, using the , that she no longer wanted pain meds, and the pt confirmed no pain meds with the . This RN instructed the  to tell the patient I had her Keppra for her that she takes for her seizures. The pt was in agreement with taking her keppra IV.      Lacie Perla RN  01/10/25 0329    "

## 2025-01-10 NOTE — ED NOTES
"pt was moaning very loudly/crying. this RN entered room to see what was wrong. This RN contacted the  via [employer]. The  asked pt what was wrong. pt responded saying \"baby\" the  asked her what about the baby. the pt replied in ASL with \"the baby burns\". the  was instructed to ask the pt if she was in any pain. the pt replied with \"no.\" the pt was asked again what was wrong. the pt replied in ASL \"play.\"      Lacie Perla RN  01/10/25 5640    "

## 2025-01-10 NOTE — ED NOTES
This RN heard pt moaning loudly, so this RN entered room to see what was wrong. This RN contacted the  with the caesar to communicate with the pt. The  asked the pt what was wrong. The pt told the  that she was in pain in her right arm and right leg. This RN proceeded to notify Merrill Parra the hospitalist of her pain. The doctor proceeded to order pain medication for the pt.      Lacie Perla RN  01/10/25 1396

## 2025-01-11 ENCOUNTER — APPOINTMENT (OUTPATIENT)
Dept: RADIOLOGY | Facility: HOSPITAL | Age: 70
End: 2025-01-11
Payer: MEDICARE

## 2025-01-11 ENCOUNTER — APPOINTMENT (OUTPATIENT)
Dept: CARDIOLOGY | Facility: HOSPITAL | Age: 70
End: 2025-01-11
Payer: MEDICARE

## 2025-01-11 PROBLEM — F32.89 OTHER SPECIFIED DEPRESSIVE EPISODES: Status: ACTIVE | Noted: 2023-10-30

## 2025-01-11 LAB
ALBUMIN SERPL BCP-MCNC: 4.2 G/DL (ref 3.4–5)
ALP SERPL-CCNC: 100 U/L (ref 33–136)
ALT SERPL W P-5'-P-CCNC: 38 U/L (ref 7–45)
AST SERPL W P-5'-P-CCNC: 31 U/L (ref 9–39)
BILIRUB DIRECT SERPL-MCNC: 0.1 MG/DL (ref 0–0.3)
BILIRUB SERPL-MCNC: 0.6 MG/DL (ref 0–1.2)
PROT SERPL-MCNC: 6.8 G/DL (ref 6.4–8.2)

## 2025-01-11 PROCEDURE — 2500000001 HC RX 250 WO HCPCS SELF ADMINISTERED DRUGS (ALT 637 FOR MEDICARE OP): Performed by: INTERNAL MEDICINE

## 2025-01-11 PROCEDURE — 2060000001 HC INTERMEDIATE ICU ROOM DAILY

## 2025-01-11 PROCEDURE — 73502 X-RAY EXAM HIP UNI 2-3 VIEWS: CPT | Mod: LT

## 2025-01-11 PROCEDURE — 2500000004 HC RX 250 GENERAL PHARMACY W/ HCPCS (ALT 636 FOR OP/ED): Performed by: STUDENT IN AN ORGANIZED HEALTH CARE EDUCATION/TRAINING PROGRAM

## 2025-01-11 PROCEDURE — 73502 X-RAY EXAM HIP UNI 2-3 VIEWS: CPT | Mod: LEFT SIDE | Performed by: RADIOLOGY

## 2025-01-11 PROCEDURE — 2500000002 HC RX 250 W HCPCS SELF ADMINISTERED DRUGS (ALT 637 FOR MEDICARE OP, ALT 636 FOR OP/ED): Performed by: STUDENT IN AN ORGANIZED HEALTH CARE EDUCATION/TRAINING PROGRAM

## 2025-01-11 PROCEDURE — 99223 1ST HOSP IP/OBS HIGH 75: CPT | Performed by: NURSE PRACTITIONER

## 2025-01-11 PROCEDURE — 73030 X-RAY EXAM OF SHOULDER: CPT | Mod: LT

## 2025-01-11 PROCEDURE — 99222 1ST HOSP IP/OBS MODERATE 55: CPT | Performed by: NURSE PRACTITIONER

## 2025-01-11 PROCEDURE — 73030 X-RAY EXAM OF SHOULDER: CPT | Mod: LEFT SIDE | Performed by: RADIOLOGY

## 2025-01-11 PROCEDURE — 2500000004 HC RX 250 GENERAL PHARMACY W/ HCPCS (ALT 636 FOR OP/ED): Performed by: INTERNAL MEDICINE

## 2025-01-11 PROCEDURE — 2500000002 HC RX 250 W HCPCS SELF ADMINISTERED DRUGS (ALT 637 FOR MEDICARE OP, ALT 636 FOR OP/ED): Performed by: INTERNAL MEDICINE

## 2025-01-11 PROCEDURE — 99232 SBSQ HOSP IP/OBS MODERATE 35: CPT | Performed by: STUDENT IN AN ORGANIZED HEALTH CARE EDUCATION/TRAINING PROGRAM

## 2025-01-11 PROCEDURE — 93010 ELECTROCARDIOGRAM REPORT: CPT | Performed by: INTERNAL MEDICINE

## 2025-01-11 PROCEDURE — 93005 ELECTROCARDIOGRAM TRACING: CPT

## 2025-01-11 RX ORDER — ALUMINUM HYDROXIDE, MAGNESIUM HYDROXIDE, AND SIMETHICONE 1200; 120; 1200 MG/30ML; MG/30ML; MG/30ML
30 SUSPENSION ORAL 4 TIMES DAILY PRN
Status: ACTIVE | OUTPATIENT
Start: 2025-01-11

## 2025-01-11 RX ADMIN — TRAZODONE HYDROCHLORIDE 50 MG: 50 TABLET ORAL at 22:10

## 2025-01-11 RX ADMIN — QUETIAPINE FUMARATE 50 MG: 50 TABLET ORAL at 14:20

## 2025-01-11 RX ADMIN — LOSARTAN POTASSIUM 25 MG: 50 TABLET, FILM COATED ORAL at 10:25

## 2025-01-11 RX ADMIN — SERTRALINE 100 MG: 50 TABLET, FILM COATED ORAL at 10:25

## 2025-01-11 RX ADMIN — QUETIAPINE FUMARATE 100 MG: 100 TABLET ORAL at 22:10

## 2025-01-11 RX ADMIN — HEPARIN SODIUM 5000 UNITS: 5000 INJECTION, SOLUTION INTRAVENOUS; SUBCUTANEOUS at 14:20

## 2025-01-11 RX ADMIN — HEPARIN SODIUM 5000 UNITS: 5000 INJECTION, SOLUTION INTRAVENOUS; SUBCUTANEOUS at 22:09

## 2025-01-11 RX ADMIN — PHENYTOIN SODIUM 300 MG: 100 CAPSULE ORAL at 22:10

## 2025-01-11 RX ADMIN — HEPARIN SODIUM 5000 UNITS: 5000 INJECTION, SOLUTION INTRAVENOUS; SUBCUTANEOUS at 07:01

## 2025-01-11 RX ADMIN — BENZTROPINE MESYLATE 0.5 MG: 1 TABLET ORAL at 10:25

## 2025-01-11 RX ADMIN — LEVETIRACETAM 1000 MG: 10 INJECTION INTRAVENOUS at 22:12

## 2025-01-11 RX ADMIN — ATORVASTATIN CALCIUM 80 MG: 80 TABLET, FILM COATED ORAL at 10:23

## 2025-01-11 RX ADMIN — Medication 2000 UNITS: at 10:23

## 2025-01-11 RX ADMIN — BENZTROPINE MESYLATE 1 MG: 1 TABLET ORAL at 22:11

## 2025-01-11 RX ADMIN — CLOPIDOGREL 75 MG: 75 TABLET ORAL at 10:25

## 2025-01-11 ASSESSMENT — PAIN - FUNCTIONAL ASSESSMENT
PAIN_FUNCTIONAL_ASSESSMENT: WONG-BAKER FACES
PAIN_FUNCTIONAL_ASSESSMENT: WONG-BAKER FACES
PAIN_FUNCTIONAL_ASSESSMENT: 0-10
PAIN_FUNCTIONAL_ASSESSMENT: WONG-BAKER FACES

## 2025-01-11 ASSESSMENT — ENCOUNTER SYMPTOMS
APPETITE CHANGE: 1
DYSPHORIC MOOD: 1
HALLUCINATIONS: 1
AGITATION: 1

## 2025-01-11 ASSESSMENT — PAIN SCALES - GENERAL
PAINLEVEL_OUTOF10: 0 - NO PAIN
PAINLEVEL_OUTOF10: 4

## 2025-01-11 ASSESSMENT — COGNITIVE AND FUNCTIONAL STATUS - GENERAL
HELP NEEDED FOR BATHING: A LITTLE
TOILETING: A LOT
DRESSING REGULAR UPPER BODY CLOTHING: A LITTLE
TURNING FROM BACK TO SIDE WHILE IN FLAT BAD: A LITTLE
CLIMB 3 TO 5 STEPS WITH RAILING: A LITTLE
HELP NEEDED FOR BATHING: A LITTLE
CLIMB 3 TO 5 STEPS WITH RAILING: A LOT
MOBILITY SCORE: 18
MOBILITY SCORE: 14
STANDING UP FROM CHAIR USING ARMS: A LOT
DRESSING REGULAR UPPER BODY CLOTHING: A LITTLE
MOVING FROM LYING ON BACK TO SITTING ON SIDE OF FLAT BED WITH BEDRAILS: A LITTLE
PERSONAL GROOMING: A LITTLE
TURNING FROM BACK TO SIDE WHILE IN FLAT BAD: A LITTLE
MOVING TO AND FROM BED TO CHAIR: A LOT
WALKING IN HOSPITAL ROOM: A LITTLE
STANDING UP FROM CHAIR USING ARMS: A LITTLE
DRESSING REGULAR LOWER BODY CLOTHING: A LITTLE
DRESSING REGULAR LOWER BODY CLOTHING: A LITTLE
DAILY ACTIVITIY SCORE: 19
WALKING IN HOSPITAL ROOM: A LOT
TOILETING: A LITTLE
DAILY ACTIVITIY SCORE: 18
MOVING TO AND FROM BED TO CHAIR: A LITTLE
MOVING FROM LYING ON BACK TO SITTING ON SIDE OF FLAT BED WITH BEDRAILS: A LITTLE
PERSONAL GROOMING: A LITTLE

## 2025-01-11 ASSESSMENT — PAIN SCALES - WONG BAKER
WONGBAKER_NUMERICALRESPONSE: NO HURT
WONGBAKER_NUMERICALRESPONSE: HURTS LITTLE MORE

## 2025-01-11 NOTE — CONSULTS
Inpatient consult to Psychiatry  Consult performed by: DOMINGO Munoz-CNP  Consult ordered by: Merrill Parra DO  Reason for consult: schizophrenia, depression with agitation.      An interactive audio and video telecommunication system which permits real time communications between the patient (at the originating site) and provider (at the distant site) was utilized to provide this telehealth service. Verbal consent was requested and obtained from patient on 1/11/25 for a telehealth visit.    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 She had been refusing her medication at [facility] but has been compliant in the hospital. She has been without recent agitation and was given PRN medication once since admission on 1/9 1840 with zyprexa 2.5mg IM. Seroquel was held yesterday due to lethargy, but again has been compliant with any medication not held. Her last outpatient appt was 12/17 at [facility], she received her BERMUDEZ Haldol Dec 200mg. Her next dose due is 1/14/25.      PER EPAT 1/8/25  HPI: 69 year old Black female group home resident brought to the ED by her caregivers due to recent change in behaviors.  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 She signs and can read and write. She is living at [facility] with one other resident who is her roommate.  She has been recently combative towards her roommate, seemingly confused, banging on the table and on her head at the Group Home.  She apparently with observed walking  "briskly up to her roommate and then attempted to put her outstretched hand over her roommate's face.  Her caregivers were able to redirect her but were alarmed as the patient is usually docile.  She has history of Schizophrenia, Seizures, and prior CVA.  She is gait impaired and using a Rolator.   Her staff do report some history of the patient thinking she cannot walk.  The patient both complained about abdominal pain and then denied reporting her pain as \"0\".  The patient is seen with the use of a an  which she usually does well with.  However, she is currently just glaring at the  and this interviewer.  She had reported SI upon arrival to the Emergency Department which was very surprising to her staff.  Not sure she is reliable at this point.  She has not had a psychiatric inpatient admission since 2003.  Her staff does report she hears voices.  They have observed that she is usually worse in the week prior to her injection.  She is due for injection on 1/14.  She regularly attends a Day Program. The patient has also had some reduction in her Seroquel and Trazodone a few weeks complaining she was always tired. She is very calm currently but completely not engaging.  She did very briefly nod her head yes to SI and AH.    -----------------------------  Assessment and Plan: [facility] brings 69 year old female to the Emergency Department for concern of change in behaviors and mental status.  The patient was born deaf and mute but knows ASL and can read/write.   staff reports concern she is not at her baseline.  The patient has a history of Schizophrenia diagnosed when she was 30.  She has has 1 psychiatric admission in 2003.  Staff reported that she briskly walked up to her roommate and was attempting to put her hand over the roommate's face resulting in staff intervening and able to redirect her .  She has been crying out and hitting herself as well.  Staff report also " "that the patient just had a decrease in her Seroquel.  They report she can be prone to be more \"off\" closer to her injection time.  She is due for her next injection on 1/14.  In assessment she only has nodded yes to SI and to AH.  We have an  however the patient is simply glaring and except for these few head nods is not engaging.  She had initially reported pain but then rated her pain as a \"0\".  She has not been agitated at the ED and has not required medications.  Given this noted change from her baseline along with some concern for SI and AH she is recommended for inpatient admission for stabilization and further evaluation.     TODAY ON EVALUATION, Janine is interviewed via zoom and with an  as well, and her RN. Janine is alert, awake and cooperative during evaluation. At the beginning of the interview she appears to 'blank out' and not respond to interview for about for a minute. No other issues during interview and her concentration on  appears good. Janine reports she has not been feeling herself, feels a little off, cannot think as fast as she normally does. She denies AH,does report VH. Denies feeling anxious, but reports feeling paranoid. Reports feeling depressed and sad. Denies SI. When asked why she was refusing medications at the Group Home, states she did not like the taste of them. Asked if she would like to go to a psychiatric hospital to have her medications adjusted to feel better, replies yes. She is without any rigidity. She is able to move all extremities. Denies having a tight neck, jaw or difficulty swallowing.     Subjective      PSYCHIATRIC REVIEW OF SYMPTOMS  Depressive Symptoms: depressed or irritable mood, decreased appetite  Manic Symptoms: negative  Anxiety Symptoms: negative  Psychotic Symptoms:  reports feeling paranoid, does not express about what  Delirium/Altered Mental Status Symptoms:  she does 'blank out' during beginning of interview for " about a minute, does not appear delirious, was assessed for catatonic symptoms without current concern.  Other Symptoms/Concerns:  feels her 'thinking is slowed', seeking help for depression, VH, paranoia      PAST PSYCHIATRIC HISTORY  PAST DIAGNOSES:  - paranoid schizophrenia  - anxiety disorder  - depression  - does not recognize the term catatonia therefore denies hx of  - per last outpatient appt: reports some paranoia/suspiciousness towards certain staff members or residents, sometimes will claim to be someone else or have someone else in her.     CURRENT PROVIDER:   - Elena, last appt  Elvira Spring APRN-CNP [phone]   Per office note patient with hx of falls r/t weakness, dizziness, suspected oversedation effects, but also state seems to occur more often the week before BERMUDEZ due and sometimes the week after it is given.      HX INPATIENT PSYCH ADMISSIONS:   - hx of multiple. Most recent 3/2020 Fabienne, was with medical admission for AMS 2022    HX SA/SIB:   - denied    Psych Medications  CURRENT  - haldol dec 200mg IM every 4 weeks, last given , DUE 25  - cogentin 0.5mg qam, 1mg qhS  - sertraline 100mg daily  - 24 Seroquel reduced to 200mg qhS; cont 15mg at 7am, 50mg at 3pm  - trazodone 50mg qhS  - melatonin 5mg qhS PRN  - vit d3 2,000IU daily    HISTORY  - unknown, has been on this medication regime since at least 2024 per chart review    ALLERGIES  Allergies   Allergen Reactions    Levofloxacin Dizziness     Question seizure       OARRS  X2 year lookback: 0 Rx found      SOCIAL HISTORY  - born deaf and mute, mild ID  - single, never , no children  - lives in  ([facility]) since   - raised by parents (now  in mid ), has 2 foster sisters (Hoda & Maribell) whom she is close with . Pat is her POA. Patient is her own guardian.   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  - attends a day/work program    SUBSTANCE USE HISTORY  Social History     Tobacco Use   Smoking Status Never    Passive exposure: Never   Smokeless Tobacco Never     Social History     Substance and Sexual Activity   Alcohol Use Never     Social History     Substance and Sexual Activity   Drug Use Never         FAMILY HISTORY  Family History   Problem Relation Name Age of Onset    Parkinsonism Mother      Other (cardiac disorder) Father      Glaucoma Father      Parkinsonism Father         PAST MEDICAL HISTORY  Past Medical History:   Diagnosis Date    Anemia     Depression     Gastro-esophageal reflux disease without esophagitis 12/21/2022    Gastroesophageal reflux disease without esophagitis    HL (hearing loss)     Hyperlipidemia, unspecified 09/25/2019    Hyperlipemia    Hypertension     Personal history of transient ischemic attack (TIA), and cerebral infarction without residual deficits 12/21/2022    History of stroke    Stroke (Multi)            REVIEW OF SYSTEMS  Review of Systems   Constitutional:  Positive for appetite change.   Psychiatric/Behavioral:  Positive for agitation, dysphoric mood and hallucinations.         Paranoia                Objective        1/10/2025     3:48 PM 1/10/2025     5:32 PM 1/10/2025     7:34 PM 1/10/2025    11:55 PM 1/11/2025     3:48 AM 1/11/2025     8:07 AM 1/11/2025    11:35 AM   Vitals   Systolic 144 157 174 136 157 168 143   Diastolic 79 86 90 89 87 83 82   BP Location   Left arm Left arm Left arm     Heart Rate 88  96 94 89     Temp  36.6 °C (97.8 °F) 36.8 °C (98.2 °F) 36.7 °C (98.1 °F) 36.9 °C (98.4 °F) 36.7 °C (98.1 °F) 36.7 °C (98.1 °F)   Resp 16 17 18 19 18         MENTAL STATUS EXAM                                                                                                                        General: Telehealth consult utilizing HIPAA compliant platform Zoom (video/audio) facilitated by nursing staff. Patient consented verbally for telehealth  services.   Appearance: Appears stated age, dressed in hospital attire, appropriate grooming and hygiene. Gray braided hair. Large glasses  LOC: Alert  Attitude:  calm, cooperative  Behavior:  appropriate eye contact to   Movement: No psychomotor agitation or retardation. No EPS/TD. Gait not observed.  Speech and language:  mute  Mood: reports feeling depressed and sad  Affect:  blunted, dysphoric  Thought process:  linear, organized, logical  Thought content:  Does not endorse suicidal ideation or homicidal ideations,. Endorses feeling paranoid  Perception: +VH, not described. Does not endorse auditory hallucinations. Does not appear to be responding to hallucinatory stimuli.   Cognition:  A+Ox3, short and long term memory WNL, attention and concentration WNL except for very beginning of interview. Does report slowed thinking.  Insight:   fair, as patient recognizes symptoms of illness and need for recommended treatments.   Judgment:  guarded    AIMS  Note: ratings for first three major categories. 0 = none, 1 = minimal (or be extreme normal), 2 = mild, 3 = moderate, and 4 = severe.  Facial and Oral Movement       1) Muscles of facial expression (e.g., movements of forehead, eyebrows, periorbital area, cheeks, include frowning, blinking, grimacing of upper face)       Rating = 0       2) Lips and perioral area (e.g., puckering, pouting, smacking)       Rating = 0       3) Jaw (e.g., biting, clenching, chewing, mouth opening, lateral movement)       Rating = 0       4) Tongue (rate only increase in movements both in and out of mouth, not inability to sustain movement)       Rating = 0  Extremity Movements       5) Upper (arms, wrist, hands, fingers). Include movements that are choreic (rapid, objectively purposeless, irregular, spontaneous) or athetoid (slow, irregular, complex, serpentine). Do not include            tremor (repetitive, regular, rhythmic movements).       Rating = 0       6) Lower (legs,  knees, ankles, toes). (E.g., lateral knee movement, foot tapping, heel, dropping, foot squirming, inversion and eversion of foot).       Rating = 0  Trunk Movements       7) Neck, shoulders, hips (e.g., rocking, twisting, squirming, pelvic gyrations, and include diaphragmatic movements)       Rating = 0  TOTAL AIMS SCORE: 0    FUNCTIONAL ESTIMATES  Estimate of Intelligence:   Average to mild ID   Estimate of Capacity for Activities of Daily Living:    requires assistance       CURRENT MEDICATIONS  Scheduled medications  atorvastatin, 80 mg, oral, Daily  benztropine, 0.5 mg, oral, Daily  benztropine, 1 mg, oral, Nightly  cholecalciferol, 2,000 Units, oral, Daily  clopidogrel, 75 mg, oral, Daily  heparin (porcine), 5,000 Units, subcutaneous, q8h JEANETTE  HYDROmorphone, 0.2 mg, intravenous, Once  levETIRAcetam, 1,000 mg, intravenous, q24h  lidocaine, 1 patch, transdermal, Daily  losartan, 25 mg, oral, Daily  oxyCODONE, 5 mg, oral, Once  pantoprazole, 40 mg, oral, Daily before breakfast   Or  pantoprazole, 40 mg, intravenous, Daily before breakfast  perflutren lipid microspheres, 0.5-10 mL of dilution, intravenous, Once in imaging  perflutren protein A microsphere, 0.5 mL, intravenous, Once in imaging  phenytoin ER, 300 mg, oral, Nightly  QUEtiapine, 100 mg, oral, Nightly  [Held by provider] QUEtiapine, 200 mg, oral, Nightly  QUEtiapine, 25 mg, oral, Daily  QUEtiapine, 50 mg, oral, Daily  sertraline, 100 mg, oral, Daily  sulfur hexafluoride microsphr, 2 mL, intravenous, Once in imaging  traZODone, 50 mg, oral, Nightly      Continuous medications     PRN medications  PRN medications: acetaminophen **OR** acetaminophen **OR** acetaminophen, albuterol, alum-mag hydroxide-simeth, ondansetron **OR** ondansetron    Results for orders placed or performed during the hospital encounter of 01/07/25 (from the past 96 hours)   Urinalysis with Reflex Culture and Microscopic   Result Value Ref Range    Color, Urine Light-Yellow  Light-Yellow, Yellow, Dark-Yellow    Appearance, Urine Clear Clear    Specific Gravity, Urine 1.013 1.005 - 1.035    pH, Urine 6.0 5.0, 5.5, 6.0, 6.5, 7.0, 7.5, 8.0    Protein, Urine NEGATIVE NEGATIVE, 10 (TRACE), 20 (TRACE) mg/dL    Glucose, Urine Normal Normal mg/dL    Blood, Urine NEGATIVE NEGATIVE    Ketones, Urine NEGATIVE NEGATIVE mg/dL    Bilirubin, Urine NEGATIVE NEGATIVE    Urobilinogen, Urine Normal Normal mg/dL    Nitrite, Urine NEGATIVE NEGATIVE    Leukocyte Esterase, Urine 25 Denzel/uL (A) NEGATIVE   Microscopic Only, Urine   Result Value Ref Range    WBC, Urine 1-5 1-5, NONE /HPF    RBC, Urine 1-2 NONE, 1-2, 3-5 /HPF    Squamous Epithelial Cells, Urine 1-9 (SPARSE) Reference range not established. /HPF    Bacteria, Urine 1+ (A) NONE SEEN /HPF    Mucus, Urine FEW Reference range not established. /LPF   Urine Culture    Specimen: Clean Catch/Voided; Urine   Result Value Ref Range    Urine Culture       Clinically insignificant growth based on current clinical standards.   Drug Screen, Urine   Result Value Ref Range    Amphetamine Screen, Urine Presumptive Negative Presumptive Negative    Barbiturate Screen, Urine Presumptive Negative Presumptive Negative    Benzodiazepines Screen, Urine Presumptive Negative Presumptive Negative    Cannabinoid Screen, Urine Presumptive Negative Presumptive Negative    Cocaine Metabolite Screen, Urine Presumptive Negative Presumptive Negative    Fentanyl Screen, Urine Presumptive Negative Presumptive Negative    Opiate Screen, Urine Presumptive Negative Presumptive Negative    Oxycodone Screen, Urine Presumptive Negative Presumptive Negative    PCP Screen, Urine Presumptive Negative Presumptive Negative    Methadone Screen, Urine Presumptive Negative Presumptive Negative   CBC and Auto Differential   Result Value Ref Range    WBC 5.4 4.4 - 11.3 x10*3/uL    nRBC 0.0 0.0 - 0.0 /100 WBCs    RBC 4.28 4.00 - 5.20 x10*6/uL    Hemoglobin 12.7 12.0 - 16.0 g/dL    Hematocrit 38.3 36.0  - 46.0 %    MCV 90 80 - 100 fL    MCH 29.7 26.0 - 34.0 pg    MCHC 33.2 32.0 - 36.0 g/dL    RDW 11.9 11.5 - 14.5 %    Platelets 190 150 - 450 x10*3/uL    Neutrophils % 45.5 40.0 - 80.0 %    Immature Granulocytes %, Automated 0.2 0.0 - 0.9 %    Lymphocytes % 45.4 13.0 - 44.0 %    Monocytes % 6.3 2.0 - 10.0 %    Eosinophils % 2.0 0.0 - 6.0 %    Basophils % 0.6 0.0 - 2.0 %    Neutrophils Absolute 2.48 1.20 - 7.70 x10*3/uL    Immature Granulocytes Absolute, Automated 0.01 0.00 - 0.70 x10*3/uL    Lymphocytes Absolute 2.47 1.20 - 4.80 x10*3/uL    Monocytes Absolute 0.34 0.10 - 1.00 x10*3/uL    Eosinophils Absolute 0.11 0.00 - 0.70 x10*3/uL    Basophils Absolute 0.03 0.00 - 0.10 x10*3/uL   Comprehensive Metabolic Panel   Result Value Ref Range    Glucose 132 (H) 74 - 99 mg/dL    Sodium 139 136 - 145 mmol/L    Potassium 4.6 3.5 - 5.3 mmol/L    Chloride 103 98 - 107 mmol/L    Bicarbonate 30 21 - 32 mmol/L    Anion Gap 11 10 - 20 mmol/L    Urea Nitrogen 11 6 - 23 mg/dL    Creatinine 0.65 0.50 - 1.05 mg/dL    eGFR >90 >60 mL/min/1.73m*2    Calcium 9.3 8.6 - 10.3 mg/dL    Albumin 4.3 3.4 - 5.0 g/dL    Alkaline Phosphatase 90 33 - 136 U/L    Total Protein 7.6 6.4 - 8.2 g/dL    AST 34 9 - 39 U/L    Bilirubin, Total 0.5 0.0 - 1.2 mg/dL    ALT 37 7 - 45 U/L   Acute Toxicology Panel, Blood   Result Value Ref Range    Acetaminophen <10.0 10.0 - 30.0 ug/mL    Salicylate  <3 4 - 20 mg/dL    Alcohol <10 <=10 mg/dL   Lipase   Result Value Ref Range    Lipase 5 (L) 9 - 82 U/L   Troponin I, High Sensitivity, Initial   Result Value Ref Range    Troponin I, High Sensitivity 14 (H) 0 - 13 ng/L   Electrocardiogram, 12-lead   Result Value Ref Range    Ventricular Rate 70 BPM    Atrial Rate 70 BPM    UT Interval 200 ms    QRS Duration 110 ms    QT Interval 426 ms    QTC Calculation(Bazett) 460 ms    P Axis 74 degrees    R Axis 80 degrees    T Axis 26 degrees    QRS Count 12 beats    Q Onset 222 ms    P Onset 122 ms    P Offset 179 ms    T Offset  435 ms    QTC Fredericia 448 ms   Troponin, High Sensitivity, 1 Hour   Result Value Ref Range    Troponin I, High Sensitivity 15 (H) 0 - 13 ng/L   Magnesium   Result Value Ref Range    Magnesium 1.91 1.60 - 2.40 mg/dL   CBC and Auto Differential   Result Value Ref Range    WBC 11.8 (H) 4.4 - 11.3 x10*3/uL    nRBC 0.0 0.0 - 0.0 /100 WBCs    RBC 4.35 4.00 - 5.20 x10*6/uL    Hemoglobin 13.1 12.0 - 16.0 g/dL    Hematocrit 40.1 36.0 - 46.0 %    MCV 92 80 - 100 fL    MCH 30.1 26.0 - 34.0 pg    MCHC 32.7 32.0 - 36.0 g/dL    RDW 11.9 11.5 - 14.5 %    Platelets 249 150 - 450 x10*3/uL    Neutrophils % 59.5 40.0 - 80.0 %    Immature Granulocytes %, Automated 0.4 0.0 - 0.9 %    Lymphocytes % 31.5 13.0 - 44.0 %    Monocytes % 8.0 2.0 - 10.0 %    Eosinophils % 0.3 0.0 - 6.0 %    Basophils % 0.3 0.0 - 2.0 %    Neutrophils Absolute 7.01 1.20 - 7.70 x10*3/uL    Immature Granulocytes Absolute, Automated 0.05 0.00 - 0.70 x10*3/uL    Lymphocytes Absolute 3.71 1.20 - 4.80 x10*3/uL    Monocytes Absolute 0.94 0.10 - 1.00 x10*3/uL    Eosinophils Absolute 0.03 0.00 - 0.70 x10*3/uL    Basophils Absolute 0.04 0.00 - 0.10 x10*3/uL   Basic metabolic panel   Result Value Ref Range    Glucose 218 (H) 74 - 99 mg/dL    Sodium 140 136 - 145 mmol/L    Potassium 4.0 3.5 - 5.3 mmol/L    Chloride 103 98 - 107 mmol/L    Bicarbonate 19 (L) 21 - 32 mmol/L    Anion Gap 22 (H) 10 - 20 mmol/L    Urea Nitrogen 13 6 - 23 mg/dL    Creatinine 0.79 0.50 - 1.05 mg/dL    eGFR 81 >60 mL/min/1.73m*2    Calcium 9.6 8.6 - 10.3 mg/dL   CBC   Result Value Ref Range    WBC 8.1 4.4 - 11.3 x10*3/uL    nRBC 0.0 0.0 - 0.0 /100 WBCs    RBC 4.06 4.00 - 5.20 x10*6/uL    Hemoglobin 11.9 (L) 12.0 - 16.0 g/dL    Hematocrit 35.7 (L) 36.0 - 46.0 %    MCV 88 80 - 100 fL    MCH 29.3 26.0 - 34.0 pg    MCHC 33.3 32.0 - 36.0 g/dL    RDW 11.9 11.5 - 14.5 %    Platelets 183 150 - 450 x10*3/uL   Basic metabolic panel   Result Value Ref Range    Glucose 144 (H) 74 - 99 mg/dL    Sodium 143 136  - 145 mmol/L    Potassium 3.4 (L) 3.5 - 5.3 mmol/L    Chloride 106 98 - 107 mmol/L    Bicarbonate 31 21 - 32 mmol/L    Anion Gap 9 (L) 10 - 20 mmol/L    Urea Nitrogen 13 6 - 23 mg/dL    Creatinine 0.76 0.50 - 1.05 mg/dL    eGFR 85 >60 mL/min/1.73m*2    Calcium 9.2 8.6 - 10.3 mg/dL   Phenytoin level, total   Result Value Ref Range    Phenytoin 15.7 10.0 - 20.0 ug/mL   Levetiracetam   Result Value Ref Range    Keppra 9 (L) 10 - 40 ug/mL   Basic Metabolic Panel   Result Value Ref Range    Glucose 130 (H) 74 - 99 mg/dL    Sodium 143 136 - 145 mmol/L    Potassium 3.7 3.5 - 5.3 mmol/L    Chloride 107 98 - 107 mmol/L    Bicarbonate 27 21 - 32 mmol/L    Anion Gap 13 10 - 20 mmol/L    Urea Nitrogen 15 6 - 23 mg/dL    Creatinine 0.69 0.50 - 1.05 mg/dL    eGFR >90 >60 mL/min/1.73m*2    Calcium 9.5 8.6 - 10.3 mg/dL   CBC and Auto Differential   Result Value Ref Range    WBC 7.6 4.4 - 11.3 x10*3/uL    nRBC 0.0 0.0 - 0.0 /100 WBCs    RBC 4.34 4.00 - 5.20 x10*6/uL    Hemoglobin 12.9 12.0 - 16.0 g/dL    Hematocrit 38.7 36.0 - 46.0 %    MCV 89 80 - 100 fL    MCH 29.7 26.0 - 34.0 pg    MCHC 33.3 32.0 - 36.0 g/dL    RDW 11.9 11.5 - 14.5 %    Platelets 174 150 - 450 x10*3/uL    Neutrophils % 58.8 40.0 - 80.0 %    Immature Granulocytes %, Automated 0.1 0.0 - 0.9 %    Lymphocytes % 30.4 13.0 - 44.0 %    Monocytes % 8.6 2.0 - 10.0 %    Eosinophils % 1.8 0.0 - 6.0 %    Basophils % 0.3 0.0 - 2.0 %    Neutrophils Absolute 4.46 1.20 - 7.70 x10*3/uL    Immature Granulocytes Absolute, Automated 0.01 0.00 - 0.70 x10*3/uL    Lymphocytes Absolute 2.31 1.20 - 4.80 x10*3/uL    Monocytes Absolute 0.65 0.10 - 1.00 x10*3/uL    Eosinophils Absolute 0.14 0.00 - 0.70 x10*3/uL    Basophils Absolute 0.02 0.00 - 0.10 x10*3/uL              ASSESSMENT  PSYCHIATRIC RISK ASSESSMENT  Violence Risk Assessment: lower socioeconomic class and major mental illness  Acute Risk of Harm to Others is Considered: low   Suicide Risk Assessment: age > 65 yrs old, chronic  medical illness, current psychiatric illness, mood congruent delusions, and unmarried  Protective Factors against Suicide: positive family relationships and social support/connectedness  Acute Risk of Harm to Self is Considered: moderate            Assessment & Plan  Breakthrough seizure (Multi)    Acute exacerbation of chronic paranoid schizophrenia (Multi)  - recent agitation r/t paranoia at group home  - next haldol dec 200mg due 1/14/25  - outpatient provider [facility], last visit 12/17/24    - cont cogentin 0.5mg / 1mg BID  - restart seroquel 200mg qhS if VS wnl  - cont seroquel 25mg qam, 50mg q afternoon  - cont sertraline 100mg daily  - cont trazodone 50mg qhS    Other specified depressive episodes  - as above          - Patient DOES meet criteria for inpatient psychiatric hospitalization currently due to patient's current inability to function secondary to paranoia, delusions, hallucinations, and increased depression.    - Patient is not able to leave AMA. Please use descalating attempts, call code violet, or notify psych staff for assistance.    - PRIMARY TEAM to FILL OUT [facility], ONCE PLACEMENT/LOCATION IS DETERMINED. IF POSSIBLE, DELAY WRITING [facility] WHEN TIME OF TRANSFER IS KNOWN. [facility] is for transportation purposes.     -When patient is medically cleared, please DOCUMENT MEDICALLY CLEARED and please call EPAT to find placement. Depending on facility, may request additional medical tests/care/clearance depending on psych admission criteria.        Medication Consent,  risks, benefits, side effects reviewed for all ordered meds and patient expressed understanding and consent obtained      I spent 75 minutes in the professional and overall care of this patient (telehealth consult)  Including: preparation to evaluate patient; obtaining history via extensive chart review, including OARRS search; obtaining relevant additional history from patient (and/or family/CG); performing appropriate  evaluation, counseling and educating patient (and/or family/CG); ordering/reviewing medications; ordering/reviewing tests/labs and independently interpreting results and communicating results to patient (and or family/CG); communicating/referring with other HC professionals; documenting clinical information in emr; care coordination    DOMINGO NOLEN, CNP, PMHNP - [place]

## 2025-01-11 NOTE — CONSULTS
Reason For Consult  main duct IPMN versus obstructing pancreatic head mass     History Of Present Illness  Janine Sandoval is a 69 y.o. female  with h/o depression, GERD, schizophrenia, hyperlipidemia, hypertension, deafness, stroke and seizures who resides in a group home, presented due to aggressive behavior for the past week prior to admission, refusing medications. Pt had seizures in ED, neurology and psych consulted. GI consulted due to abnormal imaging showing atrophic pancrease, IPMN vs pancreatic mass. Labs showing normal liver enzymes.  CT showed no biliary dilation, severe atrophy of the pancreatic body and tail with dilation of main PD measuring up to 1.3 cm, prominent soft tissue in the region of the pancreatic head without inflammation.  Patient is hearing impaired, Anju used to obtain the history.  She is awake and oriented.  She denies abdominal pain, nausea, vomiting, dysphagia, odynophagia.  Stated she lost approximately 40 pounds, the weight loss was unintentional.  She reports 2 episodes of diarrhea daily, denies melena, hematochezia or oily stools.  She never had EGD, colonoscopy in 2023, see report below.  Reports history of alcohol use in the past, once a month.  She did not drink for a long time.  She is not aware of any known GI malignancies, liver disease or pancreas problems     Colonoscopy 12/21/2023, screening  One 6 mm polyp removed from the transverse colon, pathology showing tubular adenoma  Diverticulosis in the sigmoid colon  Nonbleeding internal hemorrhoids    Past Medical History  She has a past medical history of Anemia, Depression, Gastro-esophageal reflux disease without esophagitis (12/21/2022), HL (hearing loss), Hyperlipidemia, unspecified (09/25/2019), Hypertension, Personal history of transient ischemic attack (TIA), and cerebral infarction without residual deficits (12/21/2022), and Stroke (Multi).    Surgical History  She has no past surgical history on file.    "  Social History  She reports that she has never smoked. She has never been exposed to tobacco smoke. She has never used smokeless tobacco. She reports that she does not drink alcohol and does not use drugs.    Family History  Family History   Problem Relation Name Age of Onset    Parkinsonism Mother      Other (cardiac disorder) Father      Glaucoma Father      Parkinsonism Father          Allergies  Levofloxacin    Review of Systems  10 systems reviewed and negative other than HPI     Physical Exam  Physical Exam  Vitals reviewed.   Constitutional:       Appearance: Normal appearance.   HENT:      Head: Normocephalic and atraumatic.      Nose: Nose normal.   Cardiovascular:      Rate and Rhythm: Regular rhythm. Tachycardia present.   Pulmonary:      Effort: Pulmonary effort is normal.      Breath sounds: Normal breath sounds.   Abdominal:      General: Bowel sounds are normal. There is no distension.      Palpations: Abdomen is soft.      Tenderness: There is no abdominal tenderness. There is no guarding.   Skin:     General: Skin is warm and dry.   Neurological:      Mental Status: She is alert.      Sensory: Sensory deficit present.      Comments: Pt is deaf   Psychiatric:         Mood and Affect: Mood normal.         Behavior: Behavior normal.            Last Recorded Vitals  Blood pressure 143/82, pulse 89, temperature 36.7 °C (98.1 °F), resp. rate 18, height 1.651 m (5' 5\"), weight 74.8 kg (165 lb), SpO2 93%.    Relevant Results      Scheduled medications  atorvastatin, 80 mg, oral, Daily  benztropine, 0.5 mg, oral, Daily  benztropine, 1 mg, oral, Nightly  cholecalciferol, 2,000 Units, oral, Daily  clopidogrel, 75 mg, oral, Daily  heparin (porcine), 5,000 Units, subcutaneous, q8h JEANETTE  HYDROmorphone, 0.2 mg, intravenous, Once  levETIRAcetam, 1,000 mg, intravenous, q24h  lidocaine, 1 patch, transdermal, Daily  losartan, 25 mg, oral, Daily  oxyCODONE, 5 mg, oral, Once  pantoprazole, 40 mg, oral, Daily before " breakfast   Or  pantoprazole, 40 mg, intravenous, Daily before breakfast  perflutren lipid microspheres, 0.5-10 mL of dilution, intravenous, Once in imaging  perflutren protein A microsphere, 0.5 mL, intravenous, Once in imaging  phenytoin ER, 300 mg, oral, Nightly  QUEtiapine, 100 mg, oral, Nightly  [Held by provider] QUEtiapine, 200 mg, oral, Nightly  QUEtiapine, 25 mg, oral, Daily  QUEtiapine, 50 mg, oral, Daily  sertraline, 100 mg, oral, Daily  sulfur hexafluoride microsphr, 2 mL, intravenous, Once in imaging  traZODone, 50 mg, oral, Nightly      Continuous medications     PRN medications  PRN medications: acetaminophen **OR** acetaminophen **OR** acetaminophen, albuterol, alum-mag hydroxide-simeth, ondansetron **OR** ondansetron  MR brain wo IV contrast    Result Date: 1/11/2025  Interpreted By:  Juan Robledo,  and Ruddy Smith STUDY: MR BRAIN WO IV CONTRAST;  1/10/2025 4:26 pm   INDICATION: Signs/Symptoms:wax/waning mental status, breakthrough seizures.     COMPARISON: CT of the head obtained January 9, 2025 MRI of the brain obtained December 18, 2024   ACCESSION NUMBER(S): [id number]   ORDERING CLINICIAN: MARC PEREYRA   TECHNIQUE: Axial T2, FLAIR, DWI, gradient echo T2 and sagittal and coronal T1 weighted images of brain were acquired.  No contrast was administered.   FINDINGS: Midline brain structures appear normal on mid sagittal imaging. Mild diffuse cerebral parenchymal volume loss is observed. Cystic encephalomalacia is observed in the left corona radiata with additional involvement of the left basal ganglia consistent with remote infarct.   There is no territorial restricted diffusion to suggest acute intracranial infarct. No suspicious intra-axial gradient signal hypointensity is observed.   Hyperostosis frontalis interna is observed with suspected subjacent dural thickening. No evidence of extra-axial fluid collection. No acute loss of gray-white differentiation. Subcortical and periventricular  white matter FLAIR signal hyperintensities are observed.   No intra-axial mass or mass effect. No ventriculomegaly. Basilar cisterns are intact.   T2 vascular flow voids appear normal.   Paranasal sinuses appear clear. There is a unilateral right mastoid effusion.       1. No MR evidence of acute intracranial infarct, hemorrhage, mass, or mass effect. 2. Remote infarct of the left corona radiata and basal ganglia with minimal associated cystic encephalomalacia. 3. White matter FLAIR signal hyperintensities in keeping with chronic ischemic microvascular changes of the white matter. 4. Nonspecific right mastoid effusion stable since the comparison brain MRI.   I personally reviewed the images/study and I agree with the findings as stated. This study was interpreted at [place], [place].   MACRO: None   Signed by: Juan Robledo 1/11/2025 11:33 AM Dictation workstation:   [id number]    EEG    IMPRESSION Impression This routine awake and drowsy EEG is indicative of mild diffuse encephalopathy. No epileptiform discharges or lateralizing signs recorded. A full report will be scanned into the patient's chart at a later time. This report has been interpreted and electronically signed by    CT brain attack angio head and neck W and WO IV contrast    Result Date: 1/9/2025  Interpreted By:  Justin Quiñonez, STUDY: CT BRAIN ATTACK ANGIO HEAD AND NECK W AND WO IV CONTRAST;  1/9/2025 1:07 pm   INDICATION: Signs/Symptoms:c/f stroke.     COMPARISON: CT head today.   ACCESSION NUMBER(S): [id number]   ORDERING CLINICIAN: RUBEN CASILLAS   TECHNIQUE: 75 ML of Omnipaque 350 was administered intravenously and axial images of the head and neck were acquired.  Coronal, sagittal, and 3-D reconstructions were provided for review.   FINDINGS:   CTA COW: No major vascular occlusion. Moderate atherosclerotic changes through the carotid siphons. No aneurysms.  No vascular malformations. Patent dural  venous sinuses and intracranial deep venous structures.   CTA CAROTID: No aortic aneurysm or dissection. No significant stenoses at the origins of the great vessels from the aortic arch. No significant stenoses of the brachycephalic artery or bilateral subclavian arteries.   No significant stenoses bilateral carotid arterial systems. No significant stenoses bilateral vertebral arterial systems.   NONVASCULAR NECK: No soft tissue mass. No adenopathy. Salivary glands are unremarkable. Thyroid gland unremarkable. Bones intact.         1. No intracranial major vascular occlusion. 2. No flow-limiting stenosis or significant plaque irregularity in the neck.     MACRO: None   Signed by: Justin Quiñonez 1/9/2025 1:26 PM Dictation workstation:   [id number]    CT brain attack head wo IV contrast    Result Date: 1/9/2025  Interpreted By:  Jony Cortés, STUDY: CT BRAIN ATTACK HEAD WO IV CONTRAST;  1/9/2025 1:06 pm   INDICATION: Signs/Symptoms:c/f stroke.   COMPARISON: 09/16/2024   ACCESSION NUMBER(S): [id number]   ORDERING CLINICIAN: RUBEN CASILLAS   TECHNIQUE: Sequential trans axial images were obtained  .   FINDINGS: INTRACRANIAL:   CORTICAL SULCI AND EXTRA-AXIAL SPACES:  Unremarkable.   VENTRICULAR SYSTEM:  There is mild relative prominence of the left lateral ventricle compared to the right, probably at least in part due to ex vacuo dilatation from adjacent remote infarction described below.   CEREBRAL PARENCHYMA:  There is hypodensity at the left corona radiata centrally and lenticulostriate nucleus. This is more defined and smaller than on the prior examination consistent with evolution of a nonhemorrhagic infarction. There is also focal hypodensity at the rostrum of the corpus callosum on image 43 of series 202 similar to the prior exam probably remote lacunar infarction. Otherwisethere is no evidence of definite subacute infarction, intracranial hemorrhage or mass.   EXTRACRANIAL: Visualized paranasal sinuses and  mastoids are clear. The calvarium is intact. There is opacification of right mastoid air cells progressed from the prior examination, most consistent with mastoiditis or effusions.       Remote left cerebral infarction, otherwise without acute findings.   MACRO: Jony Cortés discussed the significance and urgency of this critical finding by telephone with  RUBEN SONJA on 1/9/2025 at 1:21 pm. (**-RCF-**) Findings:  See findings.   Signed by: Jony Cortés 1/9/2025 1:22 PM Dictation workstation:   [id number]    Electrocardiogram, 12-lead    Result Date: 1/8/2025  Normal sinus rhythm with sinus arrhythmia Cannot rule out Anterior infarct , age undetermined Abnormal ECG When compared with ECG of 16-SEP-2024 01:36, No significant change was found    CT abdomen pelvis w IV contrast    Result Date: 1/8/2025  Interpreted By:  Leyda Hernadez, STUDY: CT ABDOMEN PELVIS W IV CONTRAST;  1/8/2025 1:00 am   INDICATION: Signs/Symptoms: Lower abdominal pain.   COMPARISON: None.   ACCESSION NUMBER(S): [id number]   ORDERING CLINICIAN: MARIAJOSE OKEEFE   TECHNIQUE: Axial CT images of the abdomen and pelvis with coronal and sagittal reconstructed images obtained after intravenous administration of 75 mL of Omnipaque 350   FINDINGS: LOWER CHEST: Bibasilar atelectasis.   ABDOMEN:   LIVER: Normal morphology. Liver is hypodense relative to the spleen which can be seen in the setting of steatosis. BILE DUCTS: Normal caliber. GALLBLADDER: No calcified gallstones. No wall thickening. PANCREAS: There is severe atrophy of the pancreas with ductal dilatation measuring up to 1.3 cm. There is prominent soft tissue in the region of the pancreatic head. No surrounding inflammatory changes seen. SPLEEN: Within normal limits. ADRENALS: Nodular thickening of the adrenal glands may relate to hyperplasia or adenomatous change. KIDNEYS and URETERS: Symmetric renal enhancement. No hydronephrosis or perinephric fluid collection.   VESSELS:  Calcific  atherosclerosis of the aorta. No aortic aneurysm. Incidental note is made of circumaortic left renal vein. RETROPERITONEUM: No pathologically enlarged retroperitoneal lymph nodes.   PELVIS:   REPRODUCTIVE ORGANS: Uterus is present with a 2.9 cm hypo dense lesion in the left uterine fundus suspicious for fibroid changes. No adnexal mass. BLADDER: Within normal limits.   BOWEL: Small hiatal hernia. Stomach is under distended with apparent wall thickening. Visualized loops of bowel are without evidence for obstruction. Moderate to large stool burden. Normal appendix. Scattered colonic diverticula without evidence for acute diverticulitis. PERITONEUM: No ascites or free air, no fluid collection.   ABDOMINAL WALL: Injection granulomas noted in the gluteal fat bilaterally. BONES: Multilevel degenerative changes of the spine.       There is severe atrophy of the pancreatic body and tail with dilatation of the main pancreatic duct measuring up to 1.3 cm. There is prominent soft tissue in the region of the pancreatic head. No surrounding inflammatory change is seen. The peripancreatic vasculature is patent. These findings may relate to main duct IPMN versus obstructing pancreatic head mass. GI consultation and further evaluation with MRCP/ERCP is recommended.   Hepatic steatosis.   Diverticulosis without evidence for acute diverticulitis.   Fibroid changes of the uterus.   Additional findings as described above.   MACRO: None   Signed by: Leyda Hernadez 1/8/2025 1:28 AM Dictation workstation:   [id number]    MR brain w and wo IV contrast    Result Date: 12/19/2024  Interpreted By:  Bharat Barnard, STUDY: MR BRAIN W AND WO IV CONTRAST;  12/18/2024 11:50 am   INDICATION: Signs/Symptoms:recurrent falls, concern for SOL in brain with c/f fracture/ slipped disc in lumbar spine and pelvis. ,R29.6 Repeated falls,G40.909 Epilepsy, unspecified, not intractable, without status epilepticus   COMPARISON: CT September 16.   ACCESSION  NUMBER(S): [id number]   ORDERING CLINICIAN: ANNA RYAN   TECHNIQUE: The brain was studied in the sagittal axial and coronal planes utilizing diffusion, gradient echo T2 weighted FLAIR, T1 and T2 weighted images   Following intravenous injection of gadolinium contrast, T1 weighted fat suppressed multiplanar images were also performed.    Following intravenous injection of gadolinium contrast, T1 weighted fat suppressed multiplanar images were also performed.   FINDINGS: Focal encephalomalacia with hemosiderin deposition in the left basal ganglia consistent with previous hemorrhage is unchanged from the previous exam. There is slight prominence of the cortical sulci and sylvian fissures. There is mild ventricular dilatation.  There are scattered and confluence foci of abnormal signal within the periventricular and subcortical white matter bilaterally.  These are compatible with minimal small vessel ischemic changes.  These nonspecific findings could also be produced by a demyelinating or post inflammatory process.   There is bifrontal hyperostosis greater on the left than the right. There is fluid opacification of the right mastoid air cells and middle ear. The visualized skull base paranasal sinuses and orbital structures are unremarkable. Diffusion weighted images and associated ADC maps of the brain were unremarkable.  There is no evidence of diffusion restriction to suggest the presence of acute infarction. Gradient echo T2 weighted images fail to demonstrate hemosiderin deposition or other evidence of hemorrhage.   Following intravenous injection of there is no abnormal enhancement. There is normal contrast opacification of the dural venous sinuses.   IMPRESSION * There is no evidence of mass, acute cerebral infarction or acute hemorrhage. *Previous left basal ganglia hemorrhage or hemorrhagic infarction unchanged from the previous exam *Fluid opacification of the right mastoid air cells and middle ear      MACRO: none   Signed by: Bharat Barnard 12/19/2024 8:28 AM Dictation workstation:   [id number]   Results for orders placed or performed during the hospital encounter of 01/07/25 (from the past 24 hours)   CBC and Auto Differential   Result Value Ref Range    WBC 7.6 4.4 - 11.3 x10*3/uL    nRBC 0.0 0.0 - 0.0 /100 WBCs    RBC 4.34 4.00 - 5.20 x10*6/uL    Hemoglobin 12.9 12.0 - 16.0 g/dL    Hematocrit 38.7 36.0 - 46.0 %    MCV 89 80 - 100 fL    MCH 29.7 26.0 - 34.0 pg    MCHC 33.3 32.0 - 36.0 g/dL    RDW 11.9 11.5 - 14.5 %    Platelets 174 150 - 450 x10*3/uL    Neutrophils % 58.8 40.0 - 80.0 %    Immature Granulocytes %, Automated 0.1 0.0 - 0.9 %    Lymphocytes % 30.4 13.0 - 44.0 %    Monocytes % 8.6 2.0 - 10.0 %    Eosinophils % 1.8 0.0 - 6.0 %    Basophils % 0.3 0.0 - 2.0 %    Neutrophils Absolute 4.46 1.20 - 7.70 x10*3/uL    Immature Granulocytes Absolute, Automated 0.01 0.00 - 0.70 x10*3/uL    Lymphocytes Absolute 2.31 1.20 - 4.80 x10*3/uL    Monocytes Absolute 0.65 0.10 - 1.00 x10*3/uL    Eosinophils Absolute 0.14 0.00 - 0.70 x10*3/uL    Basophils Absolute 0.02 0.00 - 0.10 x10*3/uL          Assessment/Plan     69 y.o. female  with h/o depression, GERD, schizophrenia, hyperlipidemia, hypertension, deafness, stroke and seizures who resides in a group home, presented due to aggressive behavior for the past week prior to admission, refusing medications. Pt had seizures in ED, neurology and psych consulted.  During workup, CT showed no biliary dilation, severe atrophy of the pancreas involving body and tail, with dilation of PD up to 1.3 cm, prominent soft tissue in the region of the pancreatic head, possible IPMN versus obstructing mass.    Atrophic pancreas dilated PD and possible mass versus IPMN, likely represents chronic pancreatitis, dilated PD is also can be a sign of chronic pancreatitis.  Do not suspect obstructing mass given normal liver enzymes.    -Recommend to obtain MRCP can be done inpatient or  outpatient  -Will add pancreatic elastase and fecal fat  -Given appearance of the pancreas, patient may benefit from pancreatic enzymes supplementation with each meal and snacks  -She would need close follow-up with pancreatic clinic    Will discuss with Dr. Felipe Jenkins, DOMINGO-CNP

## 2025-01-11 NOTE — NURSING NOTE
Spoke with caregiver Claudette re: conversation with family. Per caregiver, family was notified of transfer to hospital prior for increase in agitated behaviors and refusal of medications, including Keppra.

## 2025-01-11 NOTE — CARE PLAN
The patient's goals for the shift include      The clinical goals for the shift include Pt will remain safe during shift

## 2025-01-11 NOTE — CARE PLAN
The patient's goals for the shift include      The clinical goals for the shift include Pt will remain safe during shift      Problem: Risk for Suicide  Goal: Accepts medications as prescribed/needed this shift  1/10/2025 2236 by Adal Montoya RN  Outcome: Progressing  1/10/2025 2236 by Adal Montoya RN  Outcome: Progressing  Goal: Identifies supports this shift  1/10/2025 2236 by Adal Montoya RN  Outcome: Progressing  1/10/2025 2236 by Adal Montoya RN  Outcome: Progressing  Goal: Makes needs known through verbalization or behaviors this shift  1/10/2025 2236 by Adal Montoya RN  Outcome: Progressing  1/10/2025 2236 by Adal Montoya RN  Outcome: Progressing  Goal: No self harm this shift  1/10/2025 2236 by Adal Montoya RN  Outcome: Progressing  1/10/2025 2236 by Adal Montoya RN  Outcome: Progressing  Goal: Read Safety Guidelines this shift  1/10/2025 2236 by Adal Montoya RN  Outcome: Progressing  1/10/2025 2236 by Adal Montoya RN  Outcome: Progressing  Goal: Complete Mental Health Safety Plan (psychiatry only) this shift  1/10/2025 2236 by Adal Montoya RN  Outcome: Progressing  1/10/2025 2236 by Adal Montoya RN  Outcome: Progressing     Problem: Sensory Perceptual Alteration as Evidenced by  Goal: Cooperates with admission process  1/10/2025 2236 by Adal Montoya RN  Outcome: Progressing  1/10/2025 2236 by Adal Montoya RN  Outcome: Progressing  Goal: Patient/Family participate in treatment and discharge plans  1/10/2025 2236 by Adal Montoya RN  Outcome: Progressing  1/10/2025 2236 by Adal Montoya RN  Outcome: Progressing  Goal: Patient/Family verbalizes awareness of resources  1/10/2025 2236 by Adal Montoya RN  Outcome: Progressing  1/10/2025 2236 by Adal Montoya RN  Outcome: Progressing  Goal: Participates in unit activities  1/10/2025 2236 by Adal Montoya RN  Outcome: Progressing  1/10/2025 2236 by Adal Montoya RN  Outcome: Progressing  Goal: Discusses signs/symptoms of illness/treatment options  1/10/2025 2236 by Adal Montoya RN  Outcome:  Progressing  1/10/2025 2236 by Adal Montoya RN  Outcome: Progressing  Goal: Initiates reality-based interactions  1/10/2025 2236 by Adal Montoya RN  Outcome: Progressing  1/10/2025 2236 by Adal Montoya RN  Outcome: Progressing  Goal: Able to discuss content of hallucinations/delusions  1/10/2025 2236 by Adal Montoya RN  Outcome: Progressing  1/10/2025 2236 by Adal Montoya RN  Outcome: Progressing  Goal: Notifies staff when experiencing hallucinations/delusions  1/10/2025 2236 by Adal Montoya RN  Outcome: Progressing  1/10/2025 2236 by Adal Montoya RN  Outcome: Progressing  Goal: Verbalizes reduction in hallucinations/delusions  1/10/2025 2236 by Adal Montoya RN  Outcome: Progressing  1/10/2025 2236 by Adal Montoya RN  Outcome: Progressing  Goal: Will not act on psychotic perception  1/10/2025 2236 by Adal Montoya RN  Outcome: Progressing  1/10/2025 2236 by Adal Montoya RN  Outcome: Progressing  Goal: Understands least restrictive measures  1/10/2025 2236 by Adal Montoya RN  Outcome: Progressing  1/10/2025 2236 by Adal Montoya RN  Outcome: Progressing  Goal: Free from restraint events  1/10/2025 2236 by Adal Montoya RN  Outcome: Progressing  1/10/2025 2236 by Adal Montoya RN  Outcome: Progressing     Problem: Pain - Adult  Goal: Verbalizes/displays adequate comfort level or baseline comfort level  Outcome: Progressing     Problem: Safety - Adult  Goal: Free from fall injury  Outcome: Progressing     Problem: Discharge Planning  Goal: Discharge to home or other facility with appropriate resources  Outcome: Progressing     Problem: Chronic Conditions and Co-morbidities  Goal: Patient's chronic conditions and co-morbidity symptoms are monitored and maintained or improved  Outcome: Progressing     Problem: Fall/Injury  Goal: Not fall by end of shift  Outcome: Progressing  Goal: Be free from injury by end of the shift  Outcome: Progressing  Goal: Verbalize understanding of personal risk factors for fall in the hospital  Outcome:  Progressing  Goal: Verbalize understanding of risk factor reduction measures to prevent injury from fall in the home  Outcome: Progressing  Goal: Use assistive devices by end of the shift  Outcome: Progressing  Goal: Pace activities to prevent fatigue by end of the shift  Outcome: Progressing

## 2025-01-11 NOTE — NURSING NOTE
Caregiver Claudette updated on current patient condition. All questions answered thoroughly at this time. Dr. Farmer notified of caregiver questions, including discharge planning and CT results.

## 2025-01-11 NOTE — ASSESSMENT & PLAN NOTE
- recent agitation r/t paranoia at group home  - next haldol dec 200mg due 1/14/25  - outpatient provider [facility], last visit 12/17/24    - cont cogentin 0.5mg / 1mg BID  - restart seroquel 200mg qhS if VS wnl  - cont seroquel 25mg qam, 50mg q afternoon  - cont sertraline 100mg daily  - cont trazodone 50mg qhS

## 2025-01-11 NOTE — ASSESSMENT & PLAN NOTE
- as above          - Patient DOES meet criteria for inpatient psychiatric hospitalization currently due to patient's current inability to function secondary to paranoia, delusions, hallucinations, and increased depression.    - Patient is not able to leave AMA. Please use descalating attempts, call code violet, or notify psych staff for assistance.    - PRIMARY TEAM to [facility]ILL OUT [facility], ONCE PLACEMENT/LOCATION IS DETERMINED. IF POSSIBLE, DELAY WRITING [facility] WHEN TIME OF TRANSFER IS KNOWN. [facility] is for transportation purposes.     -When patient is medically cleared, please DOCUMENT MEDICALLY CLEARED and please call [facility] to find placement. Depending on facility, may request additional medical tests/care/clearance depending on psych admission criteria.

## 2025-01-11 NOTE — PROGRESS NOTES
Janine Sandoval is a 69 y.o. female on day 2 of admission presenting with No Principal Problem: There is no principal problem currently on the Problem List. Please update the Problem List and refresh..      Subjective   NAEO. Having abdominal pain this morning R sided. Lethargic last night. Later in the day, reports to nursing that she fell at her group home and is having pain in L shoulder, back, and L hip. Bruising is noted in those areas.        Objective     Last Recorded Vitals  /78 (BP Location: Left arm, Patient Position: Sitting)   Pulse 89   Temp 36 °C (96.8 °F) (Temporal)   Resp 13   Wt 74.8 kg (165 lb)   SpO2 93%   Intake/Output last 3 Shifts:    Intake/Output Summary (Last 24 hours) at 1/11/2025 1839  Last data filed at 1/11/2025 1040  Gross per 24 hour   Intake 240 ml   Output --   Net 240 ml       Admission Weight  Weight: 74.8 kg (165 lb) (01/07/25 2020)    Daily Weight  01/07/25 : 74.8 kg (165 lb)    Image Results  MR brain wo IV contrast  Narrative: Interpreted By:  Juan Robledo and Hooper Grayson   STUDY:  MR BRAIN WO IV CONTRAST;  1/10/2025 4:26 pm      INDICATION:  Signs/Symptoms:wax/waning mental status, breakthrough seizures.          COMPARISON:  CT of the head obtained January 9, 2025  MRI of the brain obtained December 18, 2024      ACCESSION NUMBER(S):  [id number]      ORDERING CLINICIAN:  MARC PEREYRA      TECHNIQUE:  Axial T2, FLAIR, DWI, gradient echo T2 and sagittal and coronal T1  weighted images of brain were acquired.  No contrast was administered.      FINDINGS:  Midline brain structures appear normal on mid sagittal imaging. Mild  diffuse cerebral parenchymal volume loss is observed. Cystic  encephalomalacia is observed in the left corona radiata with  additional involvement of the left basal ganglia consistent with  remote infarct.      There is no territorial restricted diffusion to suggest acute  intracranial infarct. No suspicious intra-axial gradient  signal  hypointensity is observed.      Hyperostosis frontalis interna is observed with suspected subjacent  dural thickening. No evidence of extra-axial fluid collection. No  acute loss of gray-white differentiation. Subcortical and  periventricular white matter FLAIR signal hyperintensities are  observed.      No intra-axial mass or mass effect. No ventriculomegaly. Basilar  cisterns are intact.      T2 vascular flow voids appear normal.      Paranasal sinuses appear clear. There is a unilateral right mastoid  effusion.      Impression: 1. No MR evidence of acute intracranial infarct, hemorrhage, mass, or  mass effect.  2. Remote infarct of the left corona radiata and basal ganglia with  minimal associated cystic encephalomalacia.  3. White matter FLAIR signal hyperintensities in keeping with chronic  ischemic microvascular changes of the white matter.  4. Nonspecific right mastoid effusion stable since the comparison  brain MRI.      I personally reviewed the images/study and I agree with the findings  as stated. This study was interpreted at [place], [place].      MACRO:  None      Signed by: Juan Robledo 1/11/2025 11:33 AM  Dictation workstation:   [id number]      Physical Exam  Constitutional:       General: She is not in acute distress.     Appearance: Normal appearance. She is not ill-appearing.   HENT:      Mouth/Throat:      Mouth: Mucous membranes are moist.   Eyes:      Extraocular Movements: Extraocular movements intact.      Pupils: Pupils are equal, round, and reactive to light.   Cardiovascular:      Rate and Rhythm: Normal rate and regular rhythm.      Heart sounds: No murmur heard.     No friction rub. No gallop.   Pulmonary:      Effort: Pulmonary effort is normal. No respiratory distress.      Breath sounds: Normal breath sounds. No wheezing, rhonchi or rales.   Abdominal:      General: Bowel sounds are normal. There is no distension.       Palpations: Abdomen is soft. There is no mass.      Tenderness: There is abdominal tenderness. There is no guarding or rebound.      Comments: R sided tenderness   Musculoskeletal:         General: No swelling, tenderness or deformity.      Cervical back: Normal range of motion and neck supple.   Skin:     General: Skin is warm and dry.   Neurological:      General: No focal deficit present.      Mental Status: She is alert and oriented to person, place, and time. Mental status is at baseline.      Motor: No weakness.   Psychiatric:         Mood and Affect: Mood normal.         Behavior: Behavior normal.         Thought Content: Thought content normal.         Relevant Results  Scheduled medications  atorvastatin, 80 mg, oral, Daily  benztropine, 0.5 mg, oral, Daily  benztropine, 1 mg, oral, Nightly  cholecalciferol, 2,000 Units, oral, Daily  clopidogrel, 75 mg, oral, Daily  heparin (porcine), 5,000 Units, subcutaneous, q8h JEANETTE  HYDROmorphone, 0.2 mg, intravenous, Once  levETIRAcetam, 1,000 mg, intravenous, q24h  lidocaine, 1 patch, transdermal, Daily  [START ON 1/12/2025] lipase-protease-amylase, 2 capsule, oral, TID  losartan, 25 mg, oral, Daily  oxyCODONE, 5 mg, oral, Once  pantoprazole, 40 mg, oral, Daily before breakfast   Or  pantoprazole, 40 mg, intravenous, Daily before breakfast  perflutren lipid microspheres, 0.5-10 mL of dilution, intravenous, Once in imaging  perflutren protein A microsphere, 0.5 mL, intravenous, Once in imaging  phenytoin ER, 300 mg, oral, Nightly  QUEtiapine, 100 mg, oral, Nightly  [Held by provider] QUEtiapine, 200 mg, oral, Nightly  QUEtiapine, 25 mg, oral, Daily  QUEtiapine, 50 mg, oral, Daily  sertraline, 100 mg, oral, Daily  sulfur hexafluoride microsphr, 2 mL, intravenous, Once in imaging  traZODone, 50 mg, oral, Nightly      Continuous medications     PRN medications  PRN medications: acetaminophen **OR** acetaminophen **OR** acetaminophen, albuterol, alum-mag hydroxide-simeth,  ondansetron **OR** ondansetron    No results found for this or any previous visit (from the past 24 hours).    Assessment & Plan  Breakthrough seizure (Multi)    Other specified depressive episodes    Acute exacerbation of chronic paranoid schizophrenia (Multi)    # Breakthrough seizures secondary to missed antiepileptics  -Resume home Keppra, phenytoin (keppra currently IV due to agitation, pt refusing PO medications intermittently)  -Neurology consultation  >>MRI without any acute changes  >>EEG routine showing mild diffuse encephalopathy. No epileptiform discharges or lateralizing signs recorded.   >>no barriers to discharge from neurologic perspective. OK to dc on home antiepileptics     # Schizophrenia with agitation.  Depression  -Continue home doses of Cogentin  -Evening seroquel dose reduced due to extreme lethargy on 1/9. Other seroquel doses during the day were also held but re-resumed on 1/10  -Takes monthly Haldol  -Continue home sertraline 100 mg orally daily  -Resume trazodone 50 mg orally nightly   -Psychiatry consultation  >>recommending inpatient psych hospitalization once medically clear, pt agreeable to this.     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  -No elevation in liver enzymes  -Rec MRCP inpt or outpt (will obtain inpt as pt will be discharged with EPAT once med clear)  -Add pancreatic elastase and fecal fat  -Start pancreatic enzymes supplementation  -Close fu with pancreatic clinic    # Facial droop, NOW RESOLVED  -Stroke alert called 1/9/25 for new L sided facial droop and concern for L eye blindness  -Discussed with [facility] [place] who advised against TNK  -CT negative for new stroke     # hyperlipidemia  -Atorvastatin 80 mg orally daily     # GERD  -Protonix     # Hypertension  -Resume home losartan 25 mg orally daily     #  History of CVA  ?residual R sided weakness  -Resume Plavix 75 mg orally daily  -High intensity atorvastatin 80 mg orally daily     DVT prophylaxis  Heparin 5000 units subcutaneously every 8 hours  SCDs     Discharge: after MRCP, will discuss with EPAT about inpt psych placement. Pending xr to rule out fracture after fall    Updates to Claudette (caregiver) [phone]       Iwona Farmer MD

## 2025-01-12 ENCOUNTER — APPOINTMENT (OUTPATIENT)
Dept: RADIOLOGY | Facility: HOSPITAL | Age: 70
End: 2025-01-12
Payer: MEDICARE

## 2025-01-12 ENCOUNTER — APPOINTMENT (OUTPATIENT)
Dept: CARDIOLOGY | Facility: HOSPITAL | Age: 70
End: 2025-01-12
Payer: MEDICARE

## 2025-01-12 VITALS
TEMPERATURE: 97.5 F | RESPIRATION RATE: 18 BRPM | WEIGHT: 165 LBS | OXYGEN SATURATION: 97 % | HEIGHT: 65 IN | HEART RATE: 82 BPM | BODY MASS INDEX: 27.49 KG/M2 | SYSTOLIC BLOOD PRESSURE: 134 MMHG | DIASTOLIC BLOOD PRESSURE: 68 MMHG

## 2025-01-12 LAB
ALBUMIN SERPL BCP-MCNC: 3.7 G/DL (ref 3.4–5)
ALP SERPL-CCNC: 82 U/L (ref 33–136)
ALT SERPL W P-5'-P-CCNC: 42 U/L (ref 7–45)
ANION GAP SERPL CALC-SCNC: 12 MMOL/L (ref 10–20)
AST SERPL W P-5'-P-CCNC: 29 U/L (ref 9–39)
BILIRUB SERPL-MCNC: 0.5 MG/DL (ref 0–1.2)
BUN SERPL-MCNC: 24 MG/DL (ref 6–23)
CALCIUM SERPL-MCNC: 9 MG/DL (ref 8.6–10.3)
CHLORIDE SERPL-SCNC: 109 MMOL/L (ref 98–107)
CO2 SERPL-SCNC: 26 MMOL/L (ref 21–32)
CREAT SERPL-MCNC: 0.79 MG/DL (ref 0.5–1.05)
EGFRCR SERPLBLD CKD-EPI 2021: 81 ML/MIN/1.73M*2
ERYTHROCYTE [DISTWIDTH] IN BLOOD BY AUTOMATED COUNT: 11.9 % (ref 11.5–14.5)
GLUCOSE BLD MANUAL STRIP-MCNC: 102 MG/DL (ref 74–99)
GLUCOSE SERPL-MCNC: 196 MG/DL (ref 74–99)
HCT VFR BLD AUTO: 36.1 % (ref 36–46)
HGB BLD-MCNC: 12.4 G/DL (ref 12–16)
MCH RBC QN AUTO: 30.2 PG (ref 26–34)
MCHC RBC AUTO-ENTMCNC: 34.3 G/DL (ref 32–36)
MCV RBC AUTO: 88 FL (ref 80–100)
NRBC BLD-RTO: 0 /100 WBCS (ref 0–0)
PLATELET # BLD AUTO: 164 X10*3/UL (ref 150–450)
POTASSIUM SERPL-SCNC: 3.4 MMOL/L (ref 3.5–5.3)
PROT SERPL-MCNC: 6.4 G/DL (ref 6.4–8.2)
RBC # BLD AUTO: 4.11 X10*6/UL (ref 4–5.2)
SODIUM SERPL-SCNC: 144 MMOL/L (ref 136–145)
WBC # BLD AUTO: 6.9 X10*3/UL (ref 4.4–11.3)

## 2025-01-12 PROCEDURE — 2500000002 HC RX 250 W HCPCS SELF ADMINISTERED DRUGS (ALT 637 FOR MEDICARE OP, ALT 636 FOR OP/ED): Performed by: INTERNAL MEDICINE

## 2025-01-12 PROCEDURE — 2500000002 HC RX 250 W HCPCS SELF ADMINISTERED DRUGS (ALT 637 FOR MEDICARE OP, ALT 636 FOR OP/ED): Performed by: STUDENT IN AN ORGANIZED HEALTH CARE EDUCATION/TRAINING PROGRAM

## 2025-01-12 PROCEDURE — 2500000004 HC RX 250 GENERAL PHARMACY W/ HCPCS (ALT 636 FOR OP/ED): Performed by: INTERNAL MEDICINE

## 2025-01-12 PROCEDURE — 36415 COLL VENOUS BLD VENIPUNCTURE: CPT | Performed by: STUDENT IN AN ORGANIZED HEALTH CARE EDUCATION/TRAINING PROGRAM

## 2025-01-12 PROCEDURE — 2500000001 HC RX 250 WO HCPCS SELF ADMINISTERED DRUGS (ALT 637 FOR MEDICARE OP): Performed by: STUDENT IN AN ORGANIZED HEALTH CARE EDUCATION/TRAINING PROGRAM

## 2025-01-12 PROCEDURE — 85027 COMPLETE CBC AUTOMATED: CPT | Performed by: STUDENT IN AN ORGANIZED HEALTH CARE EDUCATION/TRAINING PROGRAM

## 2025-01-12 PROCEDURE — 2500000004 HC RX 250 GENERAL PHARMACY W/ HCPCS (ALT 636 FOR OP/ED): Performed by: STUDENT IN AN ORGANIZED HEALTH CARE EDUCATION/TRAINING PROGRAM

## 2025-01-12 PROCEDURE — 99233 SBSQ HOSP IP/OBS HIGH 50: CPT | Performed by: INTERNAL MEDICINE

## 2025-01-12 PROCEDURE — 93005 ELECTROCARDIOGRAM TRACING: CPT

## 2025-01-12 PROCEDURE — 80053 COMPREHEN METABOLIC PANEL: CPT | Performed by: STUDENT IN AN ORGANIZED HEALTH CARE EDUCATION/TRAINING PROGRAM

## 2025-01-12 PROCEDURE — 74181 MRI ABDOMEN W/O CONTRAST: CPT | Mod: REDUCED SERVICES | Performed by: STUDENT IN AN ORGANIZED HEALTH CARE EDUCATION/TRAINING PROGRAM

## 2025-01-12 PROCEDURE — 1200000002 HC GENERAL ROOM WITH TELEMETRY DAILY

## 2025-01-12 PROCEDURE — 2500000004 HC RX 250 GENERAL PHARMACY W/ HCPCS (ALT 636 FOR OP/ED): Performed by: NURSE PRACTITIONER

## 2025-01-12 PROCEDURE — 84075 ASSAY ALKALINE PHOSPHATASE: CPT | Performed by: STUDENT IN AN ORGANIZED HEALTH CARE EDUCATION/TRAINING PROGRAM

## 2025-01-12 PROCEDURE — 2500000001 HC RX 250 WO HCPCS SELF ADMINISTERED DRUGS (ALT 637 FOR MEDICARE OP): Performed by: INTERNAL MEDICINE

## 2025-01-12 PROCEDURE — 99232 SBSQ HOSP IP/OBS MODERATE 35: CPT | Performed by: STUDENT IN AN ORGANIZED HEALTH CARE EDUCATION/TRAINING PROGRAM

## 2025-01-12 PROCEDURE — 93010 ELECTROCARDIOGRAM REPORT: CPT | Performed by: INTERNAL MEDICINE

## 2025-01-12 PROCEDURE — 82947 ASSAY GLUCOSE BLOOD QUANT: CPT

## 2025-01-12 PROCEDURE — 74181 MRI ABDOMEN W/O CONTRAST: CPT | Mod: 52

## 2025-01-12 RX ORDER — HALOPERIDOL 5 MG/ML
2 INJECTION INTRAMUSCULAR EVERY 6 HOURS PRN
Status: DISPENSED | OUTPATIENT
Start: 2025-01-12

## 2025-01-12 RX ORDER — POTASSIUM CHLORIDE 1.5 G/1.58G
20 POWDER, FOR SOLUTION ORAL ONCE
Status: COMPLETED | OUTPATIENT
Start: 2025-01-12 | End: 2025-01-12

## 2025-01-12 RX ORDER — LORAZEPAM 2 MG/ML
0.5 INJECTION INTRAMUSCULAR EVERY 6 HOURS PRN
Status: DISPENSED | OUTPATIENT
Start: 2025-01-12

## 2025-01-12 RX ADMIN — LORAZEPAM 0.5 MG: 2 INJECTION INTRAMUSCULAR; INTRAVENOUS at 15:00

## 2025-01-12 RX ADMIN — ATORVASTATIN CALCIUM 80 MG: 80 TABLET, FILM COATED ORAL at 11:18

## 2025-01-12 RX ADMIN — QUETIAPINE FUMARATE 200 MG: 100 TABLET ORAL at 20:42

## 2025-01-12 RX ADMIN — HEPARIN SODIUM 5000 UNITS: 5000 INJECTION, SOLUTION INTRAVENOUS; SUBCUTANEOUS at 21:08

## 2025-01-12 RX ADMIN — PHENYTOIN SODIUM 150 MG: 50 INJECTION INTRAMUSCULAR; INTRAVENOUS at 14:13

## 2025-01-12 RX ADMIN — SERTRALINE 100 MG: 50 TABLET, FILM COATED ORAL at 11:13

## 2025-01-12 RX ADMIN — BENZTROPINE MESYLATE 1 MG: 1 TABLET ORAL at 21:04

## 2025-01-12 RX ADMIN — LEVETIRACETAM 1000 MG: 10 INJECTION INTRAVENOUS at 20:42

## 2025-01-12 RX ADMIN — HALOPERIDOL LACTATE 2 MG: 5 INJECTION, SOLUTION INTRAMUSCULAR at 10:10

## 2025-01-12 RX ADMIN — TRAZODONE HYDROCHLORIDE 50 MG: 50 TABLET ORAL at 20:43

## 2025-01-12 RX ADMIN — LOSARTAN POTASSIUM 25 MG: 50 TABLET, FILM COATED ORAL at 11:18

## 2025-01-12 RX ADMIN — CLOPIDOGREL 75 MG: 75 TABLET ORAL at 11:13

## 2025-01-12 RX ADMIN — PHENYTOIN SODIUM 150 MG: 50 INJECTION INTRAMUSCULAR; INTRAVENOUS at 21:07

## 2025-01-12 RX ADMIN — PANCRELIPASE 2 CAPSULE: 15000; 3000; 9500 CAPSULE, DELAYED RELEASE ORAL at 11:17

## 2025-01-12 RX ADMIN — POTASSIUM CHLORIDE 20 MEQ: 1.5 POWDER, FOR SOLUTION ORAL at 11:25

## 2025-01-12 RX ADMIN — BENZTROPINE MESYLATE 0.5 MG: 1 TABLET ORAL at 11:18

## 2025-01-12 RX ADMIN — QUETIAPINE FUMARATE 25 MG: 100 TABLET ORAL at 11:13

## 2025-01-12 ASSESSMENT — COGNITIVE AND FUNCTIONAL STATUS - GENERAL
CLIMB 3 TO 5 STEPS WITH RAILING: A LITTLE
DAILY ACTIVITIY SCORE: 19
MOVING TO AND FROM BED TO CHAIR: A LITTLE
DAILY ACTIVITIY SCORE: 19
DRESSING REGULAR LOWER BODY CLOTHING: A LITTLE
CLIMB 3 TO 5 STEPS WITH RAILING: A LITTLE
TURNING FROM BACK TO SIDE WHILE IN FLAT BAD: A LITTLE
DRESSING REGULAR UPPER BODY CLOTHING: A LITTLE
STANDING UP FROM CHAIR USING ARMS: A LITTLE
TOILETING: A LITTLE
DRESSING REGULAR LOWER BODY CLOTHING: A LITTLE
WALKING IN HOSPITAL ROOM: A LITTLE
CLIMB 3 TO 5 STEPS WITH RAILING: A LITTLE
WALKING IN HOSPITAL ROOM: A LITTLE
DRESSING REGULAR LOWER BODY CLOTHING: A LITTLE
MOBILITY SCORE: 18
TOILETING: A LITTLE
MOVING FROM LYING ON BACK TO SITTING ON SIDE OF FLAT BED WITH BEDRAILS: A LITTLE
MOVING FROM LYING ON BACK TO SITTING ON SIDE OF FLAT BED WITH BEDRAILS: A LITTLE
HELP NEEDED FOR BATHING: A LITTLE
DRESSING REGULAR LOWER BODY CLOTHING: A LITTLE
PERSONAL GROOMING: A LITTLE
DAILY ACTIVITIY SCORE: 19
MOBILITY SCORE: 18
TURNING FROM BACK TO SIDE WHILE IN FLAT BAD: A LITTLE
MOVING FROM LYING ON BACK TO SITTING ON SIDE OF FLAT BED WITH BEDRAILS: A LITTLE
MOVING TO AND FROM BED TO CHAIR: A LITTLE
HELP NEEDED FOR BATHING: A LITTLE
PERSONAL GROOMING: A LITTLE
HELP NEEDED FOR BATHING: A LITTLE
DRESSING REGULAR UPPER BODY CLOTHING: A LITTLE
STANDING UP FROM CHAIR USING ARMS: A LITTLE
TURNING FROM BACK TO SIDE WHILE IN FLAT BAD: A LITTLE
DRESSING REGULAR UPPER BODY CLOTHING: A LITTLE
DAILY ACTIVITIY SCORE: 19
PERSONAL GROOMING: A LITTLE
WALKING IN HOSPITAL ROOM: A LITTLE
MOVING FROM LYING ON BACK TO SITTING ON SIDE OF FLAT BED WITH BEDRAILS: A LITTLE
PERSONAL GROOMING: A LITTLE
STANDING UP FROM CHAIR USING ARMS: A LITTLE
HELP NEEDED FOR BATHING: A LITTLE
MOBILITY SCORE: 18
CLIMB 3 TO 5 STEPS WITH RAILING: A LITTLE
MOVING TO AND FROM BED TO CHAIR: A LITTLE
WALKING IN HOSPITAL ROOM: A LITTLE
MOVING TO AND FROM BED TO CHAIR: A LITTLE
MOBILITY SCORE: 18
STANDING UP FROM CHAIR USING ARMS: A LITTLE
TURNING FROM BACK TO SIDE WHILE IN FLAT BAD: A LITTLE
DRESSING REGULAR UPPER BODY CLOTHING: A LITTLE

## 2025-01-12 ASSESSMENT — PAIN SCALES - GENERAL
PAINLEVEL_OUTOF10: 0 - NO PAIN

## 2025-01-12 ASSESSMENT — PAIN - FUNCTIONAL ASSESSMENT
PAIN_FUNCTIONAL_ASSESSMENT: 0-10

## 2025-01-12 ASSESSMENT — PAIN SCALES - WONG BAKER
WONGBAKER_NUMERICALRESPONSE: NO HURT
WONGBAKER_NUMERICALRESPONSE: NO HURT

## 2025-01-12 NOTE — PROGRESS NOTES
Janine Sandoval is a 69 y.o. female on day 3 of admission presenting with No Principal Problem: There is no principal problem currently on the Problem List. Please update the Problem List and refresh..      Subjective   NAEO. Crying out this morning. With VIVIAN , indicated to us that she is seeing the devil (signed 6-6-6-) in one eye and a cross in the other. Also is describing an ugly cat. Patient in distress about this. Was not able to comment on anything else including whether she is in pain due to the fact that was currently in distress from the devil.        Objective     Last Recorded Vitals  /83 (BP Location: Left arm, Patient Position: Lying)   Pulse 94   Temp 36.9 °C (98.4 °F) (Temporal)   Resp 17   Wt 74.8 kg (165 lb)   SpO2 94%   Intake/Output last 3 Shifts:    Intake/Output Summary (Last 24 hours) at 1/12/2025 1341  Last data filed at 1/12/2025 1137  Gross per 24 hour   Intake 600 ml   Output 500 ml   Net 100 ml       Admission Weight  Weight: 74.8 kg (165 lb) (01/07/25 2020)    Daily Weight  01/07/25 : 74.8 kg (165 lb)    Image Results  MR brain wo IV contrast  Narrative: Interpreted By:  Juan Robledo,  and Ruddy Smith   STUDY:  MR BRAIN WO IV CONTRAST;  1/10/2025 4:26 pm      INDICATION:  Signs/Symptoms:wax/waning mental status, breakthrough seizures.          COMPARISON:  CT of the head obtained January 9, 2025  MRI of the brain obtained December 18, 2024      ACCESSION NUMBER(S):  [id number]      ORDERING CLINICIAN:  MARC PEREYRA      TECHNIQUE:  Axial T2, FLAIR, DWI, gradient echo T2 and sagittal and coronal T1  weighted images of brain were acquired.  No contrast was administered.      FINDINGS:  Midline brain structures appear normal on mid sagittal imaging. Mild  diffuse cerebral parenchymal volume loss is observed. Cystic  encephalomalacia is observed in the left corona radiata with  additional involvement of the left basal ganglia consistent with  remote infarct.       There is no territorial restricted diffusion to suggest acute  intracranial infarct. No suspicious intra-axial gradient signal  hypointensity is observed.      Hyperostosis frontalis interna is observed with suspected subjacent  dural thickening. No evidence of extra-axial fluid collection. No  acute loss of gray-white differentiation. Subcortical and  periventricular white matter FLAIR signal hyperintensities are  observed.      No intra-axial mass or mass effect. No ventriculomegaly. Basilar  cisterns are intact.      T2 vascular flow voids appear normal.      Paranasal sinuses appear clear. There is a unilateral right mastoid  effusion.      Impression: 1. No MR evidence of acute intracranial infarct, hemorrhage, mass, or  mass effect.  2. Remote infarct of the left corona radiata and basal ganglia with  minimal associated cystic encephalomalacia.  3. White matter FLAIR signal hyperintensities in keeping with chronic  ischemic microvascular changes of the white matter.  4. Nonspecific right mastoid effusion stable since the comparison  brain MRI.      I personally reviewed the images/study and I agree with the findings  as stated. This study was interpreted at [place], [place].      MACRO:  None      Signed by: Juan Robledo 1/11/2025 11:33 AM  Dictation workstation:   [id number]      Physical Exam  Constitutional:       General: She is in acute distress.      Comments: Crying out   HENT:      Mouth/Throat:      Mouth: Mucous membranes are moist.   Eyes:      Extraocular Movements: Extraocular movements intact.      Pupils: Pupils are equal, round, and reactive to light.   Cardiovascular:      Rate and Rhythm: Regular rhythm. Tachycardia present.      Heart sounds: No murmur heard.     No friction rub. No gallop.   Pulmonary:      Effort: Pulmonary effort is normal. No respiratory distress.      Breath sounds: No wheezing.   Abdominal:      General: Bowel sounds  are normal. There is no distension.      Palpations: Abdomen is soft. There is no mass.      Tenderness: There is no abdominal tenderness. There is no guarding or rebound.   Musculoskeletal:         General: No swelling, tenderness or deformity.      Cervical back: Normal range of motion.   Skin:     General: Skin is warm and dry.      Findings: No bruising or rash.   Neurological:      General: No focal deficit present.      Mental Status: She is alert. Mental status is at baseline.      Motor: No weakness.   Psychiatric:      Comments: Crying out that she is seeing the devil (6-6-6) in one eye and cross in the other, in addition to describing an ugly cat per ASL VIVIAN          Relevant Results  Scheduled medications  atorvastatin, 80 mg, oral, Daily  benztropine, 0.5 mg, oral, Daily  benztropine, 1 mg, oral, Nightly  cholecalciferol, 2,000 Units, oral, Daily  clopidogrel, 75 mg, oral, Daily  heparin (porcine), 5,000 Units, subcutaneous, q8h JEANETTE  HYDROmorphone, 0.2 mg, intravenous, Once  levETIRAcetam, 1,000 mg, intravenous, q24h  lidocaine, 1 patch, transdermal, Daily  lipase-protease-amylase, 2 capsule, oral, TID  losartan, 25 mg, oral, Daily  oxyCODONE, 5 mg, oral, Once  pantoprazole, 40 mg, oral, Daily before breakfast   Or  pantoprazole, 40 mg, intravenous, Daily before breakfast  perflutren lipid microspheres, 0.5-10 mL of dilution, intravenous, Once in imaging  perflutren protein A microsphere, 0.5 mL, intravenous, Once in imaging  phenytoin, 150 mg, intravenous, BID  QUEtiapine, 100 mg, oral, Nightly  [Held by provider] QUEtiapine, 200 mg, oral, Nightly  QUEtiapine, 25 mg, oral, Daily  QUEtiapine, 50 mg, oral, Daily  sertraline, 100 mg, oral, Daily  sulfur hexafluoride microsphr, 2 mL, intravenous, Once in imaging  traZODone, 50 mg, oral, Nightly      Continuous medications     PRN medications  PRN medications: acetaminophen **OR** acetaminophen **OR** acetaminophen, albuterol, alum-mag  hydroxide-simeth, haloperidol lactate, LORazepam, ondansetron **OR** ondansetron    Results for orders placed or performed during the hospital encounter of 01/07/25 (from the past 24 hours)   Comprehensive Metabolic Panel   Result Value Ref Range    Glucose 196 (H) 74 - 99 mg/dL    Sodium 144 136 - 145 mmol/L    Potassium 3.4 (L) 3.5 - 5.3 mmol/L    Chloride 109 (H) 98 - 107 mmol/L    Bicarbonate 26 21 - 32 mmol/L    Anion Gap 12 10 - 20 mmol/L    Urea Nitrogen 24 (H) 6 - 23 mg/dL    Creatinine 0.79 0.50 - 1.05 mg/dL    eGFR 81 >60 mL/min/1.73m*2    Calcium 9.0 8.6 - 10.3 mg/dL    Albumin 3.7 3.4 - 5.0 g/dL    Alkaline Phosphatase 82 33 - 136 U/L    Total Protein 6.4 6.4 - 8.2 g/dL    AST 29 9 - 39 U/L    Bilirubin, Total 0.5 0.0 - 1.2 mg/dL    ALT 42 7 - 45 U/L   CBC   Result Value Ref Range    WBC 6.9 4.4 - 11.3 x10*3/uL    nRBC 0.0 0.0 - 0.0 /100 WBCs    RBC 4.11 4.00 - 5.20 x10*6/uL    Hemoglobin 12.4 12.0 - 16.0 g/dL    Hematocrit 36.1 36.0 - 46.0 %    MCV 88 80 - 100 fL    MCH 30.2 26.0 - 34.0 pg    MCHC 34.3 32.0 - 36.0 g/dL    RDW 11.9 11.5 - 14.5 %    Platelets 164 150 - 450 x10*3/uL     Assessment & Plan  Breakthrough seizure (Multi)    Other specified depressive episodes    Acute exacerbation of chronic paranoid schizophrenia (Multi)    # Breakthrough seizures secondary to missed antiepileptics  -Resume home Keppra, phenytoin (keppra currently IV due to agitation, pt refusing PO medications intermittently)  -Neurology consultation  >>MRI without any acute changes  >>EEG routine showing mild diffuse encephalopathy. No epileptiform discharges or lateralizing signs recorded.   >>no barriers to discharge from neurologic perspective. OK to dc on home antiepileptics     # Schizophrenia with agitation.  Depression  -Continue home doses of Cogentin  -Evening seroquel dose reduced due to extreme lethargy on 1/9. Other seroquel doses during the day were also held but re-resumed on 1/10  -Takes monthly  Haldol  -Continue home sertraline 100 mg orally daily  -Resume trazodone 50 mg orally nightly   -Haldol 2mg IV / ativan 0.5mg q6hr PRN psychosis/anxiety/agitation if refusing seroquel  -1/12: having visual and auditory hallucinations of the devil and a cross. Increased pt's nighttime seroquel back to prescribed 200mg (from 100mg due to lethargy)  -Psychiatry consultation  >>recommending inpatient psych hospitalization once medically clear, pt agreeable to this. EPAT to coordinate once dc order is in. COVID test and EKG ordered for EPAT.     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  -No elevation in liver enzymes  -Pending MRCP  -Add pancreatic elastase and fecal fat  -Start pancreatic enzymes supplementation  -Close fu with pancreatic clinic     # Facial droop, NOW RESOLVED  -Stroke alert called 1/9/25 for new L sided facial droop and concern for L eye blindness  -Discussed with [facility] stroke who advised against TNK  -CT negative for new stroke     # hyperlipidemia  -Atorvastatin 80 mg orally daily     # GERD  -Protonix     # Hypertension  -Resume home losartan 25 mg orally daily     # History of CVA  ?residual R sided weakness  -Resume Plavix 75 mg orally daily  -High intensity atorvastatin 80 mg orally daily     DVT prophylaxis  Heparin 5000 units subcutaneously every 8 hours  SCDs     Discharge: Pending MRCP then will be med clear for EPAT  [place] slip- patient does not have the capacity to leave     Updates to Claudette (caregiver) [phone]    OK to transfer to gen med floor out of SDU        Iwona Farmer MD

## 2025-01-12 NOTE — PROGRESS NOTES
GI Daily Progress Note    Assessment/Plan:    Assessment & Plan  Other specified depressive episodes    Acute exacerbation of chronic paranoid schizophrenia (Multi)    Breakthrough seizure (Multi)    69 y.o. F with h/o depression, GERD, schizophrenia, HLD, HTN, deafness, stroke, seizures, presented due to aggressive behavior, refusing medications, seizures in ED.  During workup, CT showed no biliary dilation, severe atrophy of the pancreas involving body and tail, with dilation of PD up to 1.3 cm, prominent soft tissue in the region of the pancreatic head, possible IPMN versus obstructing mass.     Atrophic pancreas dilated PD and possible mass versus IPMN, likely represents chronic pancreatitis, dilated PD is also can be a sign of chronic pancreatitis.       -await MRCP.  -Will add pancreatic elastase and fecal fat  -cont Creon with each meal and snacks  Further recommendations pending MRCP results     LOS: 3 days     Janine Sandoval is a 69 y.o. female who was admitted with No Principal Problem: There is no principal problem currently on the Problem List. Please update the Problem List and refresh.. Pt is anxious, appears preoccupied, possibly hallucinating.    Subjective:    Not providing any history today    Objective:    Vital signs in last 24 hours:  Temp:  [36 °C (96.8 °F)-36.9 °C (98.4 °F)] 36.9 °C (98.4 °F)  Heart Rate:  [] 94  Resp:  [13-17] 17  BP: (143-167)/(78-98) 156/83    Intake/Output last 3 shifts:  I/O last 3 completed shifts:  In: 240 (3.2 mL/kg) [P.O.:240]  Out: - (0 mL/kg)   Weight: 74.8 kg   Intake/Output this shift:  I/O this shift:  In: -   Out: 500 [Urine:500]    Physical Exam  Cardiovascular:      Rate and Rhythm: Regular rhythm. Tachycardia present.      Heart sounds: Normal heart sounds.   Pulmonary:      Breath sounds: Normal breath sounds.   Abdominal:      General: Bowel sounds are normal.      Palpations: Abdomen is soft.   Neurological:      Mental Status: She is alert.  Mental status is at baseline.   Psychiatric:         Mood and Affect: Mood is anxious. Affect is angry and tearful.         Behavior: Behavior is uncooperative and agitated.          Results for orders placed or performed during the hospital encounter of 01/07/25 (from the past 24 hours)   Comprehensive Metabolic Panel   Result Value Ref Range    Glucose 196 (H) 74 - 99 mg/dL    Sodium 144 136 - 145 mmol/L    Potassium 3.4 (L) 3.5 - 5.3 mmol/L    Chloride 109 (H) 98 - 107 mmol/L    Bicarbonate 26 21 - 32 mmol/L    Anion Gap 12 10 - 20 mmol/L    Urea Nitrogen 24 (H) 6 - 23 mg/dL    Creatinine 0.79 0.50 - 1.05 mg/dL    eGFR 81 >60 mL/min/1.73m*2    Calcium 9.0 8.6 - 10.3 mg/dL    Albumin 3.7 3.4 - 5.0 g/dL    Alkaline Phosphatase 82 33 - 136 U/L    Total Protein 6.4 6.4 - 8.2 g/dL    AST 29 9 - 39 U/L    Bilirubin, Total 0.5 0.0 - 1.2 mg/dL    ALT 42 7 - 45 U/L   CBC   Result Value Ref Range    WBC 6.9 4.4 - 11.3 x10*3/uL    nRBC 0.0 0.0 - 0.0 /100 WBCs    RBC 4.11 4.00 - 5.20 x10*6/uL    Hemoglobin 12.4 12.0 - 16.0 g/dL    Hematocrit 36.1 36.0 - 46.0 %    MCV 88 80 - 100 fL    MCH 30.2 26.0 - 34.0 pg    MCHC 34.3 32.0 - 36.0 g/dL    RDW 11.9 11.5 - 14.5 %    Platelets 164 150 - 450 x10*3/uL

## 2025-01-12 NOTE — PROGRESS NOTES
Patient with VH seeing the rico. Added low dose haldol 2mg/ativan 0.5mg every 6 hrs prn psychosis/anxiety/ or agitation. She is awaiting medical clearance/psychiatric hospitalization. Refusing seroquel. Haldol Dec 200mg IM due 1/14

## 2025-01-12 NOTE — NURSING NOTE
Patient has been yelling out frequently this shift. Anju utilized three times at which two times patient wouldn't respond and then pretend to be asleep only to yell out once this nurse left the room. Large-lettered signage also utilized to assess needs not limited to pain, repositioning, and thirst. Safety maintained. Physician notified.

## 2025-01-13 ENCOUNTER — APPOINTMENT (OUTPATIENT)
Dept: CARDIOLOGY | Facility: HOSPITAL | Age: 70
End: 2025-01-13
Payer: MEDICARE

## 2025-01-13 LAB
ALBUMIN SERPL BCP-MCNC: 3.7 G/DL (ref 3.4–5)
ANION GAP SERPL CALC-SCNC: 11 MMOL/L (ref 10–20)
ATRIAL RATE: 91 BPM
ATRIAL RATE: 93 BPM
ATRIAL RATE: 97 BPM
BUN SERPL-MCNC: 17 MG/DL (ref 6–23)
CALCIUM SERPL-MCNC: 9.3 MG/DL (ref 8.6–10.3)
CHLORIDE SERPL-SCNC: 108 MMOL/L (ref 98–107)
CO2 SERPL-SCNC: 27 MMOL/L (ref 21–32)
CREAT SERPL-MCNC: 0.7 MG/DL (ref 0.5–1.05)
EGFRCR SERPLBLD CKD-EPI 2021: >90 ML/MIN/1.73M*2
GLUCOSE SERPL-MCNC: 146 MG/DL (ref 74–99)
HOLD SPECIMEN: NORMAL
P AXIS: 62 DEGREES
P AXIS: 64 DEGREES
P AXIS: 67 DEGREES
P OFFSET: 186 MS
P OFFSET: 191 MS
P OFFSET: 196 MS
P ONSET: 131 MS
P ONSET: 139 MS
P ONSET: 92 MS
PHOSPHATE SERPL-MCNC: 4.2 MG/DL (ref 2.5–4.9)
POTASSIUM SERPL-SCNC: 3.6 MMOL/L (ref 3.5–5.3)
PR INTERVAL: 168 MS
PR INTERVAL: 170 MS
PR INTERVAL: 254 MS
Q ONSET: 216 MS
Q ONSET: 219 MS
Q ONSET: 223 MS
QRS COUNT: 15 BEATS
QRS COUNT: 15 BEATS
QRS COUNT: 16 BEATS
QRS DURATION: 100 MS
QRS DURATION: 86 MS
QRS DURATION: 96 MS
QT INTERVAL: 354 MS
QT INTERVAL: 394 MS
QT INTERVAL: 400 MS
QTC CALCULATION(BAZETT): 449 MS
QTC CALCULATION(BAZETT): 489 MS
QTC CALCULATION(BAZETT): 492 MS
QTC FREDERICIA: 415 MS
QTC FREDERICIA: 456 MS
QTC FREDERICIA: 460 MS
R AXIS: 46 DEGREES
R AXIS: 58 DEGREES
R AXIS: 60 DEGREES
SARS-COV-2 RNA RESP QL NAA+PROBE: NOT DETECTED
SODIUM SERPL-SCNC: 142 MMOL/L (ref 136–145)
T AXIS: -6 DEGREES
T AXIS: 2 DEGREES
T AXIS: 9 DEGREES
T OFFSET: 396 MS
T OFFSET: 413 MS
T OFFSET: 423 MS
VENTRICULAR RATE: 91 BPM
VENTRICULAR RATE: 93 BPM
VENTRICULAR RATE: 97 BPM

## 2025-01-13 PROCEDURE — 2500000004 HC RX 250 GENERAL PHARMACY W/ HCPCS (ALT 636 FOR OP/ED): Performed by: STUDENT IN AN ORGANIZED HEALTH CARE EDUCATION/TRAINING PROGRAM

## 2025-01-13 PROCEDURE — 2500000004 HC RX 250 GENERAL PHARMACY W/ HCPCS (ALT 636 FOR OP/ED): Performed by: INTERNAL MEDICINE

## 2025-01-13 PROCEDURE — 87635 SARS-COV-2 COVID-19 AMP PRB: CPT | Performed by: STUDENT IN AN ORGANIZED HEALTH CARE EDUCATION/TRAINING PROGRAM

## 2025-01-13 PROCEDURE — 36415 COLL VENOUS BLD VENIPUNCTURE: CPT | Performed by: STUDENT IN AN ORGANIZED HEALTH CARE EDUCATION/TRAINING PROGRAM

## 2025-01-13 PROCEDURE — 93005 ELECTROCARDIOGRAM TRACING: CPT

## 2025-01-13 PROCEDURE — 80069 RENAL FUNCTION PANEL: CPT | Performed by: STUDENT IN AN ORGANIZED HEALTH CARE EDUCATION/TRAINING PROGRAM

## 2025-01-13 PROCEDURE — 93010 ELECTROCARDIOGRAM REPORT: CPT | Performed by: INTERNAL MEDICINE

## 2025-01-13 PROCEDURE — 2500000002 HC RX 250 W HCPCS SELF ADMINISTERED DRUGS (ALT 637 FOR MEDICARE OP, ALT 636 FOR OP/ED): Performed by: STUDENT IN AN ORGANIZED HEALTH CARE EDUCATION/TRAINING PROGRAM

## 2025-01-13 PROCEDURE — 99232 SBSQ HOSP IP/OBS MODERATE 35: CPT | Performed by: STUDENT IN AN ORGANIZED HEALTH CARE EDUCATION/TRAINING PROGRAM

## 2025-01-13 PROCEDURE — 2500000001 HC RX 250 WO HCPCS SELF ADMINISTERED DRUGS (ALT 637 FOR MEDICARE OP): Performed by: INTERNAL MEDICINE

## 2025-01-13 PROCEDURE — 1100000001 HC PRIVATE ROOM DAILY

## 2025-01-13 PROCEDURE — 2500000001 HC RX 250 WO HCPCS SELF ADMINISTERED DRUGS (ALT 637 FOR MEDICARE OP): Performed by: STUDENT IN AN ORGANIZED HEALTH CARE EDUCATION/TRAINING PROGRAM

## 2025-01-13 PROCEDURE — 2500000002 HC RX 250 W HCPCS SELF ADMINISTERED DRUGS (ALT 637 FOR MEDICARE OP, ALT 636 FOR OP/ED): Performed by: INTERNAL MEDICINE

## 2025-01-13 PROCEDURE — 99231 SBSQ HOSP IP/OBS SF/LOW 25: CPT | Performed by: INTERNAL MEDICINE

## 2025-01-13 PROCEDURE — 2500000005 HC RX 250 GENERAL PHARMACY W/O HCPCS: Performed by: INTERNAL MEDICINE

## 2025-01-13 RX ORDER — PHENYTOIN SODIUM 100 MG/1
100 CAPSULE, EXTENDED RELEASE ORAL 3 TIMES DAILY
Status: DISCONTINUED | OUTPATIENT
Start: 2025-01-13 | End: 2025-01-15 | Stop reason: HOSPADM

## 2025-01-13 RX ADMIN — SERTRALINE 100 MG: 50 TABLET, FILM COATED ORAL at 09:25

## 2025-01-13 RX ADMIN — HEPARIN SODIUM 5000 UNITS: 5000 INJECTION, SOLUTION INTRAVENOUS; SUBCUTANEOUS at 09:26

## 2025-01-13 RX ADMIN — PANCRELIPASE 2 CAPSULE: 15000; 3000; 9500 CAPSULE, DELAYED RELEASE ORAL at 12:03

## 2025-01-13 RX ADMIN — QUETIAPINE FUMARATE 25 MG: 100 TABLET ORAL at 06:23

## 2025-01-13 RX ADMIN — HEPARIN SODIUM 5000 UNITS: 5000 INJECTION, SOLUTION INTRAVENOUS; SUBCUTANEOUS at 14:53

## 2025-01-13 RX ADMIN — CLOPIDOGREL 75 MG: 75 TABLET ORAL at 09:23

## 2025-01-13 RX ADMIN — BENZTROPINE MESYLATE 0.5 MG: 1 TABLET ORAL at 09:25

## 2025-01-13 RX ADMIN — ATORVASTATIN CALCIUM 80 MG: 80 TABLET, FILM COATED ORAL at 09:24

## 2025-01-13 RX ADMIN — TRAZODONE HYDROCHLORIDE 50 MG: 50 TABLET ORAL at 22:24

## 2025-01-13 RX ADMIN — PHENYTOIN SODIUM 150 MG: 50 INJECTION INTRAMUSCULAR; INTRAVENOUS at 09:35

## 2025-01-13 RX ADMIN — QUETIAPINE FUMARATE 50 MG: 50 TABLET ORAL at 14:53

## 2025-01-13 RX ADMIN — PHENYTOIN SODIUM 100 MG: 100 CAPSULE, EXTENDED RELEASE ORAL at 14:53

## 2025-01-13 RX ADMIN — Medication 2000 UNITS: at 09:24

## 2025-01-13 RX ADMIN — BENZTROPINE MESYLATE 1 MG: 1 TABLET ORAL at 22:24

## 2025-01-13 RX ADMIN — PANCRELIPASE 2 CAPSULE: 15000; 3000; 9500 CAPSULE, DELAYED RELEASE ORAL at 09:24

## 2025-01-13 RX ADMIN — PANTOPRAZOLE SODIUM 40 MG: 40 TABLET, DELAYED RELEASE ORAL at 06:23

## 2025-01-13 RX ADMIN — LOSARTAN POTASSIUM 25 MG: 50 TABLET, FILM COATED ORAL at 09:23

## 2025-01-13 RX ADMIN — LIDOCAINE 4% 1 PATCH: 40 PATCH TOPICAL at 09:16

## 2025-01-13 RX ADMIN — HEPARIN SODIUM 5000 UNITS: 5000 INJECTION, SOLUTION INTRAVENOUS; SUBCUTANEOUS at 22:26

## 2025-01-13 RX ADMIN — LEVETIRACETAM 1000 MG: 10 INJECTION INTRAVENOUS at 22:25

## 2025-01-13 RX ADMIN — QUETIAPINE FUMARATE 200 MG: 100 TABLET ORAL at 22:24

## 2025-01-13 RX ADMIN — PANCRELIPASE 2 CAPSULE: 15000; 3000; 9500 CAPSULE, DELAYED RELEASE ORAL at 17:51

## 2025-01-13 RX ADMIN — PHENYTOIN SODIUM 100 MG: 100 CAPSULE, EXTENDED RELEASE ORAL at 22:24

## 2025-01-13 ASSESSMENT — PAIN SCALES - WONG BAKER: WONGBAKER_NUMERICALRESPONSE: NO HURT

## 2025-01-13 ASSESSMENT — COGNITIVE AND FUNCTIONAL STATUS - GENERAL
TOILETING: A LITTLE
PERSONAL GROOMING: A LITTLE
DRESSING REGULAR UPPER BODY CLOTHING: A LITTLE
WALKING IN HOSPITAL ROOM: A LITTLE
MOBILITY SCORE: 18
STANDING UP FROM CHAIR USING ARMS: A LITTLE
DAILY ACTIVITIY SCORE: 19
DAILY ACTIVITIY SCORE: 18
DRESSING REGULAR UPPER BODY CLOTHING: A LITTLE
TOILETING: A LITTLE
MOVING FROM LYING ON BACK TO SITTING ON SIDE OF FLAT BED WITH BEDRAILS: A LITTLE
MOVING TO AND FROM BED TO CHAIR: A LITTLE
TURNING FROM BACK TO SIDE WHILE IN FLAT BAD: A LITTLE
MOVING TO AND FROM BED TO CHAIR: A LITTLE
CLIMB 3 TO 5 STEPS WITH RAILING: A LITTLE
HELP NEEDED FOR BATHING: A LITTLE
HELP NEEDED FOR BATHING: A LITTLE
WALKING IN HOSPITAL ROOM: A LITTLE
TURNING FROM BACK TO SIDE WHILE IN FLAT BAD: A LITTLE
MOBILITY SCORE: 18
STANDING UP FROM CHAIR USING ARMS: A LITTLE
EATING MEALS: A LITTLE
DRESSING REGULAR LOWER BODY CLOTHING: A LITTLE
PERSONAL GROOMING: A LITTLE
MOVING FROM LYING ON BACK TO SITTING ON SIDE OF FLAT BED WITH BEDRAILS: A LITTLE
DRESSING REGULAR LOWER BODY CLOTHING: A LITTLE
CLIMB 3 TO 5 STEPS WITH RAILING: A LITTLE

## 2025-01-13 ASSESSMENT — PAIN - FUNCTIONAL ASSESSMENT: PAIN_FUNCTIONAL_ASSESSMENT: 0-10

## 2025-01-13 ASSESSMENT — PAIN SCALES - GENERAL
PAINLEVEL_OUTOF10: 0 - NO PAIN

## 2025-01-13 ASSESSMENT — ACTIVITIES OF DAILY LIVING (ADL)
WALKS IN HOME: UNABLE TO ASSESS
PATIENT'S MEMORY ADEQUATE TO SAFELY COMPLETE DAILY ACTIVITIES?: UNABLE TO ASSESS
FEEDING YOURSELF: INDEPENDENT
ASSISTIVE_DEVICE: WALKER
ADEQUATE_TO_COMPLETE_ADL: UNABLE TO ASSESS
TOILETING: NEEDS ASSISTANCE
DRESSING YOURSELF: UNABLE TO ASSESS
HEARING - LEFT EAR: DEAF
GROOMING: NEEDS ASSISTANCE
BATHING: NEEDS ASSISTANCE
HEARING - RIGHT EAR: DEAF
JUDGMENT_ADEQUATE_SAFELY_COMPLETE_DAILY_ACTIVITIES: UNABLE TO ASSESS

## 2025-01-13 ASSESSMENT — PAIN DESCRIPTION - ORIENTATION: ORIENTATION: RIGHT

## 2025-01-13 ASSESSMENT — PAIN DESCRIPTION - LOCATION: LOCATION: ABDOMEN

## 2025-01-13 NOTE — CARE PLAN
The clinical goals for the shift include pt will remain safe this shift.    Over the shift, the patient did not make progress toward the following goals. Barriers to progression include       Problem: Risk for Suicide  Goal: Accepts medications as prescribed/needed this shift  Outcome: Progressing  Goal: Identifies supports this shift  Outcome: Progressing  Goal: Makes needs known through verbalization or behaviors this shift  Outcome: Progressing  Goal: No self harm this shift  Outcome: Progressing  Goal: Read Safety Guidelines this shift  Outcome: Progressing  Goal: Complete Mental Health Safety Plan (psychiatry only) this shift  Outcome: Progressing     Problem: Sensory Perceptual Alteration as Evidenced by  Goal: Cooperates with admission process  Outcome: Progressing  Flowsheets (Taken 1/13/2025 1603)  Cooperates with admission process: Complete admission process  Goal: Patient/Family participate in treatment and discharge plans  Outcome: Progressing  Flowsheets (Taken 1/13/2025 1603)  Patient/Family participate in treatment and DC plans:   Complete daily flowsheet   Provide therapeutic environment  Goal: Patient/Family verbalizes awareness of resources  Outcome: Progressing  Goal: Participates in unit activities  Outcome: Progressing  Flowsheets (Taken 1/13/2025 1603)  Participates in unit activities:   Provide therapeutic environment   Provide required programming   Redirect inappropriate behaviors  Goal: Discusses signs/symptoms of illness/treatment options  Outcome: Progressing  Flowsheets (Taken 1/13/2025 1603)  Discusses signs/symptoms of illness/treatment options:   Provide therapeutic environment   Complete daily flowsheet   Provide required programming  Goal: Initiates reality-based interactions  Outcome: Progressing  Flowsheets (Taken 1/13/2025 1603)  Initiates reality-based interactions:   Redirect inappropriate behaviors   Provide reassurance and reality orientation  Goal: Able to discuss content  of hallucinations/delusions  Outcome: Progressing  Goal: Notifies staff when experiencing hallucinations/delusions  Outcome: Progressing  Goal: Verbalizes reduction in hallucinations/delusions  Outcome: Progressing  Goal: Will not act on psychotic perception  Outcome: Progressing  Flowsheets (Taken 1/13/2025 1603)  Will not act on psychotic perception:   Provide therapeutic environment   Redirect inappropriate behaviors   Provide behavioral interventions   Complete safety checks per protocol   Utilize least restrictive measures  Goal: Understands least restrictive measures  Outcome: Progressing  Flowsheets (Taken 1/13/2025 1603)  Understands least restrictive measures: Utilize least restrictive measures  Goal: Free from restraint events  Outcome: Progressing  Flowsheets (Taken 1/13/2025 1603)  Free from restraint events:   Utilize least restrictive measures   Provide behavioral interventions   Redirect inappropriate behaviors     Problem: Pain - Adult  Goal: Verbalizes/displays adequate comfort level or baseline comfort level  Outcome: Progressing  Flowsheets (Taken 1/13/2025 1603)  Verbalizes/displays adequate comfort level or baseline comfort level:   Encourage patient to monitor pain and request assistance   Assess pain using appropriate pain scale   Administer analgesics based on type and severity of pain and evaluate response   Implement non-pharmacological measures as appropriate and evaluate response   Consider cultural and social influences on pain and pain management   Notify Licensed Independent Practitioner if interventions unsuccessful or patient reports new pain     Problem: Safety - Adult  Goal: Free from fall injury  Outcome: Progressing  Flowsheets (Taken 1/13/2025 1603)  Free from fall injury: Instruct family/caregiver on patient safety     Problem: Discharge Planning  Goal: Discharge to home or other facility with appropriate resources  Outcome: Progressing  Flowsheets (Taken 1/13/2025  1603)  Discharge to home or other facility with appropriate resources:   Identify barriers to discharge with patient and caregiver   Arrange for needed discharge resources and transportation as appropriate   Identify discharge learning needs (meds, wound care, etc)   Arrange for interpreters to assist at discharge as needed   Refer to discharge planning if patient needs post-hospital services based on physician order or complex needs related to functional status, cognitive ability or social support system     Problem: Chronic Conditions and Co-morbidities  Goal: Patient's chronic conditions and co-morbidity symptoms are monitored and maintained or improved  Outcome: Progressing  Flowsheets (Taken 1/13/2025 1603)  Care Plan - Patient's Chronic Conditions and Co-Morbidity Symptoms are Monitored and Maintained or Improved:   Monitor and assess patient's chronic conditions and comorbid symptoms for stability, deterioration, or improvement   Collaborate with multidisciplinary team to address chronic and comorbid conditions and prevent exacerbation or deterioration   Update acute care plan with appropriate goals if chronic or comorbid symptoms are exacerbated and prevent overall improvement and discharge     Problem: Fall/Injury  Goal: Not fall by end of shift  Outcome: Progressing  Goal: Be free from injury by end of the shift  Outcome: Progressing  Goal: Verbalize understanding of personal risk factors for fall in the hospital  Outcome: Progressing  Goal: Verbalize understanding of risk factor reduction measures to prevent injury from fall in the home  Outcome: Progressing  Goal: Use assistive devices by end of the shift  Outcome: Progressing  Goal: Pace activities to prevent fatigue by end of the shift  Outcome: Progressing     Problem: Skin  Goal: Decreased wound size/increased tissue granulation at next dressing change  Outcome: Progressing  Goal: Participates in plan/prevention/treatment measures  Outcome:  Progressing  Flowsheets (Taken 1/13/2025 1603)  Participates in plan/prevention/treatment measures:   Discuss with provider PT/OT consult   Elevate heels   Increase activity/out of bed for meals  Goal: Prevent/manage excess moisture  Outcome: Progressing  Flowsheets (Taken 1/13/2025 1603)  Prevent/manage excess moisture:   Cleanse incontinence/protect with barrier cream   Monitor for/manage infection if present   Follow provider orders for dressing changes   Use wicking fabric (obtain order)   Moisturize dry skin  Goal: Prevent/minimize sheer/friction injuries  Outcome: Progressing  Flowsheets (Taken 1/13/2025 1603)  Prevent/minimize sheer/friction injuries:   Complete micro-shifts as needed if patient unable. Adjust patient position to relieve pressure points, not a full turn   Increase activity/out of bed for meals   Use pull sheet   HOB 30 degrees or less   Turn/reposition every 2 hours/use positioning/transfer devices   Utilize specialty bed per algorithm  Goal: Promote/optimize nutrition  Outcome: Progressing  Flowsheets (Taken 1/13/2025 1603)  Promote/optimize nutrition:   Assist with feeding   Monitor/record intake including meals   Consume > 50% meals/supplements   Offer water/supplements/favorite foods   Discuss with provider if NPO > 2 days   Reassess MST if dietician not consulted  Goal: Promote skin healing  Outcome: Progressing  Flowsheets (Taken 1/13/2025 1603)  Promote skin healing:   Assess skin/pad under line(s)/device(s)   Protective dressings over bony prominences   Turn/reposition every 2 hours/use positioning/transfer devices   Ensure correct size (line/device) and apply per  instructions   Rotate device position/do not position patient on device     Problem: Pain  Goal: Walks with improved pain control throughout the shift  Outcome: Progressing  Goal: Performs ADL's with improved pain control throughout shift  Outcome: Progressing  Goal: Free from opioid side effects throughout the  shift  Outcome: Progressing  Goal: Free from acute confusion related to pain meds throughout the shift  Outcome: Progressing

## 2025-01-13 NOTE — PROGRESS NOTES
Janine Sandoval is a 69 y.o. female on day 4 of admission presenting with No Principal Problem: There is no principal problem currently on the Problem List. Please update the Problem List and refresh..      Subjective   NAEO. Communicated with her via [employer] . Pt sleepy today, saying that she would like to rest. When asked if we are talking to Janine, she says no. When asked who we are talking to, she is not sure. When asked if she's talking to Eric (one of her multiple personalities), she is not sure. States that she is not in pain. Says that she is surrounded by people who she is afraid will harm her. However, she does not seem in distress by this information. Says that she would like to rest.       Objective     Last Recorded Vitals  /75 (BP Location: Right arm, Patient Position: Lying)   Pulse 89   Temp 36.8 °C (98.3 °F) (Temporal)   Resp 17   Wt 74.8 kg (165 lb)   SpO2 96%   Intake/Output last 3 Shifts:    Intake/Output Summary (Last 24 hours) at 1/13/2025 1159  Last data filed at 1/13/2025 0100  Gross per 24 hour   Intake --   Output 400 ml   Net -400 ml       Admission Weight  Weight: 74.8 kg (165 lb) (01/07/25 2020)    Daily Weight  01/07/25 : 74.8 kg (165 lb)    Image Results  Electrocardiogram, 12-lead PRN ACS symptoms  Normal sinus rhythm  Voltage criteria for left ventricular hypertrophy  Prolonged QT  Abnormal ECG  When compared with ECG of 11-JAN-2025 06:56, (unconfirmed)  Fusion complexes are no longer Present  TX interval has decreased  Non-specific change in ST segment in Anterior leads  T wave inversion no longer evident in Lateral leads  Electrocardiogram, 12-lead PRN ACS symptoms  Sinus rhythm with 1st degree AV block with Fusion complexes  Left atrial enlargement  Cannot rule out Anterior infarct (cited on or before 07-JAN-2025)  Abnormal ECG  When compared with ECG of 07-JAN-2025 21:45, (unconfirmed)  Fusion complexes are now Present  TX interval has  increased  Inverted T waves have replaced nonspecific T wave abnormality in Inferior leads  MRCP pancreas wo IV contrast  Narrative: Interpreted By:  Ron Zarco,   STUDY:  MRCP PANCREAS WO IV CONTRAST;  1/12/2025 4:31 pm      INDICATION:  Signs/Symptoms:main duct IPMN versus obstructing pancreatic head mass.      COMPARISON:  Abdomen CT scan 01/08/2025      ACCESSION NUMBER(S):  [id number]      ORDERING CLINICIAN:  MARC PEREYRA      TECHNIQUE:  Limited MRCP as the patient declined scan completion with only T2  haste sequences obtained.      FINDINGS:  LIVER:  No gross lesions.      BILE DUCTS:  Mild intra and extrahepatic biliary dilatation.      GALLBLADDER:  Distended      PANCREAS:  Diffusely dilated pancreatic duct measuring up to 1.2 cm with a  evidence of soft tissue fullness in the region of the pancreatic head  measuring 2.2 cm (series [id number], image 26).      SPLEEN:  No gross lesions      ADRENAL GLANDS:  No gross nodules      KIDNEYS:  No gross hydronephrosis.      LYMPH NODES:  No enlarged lymph nodes.      ABDOMINAL VESSELS:  No aneurysmal dilatation of the abdominal aorta.      BOWEL:  No bowel dilatation      PERITONEUM/RETROPERITONEUM:  No ascites      BONES AND LOWER THORAX:  Thoracolumbar spine scoliotic deformity with multilevel degenerative  spine changes.      Impression: Extremely limited study with a few sequences obtained as the patient  could not complete the study.  1. Diffusely dilated pancreatic duct measuring up to 1.2 cm with a  evidence of caliber change at the level of the pancreatic head with a  evidence of nonspecific soft tissue thickening measuring 2.2 cm would  be concerning for obstructive pathology. Further assessment is  precluded given aforementioned limitation and advise further  assessment by EUS/ERCP or redo MRI with sedation.          MACRO:  None      Signed by: Ron Zarco 1/13/2025 7:35 AM  Dictation workstation:   [id number]      Physical  Exam  Constitutional:       General: She is NOT in acute distress. sleepy  HENT:      Mouth/Throat:      Mouth: Mucous membranes are moist.   Eyes:      Extraocular Movements: Extraocular movements intact.      Pupils: Pupils are equal, round, and reactive to light.   Cardiovascular:      Rate and Rhythm: Regular rhythm. Regular rate present.      Heart sounds: No murmur heard.     No friction rub. No gallop.   Pulmonary:      Effort: Pulmonary effort is normal. No respiratory distress.      Breath sounds: No wheezing.   Abdominal:      General: Bowel sounds are normal. There is no distension.      Palpations: Abdomen is soft. There is no mass.      Tenderness: There is no abdominal tenderness. There is no guarding or rebound.   Musculoskeletal:         General: No swelling, tenderness or deformity.      Cervical back: Normal range of motion.   Skin:     General: Skin is warm and dry.      Findings: No bruising or rash.   Neurological:      General: No focal deficit present.      Mental Status: She is alert. Mental status is at baseline.      Motor: No weakness.   Psychiatric:      Comments: When asked if we are talking to Jainne, she says no. When asked who we are talking to, she is not sure. When asked if she's talking to Eric (one of her multiple personalities), she is not sure. States that she is not in pain. Says that she is surrounded by people who she is afraid will harm her. However, she does not seem in distress by this information. Says that she would like to rest.    Relevant Results  Scheduled medications  atorvastatin, 80 mg, oral, Daily  benztropine, 0.5 mg, oral, Daily  benztropine, 1 mg, oral, Nightly  cholecalciferol, 2,000 Units, oral, Daily  clopidogrel, 75 mg, oral, Daily  heparin (porcine), 5,000 Units, subcutaneous, q8h JEANETTE  HYDROmorphone, 0.2 mg, intravenous, Once  levETIRAcetam, 1,000 mg, intravenous, q24h  lidocaine, 1 patch, transdermal, Daily  lipase-protease-amylase, 2 capsule, oral,  TID  losartan, 25 mg, oral, Daily  oxyCODONE, 5 mg, oral, Once  pantoprazole, 40 mg, oral, Daily before breakfast   Or  pantoprazole, 40 mg, intravenous, Daily before breakfast  perflutren lipid microspheres, 0.5-10 mL of dilution, intravenous, Once in imaging  perflutren protein A microsphere, 0.5 mL, intravenous, Once in imaging  phenytoin ER, 100 mg, oral, TID  QUEtiapine, 200 mg, oral, Nightly  QUEtiapine, 25 mg, oral, Daily  QUEtiapine, 50 mg, oral, Daily  sertraline, 100 mg, oral, Daily  sulfur hexafluoride microsphr, 2 mL, intravenous, Once in imaging  traZODone, 50 mg, oral, Nightly      Continuous medications     PRN medications  PRN medications: acetaminophen **OR** acetaminophen **OR** acetaminophen, albuterol, alum-mag hydroxide-simeth, haloperidol lactate, LORazepam, ondansetron **OR** ondansetron    Results for orders placed or performed during the hospital encounter of 01/07/25 (from the past 24 hours)   POCT GLUCOSE   Result Value Ref Range    POCT Glucose 102 (H) 74 - 99 mg/dL   Sars-CoV-2 PCR   Result Value Ref Range    Coronavirus 2019, PCR Not Detected Not Detected   Renal Function Panel   Result Value Ref Range    Glucose 146 (H) 74 - 99 mg/dL    Sodium 142 136 - 145 mmol/L    Potassium 3.6 3.5 - 5.3 mmol/L    Chloride 108 (H) 98 - 107 mmol/L    Bicarbonate 27 21 - 32 mmol/L    Anion Gap 11 10 - 20 mmol/L    Urea Nitrogen 17 6 - 23 mg/dL    Creatinine 0.70 0.50 - 1.05 mg/dL    eGFR >90 >60 mL/min/1.73m*2    Calcium 9.3 8.6 - 10.3 mg/dL    Phosphorus 4.2 2.5 - 4.9 mg/dL    Albumin 3.7 3.4 - 5.0 g/dL   Lavender Top   Result Value Ref Range    Extra Tube Hold for add-ons.    ECG 12 Lead   Result Value Ref Range    Ventricular Rate 93 BPM    Atrial Rate 93 BPM    IA Interval 170 ms    QRS Duration 100 ms    QT Interval 394 ms    QTC Calculation(Bazett) 489 ms    P Axis 67 degrees    R Axis 60 degrees    T Axis 2 degrees    QRS Count 15 beats    Q Onset 216 ms    P Onset 131 ms    P Offset 186 ms     T Offset 413 ms    QTC Fredericia 456 ms     Assessment & Plan  Breakthrough seizure (Multi)    Other specified depressive episodes    Acute exacerbation of chronic paranoid schizophrenia (Multi)    # Breakthrough seizures secondary to missed antiepileptics  -Resume home Keppra, phenytoin (keppra currently IV due to agitation, pt refusing PO medications intermittently)  -Neurology consultation  >>MRI without any acute changes  >>EEG routine showing mild diffuse encephalopathy. No epileptiform discharges or lateralizing signs recorded.   >>no barriers to discharge from neurologic perspective. OK to dc on home antiepileptics     # Schizophrenia with agitation.  Depression  -Continue home doses of Cogentin  -Evening seroquel dose reduced due to extreme lethargy on 1/9. Other seroquel doses during the day were also held but re-resumed on 1/10  -Takes monthly Haldol  -Continue home sertraline 100 mg orally daily  -Resume trazodone 50 mg orally nightly   -Haldol 2mg IV / ativan 0.5mg q6hr PRN psychosis/anxiety/agitation if refusing seroquel  -1/12: having visual and auditory hallucinations of the devil and a cross. Increased pt's nighttime seroquel back to prescribed 200mg (from 100mg due to lethargy)  -Psychiatry consultation  >>recommending inpatient psych hospitalization once medically clear, pt agreeable to this. EPAT to coordinate once dc order is in. COVID test and EKG completed. 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   -pancreatic elastase and fecal fat pending  -Start pancreatic enzymes supplementation  -Discussed with Dr. Maier advanced endoscopy and conclusion for no intervention at this time- GI signed off  -Close fu with pancreatic clinic     # Facial droop, NOW RESOLVED  -Stroke alert called 1/9/25 for new L sided facial droop and concern for L eye blindness  -Discussed with [facility] stroke who advised against TNK  -CT negative for new stroke     # hyperlipidemia  -Atorvastatin 80 mg orally daily     # GERD  -Protonix     # Hypertension  -Resume home losartan 25 mg orally daily     # History of CVA  ?residual R sided weakness  -Resume Plavix 75 mg orally daily  -High intensity atorvastatin 80 mg orally daily     DVT prophylaxis  Heparin 5000 units subcutaneously every 8 hours  SCDs     Discharge: medically cleared for EPAT. COVID negative, EKG done, only awaiting results of UDS before EPAT can begin their process    Pink slip- patient does not have the capacity to leave     Updates to Claudette (caregiver) [phone]    No need for AM labs as pt is med clear -> EPAT      Iwona Farmer MD

## 2025-01-13 NOTE — NURSING NOTE
Sister Pat given update on patient condition and plan of care; all questions answered; no additional concerns at this time

## 2025-01-13 NOTE — CARE PLAN
The patient's goals for the shift include      The clinical goals for the shift include Patient will remain hemodynamically stable throughout shift.      Problem: Risk for Suicide  Goal: Makes needs known through verbalization or behaviors this shift  Outcome: Progressing

## 2025-01-13 NOTE — PROGRESS NOTES
01/13/25 1248   Discharge Planning   Expected Discharge Disposition Psych     TCC spoke with EPAT. Pt is now medically cleared for pysch placement. EPAT needs new order.

## 2025-01-13 NOTE — PROGRESS NOTES
Janine Sandoval is a 69 y.o. female on day 4 of admission presenting with No Principal Problem: There is no principal problem currently on the Problem List. Please update the Problem List and refresh..      Subjective   No acute events overnight.  Patient seen examined at bedside.  Used Martti to communicate with the patient.  Patient denied abdominal pain.       Objective     Last Recorded Vitals  /75 (BP Location: Right arm, Patient Position: Lying)   Pulse 89   Temp 36.8 °C (98.3 °F) (Temporal)   Resp 17   Wt 74.8 kg (165 lb)   SpO2 96%   Intake/Output last 3 Shifts:    Intake/Output Summary (Last 24 hours) at 1/13/2025 1041  Last data filed at 1/13/2025 0100  Gross per 24 hour   Intake 600 ml   Output 400 ml   Net 200 ml       Admission Weight  Weight: 74.8 kg (165 lb) (01/07/25 2020)    Daily Weight  01/07/25 : 74.8 kg (165 lb)    Image Results  Electrocardiogram, 12-lead PRN ACS symptoms  Normal sinus rhythm  Voltage criteria for left ventricular hypertrophy  Prolonged QT  Abnormal ECG  When compared with ECG of 11-JAN-2025 06:56, (unconfirmed)  Fusion complexes are no longer Present  RI interval has decreased  Non-specific change in ST segment in Anterior leads  T wave inversion no longer evident in Lateral leads  Electrocardiogram, 12-lead PRN ACS symptoms  Sinus rhythm with 1st degree AV block with Fusion complexes  Left atrial enlargement  Cannot rule out Anterior infarct (cited on or before 07-JAN-2025)  Abnormal ECG  When compared with ECG of 07-JAN-2025 21:45, (unconfirmed)  Fusion complexes are now Present  RI interval has increased  Inverted T waves have replaced nonspecific T wave abnormality in Inferior leads  MRCP pancreas wo IV contrast  Narrative: Interpreted By:  Ron Zarco,   STUDY:  MRCP PANCREAS WO IV CONTRAST;  1/12/2025 4:31 pm      INDICATION:  Signs/Symptoms:main duct IPMN versus obstructing pancreatic head mass.      COMPARISON:  Abdomen CT scan 01/08/2025      ACCESSION  NUMBER(S):  [id number]      ORDERING CLINICIAN:  MARC PEREYRA      TECHNIQUE:  Limited MRCP as the patient declined scan completion with only T2  haste sequences obtained.      FINDINGS:  LIVER:  No gross lesions.      BILE DUCTS:  Mild intra and extrahepatic biliary dilatation.      GALLBLADDER:  Distended      PANCREAS:  Diffusely dilated pancreatic duct measuring up to 1.2 cm with a  evidence of soft tissue fullness in the region of the pancreatic head  measuring 2.2 cm (series [id number], image 26).      SPLEEN:  No gross lesions      ADRENAL GLANDS:  No gross nodules      KIDNEYS:  No gross hydronephrosis.      LYMPH NODES:  No enlarged lymph nodes.      ABDOMINAL VESSELS:  No aneurysmal dilatation of the abdominal aorta.      BOWEL:  No bowel dilatation      PERITONEUM/RETROPERITONEUM:  No ascites      BONES AND LOWER THORAX:  Thoracolumbar spine scoliotic deformity with multilevel degenerative  spine changes.      Impression: Extremely limited study with a few sequences obtained as the patient  could not complete the study.  1. Diffusely dilated pancreatic duct measuring up to 1.2 cm with a  evidence of caliber change at the level of the pancreatic head with a  evidence of nonspecific soft tissue thickening measuring 2.2 cm would  be concerning for obstructive pathology. Further assessment is  precluded given aforementioned limitation and advise further  assessment by EUS/ERCP or redo MRI with sedation.          MACRO:  None      Signed by: Ron Zarco 1/13/2025 7:35 AM  Dictation workstation:   [id number]      Physical Exam  Vitals reviewed.   Constitutional:       Appearance: She is ill-appearing.   HENT:      Head: Atraumatic.   Cardiovascular:      Rate and Rhythm: Normal rate and regular rhythm.   Pulmonary:      Effort: Pulmonary effort is normal.      Breath sounds: Normal breath sounds.   Abdominal:      General: Bowel sounds are normal. There is no distension.      Palpations: Abdomen is soft.       Tenderness: There is no abdominal tenderness. There is no guarding or rebound.   Musculoskeletal:      Comments: Refused to follow commands   Skin:     General: Skin is warm and dry.   Neurological:      Comments: deaf   Psychiatric:         Mood and Affect: Affect is flat.         Speech: She is noncommunicative.         Cognition and Memory: Cognition is impaired.         Relevant Results           Scheduled medications  atorvastatin, 80 mg, oral, Daily  benztropine, 0.5 mg, oral, Daily  benztropine, 1 mg, oral, Nightly  cholecalciferol, 2,000 Units, oral, Daily  clopidogrel, 75 mg, oral, Daily  heparin (porcine), 5,000 Units, subcutaneous, q8h JEANETTE  HYDROmorphone, 0.2 mg, intravenous, Once  levETIRAcetam, 1,000 mg, intravenous, q24h  lidocaine, 1 patch, transdermal, Daily  lipase-protease-amylase, 2 capsule, oral, TID  losartan, 25 mg, oral, Daily  oxyCODONE, 5 mg, oral, Once  pantoprazole, 40 mg, oral, Daily before breakfast   Or  pantoprazole, 40 mg, intravenous, Daily before breakfast  perflutren lipid microspheres, 0.5-10 mL of dilution, intravenous, Once in imaging  perflutren protein A microsphere, 0.5 mL, intravenous, Once in imaging  phenytoin, 150 mg, intravenous, BID  QUEtiapine, 200 mg, oral, Nightly  QUEtiapine, 25 mg, oral, Daily  QUEtiapine, 50 mg, oral, Daily  sertraline, 100 mg, oral, Daily  sulfur hexafluoride microsphr, 2 mL, intravenous, Once in imaging  traZODone, 50 mg, oral, Nightly      Continuous medications     PRN medications  PRN medications: acetaminophen **OR** acetaminophen **OR** acetaminophen, albuterol, alum-mag hydroxide-simeth, haloperidol lactate, LORazepam, ondansetron **OR** ondansetron   Results for orders placed or performed during the hospital encounter of 01/07/25 (from the past 24 hours)   Electrocardiogram, 12-lead PRN ACS symptoms   Result Value Ref Range    Ventricular Rate 91 BPM    Atrial Rate 91 BPM    HI Interval 168 ms    QRS Duration 96 ms    QT Interval 400 ms     QTC Calculation(Bazett) 492 ms    P Axis 64 degrees    R Axis 58 degrees    T Axis 9 degrees    QRS Count 15 beats    Q Onset 223 ms    P Onset 139 ms    P Offset 191 ms    T Offset 423 ms    QTC Fredericia 460 ms   POCT GLUCOSE   Result Value Ref Range    POCT Glucose 102 (H) 74 - 99 mg/dL   Sars-CoV-2 PCR   Result Value Ref Range    Coronavirus 2019, PCR Not Detected Not Detected   Renal Function Panel   Result Value Ref Range    Glucose 146 (H) 74 - 99 mg/dL    Sodium 142 136 - 145 mmol/L    Potassium 3.6 3.5 - 5.3 mmol/L    Chloride 108 (H) 98 - 107 mmol/L    Bicarbonate 27 21 - 32 mmol/L    Anion Gap 11 10 - 20 mmol/L    Urea Nitrogen 17 6 - 23 mg/dL    Creatinine 0.70 0.50 - 1.05 mg/dL    eGFR >90 >60 mL/min/1.73m*2    Calcium 9.3 8.6 - 10.3 mg/dL    Phosphorus 4.2 2.5 - 4.9 mg/dL    Albumin 3.7 3.4 - 5.0 g/dL   Lavender Top   Result Value Ref Range    Extra Tube Hold for add-ons.     Electrocardiogram, 12-lead PRN ACS symptoms    Result Date: 1/13/2025  Normal sinus rhythm Voltage criteria for left ventricular hypertrophy Prolonged QT Abnormal ECG When compared with ECG of 11-JAN-2025 06:56, (unconfirmed) Fusion complexes are no longer Present LA interval has decreased Non-specific change in ST segment in Anterior leads T wave inversion no longer evident in Lateral leads    Electrocardiogram, 12-lead PRN ACS symptoms    Result Date: 1/13/2025  Sinus rhythm with 1st degree AV block with Fusion complexes Left atrial enlargement Cannot rule out Anterior infarct (cited on or before 07-JAN-2025) Abnormal ECG When compared with ECG of 07-JAN-2025 21:45, (unconfirmed) Fusion complexes are now Present LA interval has increased Inverted T waves have replaced nonspecific T wave abnormality in Inferior leads    MRCP pancreas wo IV contrast    Result Date: 1/13/2025  Interpreted By:  Ron Zarco, STUDY: MRCP PANCREAS WO IV CONTRAST;  1/12/2025 4:31 pm   INDICATION: Signs/Symptoms:main duct IPMN versus obstructing  pancreatic head mass.   COMPARISON: Abdomen CT scan 01/08/2025   ACCESSION NUMBER(S): [id number]   ORDERING CLINICIAN: MARC PEREYRA   TECHNIQUE: Limited MRCP as the patient declined scan completion with only T2 haste sequences obtained.   FINDINGS: LIVER: No gross lesions.   BILE DUCTS: Mild intra and extrahepatic biliary dilatation.   GALLBLADDER: Distended   PANCREAS: Diffusely dilated pancreatic duct measuring up to 1.2 cm with a evidence of soft tissue fullness in the region of the pancreatic head measuring 2.2 cm (series [id number], image 26).   SPLEEN: No gross lesions   ADRENAL GLANDS: No gross nodules   KIDNEYS: No gross hydronephrosis.   LYMPH NODES: No enlarged lymph nodes.   ABDOMINAL VESSELS: No aneurysmal dilatation of the abdominal aorta.   BOWEL: No bowel dilatation   PERITONEUM/RETROPERITONEUM: No ascites   BONES AND LOWER THORAX: Thoracolumbar spine scoliotic deformity with multilevel degenerative spine changes.       Extremely limited study with a few sequences obtained as the patient could not complete the study. 1. Diffusely dilated pancreatic duct measuring up to 1.2 cm with a evidence of caliber change at the level of the pancreatic head with a evidence of nonspecific soft tissue thickening measuring 2.2 cm would be concerning for obstructive pathology. Further assessment is precluded given aforementioned limitation and advise further assessment by EUS/ERCP or redo MRI with sedation.     MACRO: None   Signed by: Ron Zarco 1/13/2025 7:35 AM Dictation workstation:   [id number]    XR hip left with pelvis when performed 2 or 3 views    Result Date: 1/12/2025  Interpreted By:  Samira Burden, STUDY: XR HIP LEFT WITH PELVIS WHEN PERFORMED 2 OR 3 VIEWS 1/11/2025 7:11 pm   INDICATION: Signs/Symptoms:pain after fall   COMPARISON: None available.   ACCESSION NUMBER(S): [id number]   ORDERING CLINICIAN: MARC PEREYRA   TECHNIQUE: AP view of the pelvis with AP and lateral views of the left hip.    FINDINGS: Normal mineralization. The hip joint space is maintained. No fracture, dislocation, or acute bony abnormality is seen.   The pelvic ring is intact without fracture or disruption.   There is rectal impaction observed.       No fracture or dislocation of the left hip.   Rectal impaction.   Signed by: Samira Burden 1/12/2025 1:42 PM Dictation workstation:   [id number]    XR shoulder left 2+ views    Result Date: 1/12/2025  Interpreted By:  Samira Burden, STUDY: XR SHOULDER LEFT 2+ VIEWS 1/11/2025 7:11 pm   INDICATION: Signs/Symptoms:pain after fall   COMPARISON: None available.   ACCESSION NUMBER(S): [id number]   ORDERING CLINICIAN: MARC PEREYRA   TECHNIQUE: Two views of the left shoulder   FINDINGS: No fracture or dislocation is seen. The glenohumeral joint is narrowed as is the acromioclavicular joint. There is osteophytosis of the inferior glenoid with mild osteophytosis of the humeral head.       No fracture or dislocation.   Osteoarthritis of the left shoulder affecting glenohumeral joint and AC joint.   Signed by: Samira Burden 1/12/2025 1:41 PM Dictation workstation:   [id number]    MR brain wo IV contrast    Result Date: 1/11/2025  Interpreted By:  Juan Robledo and Hooper Grayson STUDY: MR BRAIN WO IV CONTRAST;  1/10/2025 4:26 pm   INDICATION: Signs/Symptoms:wax/waning mental status, breakthrough seizures.     COMPARISON: CT of the head obtained January 9, 2025 MRI of the brain obtained December 18, 2024   ACCESSION NUMBER(S): [id number]   ORDERING CLINICIAN: MARC PEREYRA   TECHNIQUE: Axial T2, FLAIR, DWI, gradient echo T2 and sagittal and coronal T1 weighted images of brain were acquired.  No contrast was administered.   FINDINGS: Midline brain structures appear normal on mid sagittal imaging. Mild diffuse cerebral parenchymal volume loss is observed. Cystic encephalomalacia is observed in the left corona radiata with additional involvement of the left basal ganglia consistent with remote  infarct.   There is no territorial restricted diffusion to suggest acute intracranial infarct. No suspicious intra-axial gradient signal hypointensity is observed.   Hyperostosis frontalis interna is observed with suspected subjacent dural thickening. No evidence of extra-axial fluid collection. No acute loss of gray-white differentiation. Subcortical and periventricular white matter FLAIR signal hyperintensities are observed.   No intra-axial mass or mass effect. No ventriculomegaly. Basilar cisterns are intact.   T2 vascular flow voids appear normal.   Paranasal sinuses appear clear. There is a unilateral right mastoid effusion.       1. No MR evidence of acute intracranial infarct, hemorrhage, mass, or mass effect. 2. Remote infarct of the left corona radiata and basal ganglia with minimal associated cystic encephalomalacia. 3. White matter FLAIR signal hyperintensities in keeping with chronic ischemic microvascular changes of the white matter. 4. Nonspecific right mastoid effusion stable since the comparison brain MRI.   I personally reviewed the images/study and I agree with the findings as stated. This study was interpreted at [place], [place].   MACRO: None   Signed by: Juan Robledo 1/11/2025 11:33 AM Dictation workstation:   [id number]    EEG    IMPRESSION Impression This routine awake and drowsy EEG is indicative of mild diffuse encephalopathy. No epileptiform discharges or lateralizing signs recorded. A full report will be scanned into the patient's chart at a later time. This report has been interpreted and electronically signed by    CT brain attack angio head and neck W and WO IV contrast    Result Date: 1/9/2025  Interpreted By:  Justin Quiñonez, STUDY: CT BRAIN ATTACK ANGIO HEAD AND NECK W AND WO IV CONTRAST;  1/9/2025 1:07 pm   INDICATION: Signs/Symptoms:c/f stroke.     COMPARISON: CT head today.   ACCESSION NUMBER(S): [id number]   ORDERING CLINICIAN:  RUBEN CASILLAS   TECHNIQUE: 75 ML of Omnipaque 350 was administered intravenously and axial images of the head and neck were acquired.  Coronal, sagittal, and 3-D reconstructions were provided for review.   FINDINGS:   CTA COW: No major vascular occlusion. Moderate atherosclerotic changes through the carotid siphons. No aneurysms.  No vascular malformations. Patent dural venous sinuses and intracranial deep venous structures.   CTA CAROTID: No aortic aneurysm or dissection. No significant stenoses at the origins of the great vessels from the aortic arch. No significant stenoses of the brachycephalic artery or bilateral subclavian arteries.   No significant stenoses bilateral carotid arterial systems. No significant stenoses bilateral vertebral arterial systems.   NONVASCULAR NECK: No soft tissue mass. No adenopathy. Salivary glands are unremarkable. Thyroid gland unremarkable. Bones intact.         1. No intracranial major vascular occlusion. 2. No flow-limiting stenosis or significant plaque irregularity in the neck.     MACRO: None   Signed by: Justin Quiñonez 1/9/2025 1:26 PM Dictation workstation:   [id number]    CT brain attack head wo IV contrast    Result Date: 1/9/2025  Interpreted By:  Jony Cortés, STUDY: CT BRAIN ATTACK HEAD WO IV CONTRAST;  1/9/2025 1:06 pm   INDICATION: Signs/Symptoms:c/f stroke.   COMPARISON: 09/16/2024   ACCESSION NUMBER(S): [id number]   ORDERING CLINICIAN: RUBEN CASILLAS   TECHNIQUE: Sequential trans axial images were obtained  .   FINDINGS: INTRACRANIAL:   CORTICAL SULCI AND EXTRA-AXIAL SPACES:  Unremarkable.   VENTRICULAR SYSTEM:  There is mild relative prominence of the left lateral ventricle compared to the right, probably at least in part due to ex vacuo dilatation from adjacent remote infarction described below.   CEREBRAL PARENCHYMA:  There is hypodensity at the left corona radiata centrally and lenticulostriate nucleus. This is more defined and smaller than on the prior  examination consistent with evolution of a nonhemorrhagic infarction. There is also focal hypodensity at the rostrum of the corpus callosum on image 43 of series 202 similar to the prior exam probably remote lacunar infarction. Otherwisethere is no evidence of definite subacute infarction, intracranial hemorrhage or mass.   EXTRACRANIAL: Visualized paranasal sinuses and mastoids are clear. The calvarium is intact. There is opacification of right mastoid air cells progressed from the prior examination, most consistent with mastoiditis or effusions.       Remote left cerebral infarction, otherwise without acute findings.   MACRO: Jony Cortés discussed the significance and urgency of this critical finding by telephone with  RUBEN CASILLAS on 1/9/2025 at 1:21 pm. (**-RCF-**) Findings:  See findings.   Signed by: Jony Cortés 1/9/2025 1:22 PM Dictation workstation:   [id number]    Electrocardiogram, 12-lead    Result Date: 1/8/2025  Normal sinus rhythm with sinus arrhythmia Cannot rule out Anterior infarct , age undetermined Abnormal ECG When compared with ECG of 16-SEP-2024 01:36, No significant change was found    CT abdomen pelvis w IV contrast    Result Date: 1/8/2025  Interpreted By:  Leyda Hernadez, STUDY: CT ABDOMEN PELVIS W IV CONTRAST;  1/8/2025 1:00 am   INDICATION: Signs/Symptoms: Lower abdominal pain.   COMPARISON: None.   ACCESSION NUMBER(S): [id number]   ORDERING CLINICIAN: MARIAJOSE OKEEFE   TECHNIQUE: Axial CT images of the abdomen and pelvis with coronal and sagittal reconstructed images obtained after intravenous administration of 75 mL of Omnipaque 350   FINDINGS: LOWER CHEST: Bibasilar atelectasis.   ABDOMEN:   LIVER: Normal morphology. Liver is hypodense relative to the spleen which can be seen in the setting of steatosis. BILE DUCTS: Normal caliber. GALLBLADDER: No calcified gallstones. No wall thickening. PANCREAS: There is severe atrophy of the pancreas with ductal dilatation measuring up to 1.3  cm. There is prominent soft tissue in the region of the pancreatic head. No surrounding inflammatory changes seen. SPLEEN: Within normal limits. ADRENALS: Nodular thickening of the adrenal glands may relate to hyperplasia or adenomatous change. KIDNEYS and URETERS: Symmetric renal enhancement. No hydronephrosis or perinephric fluid collection.   VESSELS:  Calcific atherosclerosis of the aorta. No aortic aneurysm. Incidental note is made of circumaortic left renal vein. RETROPERITONEUM: No pathologically enlarged retroperitoneal lymph nodes.   PELVIS:   REPRODUCTIVE ORGANS: Uterus is present with a 2.9 cm hypo dense lesion in the left uterine fundus suspicious for fibroid changes. No adnexal mass. BLADDER: Within normal limits.   BOWEL: Small hiatal hernia. Stomach is under distended with apparent wall thickening. Visualized loops of bowel are without evidence for obstruction. Moderate to large stool burden. Normal appendix. Scattered colonic diverticula without evidence for acute diverticulitis. PERITONEUM: No ascites or free air, no fluid collection.   ABDOMINAL WALL: Injection granulomas noted in the gluteal fat bilaterally. BONES: Multilevel degenerative changes of the spine.       There is severe atrophy of the pancreatic body and tail with dilatation of the main pancreatic duct measuring up to 1.3 cm. There is prominent soft tissue in the region of the pancreatic head. No surrounding inflammatory change is seen. The peripancreatic vasculature is patent. These findings may relate to main duct IPMN versus obstructing pancreatic head mass. GI consultation and further evaluation with MRCP/ERCP is recommended.   Hepatic steatosis.   Diverticulosis without evidence for acute diverticulitis.   Fibroid changes of the uterus.   Additional findings as described above.   MACRO: None   Signed by: Leyda Hernadez 1/8/2025 1:28 AM Dictation workstation:   [id number]    MR brain w and wo IV contrast    Result Date:  12/19/2024  Interpreted By:  Bharat Barnard, STUDY: MR BRAIN W AND WO IV CONTRAST;  12/18/2024 11:50 am   INDICATION: Signs/Symptoms:recurrent falls, concern for SOL in brain with c/f fracture/ slipped disc in lumbar spine and pelvis. ,R29.6 Repeated falls,G40.909 Epilepsy, unspecified, not intractable, without status epilepticus   COMPARISON: CT September 16.   ACCESSION NUMBER(S): [id number]   ORDERING CLINICIAN: ANNA RYAN   TECHNIQUE: The brain was studied in the sagittal axial and coronal planes utilizing diffusion, gradient echo T2 weighted FLAIR, T1 and T2 weighted images   Following intravenous injection of gadolinium contrast, T1 weighted fat suppressed multiplanar images were also performed.    Following intravenous injection of gadolinium contrast, T1 weighted fat suppressed multiplanar images were also performed.   FINDINGS: Focal encephalomalacia with hemosiderin deposition in the left basal ganglia consistent with previous hemorrhage is unchanged from the previous exam. There is slight prominence of the cortical sulci and sylvian fissures. There is mild ventricular dilatation.  There are scattered and confluence foci of abnormal signal within the periventricular and subcortical white matter bilaterally.  These are compatible with minimal small vessel ischemic changes.  These nonspecific findings could also be produced by a demyelinating or post inflammatory process.   There is bifrontal hyperostosis greater on the left than the right. There is fluid opacification of the right mastoid air cells and middle ear. The visualized skull base paranasal sinuses and orbital structures are unremarkable. Diffusion weighted images and associated ADC maps of the brain were unremarkable.  There is no evidence of diffusion restriction to suggest the presence of acute infarction. Gradient echo T2 weighted images fail to demonstrate hemosiderin deposition or other evidence of hemorrhage.   Following  intravenous injection of there is no abnormal enhancement. There is normal contrast opacification of the dural venous sinuses.   IMPRESSION * There is no evidence of mass, acute cerebral infarction or acute hemorrhage. *Previous left basal ganglia hemorrhage or hemorrhagic infarction unchanged from the previous exam *Fluid opacification of the right mastoid air cells and middle ear     MACRO: none   Signed by: Bharat Barnard 12/19/2024 8:28 AM Dictation workstation:   [id number]        Assessment/Plan                  Assessment & Plan  Breakthrough seizure (Multi)    Other specified depressive episodes    Acute exacerbation of chronic paranoid schizophrenia (Multi)      69 y.o. F with h/o depression, GERD, schizophrenia, HLD, HTN, deafness, stroke, seizures, presented due to aggressive behavior, refusing medications, seizures in ED.  During workup, CT showed no biliary dilation, severe atrophy of the pancreas involving body and tail, with dilation of PD up to 1.3 cm, prominent soft tissue in the region of the pancreatic head, possible IPMN versus obstructing mass.  Atrophic pancreas dilated PD and possible mass versus IPMN, likely represents chronic pancreatitis, dilated PD is also can be a sign of chronic pancreatitis.    MRCP showed diffusely dilated pancreatic duct measuring up to 1.2 cm with a evidence of caliber change at the level of the pancreatic head with a evidence of nonspecific soft tissue thickening measuring 2.2 cm would  be concerning for obstructive pathology.   Discussed with advanced GI Dr. Justin Maier the results of MRCP.  He does not recommend any endoscopic intervention at this point due to patient being not symptomatic and poor surgical candidate.     -Pancreatic elastase and fecal fat pending  -C/w Creon with each meal and snacks  -Outpatient close follow-up with pancreatic clinic   -Supportive care as per primary team  -No barriers for discharge from GI standpoint  -GI will stop  following.  Please, let us know if further questions or assistance needed    Case discussed with Dr. Justin Wang and advanced GI Dr. Justin Bravo, APRN-CNP

## 2025-01-13 NOTE — SIGNIFICANT EVENT
Patient is medically cleared to begin placement with [facility]. Neurology and GI have both signed off. Patient to follow with them outpatient.    EKG is done, COVID negative, UDS pending.    Iwona Farmer MD  [facility]

## 2025-01-14 ENCOUNTER — APPOINTMENT (OUTPATIENT)
Dept: RADIOLOGY | Facility: HOSPITAL | Age: 70
End: 2025-01-14
Payer: MEDICARE

## 2025-01-14 LAB
AMPHETAMINES UR QL SCN: NORMAL
BARBITURATES UR QL SCN: NORMAL
BENZODIAZ UR QL SCN: NORMAL
BZE UR QL SCN: NORMAL
CANNABINOIDS UR QL SCN: NORMAL
FENTANYL+NORFENTANYL UR QL SCN: NORMAL
METHADONE UR QL SCN: NORMAL
OPIATES UR QL SCN: NORMAL
OXYCODONE+OXYMORPHONE UR QL SCN: NORMAL
PCP UR QL SCN: NORMAL

## 2025-01-14 PROCEDURE — 2500000002 HC RX 250 W HCPCS SELF ADMINISTERED DRUGS (ALT 637 FOR MEDICARE OP, ALT 636 FOR OP/ED): Performed by: STUDENT IN AN ORGANIZED HEALTH CARE EDUCATION/TRAINING PROGRAM

## 2025-01-14 PROCEDURE — 1100000001 HC PRIVATE ROOM DAILY

## 2025-01-14 PROCEDURE — 2500000001 HC RX 250 WO HCPCS SELF ADMINISTERED DRUGS (ALT 637 FOR MEDICARE OP): Performed by: STUDENT IN AN ORGANIZED HEALTH CARE EDUCATION/TRAINING PROGRAM

## 2025-01-14 PROCEDURE — 99232 SBSQ HOSP IP/OBS MODERATE 35: CPT | Performed by: STUDENT IN AN ORGANIZED HEALTH CARE EDUCATION/TRAINING PROGRAM

## 2025-01-14 PROCEDURE — 2500000004 HC RX 250 GENERAL PHARMACY W/ HCPCS (ALT 636 FOR OP/ED): Performed by: STUDENT IN AN ORGANIZED HEALTH CARE EDUCATION/TRAINING PROGRAM

## 2025-01-14 PROCEDURE — 74018 RADEX ABDOMEN 1 VIEW: CPT | Performed by: RADIOLOGY

## 2025-01-14 PROCEDURE — 2500000002 HC RX 250 W HCPCS SELF ADMINISTERED DRUGS (ALT 637 FOR MEDICARE OP, ALT 636 FOR OP/ED): Performed by: INTERNAL MEDICINE

## 2025-01-14 PROCEDURE — 2500000005 HC RX 250 GENERAL PHARMACY W/O HCPCS: Performed by: INTERNAL MEDICINE

## 2025-01-14 PROCEDURE — 80307 DRUG TEST PRSMV CHEM ANLYZR: CPT | Performed by: STUDENT IN AN ORGANIZED HEALTH CARE EDUCATION/TRAINING PROGRAM

## 2025-01-14 PROCEDURE — 99232 SBSQ HOSP IP/OBS MODERATE 35: CPT | Performed by: PSYCHIATRY & NEUROLOGY

## 2025-01-14 PROCEDURE — 2500000001 HC RX 250 WO HCPCS SELF ADMINISTERED DRUGS (ALT 637 FOR MEDICARE OP): Performed by: INTERNAL MEDICINE

## 2025-01-14 PROCEDURE — 74018 RADEX ABDOMEN 1 VIEW: CPT

## 2025-01-14 PROCEDURE — 2500000004 HC RX 250 GENERAL PHARMACY W/ HCPCS (ALT 636 FOR OP/ED): Performed by: INTERNAL MEDICINE

## 2025-01-14 RX ORDER — AMOXICILLIN 250 MG
2 CAPSULE ORAL DAILY
Status: DISCONTINUED | OUTPATIENT
Start: 2025-01-14 | End: 2025-01-15 | Stop reason: HOSPADM

## 2025-01-14 RX ADMIN — QUETIAPINE FUMARATE 200 MG: 100 TABLET ORAL at 22:32

## 2025-01-14 RX ADMIN — CLOPIDOGREL 75 MG: 75 TABLET ORAL at 09:21

## 2025-01-14 RX ADMIN — LOSARTAN POTASSIUM 25 MG: 50 TABLET, FILM COATED ORAL at 09:21

## 2025-01-14 RX ADMIN — SENNOSIDES AND DOCUSATE SODIUM 2 TABLET: 50; 8.6 TABLET ORAL at 19:02

## 2025-01-14 RX ADMIN — TRAZODONE HYDROCHLORIDE 50 MG: 50 TABLET ORAL at 22:32

## 2025-01-14 RX ADMIN — LEVETIRACETAM 1000 MG: 10 INJECTION INTRAVENOUS at 22:32

## 2025-01-14 RX ADMIN — PHENYTOIN SODIUM 100 MG: 100 CAPSULE, EXTENDED RELEASE ORAL at 22:32

## 2025-01-14 RX ADMIN — LIDOCAINE 4% 1 PATCH: 40 PATCH TOPICAL at 09:22

## 2025-01-14 RX ADMIN — PANTOPRAZOLE SODIUM 40 MG: 40 INJECTION, POWDER, FOR SOLUTION INTRAVENOUS at 06:02

## 2025-01-14 RX ADMIN — ATORVASTATIN CALCIUM 80 MG: 80 TABLET, FILM COATED ORAL at 09:21

## 2025-01-14 RX ADMIN — PHENYTOIN SODIUM 100 MG: 100 CAPSULE, EXTENDED RELEASE ORAL at 14:42

## 2025-01-14 RX ADMIN — PHENYTOIN SODIUM 100 MG: 100 CAPSULE, EXTENDED RELEASE ORAL at 09:40

## 2025-01-14 RX ADMIN — Medication 2000 UNITS: at 09:22

## 2025-01-14 RX ADMIN — SERTRALINE 100 MG: 50 TABLET, FILM COATED ORAL at 09:21

## 2025-01-14 RX ADMIN — BENZTROPINE MESYLATE 0.5 MG: 1 TABLET ORAL at 09:21

## 2025-01-14 RX ADMIN — QUETIAPINE FUMARATE 50 MG: 50 TABLET ORAL at 14:42

## 2025-01-14 RX ADMIN — HEPARIN SODIUM 5000 UNITS: 5000 INJECTION, SOLUTION INTRAVENOUS; SUBCUTANEOUS at 06:02

## 2025-01-14 RX ADMIN — QUETIAPINE FUMARATE 25 MG: 100 TABLET ORAL at 09:25

## 2025-01-14 RX ADMIN — PANCRELIPASE 2 CAPSULE: 15000; 3000; 9500 CAPSULE, DELAYED RELEASE ORAL at 14:42

## 2025-01-14 RX ADMIN — HEPARIN SODIUM 5000 UNITS: 5000 INJECTION, SOLUTION INTRAVENOUS; SUBCUTANEOUS at 22:36

## 2025-01-14 RX ADMIN — HEPARIN SODIUM 5000 UNITS: 5000 INJECTION, SOLUTION INTRAVENOUS; SUBCUTANEOUS at 14:42

## 2025-01-14 RX ADMIN — BENZTROPINE MESYLATE 1 MG: 1 TABLET ORAL at 22:32

## 2025-01-14 RX ADMIN — PANCRELIPASE 2 CAPSULE: 15000; 3000; 9500 CAPSULE, DELAYED RELEASE ORAL at 19:02

## 2025-01-14 ASSESSMENT — PAIN SCALES - GENERAL
PAINLEVEL_OUTOF10: 0 - NO PAIN
PAINLEVEL_OUTOF10: 0 - NO PAIN
PAINLEVEL_OUTOF10: 3

## 2025-01-14 ASSESSMENT — PAIN DESCRIPTION - LOCATION: LOCATION: GENERALIZED

## 2025-01-14 ASSESSMENT — PAIN - FUNCTIONAL ASSESSMENT
PAIN_FUNCTIONAL_ASSESSMENT: 0-10
PAIN_FUNCTIONAL_ASSESSMENT: 0-10

## 2025-01-14 NOTE — CARE PLAN
The patient's goals for the shift include      The clinical goals for the shift include remain free of falls      Problem: Risk for Suicide  Goal: Accepts medications as prescribed/needed this shift  Outcome: Progressing  Goal: Identifies supports this shift  Outcome: Progressing  Goal: Makes needs known through verbalization or behaviors this shift  Outcome: Progressing  Goal: No self harm this shift  Outcome: Progressing  Goal: Read Safety Guidelines this shift  Outcome: Progressing  Goal: Complete Mental Health Safety Plan (psychiatry only) this shift  Outcome: Progressing     Problem: Sensory Perceptual Alteration as Evidenced by  Goal: Cooperates with admission process  Outcome: Progressing  Goal: Patient/Family participate in treatment and discharge plans  Outcome: Progressing  Goal: Patient/Family verbalizes awareness of resources  Outcome: Progressing  Goal: Participates in unit activities  Outcome: Progressing  Goal: Discusses signs/symptoms of illness/treatment options  Outcome: Progressing  Goal: Initiates reality-based interactions  Outcome: Progressing  Goal: Able to discuss content of hallucinations/delusions  Outcome: Progressing  Goal: Notifies staff when experiencing hallucinations/delusions  Outcome: Progressing  Goal: Verbalizes reduction in hallucinations/delusions  Outcome: Progressing  Goal: Will not act on psychotic perception  Outcome: Progressing  Goal: Understands least restrictive measures  Outcome: Progressing  Goal: Free from restraint events  Outcome: Progressing     Problem: Pain - Adult  Goal: Verbalizes/displays adequate comfort level or baseline comfort level  Outcome: Progressing     Problem: Safety - Adult  Goal: Free from fall injury  Outcome: Progressing     Problem: Discharge Planning  Goal: Discharge to home or other facility with appropriate resources  Outcome: Progressing     Problem: Chronic Conditions and Co-morbidities  Goal: Patient's chronic conditions and co-morbidity  symptoms are monitored and maintained or improved  Outcome: Progressing     Problem: Fall/Injury  Goal: Not fall by end of shift  Outcome: Progressing  Goal: Be free from injury by end of the shift  Outcome: Progressing  Goal: Verbalize understanding of personal risk factors for fall in the hospital  Outcome: Progressing  Goal: Verbalize understanding of risk factor reduction measures to prevent injury from fall in the home  Outcome: Progressing  Goal: Use assistive devices by end of the shift  Outcome: Progressing  Goal: Pace activities to prevent fatigue by end of the shift  Outcome: Progressing     Problem: Skin  Goal: Decreased wound size/increased tissue granulation at next dressing change  Outcome: Progressing  Goal: Participates in plan/prevention/treatment measures  Outcome: Progressing  Goal: Prevent/manage excess moisture  Outcome: Progressing  Goal: Prevent/minimize sheer/friction injuries  Outcome: Progressing  Goal: Promote/optimize nutrition  Outcome: Progressing  Goal: Promote skin healing  Outcome: Progressing     Problem: Pain  Goal: Walks with improved pain control throughout the shift  Outcome: Progressing  Goal: Performs ADL's with improved pain control throughout shift  Outcome: Progressing  Goal: Free from opioid side effects throughout the shift  Outcome: Progressing  Goal: Free from acute confusion related to pain meds throughout the shift  Outcome: Progressing

## 2025-01-14 NOTE — PROGRESS NOTES
01/14/25 0851   Discharge Planning   Expected Discharge Disposition Psych     [facility] spoke with EPAT [phone]. Pt is not their list. Need updated Pysch note and new EPAT order. Pt is medically cleared for pysch placement.

## 2025-01-14 NOTE — PROGRESS NOTES
[facility] Hospitalist Progress Note        Between 7AM-7PM please message me via Epic Secure Chat.  After 7PM please page Nocturnist on call.        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 Maier advanced endoscopy and conclusion for no intervention at this time. Can follow up with pancreatic clinic, GI referral has been made  Facial droop now resolved - neuro advised against TNK  Hx stroke/HTN/HLD  GERD      - neuro -> no med changes, no barriers to DC  - psych -> meds as ordered, plan will be for EPAT      Fluids: None  Lytes: Replete as needed  Nutrition: Full liquid  Staples: None  Invasive lines: None  Drains: None    DVT Prophylaxis:  Heparin subq      Discharge Planning: Patient is med ready can begin EPAT process. DC order and med rec completed      I personally examined the patient and reviewed chart.  Plan of care was discussed with patient/RN, all questions answered.    Total time spent: At least 38 minutes, providing counseling or in coordination of care. Total time on this day of visit includes record and documentation review before and after visit including documentation and time not explicitly included on EMR time stamp.      Merced Sandoval is a 69 y.o. female on day 5 of admission presenting with No Principal Problem: There is no principal problem currently on the Problem List. Please update the Problem List and refresh..    NAEON. Overall stable, discussed care w/ RN, no concerns at this time    Review of Systems   Unable to perform ROS: Other       Objective     Physical Exam  Constitutional:       General: She is not in acute distress.  Cardiovascular:      Rate and Rhythm: Normal rate and regular rhythm.   Pulmonary:      Effort: No respiratory distress.   Abdominal:      Tenderness: There is no abdominal  "tenderness.   Neurological:      Mental Status: She is alert.         Last Recorded Vitals  Blood pressure 143/80, pulse 88, temperature 35.4 °C (95.7 °F), temperature source Temporal, resp. rate 22, height 1.651 m (5' 5\"), weight 74.8 kg (165 lb), SpO2 96%.  Intake/Output last 3 Shifts:  I/O last 3 completed shifts:  In: 280 (3.7 mL/kg) [P.O.:180; IV Piggyback:100]  Out: 750 (10 mL/kg) [Urine:750 (0.3 mL/kg/hr)]  Weight: 74.8 kg     Relevant Results  Results for orders placed or performed during the hospital encounter of 01/07/25 (from the past 24 hours)   ECG 12 Lead   Result Value Ref Range    Ventricular Rate 93 BPM    Atrial Rate 93 BPM    MN Interval 170 ms    QRS Duration 100 ms    QT Interval 394 ms    QTC Calculation(Bazett) 489 ms    P Axis 67 degrees    R Axis 60 degrees    T Axis 2 degrees    QRS Count 15 beats    Q Onset 216 ms    P Onset 131 ms    P Offset 186 ms    T Offset 413 ms    QTC Fredericia 456 ms       Imaging Results  ECG 12 Lead    Result Date: 1/13/2025  Normal sinus rhythm Prolonged QT Abnormal ECG Confirmed by Edward Evans ([id number]) on 1/13/2025 3:47:42 PM    Electrocardiogram, 12-lead PRN ACS symptoms    Result Date: 1/13/2025  Normal sinus rhythm Voltage criteria for left ventricular hypertrophy Prolonged QT Abnormal ECG When compared with ECG of 11-JAN-2025 06:56, (unconfirmed) Fusion complexes are no longer Present MN interval has decreased Non-specific change in ST segment in Anterior leads T wave inversion no longer evident in Lateral leads    MRCP pancreas wo IV contrast    Result Date: 1/13/2025  Interpreted By:  Ron Zarco, STUDY: MRCP PANCREAS WO IV CONTRAST;  1/12/2025 4:31 pm   INDICATION: Signs/Symptoms:main duct IPMN versus obstructing pancreatic head mass.   COMPARISON: Abdomen CT scan 01/08/2025   ACCESSION NUMBER(S): [id number]   ORDERING CLINICIAN: MARC PEREYRA   TECHNIQUE: Limited MRCP as the patient declined scan completion with only T2 haste sequences " obtained.   FINDINGS: LIVER: No gross lesions.   BILE DUCTS: Mild intra and extrahepatic biliary dilatation.   GALLBLADDER: Distended   PANCREAS: Diffusely dilated pancreatic duct measuring up to 1.2 cm with a evidence of soft tissue fullness in the region of the pancreatic head measuring 2.2 cm (series [id number], image 26).   SPLEEN: No gross lesions   ADRENAL GLANDS: No gross nodules   KIDNEYS: No gross hydronephrosis.   LYMPH NODES: No enlarged lymph nodes.   ABDOMINAL VESSELS: No aneurysmal dilatation of the abdominal aorta.   BOWEL: No bowel dilatation   PERITONEUM/RETROPERITONEUM: No ascites   BONES AND LOWER THORAX: Thoracolumbar spine scoliotic deformity with multilevel degenerative spine changes.       Extremely limited study with a few sequences obtained as the patient could not complete the study. 1. Diffusely dilated pancreatic duct measuring up to 1.2 cm with a evidence of caliber change at the level of the pancreatic head with a evidence of nonspecific soft tissue thickening measuring 2.2 cm would be concerning for obstructive pathology. Further assessment is precluded given aforementioned limitation and advise further assessment by EUS/ERCP or redo MRI with sedation.     MACRO: None   Signed by: Ron Zarco 1/13/2025 7:35 AM Dictation workstation:   [id number]      Medications  atorvastatin, 80 mg, oral, Daily  benztropine, 0.5 mg, oral, Daily  benztropine, 1 mg, oral, Nightly  cholecalciferol, 2,000 Units, oral, Daily  clopidogrel, 75 mg, oral, Daily  heparin (porcine), 5,000 Units, subcutaneous, q8h JEANETTE  levETIRAcetam, 1,000 mg, intravenous, q24h  lidocaine, 1 patch, transdermal, Daily  lipase-protease-amylase, 2 capsule, oral, TID  losartan, 25 mg, oral, Daily  pantoprazole, 40 mg, oral, Daily before breakfast   Or  pantoprazole, 40 mg, intravenous, Daily before breakfast  perflutren lipid microspheres, 0.5-10 mL of dilution, intravenous, Once in imaging  perflutren protein A microsphere, 0.5 mL,  intravenous, Once in imaging  phenytoin ER, 100 mg, oral, TID  QUEtiapine, 200 mg, oral, Nightly  QUEtiapine, 25 mg, oral, Daily  QUEtiapine, 50 mg, oral, Daily  sertraline, 100 mg, oral, Daily  sulfur hexafluoride microsphr, 2 mL, intravenous, Once in imaging  traZODone, 50 mg, oral, Nightly       PRN medications: acetaminophen **OR** acetaminophen **OR** acetaminophen, albuterol, alum-mag hydroxide-simeth, haloperidol lactate, LORazepam, ondansetron **OR** ondansetron                Thu Kellogg MD  [facility] Medicine

## 2025-01-14 NOTE — PROGRESS NOTES
"Janine Sandoval is a 69 y.o. female on day 5 of admission presenting with No Principal Problem: There is no principal problem currently on the Problem List. Please update the Problem List and refresh..    Subjective   Pt has no distress and no symptoms of internal preoccupation.   Caregiver thinks she's improved.   Pt declined Bryce  this am - said she can't see the screen.     No evidence of active hallucinations since restarted on hs seroquel  Pt is relaxed, cooperative, calm.   Compliant medication  No longer meets criteria for inpt psychiatry, will cancel EPAT.     No change in medication - is due for Haldol Dec today -200mg due 1/14/25  Can give prior to discharge    Objective     Physical Exam  Psychiatric:         Attention and Perception: She is inattentive. She does not perceive auditory or visual hallucinations.         Mood and Affect: Mood normal.         Speech: She is noncommunicative.         Behavior: Behavior is cooperative.         Thought Content: Thought content is not paranoid. Thought content does not include homicidal or suicidal ideation.         Judgment: Judgment is impulsive.      Comments: Pt sleeping, will not awaken.      Last Recorded Vitals  Blood pressure 143/80, pulse 88, temperature 35.4 °C (95.7 °F), temperature source Temporal, resp. rate 22, height 1.651 m (5' 5\"), weight 74.8 kg (165 lb), SpO2 96%.  Intake/Output last 3 Shifts:  I/O last 3 completed shifts:  In: 280 (3.7 mL/kg) [P.O.:180; IV Piggyback:100]  Out: 750 (10 mL/kg) [Urine:750 (0.3 mL/kg/hr)]  Weight: 74.8 kg     Relevant Results  Scheduled medications  atorvastatin, 80 mg, oral, Daily  benztropine, 0.5 mg, oral, Daily  benztropine, 1 mg, oral, Nightly  cholecalciferol, 2,000 Units, oral, Daily  clopidogrel, 75 mg, oral, Daily  heparin (porcine), 5,000 Units, subcutaneous, q8h JEANETTE  levETIRAcetam, 1,000 mg, intravenous, q24h  lidocaine, 1 patch, transdermal, Daily  lipase-protease-amylase, 2 capsule, " oral, TID  losartan, 25 mg, oral, Daily  pantoprazole, 40 mg, oral, Daily before breakfast   Or  pantoprazole, 40 mg, intravenous, Daily before breakfast  perflutren lipid microspheres, 0.5-10 mL of dilution, intravenous, Once in imaging  perflutren protein A microsphere, 0.5 mL, intravenous, Once in imaging  phenytoin ER, 100 mg, oral, TID  QUEtiapine, 200 mg, oral, Nightly  QUEtiapine, 25 mg, oral, Daily  QUEtiapine, 50 mg, oral, Daily  sennosides-docusate sodium, 2 tablet, oral, Daily  sertraline, 100 mg, oral, Daily  sulfur hexafluoride microsphr, 2 mL, intravenous, Once in imaging  traZODone, 50 mg, oral, Nightly      Results for orders placed or performed during the hospital encounter of 01/07/25 (from the past 24 hours)   Drug Screen, Urine   Result Value Ref Range    Amphetamine Screen, Urine Presumptive Negative Presumptive Negative    Barbiturate Screen, Urine Presumptive Negative Presumptive Negative    Benzodiazepines Screen, Urine Presumptive Negative Presumptive Negative    Cannabinoid Screen, Urine Presumptive Negative Presumptive Negative    Cocaine Metabolite Screen, Urine Presumptive Negative Presumptive Negative    Fentanyl Screen, Urine Presumptive Negative Presumptive Negative    Opiate Screen, Urine Presumptive Negative Presumptive Negative    Oxycodone Screen, Urine Presumptive Negative Presumptive Negative    PCP Screen, Urine Presumptive Negative Presumptive Negative    Methadone Screen, Urine Presumptive Negative Presumptive Negative       Assessment/Plan   Assessment & Plan  Breakthrough seizure (Multi)    IMP:  Pt is cleared for return home - does not require inpatient psychiatry  Haldol Dec 200 mg IM prior to discharge  Acute exacerbation of chronic paranoid schizophrenia (Multi)  - recent agitation r/t paranoia at group home  - next haldol dec 200mg due 1/14/25  - outpatient provider [facility], last visit 12/17/24    - cont cogentin 0.5mg / 1mg BID  - restart seroquel 200mg qhS if VS  wnl  - cont seroquel 25mg qam, 50mg q afternoon  - cont sertraline 100mg daily  - cont trazodone 50mg qhS    Other specified depressive episodes  - as above      I spent 30 minutes in the professional and overall care of this patient.      Marce Slater MD    Virtual or Telephone Consent    An interactive audio and video telecommunication system which permits real time communications between the patient (at the originating site) and provider (at the distant site) was utilized to provide this telehealth service.   Verbal consent was requested and obtained from [facility] on this date, 01/14/25 for a telehealth visit. '

## 2025-01-14 NOTE — ASSESSMENT & PLAN NOTE
- recent agitation r/t paranoia at group home  - next haldol dec 200mg due 1/14/25  - outpatient provider [facility], last visit 12/17/24    - cont cogentin 0.5mg / 1mg BID  - restart seroquel 200mg qhS if VS wnl  - cont seroquel 25mg qam, 50mg q afternoon  - cont sertraline 100mg daily  - cont trazodone 50mg qhS

## 2025-01-14 NOTE — ASSESSMENT & PLAN NOTE
IMP:  Pt is cleared for return home - does not require inpatient psychiatry  Haldol Dec 200 mg IM prior to discharge

## 2025-01-15 ENCOUNTER — PHARMACY VISIT (OUTPATIENT)
Dept: PHARMACY | Facility: CLINIC | Age: 70
End: 2025-01-15
Payer: COMMERCIAL

## 2025-01-15 VITALS
SYSTOLIC BLOOD PRESSURE: 139 MMHG | RESPIRATION RATE: 18 BRPM | OXYGEN SATURATION: 97 % | HEIGHT: 65 IN | WEIGHT: 165 LBS | BODY MASS INDEX: 27.49 KG/M2 | TEMPERATURE: 98.4 F | DIASTOLIC BLOOD PRESSURE: 86 MMHG | HEART RATE: 74 BPM

## 2025-01-15 PROCEDURE — 2500000004 HC RX 250 GENERAL PHARMACY W/ HCPCS (ALT 636 FOR OP/ED): Performed by: PSYCHIATRY & NEUROLOGY

## 2025-01-15 PROCEDURE — 2500000001 HC RX 250 WO HCPCS SELF ADMINISTERED DRUGS (ALT 637 FOR MEDICARE OP): Performed by: INTERNAL MEDICINE

## 2025-01-15 PROCEDURE — 2500000004 HC RX 250 GENERAL PHARMACY W/ HCPCS (ALT 636 FOR OP/ED): Performed by: STUDENT IN AN ORGANIZED HEALTH CARE EDUCATION/TRAINING PROGRAM

## 2025-01-15 PROCEDURE — 2500000005 HC RX 250 GENERAL PHARMACY W/O HCPCS: Performed by: INTERNAL MEDICINE

## 2025-01-15 PROCEDURE — 2500000001 HC RX 250 WO HCPCS SELF ADMINISTERED DRUGS (ALT 637 FOR MEDICARE OP): Performed by: STUDENT IN AN ORGANIZED HEALTH CARE EDUCATION/TRAINING PROGRAM

## 2025-01-15 PROCEDURE — 2500000004 HC RX 250 GENERAL PHARMACY W/ HCPCS (ALT 636 FOR OP/ED): Performed by: INTERNAL MEDICINE

## 2025-01-15 PROCEDURE — 2500000002 HC RX 250 W HCPCS SELF ADMINISTERED DRUGS (ALT 637 FOR MEDICARE OP, ALT 636 FOR OP/ED): Performed by: STUDENT IN AN ORGANIZED HEALTH CARE EDUCATION/TRAINING PROGRAM

## 2025-01-15 PROCEDURE — 99239 HOSP IP/OBS DSCHRG MGMT >30: CPT | Performed by: STUDENT IN AN ORGANIZED HEALTH CARE EDUCATION/TRAINING PROGRAM

## 2025-01-15 PROCEDURE — RXMED WILLOW AMBULATORY MEDICATION CHARGE

## 2025-01-15 PROCEDURE — 2500000002 HC RX 250 W HCPCS SELF ADMINISTERED DRUGS (ALT 637 FOR MEDICARE OP, ALT 636 FOR OP/ED): Performed by: INTERNAL MEDICINE

## 2025-01-15 RX ORDER — HALOPERIDOL DECANOATE 100 MG/ML
200 INJECTION INTRAMUSCULAR
Status: DISCONTINUED | OUTPATIENT
Start: 2025-01-15 | End: 2025-01-15 | Stop reason: HOSPADM

## 2025-01-15 RX ORDER — PANCRELIPASE 15000; 3000; 9500 [USP'U]/1; [USP'U]/1; [USP'U]/1
2 CAPSULE, DELAYED RELEASE ORAL
Qty: 180 CAPSULE | Refills: 1 | Status: SHIPPED | OUTPATIENT
Start: 2025-01-15 | End: 2025-03-16

## 2025-01-15 RX ADMIN — PANCRELIPASE 2 CAPSULE: 15000; 3000; 9500 CAPSULE, DELAYED RELEASE ORAL at 08:12

## 2025-01-15 RX ADMIN — LOSARTAN POTASSIUM 25 MG: 50 TABLET, FILM COATED ORAL at 08:13

## 2025-01-15 RX ADMIN — HEPARIN SODIUM 5000 UNITS: 5000 INJECTION, SOLUTION INTRAVENOUS; SUBCUTANEOUS at 14:16

## 2025-01-15 RX ADMIN — PHENYTOIN SODIUM 100 MG: 100 CAPSULE, EXTENDED RELEASE ORAL at 20:19

## 2025-01-15 RX ADMIN — PANCRELIPASE 2 CAPSULE: 15000; 3000; 9500 CAPSULE, DELAYED RELEASE ORAL at 14:15

## 2025-01-15 RX ADMIN — LIDOCAINE 4% 1 PATCH: 40 PATCH TOPICAL at 08:13

## 2025-01-15 RX ADMIN — BENZTROPINE MESYLATE 0.5 MG: 1 TABLET ORAL at 08:12

## 2025-01-15 RX ADMIN — SERTRALINE 100 MG: 50 TABLET, FILM COATED ORAL at 08:13

## 2025-01-15 RX ADMIN — PHENYTOIN SODIUM 100 MG: 100 CAPSULE, EXTENDED RELEASE ORAL at 14:16

## 2025-01-15 RX ADMIN — PANTOPRAZOLE SODIUM 40 MG: 40 TABLET, DELAYED RELEASE ORAL at 08:31

## 2025-01-15 RX ADMIN — HALOPERIDOL DECANOATE 200 MG: 100 INJECTION INTRAMUSCULAR at 10:54

## 2025-01-15 RX ADMIN — PHENYTOIN SODIUM 100 MG: 100 CAPSULE, EXTENDED RELEASE ORAL at 08:31

## 2025-01-15 RX ADMIN — PANCRELIPASE 2 CAPSULE: 15000; 3000; 9500 CAPSULE, DELAYED RELEASE ORAL at 20:18

## 2025-01-15 RX ADMIN — SENNOSIDES AND DOCUSATE SODIUM 2 TABLET: 50; 8.6 TABLET ORAL at 08:12

## 2025-01-15 RX ADMIN — BENZTROPINE MESYLATE 1 MG: 1 TABLET ORAL at 20:19

## 2025-01-15 RX ADMIN — CLOPIDOGREL 75 MG: 75 TABLET ORAL at 08:34

## 2025-01-15 RX ADMIN — QUETIAPINE FUMARATE 50 MG: 50 TABLET ORAL at 14:15

## 2025-01-15 RX ADMIN — Medication 2000 UNITS: at 08:13

## 2025-01-15 RX ADMIN — ATORVASTATIN CALCIUM 80 MG: 80 TABLET, FILM COATED ORAL at 08:12

## 2025-01-15 RX ADMIN — TRAZODONE HYDROCHLORIDE 50 MG: 50 TABLET ORAL at 20:19

## 2025-01-15 RX ADMIN — QUETIAPINE FUMARATE 25 MG: 100 TABLET ORAL at 08:31

## 2025-01-15 RX ADMIN — QUETIAPINE FUMARATE 200 MG: 100 TABLET ORAL at 20:19

## 2025-01-15 RX ADMIN — HEPARIN SODIUM 5000 UNITS: 5000 INJECTION, SOLUTION INTRAVENOUS; SUBCUTANEOUS at 08:31

## 2025-01-15 ASSESSMENT — COGNITIVE AND FUNCTIONAL STATUS - GENERAL
DRESSING REGULAR LOWER BODY CLOTHING: A LITTLE
DRESSING REGULAR UPPER BODY CLOTHING: A LITTLE
CLIMB 3 TO 5 STEPS WITH RAILING: A LITTLE
HELP NEEDED FOR BATHING: A LITTLE
EATING MEALS: A LITTLE
MOVING TO AND FROM BED TO CHAIR: A LITTLE
DAILY ACTIVITIY SCORE: 18
MOVING FROM LYING ON BACK TO SITTING ON SIDE OF FLAT BED WITH BEDRAILS: A LITTLE
STANDING UP FROM CHAIR USING ARMS: A LITTLE
WALKING IN HOSPITAL ROOM: A LITTLE
PERSONAL GROOMING: A LITTLE
TOILETING: A LITTLE
TURNING FROM BACK TO SIDE WHILE IN FLAT BAD: A LITTLE
MOBILITY SCORE: 18

## 2025-01-15 NOTE — PROGRESS NOTES
Psych has cleared pt to return to her , EPAT is cancelled. [facility] to [facility]  [phone], left a vm. Beside RN updated.     1052 Received call back from Audrey @ the [facility]. They are able to receive pt back today and will be able to provide the transport. Audrey is checking on availability. She is requesting the bedside RN call and provide report to her (at above number) along with sending the AVS/DC Summary. Secure chat sent to bedside nurse Anna with this information.     1330 Per bedside RN the  would like a PT lorene GONZALEZ ordered.

## 2025-01-15 NOTE — DISCHARGE SUMMARY
[facility] Hospitalist Discharge Summary        Janine SandovalDOB: 1955MRN: [id number]    ADMIT DATE: 2025DISCHARGE DATE: 1/15/2025    PRIMARYCARE PHYSICIAN: Jesi Ramsey MD    VISIT STATUS: Inpatient    CODE STATUS: Full Code    CONSULTANTS:  Psych  Neuro    HOSPITAL COURSE:    Breakthrough seizure - suspected from missed anti-epileptic meds. Resume home meds on DC  Schizophrenia w/ intermittent agitation - she did receive her monthly IM haldol injection here. 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 Maier advanced endoscopy and conclusion for no intervention at this time. Can follow up with pancreatic clinic, GI referral has been made. Please call [phone] if no one reaches out to get this scheduled. She was also started on Creon given the appearance of her pancrease to see if she would benefit from pancreatic enzyme replacement  Facial droop now resolved - neuro advised against TNK  Hx stroke/HTN/HLD  GERD  Constipation - continue stool softener    I discussed follow up plan and clinical updates with care giver Audrey    DAY OF DISCHARGE:  Review of Systems   Unable to perform ROS: Other       Patient Vitals for the past 24 hrs:   BP Temp Temp src Pulse Resp SpO2   01/15/25 0432 125/82 36.8 °C (98.2 °F) Axillary 97 16 96 %   25 133/63 36.7 °C (98.1 °F) -- 85 18 96 %   25 1530 142/78 35.6 °C (96.1 °F) Temporal 93 22 94 %       Average, Min, and Max forlast 24 hours Vitals:  TEMPERATURE: Temp  Av.4 °C (97.5 °F)  Min: 35.6 °C (96.1 °F)  Max: 36.8 °C (98.2 °F)    RESPIRATIONS RANGE: Resp  Av.7  Min: 16  Max: 22    PULSE RANGE: Pulse  Av.7  Min: 85  Max: 97    BLOOD PRESSURE RANGE: Systolic (24hrs), Av , Min:125 , Max:142   ; Diastolic (24hrs), Av, Min:63, Max:82      PULSE OXIMETRYRANGE: SpO2  Av.3 %  Min: 94 %  Max: 96 %    I/O last 3  completed shifts:  In: 200 (2.7 mL/kg) [IV Piggyback:200]  Out: 850 (11.4 mL/kg) [Urine:850 (0.3 mL/kg/hr)]  Weight: 74.8 kg     Physical Exam  Vitals and nursing note reviewed.   Constitutional:       General: She is not in acute distress.  Neurological:      Mental Status: She is alert.           Discharge Meds       Your medication list        CONTINUE taking these medications        Instructions Last Dose Given Next Dose Due   Ultra-Light Rollator misc  Generic drug: walker      Use as directed              ASK your doctor about these medications        Instructions Last Dose Given Next Dose Due   acetaminophen 500 mg tablet  Commonly known as: Tylenol      TAKE 1 TABLET BY MOUTH EVERY 4 HOURS AS NEEDED FOR MILD-MODERATE PAIN, CAN TAKE 2 TABLETS EVERY 8 HOURS AS NEEDED FOR MODERATE-SEVERE PAIN       albuterol 2.5 mg /3 mL (0.083 %) nebulizer solution      1 VIAL VIA AEROSOL EVERY 4 HOURS AS NEEDED FOR SHORTNESS OF BREATH / CHEST TIGHTNESS       atorvastatin 80 mg tablet  Commonly known as: Lipitor      Take 1 tablet (80 mg) by mouth once daily.       benztropine 0.5 mg tablet  Commonly known as: Cogentin      TAKE 1 TABLET BY MOUTH IN THE MORNING AND AFTERNOON ~108Q2 TAKE 2 TABLETS BY MOUTH EVERY NIGHT AT BEDTIME       cholecalciferol 50 MCG (2000 UT) tablet  Commonly known as: Vitamin D-3      TAKE 1 TABLET BY MOUTH ONCE DAILY       clopidogrel 75 mg tablet  Commonly known as: Plavix      Take 1 tablet (75 mg) by mouth once daily.       docusate sodium 100 mg capsule  Commonly known as: Colace      Take 1 capsule by mouth every day.  In case patient develops diarrhea, stop taking immediately       famotidine 20 mg tablet  Commonly known as: Pepcid      Take 1 tablet (20 mg) by mouth once daily.       haloperidol decanoate 100 mg/mL injection  Commonly known as: Haldol Decanoate           levETIRAcetam 1,000 mg tablet  Commonly known as: Keppra      Take 1 tablet (1,000 mg) by mouth once daily.       lidocaine 5  % patch  Commonly known as: Lidoderm      APPLY 1 PATCH TOPICALLY TO PAINFUL AREA ONCE DAILY *LEAVE IN PLACE FOR 12 HOURS, REMOVE AFTER 12 HOURS. DO NOT REAPPLY UNTIL 12 HOURS HAVE LAPSED*       losartan 25 mg tablet  Commonly known as: Cozaar      Take 1 tablet (25 mg) by mouth once daily.       melatonin 5 mg capsule      Take 1 capsule (5 mg) by mouth once daily.       phenytoin  mg capsule  Commonly known as: Dilantin      Take 3 capsules (300 mg) by mouth once daily at bedtime.       QUEtiapine 300 mg tablet  Commonly known as: SEROquel      TAKE 1 TABLET BY MOUTH DAILY *DOSE INCREASE*       QUEtiapine 25 mg tablet  Commonly known as: SEROquel           sertraline 100 mg tablet  Commonly known as: Zoloft      TAKE 1 TABLET BY MOUTH ONCE DAILY       traZODone 50 mg tablet  Commonly known as: Desyrel      Take 1 tablet (50 mg) by mouth once daily at bedtime.                  FOLLOW UP TESTING, PENDING RESULTS OR REFERRALS AT FOLLOW UP VISIT:   [X] Yes as outlined above   [ ] No    DISPOSITION: Group home    FACILITY NAME: [facility]    Follow up with PCP Jesi Ramsey MD    Outpatient Follow-Up Appointments  Future Appointments   Date Time Provider Department Center   1/27/2025  3:30 PM Bandar Wagner MD [id number] [facility]   3/10/2025  2:40 PM DOMINGO Amos-CNP [id number] [place]   3/14/2025 10:20 AM Jesi Ramsey MD [id number] [facility]       I personally examined the patient and reviewed chart.  Plan of care was discussed with RN    DISCHARGE TIME: > 30 minutes providing counseling or in coordination of care. Total time on this day of visit includes record and documentation review before and after visit including documentation and time not explicitly included on EMR time stamp.    Thu Kellogg MD  [facility] Medicine

## 2025-01-15 NOTE — PROGRESS NOTES
Spiritual Care Visit  Spiritual Care Request    Reason for Visit:  Routine Visit: Follow-up (Follow up after rapid response)  Continue Visiting: Yes   Request Received From:  Referral From: Verbal  Focus of Care:  Visited With: Patient   Refer to :  Referral To:    Care Provided:  Other health caregiver(s) were attending to the pt.  Outcome:  There will be another visit scheduled.    Chaplain Piper Amaral

## 2025-01-15 NOTE — CARE PLAN
The clinical goals for the shift include remain free of falls  Problem: Risk for Suicide  Goal: Accepts medications as prescribed/needed this shift  Outcome: Progressing  Goal: Identifies supports this shift  Outcome: Progressing  Goal: Makes needs known through verbalization or behaviors this shift  Outcome: Progressing  Goal: No self harm this shift  Outcome: Progressing  Goal: Read Safety Guidelines this shift  Outcome: Progressing  Goal: Complete Mental Health Safety Plan (psychiatry only) this shift  Outcome: Progressing     Problem: Sensory Perceptual Alteration as Evidenced by  Goal: Cooperates with admission process  Outcome: Progressing  Goal: Patient/Family participate in treatment and discharge plans  Outcome: Progressing  Goal: Patient/Family verbalizes awareness of resources  Outcome: Progressing  Goal: Participates in unit activities  Outcome: Progressing  Goal: Discusses signs/symptoms of illness/treatment options  Outcome: Progressing  Goal: Initiates reality-based interactions  Outcome: Progressing  Goal: Able to discuss content of hallucinations/delusions  Outcome: Progressing  Goal: Notifies staff when experiencing hallucinations/delusions  Outcome: Progressing  Goal: Verbalizes reduction in hallucinations/delusions  Outcome: Progressing  Goal: Will not act on psychotic perception  Outcome: Progressing  Goal: Understands least restrictive measures  Outcome: Progressing  Goal: Free from restraint events  Outcome: Progressing

## 2025-01-16 ENCOUNTER — APPOINTMENT (OUTPATIENT)
Dept: OTOLARYNGOLOGY | Facility: CLINIC | Age: 70
End: 2025-01-16
Payer: MEDICARE

## 2025-01-16 ENCOUNTER — HOSPITAL ENCOUNTER (INPATIENT)
Facility: HOSPITAL | Age: 70
End: 2025-01-16
Attending: STUDENT IN AN ORGANIZED HEALTH CARE EDUCATION/TRAINING PROGRAM | Admitting: INTERNAL MEDICINE
Payer: MEDICARE

## 2025-01-16 ENCOUNTER — APPOINTMENT (OUTPATIENT)
Dept: RADIOLOGY | Facility: HOSPITAL | Age: 70
End: 2025-01-16
Payer: MEDICARE

## 2025-01-16 DIAGNOSIS — K21.9 GASTROESOPHAGEAL REFLUX DISEASE WITHOUT ESOPHAGITIS: ICD-10-CM

## 2025-01-16 DIAGNOSIS — R44.3 HALLUCINATIONS: Primary | ICD-10-CM

## 2025-01-16 DIAGNOSIS — H61.21 IMPACTED CERUMEN OF RIGHT EAR: ICD-10-CM

## 2025-01-16 DIAGNOSIS — R53.1 WEAKNESS: ICD-10-CM

## 2025-01-16 LAB
ALBUMIN SERPL BCP-MCNC: 4.1 G/DL (ref 3.4–5)
ALP SERPL-CCNC: 81 U/L (ref 33–136)
ALT SERPL W P-5'-P-CCNC: 33 U/L (ref 7–45)
ANION GAP SERPL CALC-SCNC: 13 MMOL/L (ref 10–20)
AST SERPL W P-5'-P-CCNC: 31 U/L (ref 9–39)
BASOPHILS # BLD AUTO: 0.04 X10*3/UL (ref 0–0.1)
BASOPHILS NFR BLD AUTO: 0.7 %
BILIRUB SERPL-MCNC: 0.3 MG/DL (ref 0–1.2)
BUN SERPL-MCNC: 12 MG/DL (ref 6–23)
CALCIUM SERPL-MCNC: 9.5 MG/DL (ref 8.6–10.3)
CARDIAC TROPONIN I PNL SERPL HS: 16 NG/L (ref 0–13)
CHLORIDE SERPL-SCNC: 103 MMOL/L (ref 98–107)
CO2 SERPL-SCNC: 26 MMOL/L (ref 21–32)
CREAT SERPL-MCNC: 0.8 MG/DL (ref 0.5–1.05)
EGFRCR SERPLBLD CKD-EPI 2021: 80 ML/MIN/1.73M*2
EOSINOPHIL # BLD AUTO: 0.22 X10*3/UL (ref 0–0.7)
EOSINOPHIL NFR BLD AUTO: 4.1 %
ERYTHROCYTE [DISTWIDTH] IN BLOOD BY AUTOMATED COUNT: 11.7 % (ref 11.5–14.5)
GLUCOSE SERPL-MCNC: 155 MG/DL (ref 74–99)
HCT VFR BLD AUTO: 40.8 % (ref 36–46)
HGB BLD-MCNC: 12.9 G/DL (ref 12–16)
IMM GRANULOCYTES # BLD AUTO: 0.01 X10*3/UL (ref 0–0.7)
IMM GRANULOCYTES NFR BLD AUTO: 0.2 % (ref 0–0.9)
LYMPHOCYTES # BLD AUTO: 2.25 X10*3/UL (ref 1.2–4.8)
LYMPHOCYTES NFR BLD AUTO: 41.9 %
MAGNESIUM SERPL-MCNC: 1.79 MG/DL (ref 1.6–2.4)
MCH RBC QN AUTO: 29.9 PG (ref 26–34)
MCHC RBC AUTO-ENTMCNC: 31.6 G/DL (ref 32–36)
MCV RBC AUTO: 94 FL (ref 80–100)
MONOCYTES # BLD AUTO: 0.49 X10*3/UL (ref 0.1–1)
MONOCYTES NFR BLD AUTO: 9.1 %
NEUTROPHILS # BLD AUTO: 2.36 X10*3/UL (ref 1.2–7.7)
NEUTROPHILS NFR BLD AUTO: 44 %
NRBC BLD-RTO: 0 /100 WBCS (ref 0–0)
PLATELET # BLD AUTO: 154 X10*3/UL (ref 150–450)
POTASSIUM SERPL-SCNC: 3.6 MMOL/L (ref 3.5–5.3)
PROT SERPL-MCNC: 7.2 G/DL (ref 6.4–8.2)
RBC # BLD AUTO: 4.32 X10*6/UL (ref 4–5.2)
SODIUM SERPL-SCNC: 138 MMOL/L (ref 136–145)
WBC # BLD AUTO: 5.4 X10*3/UL (ref 4.4–11.3)

## 2025-01-16 PROCEDURE — 70450 CT HEAD/BRAIN W/O DYE: CPT | Performed by: STUDENT IN AN ORGANIZED HEALTH CARE EDUCATION/TRAINING PROGRAM

## 2025-01-16 PROCEDURE — 99223 1ST HOSP IP/OBS HIGH 75: CPT

## 2025-01-16 PROCEDURE — 70450 CT HEAD/BRAIN W/O DYE: CPT

## 2025-01-16 PROCEDURE — 83735 ASSAY OF MAGNESIUM: CPT | Performed by: PHYSICIAN ASSISTANT

## 2025-01-16 PROCEDURE — 71045 X-RAY EXAM CHEST 1 VIEW: CPT

## 2025-01-16 PROCEDURE — 71045 X-RAY EXAM CHEST 1 VIEW: CPT | Mod: FOREIGN READ | Performed by: RADIOLOGY

## 2025-01-16 PROCEDURE — 99285 EMERGENCY DEPT VISIT HI MDM: CPT | Mod: 25 | Performed by: STUDENT IN AN ORGANIZED HEALTH CARE EDUCATION/TRAINING PROGRAM

## 2025-01-16 PROCEDURE — 85025 COMPLETE CBC W/AUTO DIFF WBC: CPT | Performed by: PHYSICIAN ASSISTANT

## 2025-01-16 PROCEDURE — 80053 COMPREHEN METABOLIC PANEL: CPT | Performed by: PHYSICIAN ASSISTANT

## 2025-01-16 PROCEDURE — 84484 ASSAY OF TROPONIN QUANT: CPT | Performed by: PHYSICIAN ASSISTANT

## 2025-01-16 PROCEDURE — 36415 COLL VENOUS BLD VENIPUNCTURE: CPT | Performed by: PHYSICIAN ASSISTANT

## 2025-01-16 RX ORDER — ATORVASTATIN CALCIUM 80 MG/1
80 TABLET, FILM COATED ORAL DAILY
Status: DISCONTINUED | OUTPATIENT
Start: 2025-01-17 | End: 2025-01-22 | Stop reason: HOSPADM

## 2025-01-16 RX ORDER — POLYETHYLENE GLYCOL 3350 17 G/17G
17 POWDER, FOR SOLUTION ORAL DAILY
Status: DISCONTINUED | OUTPATIENT
Start: 2025-01-17 | End: 2025-01-22 | Stop reason: HOSPADM

## 2025-01-16 RX ORDER — QUETIAPINE FUMARATE 25 MG/1
50 TABLET, FILM COATED ORAL
Status: DISCONTINUED | OUTPATIENT
Start: 2025-01-17 | End: 2025-01-22 | Stop reason: HOSPADM

## 2025-01-16 RX ORDER — LEVETIRACETAM 500 MG/1
1000 TABLET ORAL DAILY
Status: DISCONTINUED | OUTPATIENT
Start: 2025-01-17 | End: 2025-01-22 | Stop reason: HOSPADM

## 2025-01-16 RX ORDER — HEPARIN SODIUM 5000 [USP'U]/ML
5000 INJECTION, SOLUTION INTRAVENOUS; SUBCUTANEOUS EVERY 8 HOURS SCHEDULED
Status: DISCONTINUED | OUTPATIENT
Start: 2025-01-16 | End: 2025-01-22 | Stop reason: HOSPADM

## 2025-01-16 RX ORDER — PHENYTOIN SODIUM 100 MG/1
300 CAPSULE, EXTENDED RELEASE ORAL NIGHTLY
Status: DISCONTINUED | OUTPATIENT
Start: 2025-01-17 | End: 2025-01-22 | Stop reason: HOSPADM

## 2025-01-16 RX ORDER — ALBUTEROL SULFATE 0.83 MG/ML
2.5 SOLUTION RESPIRATORY (INHALATION) EVERY 4 HOURS PRN
Status: DISCONTINUED | OUTPATIENT
Start: 2025-01-16 | End: 2025-01-22 | Stop reason: HOSPADM

## 2025-01-16 RX ORDER — SERTRALINE HYDROCHLORIDE 100 MG/1
100 TABLET, FILM COATED ORAL DAILY
Status: DISCONTINUED | OUTPATIENT
Start: 2025-01-17 | End: 2025-01-22 | Stop reason: HOSPADM

## 2025-01-16 RX ORDER — BENZTROPINE MESYLATE 0.5 MG/1
0.5 TABLET ORAL
Status: DISCONTINUED | OUTPATIENT
Start: 2025-01-17 | End: 2025-01-22 | Stop reason: HOSPADM

## 2025-01-16 RX ORDER — CLOPIDOGREL BISULFATE 75 MG/1
75 TABLET ORAL DAILY
Status: DISCONTINUED | OUTPATIENT
Start: 2025-01-17 | End: 2025-01-22 | Stop reason: HOSPADM

## 2025-01-16 RX ORDER — ACETAMINOPHEN 325 MG/1
650 TABLET ORAL EVERY 4 HOURS PRN
Status: DISCONTINUED | OUTPATIENT
Start: 2025-01-16 | End: 2025-01-22 | Stop reason: HOSPADM

## 2025-01-16 RX ORDER — LIDOCAINE 560 MG/1
1 PATCH PERCUTANEOUS; TOPICAL; TRANSDERMAL DAILY
Status: DISCONTINUED | OUTPATIENT
Start: 2025-01-17 | End: 2025-01-22 | Stop reason: HOSPADM

## 2025-01-16 RX ORDER — FAMOTIDINE 20 MG/1
20 TABLET, FILM COATED ORAL DAILY
Status: DISCONTINUED | OUTPATIENT
Start: 2025-01-17 | End: 2025-01-22 | Stop reason: HOSPADM

## 2025-01-16 RX ORDER — CHOLECALCIFEROL (VITAMIN D3) 25 MCG
2000 TABLET ORAL DAILY
Status: DISCONTINUED | OUTPATIENT
Start: 2025-01-17 | End: 2025-01-22 | Stop reason: HOSPADM

## 2025-01-16 RX ORDER — BENZTROPINE MESYLATE 1 MG/1
1 TABLET ORAL NIGHTLY
Status: DISCONTINUED | OUTPATIENT
Start: 2025-01-17 | End: 2025-01-22 | Stop reason: HOSPADM

## 2025-01-16 RX ORDER — TRAZODONE HYDROCHLORIDE 50 MG/1
50 TABLET ORAL NIGHTLY
Status: DISCONTINUED | OUTPATIENT
Start: 2025-01-17 | End: 2025-01-22 | Stop reason: HOSPADM

## 2025-01-16 RX ORDER — QUETIAPINE FUMARATE 100 MG/1
200 TABLET, FILM COATED ORAL NIGHTLY
Status: DISCONTINUED | OUTPATIENT
Start: 2025-01-17 | End: 2025-01-22 | Stop reason: HOSPADM

## 2025-01-16 RX ORDER — DOCUSATE SODIUM 100 MG/1
100 CAPSULE, LIQUID FILLED ORAL 2 TIMES DAILY
Status: DISCONTINUED | OUTPATIENT
Start: 2025-01-17 | End: 2025-01-22 | Stop reason: HOSPADM

## 2025-01-16 RX ORDER — QUETIAPINE FUMARATE 25 MG/1
25 TABLET, FILM COATED ORAL
Status: DISCONTINUED | OUTPATIENT
Start: 2025-01-17 | End: 2025-01-22 | Stop reason: HOSPADM

## 2025-01-16 RX ORDER — ACETAMINOPHEN 500 MG
5 TABLET ORAL NIGHTLY
Status: DISCONTINUED | OUTPATIENT
Start: 2025-01-17 | End: 2025-01-22 | Stop reason: HOSPADM

## 2025-01-16 RX ORDER — LOSARTAN POTASSIUM 25 MG/1
25 TABLET ORAL DAILY
Status: DISCONTINUED | OUTPATIENT
Start: 2025-01-17 | End: 2025-01-22 | Stop reason: HOSPADM

## 2025-01-16 ASSESSMENT — COLUMBIA-SUICIDE SEVERITY RATING SCALE - C-SSRS
6. HAVE YOU EVER DONE ANYTHING, STARTED TO DO ANYTHING, OR PREPARED TO DO ANYTHING TO END YOUR LIFE?: NO
2. HAVE YOU ACTUALLY HAD ANY THOUGHTS OF KILLING YOURSELF?: NO
1. IN THE PAST MONTH, HAVE YOU WISHED YOU WERE DEAD OR WISHED YOU COULD GO TO SLEEP AND NOT WAKE UP?: NO

## 2025-01-16 NOTE — ED TRIAGE NOTES
Pt coming from group home due to being unable to walk. Pt was just discharged from [facility] for generalized weakness. Pt unable to ambulate and the group home cannot accommodate her needs.

## 2025-01-17 ENCOUNTER — APPOINTMENT (OUTPATIENT)
Dept: CARDIOLOGY | Facility: HOSPITAL | Age: 70
End: 2025-01-17
Payer: MEDICARE

## 2025-01-17 PROBLEM — R53.1 WEAKNESS: Status: ACTIVE | Noted: 2025-01-17

## 2025-01-17 LAB
ANION GAP SERPL CALC-SCNC: 12 MMOL/L (ref 10–20)
APPEARANCE UR: CLEAR
ATRIAL RATE: 81 BPM
BILIRUB UR STRIP.AUTO-MCNC: NEGATIVE MG/DL
BUN SERPL-MCNC: 14 MG/DL (ref 6–23)
CALCIUM SERPL-MCNC: 8.8 MG/DL (ref 8.6–10.3)
CARDIAC TROPONIN I PNL SERPL HS: 17 NG/L (ref 0–13)
CHLORIDE SERPL-SCNC: 104 MMOL/L (ref 98–107)
CO2 SERPL-SCNC: 26 MMOL/L (ref 21–32)
COLOR UR: NORMAL
CREAT SERPL-MCNC: 0.78 MG/DL (ref 0.5–1.05)
EGFRCR SERPLBLD CKD-EPI 2021: 82 ML/MIN/1.73M*2
ERYTHROCYTE [DISTWIDTH] IN BLOOD BY AUTOMATED COUNT: 11.9 % (ref 11.5–14.5)
GLUCOSE SERPL-MCNC: 146 MG/DL (ref 74–99)
GLUCOSE UR STRIP.AUTO-MCNC: NORMAL MG/DL
HCT VFR BLD AUTO: 34.2 % (ref 36–46)
HGB BLD-MCNC: 11 G/DL (ref 12–16)
KETONES UR STRIP.AUTO-MCNC: NEGATIVE MG/DL
LEUKOCYTE ESTERASE UR QL STRIP.AUTO: NEGATIVE
LEVETIRACETAM SERPL-MCNC: 13 UG/ML (ref 10–40)
MCH RBC QN AUTO: 29.5 PG (ref 26–34)
MCHC RBC AUTO-ENTMCNC: 32.2 G/DL (ref 32–36)
MCV RBC AUTO: 92 FL (ref 80–100)
NITRITE UR QL STRIP.AUTO: NEGATIVE
NRBC BLD-RTO: 0 /100 WBCS (ref 0–0)
P AXIS: 31 DEGREES
PH UR STRIP.AUTO: 5.5 [PH]
PHENYTOIN SERPL-MCNC: 8.5 UG/ML (ref 10–20)
PLATELET # BLD AUTO: 124 X10*3/UL (ref 150–450)
POTASSIUM SERPL-SCNC: 3.1 MMOL/L (ref 3.5–5.3)
PR INTERVAL: 210 MS
PROT UR STRIP.AUTO-MCNC: NEGATIVE MG/DL
Q ONSET: 249 MS
QRS COUNT: 13 BEATS
QRS DURATION: 102 MS
QT INTERVAL: 410 MS
QTC CALCULATION(BAZETT): 476 MS
QTC FREDERICIA: 453 MS
R AXIS: 53 DEGREES
RBC # BLD AUTO: 3.73 X10*6/UL (ref 4–5.2)
RBC # UR STRIP.AUTO: NEGATIVE /UL
SODIUM SERPL-SCNC: 139 MMOL/L (ref 136–145)
SP GR UR STRIP.AUTO: 1
T AXIS: 39 DEGREES
T OFFSET: 454 MS
UROBILINOGEN UR STRIP.AUTO-MCNC: NORMAL MG/DL
VENTRICULAR RATE: 81 BPM
WBC # BLD AUTO: 4.5 X10*3/UL (ref 4.4–11.3)

## 2025-01-17 PROCEDURE — 97165 OT EVAL LOW COMPLEX 30 MIN: CPT | Mod: GO

## 2025-01-17 PROCEDURE — 97161 PT EVAL LOW COMPLEX 20 MIN: CPT | Mod: GP

## 2025-01-17 PROCEDURE — 96372 THER/PROPH/DIAG INJ SC/IM: CPT

## 2025-01-17 PROCEDURE — 80185 ASSAY OF PHENYTOIN TOTAL: CPT

## 2025-01-17 PROCEDURE — 2500000002 HC RX 250 W HCPCS SELF ADMINISTERED DRUGS (ALT 637 FOR MEDICARE OP, ALT 636 FOR OP/ED)

## 2025-01-17 PROCEDURE — 2500000001 HC RX 250 WO HCPCS SELF ADMINISTERED DRUGS (ALT 637 FOR MEDICARE OP): Performed by: INTERNAL MEDICINE

## 2025-01-17 PROCEDURE — 36415 COLL VENOUS BLD VENIPUNCTURE: CPT

## 2025-01-17 PROCEDURE — 84484 ASSAY OF TROPONIN QUANT: CPT

## 2025-01-17 PROCEDURE — 80048 BASIC METABOLIC PNL TOTAL CA: CPT

## 2025-01-17 PROCEDURE — 80177 DRUG SCRN QUAN LEVETIRACETAM: CPT | Mod: PARLAB

## 2025-01-17 PROCEDURE — 2500000001 HC RX 250 WO HCPCS SELF ADMINISTERED DRUGS (ALT 637 FOR MEDICARE OP)

## 2025-01-17 PROCEDURE — 2500000005 HC RX 250 GENERAL PHARMACY W/O HCPCS

## 2025-01-17 PROCEDURE — 81003 URINALYSIS AUTO W/O SCOPE: CPT | Performed by: PHYSICIAN ASSISTANT

## 2025-01-17 PROCEDURE — 1100000001 HC PRIVATE ROOM DAILY

## 2025-01-17 PROCEDURE — 2500000004 HC RX 250 GENERAL PHARMACY W/ HCPCS (ALT 636 FOR OP/ED): Performed by: INTERNAL MEDICINE

## 2025-01-17 PROCEDURE — 93005 ELECTROCARDIOGRAM TRACING: CPT

## 2025-01-17 PROCEDURE — 2500000004 HC RX 250 GENERAL PHARMACY W/ HCPCS (ALT 636 FOR OP/ED)

## 2025-01-17 PROCEDURE — 85027 COMPLETE CBC AUTOMATED: CPT

## 2025-01-17 RX ORDER — KETOROLAC TROMETHAMINE 30 MG/ML
15 INJECTION, SOLUTION INTRAMUSCULAR; INTRAVENOUS ONCE
Status: COMPLETED | OUTPATIENT
Start: 2025-01-17 | End: 2025-01-17

## 2025-01-17 RX ORDER — DEXTROSE MONOHYDRATE AND SODIUM CHLORIDE 5; .45 G/100ML; G/100ML
75 INJECTION, SOLUTION INTRAVENOUS CONTINUOUS
Status: DISCONTINUED | OUTPATIENT
Start: 2025-01-17 | End: 2025-01-22 | Stop reason: HOSPADM

## 2025-01-17 RX ORDER — POTASSIUM CHLORIDE 1.5 G/1.58G
20 POWDER, FOR SOLUTION ORAL 2 TIMES DAILY
Status: COMPLETED | OUTPATIENT
Start: 2025-01-17 | End: 2025-01-17

## 2025-01-17 RX ADMIN — QUETIAPINE FUMARATE 200 MG: 100 TABLET ORAL at 00:55

## 2025-01-17 RX ADMIN — EXTENDED PHENYTOIN SODIUM 300 MG: 100 CAPSULE ORAL at 00:55

## 2025-01-17 RX ADMIN — Medication 5 MG: at 00:46

## 2025-01-17 RX ADMIN — Medication 2000 UNITS: at 09:46

## 2025-01-17 RX ADMIN — TRAZODONE HYDROCHLORIDE 50 MG: 50 TABLET ORAL at 20:06

## 2025-01-17 RX ADMIN — BENZTROPINE MESYLATE 1 MG: 1 TABLET ORAL at 20:07

## 2025-01-17 RX ADMIN — LOSARTAN POTASSIUM 25 MG: 25 TABLET, FILM COATED ORAL at 09:47

## 2025-01-17 RX ADMIN — Medication 5 MG: at 20:07

## 2025-01-17 RX ADMIN — BENZTROPINE MESYLATE 0.5 MG: 1 TABLET ORAL at 13:10

## 2025-01-17 RX ADMIN — PANCRELIPASE 1 CAPSULE: 30000; 6000; 19000 CAPSULE, DELAYED RELEASE PELLETS ORAL at 09:46

## 2025-01-17 RX ADMIN — TRAZODONE HYDROCHLORIDE 50 MG: 50 TABLET ORAL at 00:47

## 2025-01-17 RX ADMIN — POTASSIUM CHLORIDE 20 MEQ: 1.5 POWDER, FOR SOLUTION ORAL at 20:07

## 2025-01-17 RX ADMIN — DOCUSATE SODIUM 100 MG: 100 CAPSULE, LIQUID FILLED ORAL at 10:41

## 2025-01-17 RX ADMIN — DOCUSATE SODIUM 100 MG: 100 CAPSULE, LIQUID FILLED ORAL at 00:46

## 2025-01-17 RX ADMIN — HEPARIN SODIUM 5000 UNITS: 5000 INJECTION INTRAVENOUS; SUBCUTANEOUS at 09:48

## 2025-01-17 RX ADMIN — SERTRALINE HYDROCHLORIDE 100 MG: 100 TABLET ORAL at 09:47

## 2025-01-17 RX ADMIN — HEPARIN SODIUM 5000 UNITS: 5000 INJECTION INTRAVENOUS; SUBCUTANEOUS at 16:00

## 2025-01-17 RX ADMIN — FAMOTIDINE 20 MG: 20 TABLET, FILM COATED ORAL at 09:46

## 2025-01-17 RX ADMIN — BENZTROPINE MESYLATE 0.5 MG: 1 TABLET ORAL at 09:47

## 2025-01-17 RX ADMIN — BENZTROPINE MESYLATE 1 MG: 1 TABLET ORAL at 00:52

## 2025-01-17 RX ADMIN — POLYETHYLENE GLYCOL 3350 17 G: 17 POWDER, FOR SOLUTION ORAL at 09:47

## 2025-01-17 RX ADMIN — HEPARIN SODIUM 5000 UNITS: 5000 INJECTION INTRAVENOUS; SUBCUTANEOUS at 00:52

## 2025-01-17 RX ADMIN — LIDOCAINE 4% 1 PATCH: 40 PATCH TOPICAL at 10:40

## 2025-01-17 RX ADMIN — LEVETIRACETAM 1000 MG: 500 TABLET, FILM COATED ORAL at 09:46

## 2025-01-17 RX ADMIN — PANCRELIPASE 1 CAPSULE: 30000; 6000; 19000 CAPSULE, DELAYED RELEASE PELLETS ORAL at 16:00

## 2025-01-17 RX ADMIN — QUETIAPINE FUMARATE 50 MG: 25 TABLET ORAL at 15:59

## 2025-01-17 RX ADMIN — ATORVASTATIN CALCIUM 80 MG: 80 TABLET, FILM COATED ORAL at 09:47

## 2025-01-17 RX ADMIN — DOCUSATE SODIUM 100 MG: 100 CAPSULE, LIQUID FILLED ORAL at 20:07

## 2025-01-17 RX ADMIN — QUETIAPINE FUMARATE 200 MG: 100 TABLET ORAL at 20:07

## 2025-01-17 RX ADMIN — EXTENDED PHENYTOIN SODIUM 300 MG: 100 CAPSULE ORAL at 20:06

## 2025-01-17 RX ADMIN — KETOROLAC TROMETHAMINE 15 MG: 30 INJECTION, SOLUTION INTRAMUSCULAR at 00:51

## 2025-01-17 RX ADMIN — DEXTROSE AND SODIUM CHLORIDE 75 ML/HR: 5; 450 INJECTION, SOLUTION INTRAVENOUS at 15:59

## 2025-01-17 RX ADMIN — QUETIAPINE FUMARATE 25 MG: 25 TABLET ORAL at 10:41

## 2025-01-17 RX ADMIN — CLOPIDOGREL BISULFATE 75 MG: 75 TABLET ORAL at 09:47

## 2025-01-17 RX ADMIN — PANCRELIPASE 1 CAPSULE: 30000; 6000; 19000 CAPSULE, DELAYED RELEASE PELLETS ORAL at 13:10

## 2025-01-17 RX ADMIN — POTASSIUM CHLORIDE 20 MEQ: 1.5 POWDER, FOR SOLUTION ORAL at 10:41

## 2025-01-17 SDOH — ECONOMIC STABILITY: FOOD INSECURITY
WITHIN THE PAST 12 MONTHS, YOU WORRIED THAT YOUR FOOD WOULD RUN OUT BEFORE YOU GOT THE MONEY TO BUY MORE.: PATIENT UNABLE TO ANSWER

## 2025-01-17 SDOH — SOCIAL STABILITY: SOCIAL INSECURITY: HAVE YOU HAD THOUGHTS OF HARMING ANYONE ELSE?: NO

## 2025-01-17 SDOH — SOCIAL STABILITY: SOCIAL INSECURITY: HAS ANYONE EVER THREATENED TO HURT YOUR FAMILY OR YOUR PETS?: NO

## 2025-01-17 SDOH — ECONOMIC STABILITY: FOOD INSECURITY
WITHIN THE PAST 12 MONTHS, THE FOOD YOU BOUGHT JUST DIDN'T LAST AND YOU DIDN'T HAVE MONEY TO GET MORE.: PATIENT UNABLE TO ANSWER

## 2025-01-17 SDOH — HEALTH STABILITY: MENTAL HEALTH: HOW OFTEN DO YOU HAVE A DRINK CONTAINING ALCOHOL?: NEVER

## 2025-01-17 SDOH — ECONOMIC STABILITY: HOUSING INSECURITY
IN THE LAST 12 MONTHS, WAS THERE A TIME WHEN YOU WERE NOT ABLE TO PAY THE MORTGAGE OR RENT ON TIME?: PATIENT UNABLE TO ANSWER

## 2025-01-17 SDOH — SOCIAL STABILITY: SOCIAL NETWORK
DO YOU BELONG TO ANY CLUBS OR ORGANIZATIONS SUCH AS CHURCH GROUPS, UNIONS, FRATERNAL OR ATHLETIC GROUPS, OR SCHOOL GROUPS?: PATIENT UNABLE TO ANSWER

## 2025-01-17 SDOH — HEALTH STABILITY: MENTAL HEALTH: HOW OFTEN DO YOU HAVE SIX OR MORE DRINKS ON ONE OCCASION?: NEVER

## 2025-01-17 SDOH — ECONOMIC STABILITY: TRANSPORTATION INSECURITY
IN THE PAST 12 MONTHS, HAS LACK OF TRANSPORTATION KEPT YOU FROM MEDICAL APPOINTMENTS OR FROM GETTING MEDICATIONS?: PATIENT UNABLE TO ANSWER

## 2025-01-17 SDOH — HEALTH STABILITY: PHYSICAL HEALTH
ON AVERAGE, HOW MANY DAYS PER WEEK DO YOU ENGAGE IN MODERATE TO STRENUOUS EXERCISE (LIKE A BRISK WALK)?: PATIENT UNABLE TO ANSWER

## 2025-01-17 SDOH — HEALTH STABILITY: MENTAL HEALTH
DO YOU FEEL STRESS - TENSE, RESTLESS, NERVOUS, OR ANXIOUS, OR UNABLE TO SLEEP AT NIGHT BECAUSE YOUR MIND IS TROUBLED ALL THE TIME - THESE DAYS?: PATIENT UNABLE TO ANSWER

## 2025-01-17 SDOH — SOCIAL STABILITY: SOCIAL INSECURITY: WERE YOU ABLE TO COMPLETE ALL THE BEHAVIORAL HEALTH SCREENINGS?: YES

## 2025-01-17 SDOH — SOCIAL STABILITY: SOCIAL NETWORK: HOW OFTEN DO YOU ATTEND CHURCH OR RELIGIOUS SERVICES?: PATIENT UNABLE TO ANSWER

## 2025-01-17 SDOH — HEALTH STABILITY: PHYSICAL HEALTH
HOW OFTEN DO YOU NEED TO HAVE SOMEONE HELP YOU WHEN YOU READ INSTRUCTIONS, PAMPHLETS, OR OTHER WRITTEN MATERIAL FROM YOUR DOCTOR OR PHARMACY?: PATIENT UNABLE TO RESPOND

## 2025-01-17 SDOH — SOCIAL STABILITY: SOCIAL INSECURITY: DO YOU FEEL ANYONE HAS EXPLOITED OR TAKEN ADVANTAGE OF YOU FINANCIALLY OR OF YOUR PERSONAL PROPERTY?: NO

## 2025-01-17 SDOH — HEALTH STABILITY: MENTAL HEALTH: HOW MANY DRINKS CONTAINING ALCOHOL DO YOU HAVE ON A TYPICAL DAY WHEN YOU ARE DRINKING?: PATIENT DOES NOT DRINK

## 2025-01-17 SDOH — SOCIAL STABILITY: SOCIAL INSECURITY: ARE YOU MARRIED, WIDOWED, DIVORCED, SEPARATED, NEVER MARRIED, OR LIVING WITH A PARTNER?: PATIENT UNABLE TO ANSWER

## 2025-01-17 SDOH — SOCIAL STABILITY: SOCIAL INSECURITY: ARE YOU OR HAVE YOU BEEN THREATENED OR ABUSED PHYSICALLY, EMOTIONALLY, OR SEXUALLY BY ANYONE?: NO

## 2025-01-17 SDOH — SOCIAL STABILITY: SOCIAL INSECURITY: HAVE YOU HAD ANY THOUGHTS OF HARMING ANYONE ELSE?: NO

## 2025-01-17 SDOH — SOCIAL STABILITY: SOCIAL INSECURITY: ARE THERE ANY APPARENT SIGNS OF INJURIES/BEHAVIORS THAT COULD BE RELATED TO ABUSE/NEGLECT?: NO

## 2025-01-17 SDOH — SOCIAL STABILITY: SOCIAL INSECURITY
WITHIN THE LAST YEAR, HAVE YOU BEEN RAPED OR FORCED TO HAVE ANY KIND OF SEXUAL ACTIVITY BY YOUR PARTNER OR EX-PARTNER?: PATIENT UNABLE TO ANSWER

## 2025-01-17 SDOH — ECONOMIC STABILITY: FOOD INSECURITY
HOW HARD IS IT FOR YOU TO PAY FOR THE VERY BASICS LIKE FOOD, HOUSING, MEDICAL CARE, AND HEATING?: PATIENT UNABLE TO ANSWER

## 2025-01-17 SDOH — SOCIAL STABILITY: SOCIAL INSECURITY: DO YOU FEEL UNSAFE GOING BACK TO THE PLACE WHERE YOU ARE LIVING?: NO

## 2025-01-17 SDOH — SOCIAL STABILITY: SOCIAL INSECURITY
WITHIN THE LAST YEAR, HAVE YOU BEEN HUMILIATED OR EMOTIONALLY ABUSED IN OTHER WAYS BY YOUR PARTNER OR EX-PARTNER?: PATIENT UNABLE TO ANSWER

## 2025-01-17 SDOH — SOCIAL STABILITY: SOCIAL INSECURITY: WITHIN THE LAST YEAR, HAVE YOU BEEN AFRAID OF YOUR PARTNER OR EX-PARTNER?: PATIENT UNABLE TO ANSWER

## 2025-01-17 SDOH — HEALTH STABILITY: MENTAL HEALTH: EXPERIENCED ANY OF THE FOLLOWING LIFE EVENTS: OTHER (COMMENT)

## 2025-01-17 SDOH — HEALTH STABILITY: PHYSICAL HEALTH: ON AVERAGE, HOW MANY MINUTES DO YOU ENGAGE IN EXERCISE AT THIS LEVEL?: PATIENT UNABLE TO ANSWER

## 2025-01-17 SDOH — SOCIAL STABILITY: SOCIAL NETWORK: IN A TYPICAL WEEK, HOW MANY TIMES DO YOU TALK ON THE PHONE WITH FAMILY, FRIENDS, OR NEIGHBORS?: PATIENT UNABLE TO ANSWER

## 2025-01-17 SDOH — SOCIAL STABILITY: SOCIAL INSECURITY
WITHIN THE LAST YEAR, HAVE YOU BEEN KICKED, HIT, SLAPPED, OR OTHERWISE PHYSICALLY HURT BY YOUR PARTNER OR EX-PARTNER?: PATIENT UNABLE TO ANSWER

## 2025-01-17 SDOH — SOCIAL STABILITY: SOCIAL INSECURITY: DOES ANYONE TRY TO KEEP YOU FROM HAVING/CONTACTING OTHER FRIENDS OR DOING THINGS OUTSIDE YOUR HOME?: NO

## 2025-01-17 SDOH — ECONOMIC STABILITY: INCOME INSECURITY
IN THE PAST 12 MONTHS HAS THE ELECTRIC, GAS, OIL, OR WATER COMPANY THREATENED TO SHUT OFF SERVICES IN YOUR HOME?: PATIENT UNABLE TO ANSWER

## 2025-01-17 SDOH — ECONOMIC STABILITY: HOUSING INSECURITY: AT ANY TIME IN THE PAST 12 MONTHS, WERE YOU HOMELESS OR LIVING IN A SHELTER (INCLUDING NOW)?: PATIENT UNABLE TO ANSWER

## 2025-01-17 SDOH — SOCIAL STABILITY: SOCIAL NETWORK: HOW OFTEN DO YOU ATTEND MEETINGS OF THE CLUBS OR ORGANIZATIONS YOU BELONG TO?: PATIENT UNABLE TO ANSWER

## 2025-01-17 SDOH — SOCIAL STABILITY: SOCIAL INSECURITY: ABUSE: ADULT

## 2025-01-17 SDOH — SOCIAL STABILITY: SOCIAL NETWORK: HOW OFTEN DO YOU GET TOGETHER WITH FRIENDS OR RELATIVES?: PATIENT UNABLE TO ANSWER

## 2025-01-17 ASSESSMENT — COGNITIVE AND FUNCTIONAL STATUS - GENERAL
STANDING UP FROM CHAIR USING ARMS: A LITTLE
TOILETING: A LITTLE
DRESSING REGULAR LOWER BODY CLOTHING: TOTAL
WALKING IN HOSPITAL ROOM: A LITTLE
MOVING FROM LYING ON BACK TO SITTING ON SIDE OF FLAT BED WITH BEDRAILS: A LITTLE
STANDING UP FROM CHAIR USING ARMS: A LOT
DRESSING REGULAR LOWER BODY CLOTHING: A LITTLE
DRESSING REGULAR UPPER BODY CLOTHING: A LOT
TOILETING: A LOT
MOVING TO AND FROM BED TO CHAIR: A LOT
CLIMB 3 TO 5 STEPS WITH RAILING: A LITTLE
MOVING TO AND FROM BED TO CHAIR: A LITTLE
PERSONAL GROOMING: A LITTLE
TURNING FROM BACK TO SIDE WHILE IN FLAT BAD: A LOT
PERSONAL GROOMING: A LOT
MOBILITY SCORE: 11
DRESSING REGULAR UPPER BODY CLOTHING: A LITTLE
HELP NEEDED FOR BATHING: A LITTLE
CLIMB 3 TO 5 STEPS WITH RAILING: TOTAL
WALKING IN HOSPITAL ROOM: A LOT
PATIENT BASELINE BEDBOUND: NO
HELP NEEDED FOR BATHING: A LOT
MOVING FROM LYING ON BACK TO SITTING ON SIDE OF FLAT BED WITH BEDRAILS: A LOT
DAILY ACTIVITIY SCORE: 19
MOBILITY SCORE: 18
TURNING FROM BACK TO SIDE WHILE IN FLAT BAD: A LITTLE
DAILY ACTIVITIY SCORE: 13

## 2025-01-17 ASSESSMENT — PAIN SCALES - GENERAL
PAINLEVEL_OUTOF10: 0 - NO PAIN
PAINLEVEL_OUTOF10: 8
PAINLEVEL_OUTOF10: 8
PAINLEVEL_OUTOF10: 0 - NO PAIN

## 2025-01-17 ASSESSMENT — ACTIVITIES OF DAILY LIVING (ADL)
TOILETING: NEEDS ASSISTANCE
DRESSING YOURSELF: NEEDS ASSISTANCE
LACK_OF_TRANSPORTATION: PATIENT UNABLE TO ANSWER
BATHING: NEEDS ASSISTANCE
WALKS IN HOME: NEEDS ASSISTANCE
PATIENT'S MEMORY ADEQUATE TO SAFELY COMPLETE DAILY ACTIVITIES?: UNABLE TO ASSESS
FEEDING YOURSELF: INDEPENDENT
JUDGMENT_ADEQUATE_SAFELY_COMPLETE_DAILY_ACTIVITIES: UNABLE TO ASSESS
LACK_OF_TRANSPORTATION: PATIENT UNABLE TO ANSWER
ADEQUATE_TO_COMPLETE_ADL: YES
HEARING - RIGHT EAR: DEAF
GROOMING: NEEDS ASSISTANCE
ASSISTIVE_DEVICE: WALKER
HEARING - LEFT EAR: DEAF
LACK_OF_TRANSPORTATION: PATIENT UNABLE TO ANSWER

## 2025-01-17 ASSESSMENT — PATIENT HEALTH QUESTIONNAIRE - PHQ9
2. FEELING DOWN, DEPRESSED OR HOPELESS: NOT AT ALL
SUM OF ALL RESPONSES TO PHQ9 QUESTIONS 1 & 2: 0
1. LITTLE INTEREST OR PLEASURE IN DOING THINGS: NOT AT ALL

## 2025-01-17 ASSESSMENT — PAIN - FUNCTIONAL ASSESSMENT
PAIN_FUNCTIONAL_ASSESSMENT: 0-10

## 2025-01-17 ASSESSMENT — LIFESTYLE VARIABLES
SUBSTANCE_ABUSE_PAST_12_MONTHS: NO
SKIP TO QUESTIONS 9-10: 1
SKIP TO QUESTIONS 9-10: 1
HOW OFTEN DO YOU HAVE A DRINK CONTAINING ALCOHOL: NEVER
HOW OFTEN DO YOU HAVE 6 OR MORE DRINKS ON ONE OCCASION: NEVER
PRESCIPTION_ABUSE_PAST_12_MONTHS: NO
AUDIT-C TOTAL SCORE: 0
AUDIT-C TOTAL SCORE: 0
HOW MANY STANDARD DRINKS CONTAINING ALCOHOL DO YOU HAVE ON A TYPICAL DAY: PATIENT DOES NOT DRINK
AUDIT-C TOTAL SCORE: 0

## 2025-01-17 ASSESSMENT — PAIN SCALES - WONG BAKER
WONGBAKER_NUMERICALRESPONSE: HURTS WHOLE LOT
WONGBAKER_NUMERICALRESPONSE: HURTS WHOLE LOT

## 2025-01-17 ASSESSMENT — PAIN DESCRIPTION - ORIENTATION: ORIENTATION: RIGHT;LEFT

## 2025-01-17 ASSESSMENT — PAIN DESCRIPTION - DESCRIPTORS: DESCRIPTORS: ACHING

## 2025-01-17 ASSESSMENT — PAIN DESCRIPTION - LOCATION: LOCATION: LEG

## 2025-01-17 NOTE — H&P
History Of Present Illness  Janine Sandoval is a 69 y.o. female with past medical history of HTN, HLD, deafness, CVA, seizures, schizophrenia, GERD, constipation, who presented to [facility] ED today from group home with generalized weakness. Per the ED physician, the caregivers at the group home are present and assist with the history. Patient speaks ALS.  They note that the patient has been too weak to walk since being discharged from the hospital yesterday.  They state that they have known her for years that she is able to get around okay with her rollator. They note that her legs will buckle underneath her when she tries to stand which is a change. They also state that she has been acting up more confused than normal and is signing differently than in the past and notes that she is using broken up language. States she was not like this before she was taken the hospital last week. Patient's last known well was well over 24 hours ago. They state they are unable to care for her at the facility as they do to not having the equipment necessary to carry her since she is nonmobile.  Patient was having diffuse weakness.  Also noted having blurry vision in both eyes (able to see  and answer questions appropriately) and numbness bilaterally. 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 Denies fever, chills, cough, chest pain, shortness of breath, nausea, vomiting, abdominal pain, urinary symptoms, diarrhea, or constipation. [facility]lindsay  service offered, but patient declined. Caregiver interpreting at bedside. Caregiver did state patient appears to be back to her normal self but still slightly confused.    ER course: Hemodynamically stable. Afebrile. 36.4C. /80. HR 82, RR 16, 97% RA. Labs show Glucose 155. Troponin 16. UA ordered. See imaging results below. Pt is being admitted to Dr. Edmondson  who will continue to manage patient. I was asked to do the H&P and initial assessment.     Past Medical History  As above      Surgical History  No past surgical history on file.     Social History  She reports that she has never smoked. She has never been exposed to tobacco smoke. She has never used smokeless tobacco. She reports that she does not drink alcohol and does not use drugs.    Family History  Family History   Problem Relation Name Age of Onset    Parkinsonism Mother      Other (cardiac disorder) Father      Glaucoma Father      Parkinsonism Father          Allergies  Levofloxacin    Review of Systems  10 Point review of systems was performed and is negative except for what is stated in the HPI above.   Physical Exam  Constitutional:       Comments: Sleeping but easily arousable with touch.   HENT:      Head: Normocephalic and atraumatic.      Nose: Nose normal.      Mouth/Throat:      Mouth: Mucous membranes are moist.      Pharynx: Oropharynx is clear.   Eyes:      Extraocular Movements: Extraocular movements intact.      Conjunctiva/sclera: Conjunctivae normal.      Pupils: Pupils are equal, round, and reactive to light.   Cardiovascular:      Rate and Rhythm: Normal rate and regular rhythm.      Pulses: Normal pulses.      Heart sounds: Normal heart sounds.   Pulmonary:      Effort: Pulmonary effort is normal.      Breath sounds: Normal breath sounds.   Abdominal:      General: Abdomen is flat. Bowel sounds are normal.      Palpations: Abdomen is soft.   Musculoskeletal:         General: Normal range of motion.      Cervical back: Normal range of motion and neck supple.      Comments: Weakness noted in bilateral lower extremities. NV intact.   Skin:     General: Skin is warm and dry.      Capillary Refill: Capillary refill takes less than 2 seconds.   Neurological:      General: No focal deficit present.      Comments: Answered questions appropriately with ASL with caregiver at bedside.   "  Psychiatric:         Behavior: Behavior normal.      Comments: Somnolent          Last Recorded Vitals  Blood pressure 147/72, pulse 76, temperature 36.2 °C (97.2 °F), temperature source Temporal, resp. rate 18, height 1.651 m (5' 5\"), weight 74.8 kg (164 lb 14.5 oz), SpO2 97%.    Relevant Results  Results for orders placed or performed during the hospital encounter of 01/16/25 (from the past 24 hours)   CBC and Auto Differential   Result Value Ref Range    WBC 5.4 4.4 - 11.3 x10*3/uL    nRBC 0.0 0.0 - 0.0 /100 WBCs    RBC 4.32 4.00 - 5.20 x10*6/uL    Hemoglobin 12.9 12.0 - 16.0 g/dL    Hematocrit 40.8 36.0 - 46.0 %    MCV 94 80 - 100 fL    MCH 29.9 26.0 - 34.0 pg    MCHC 31.6 (L) 32.0 - 36.0 g/dL    RDW 11.7 11.5 - 14.5 %    Platelets 154 150 - 450 x10*3/uL    Neutrophils % 44.0 40.0 - 80.0 %    Immature Granulocytes %, Automated 0.2 0.0 - 0.9 %    Lymphocytes % 41.9 13.0 - 44.0 %    Monocytes % 9.1 2.0 - 10.0 %    Eosinophils % 4.1 0.0 - 6.0 %    Basophils % 0.7 0.0 - 2.0 %    Neutrophils Absolute 2.36 1.20 - 7.70 x10*3/uL    Immature Granulocytes Absolute, Automated 0.01 0.00 - 0.70 x10*3/uL    Lymphocytes Absolute 2.25 1.20 - 4.80 x10*3/uL    Monocytes Absolute 0.49 0.10 - 1.00 x10*3/uL    Eosinophils Absolute 0.22 0.00 - 0.70 x10*3/uL    Basophils Absolute 0.04 0.00 - 0.10 x10*3/uL   Comprehensive metabolic panel   Result Value Ref Range    Glucose 155 (H) 74 - 99 mg/dL    Sodium 138 136 - 145 mmol/L    Potassium 3.6 3.5 - 5.3 mmol/L    Chloride 103 98 - 107 mmol/L    Bicarbonate 26 21 - 32 mmol/L    Anion Gap 13 10 - 20 mmol/L    Urea Nitrogen 12 6 - 23 mg/dL    Creatinine 0.80 0.50 - 1.05 mg/dL    eGFR 80 >60 mL/min/1.73m*2    Calcium 9.5 8.6 - 10.3 mg/dL    Albumin 4.1 3.4 - 5.0 g/dL    Alkaline Phosphatase 81 33 - 136 U/L    Total Protein 7.2 6.4 - 8.2 g/dL    AST 31 9 - 39 U/L    Bilirubin, Total 0.3 0.0 - 1.2 mg/dL    ALT 33 7 - 45 U/L   Magnesium   Result Value Ref Range    Magnesium 1.79 1.60 - 2.40 " mg/dL   Troponin I, High Sensitivity   Result Value Ref Range    Troponin I, High Sensitivity 16 (H) 0 - 13 ng/L   ECG 12 lead   Result Value Ref Range    Ventricular Rate 81 BPM    Atrial Rate 81 BPM    MN Interval 210 ms    QRS Duration 102 ms    QT Interval 410 ms    QTC Calculation(Bazett) 476 ms    P Axis 31 degrees    R Axis 53 degrees    T Axis 39 degrees    QRS Count 13 beats    Q Onset 249 ms    T Offset 454 ms    QTC Fredericia 453 ms   Levetiracetam   Result Value Ref Range    Keppra 13 10 - 40 ug/mL   Phenytoin level, total   Result Value Ref Range    Phenytoin 8.5 (L) 10.0 - 20.0 ug/mL   Basic metabolic panel   Result Value Ref Range    Glucose 146 (H) 74 - 99 mg/dL    Sodium 139 136 - 145 mmol/L    Potassium 3.1 (L) 3.5 - 5.3 mmol/L    Chloride 104 98 - 107 mmol/L    Bicarbonate 26 21 - 32 mmol/L    Anion Gap 12 10 - 20 mmol/L    Urea Nitrogen 14 6 - 23 mg/dL    Creatinine 0.78 0.50 - 1.05 mg/dL    eGFR 82 >60 mL/min/1.73m*2    Calcium 8.8 8.6 - 10.3 mg/dL   CBC   Result Value Ref Range    WBC 4.5 4.4 - 11.3 x10*3/uL    nRBC 0.0 0.0 - 0.0 /100 WBCs    RBC 3.73 (L) 4.00 - 5.20 x10*6/uL    Hemoglobin 11.0 (L) 12.0 - 16.0 g/dL    Hematocrit 34.2 (L) 36.0 - 46.0 %    MCV 92 80 - 100 fL    MCH 29.5 26.0 - 34.0 pg    MCHC 32.2 32.0 - 36.0 g/dL    RDW 11.9 11.5 - 14.5 %    Platelets 124 (L) 150 - 450 x10*3/uL   Troponin I, High Sensitivity   Result Value Ref Range    Troponin I, High Sensitivity 17 (H) 0 - 13 ng/L     CT head wo IV contrast    Result Date: 1/16/2025  Interpreted By:  Fausto Flores, STUDY: CT HEAD WO IV CONTRAST;  1/16/2025 10:53 pm   INDICATION: Signs/Symptoms:weakness, ams.   COMPARISON: Noncontrast head CT 09/16/2024   ACCESSION NUMBER(S): [id number]   ORDERING CLINICIAN: SAI TATE   TECHNIQUE: Axial non-contrast CT images of the head. Coronal and sagittal images were reconstructed.   FINDINGS: BRAIN PARENCHYMA: Hypodensity in the left basal ganglia and corona radiata, likely  sequelae of remote infarct, unchanged compared to prior.Sub-cortical and periventricular hypoattenuating foci, likely sequelae of chronic ischemic microangiopathy. Gray-white matter differentiation is preserved. VENTRICLES:Within normal limits for brain volume. No midline shift. EXTRA-AXIAL SPACES: No collections. SOFT TISSUES: Unremarkable. SINUSES: Paranasal sinuses are clear. MASTOIDS: Right mastoid effusion, similar to prior. CALVARIUM: No depressed skull fractures. VESSELS: Calcification of carotid siphons. OTHER FINDINGS: None.       1. No acute intracranial abnormality. If clinical concern persists, consider further evaluation with MRI. 2. Other chronic findings including encephalomalacia in the left basal ganglia/corona radiata is unchanged compared to prior.   MACRO: None   Signed by: Fausto Flores 1/16/2025 11:25 PM Dictation workstation:   [id number]    XR chest 1 view    Result Date: 1/16/2025  STUDY: Chest Radiograph;  1/16/2025 7:27 PM INDICATION: Weakness. COMPARISON: 9/16/2024 XR Chest. ACCESSION NUMBER(S): [id number] ORDERING CLINICIAN: SERGE ADAMS TECHNIQUE:  Frontal chest was obtained at 19:27 hours. FINDINGS: CARDIOMEDIASTINAL SILHOUETTE: The cardiomediastinal silhouette is mildly enlarged unchanged from previous imaging..  LUNGS: Lungs are clear.  ABDOMEN: No remarkable upper abdominal findings.  BONES: No acute osseous changes.    1.  Stable cardiomegaly. 2.  No focal pulmonary consolidation pleural effusions.. Signed by Serge Moon MD     Assessment/Plan   Assessment & Plan  Generalized weakness    Weakness    69 year old female who presented to ED today with generalized weakness. No focal deficits. Group home unable to care for her due to ambulatory dysfunction. PT/OT/SW ordered. Patient will be admitted to the hospital for further medical management.    Generalized weakness  Ambulatory dysfunction  Hx of CVA  Admit to OBS/RMF to Dr. Edmondson  Defer consults to attending per request  See  imaging results  Patient's last known well was well over 24 hours ago  UA ordered  PT/OT/SW  Repeat labs in AM (Keppra and Dilantin levels ordered)    Elevated troponin (near baseline)  Troponin 16. Will trend.   Denies chest pain    Chronic conditions  #HTN, HLD, deafness, seizures, schizophrenia, GERD, constipation   Continue home meds as appropriate when nursing completes home med rec  Full code    DVT prophylaxis  Heparin SQ  SCD's as tolerated  Up in chair TID     I spent 60 minutes in the professional and overall care of this patient.  Patient fully evaluated on January 17.  Correct electrolytes today.  Physical and Occupational Therapy consultations with possible discharge to skilled nursing when approved  Juan Luis Edmondson MD

## 2025-01-17 NOTE — PROGRESS NOTES
Occupational Therapy    Evaluation    Patient Name: Janine Sandoval  MRN: [id number]  Department: [facility]  Room: [address] [place] [address]  Today's Date: 1/17/2025  Time Calculation  Start Time: 0925  Stop Time: 0939  Time Calculation (min): 14 min    Assessment  IP OT Assessment  OT Assessment: Pt demosntrates a decline in adl/functional mobility. Recommend mod intensity OT tx intervention.Good prognosis for goa attainment  End of Session Communication: Bedside nurse  End of Session Patient Position: Bed, 2 rail up, Alarm on (All needs in reach and no complaints noted)  Plan:  Treatment Interventions: ADL retraining, Functional transfer training  OT Frequency: 3 times per week  OT Discharge Recommendations: Moderate intensity level of continued care  OT - OK to Discharge:  (Okay to discharge after medical clearance)    Subjective   Current Problem:  1. Weakness          General:  General  Reason for Referral: OT Eval and Treat  Referred By: DOMINGO Alford-CNP  Past Medical History Relevant to Rehab: 69 y.o. female with past medical history of HTN, HLD, deafness, CVA, seizures, schizophrenia, GERD, constipation, who presented to [facility] ED today from group home with generalized weakness. Per the ED physician, the caregivers at the group home are present and assist with the history. Patient speaks ALS.  They note that the patient has been too weak to walk since being discharged from the hospital yesterday.  They state that they have known her for years that she is able to get around okay with her rollator. They note that her legs will buckle underneath her when she tries to stand which is a change. They also state that she has been acting up more confused than normal and is signing differently than in the past and notes that she is using broken up language.  Co-Treatment: PT  Co-Treatment Reason: maximize safety and functional mobility  Prior to Session Communication: Bedside nurse  Patient Position Received: Bed, 2 rail up,  Alarm off, not on at start of session  General Comment: Attempted writing to pt for communication, however was not clear if pt understood, so used demonstrations with tactile cueing which pt demonstrated understanding well.  Precautions:  Precautions Comment: Fall precautions, deaf, uses ASL at baseline, did well understanding demonstrations throughout session         Pain:  Pain Assessment  Pain Assessment: 0-10  0-10 (Numeric) Pain Score: 0 - No pain    Objective   Cognition:  Overall Cognitive Status: Unable to assess (Pt not able to see written words and is hearing impaired)  Orientation Level: Unable to assess           Home Living:  Home Living Comments: From previous eval in 9/2024, Pt is from a Group Home with 2 ARELY and B HR, 1 story. Bathroom has a walk in shower with grab bar and chair, and a high toilet.   Prior Function:  Level of [place]: Needs assistance with ADLs, Needs assistance with homemaking, Needs assistance with functional transfers (Pt amb with rollator and SUP, facility assists with ADLs and does all IADLs, assist on stairs/Staff provided home mgnt)  IADL History:  Homemaking Responsibilities:  (staff)  ADL:   Assisted by staff at group home  Activity Tolerance:   Fair/fair plus  Bed Mobility/Transfers: Bed Mobility  Bed Mobility:  (supine to sitting: max A x 1, sit to supine: max A x 2)    Transfers  Transfer:  (STS from EOB: mod A x 2)      Functional Mobility:  Functional Mobility  Functional Mobility Performed:  (sidestep to HOB  with wheeled walker mod a x  2)       IADL's:   Homemaking Responsibilities:  (staff)  Vision:    Sensation:  Light Touch: No apparent deficits  Strength:  Strength Comments: strength below elbow wfl  Perception:   wfl  Coordination:    wfl  Hand Function:  Hand Function  Gross Grasp: Functional  Extremities: RUE   RUE : Within Functional Limits and LUE   LUE: Within Functional Limits    Outcome Measures: [facility] Daily Activity  Putting on and taking off  regular lower body clothing: Total  Bathing (including washing, rinsing, drying): A lot  Putting on and taking off regular upper body clothing: A lot  Toileting, which includes using toilet, bedpan or urinal: A lot  Taking care of personal grooming such as brushing teeth: A lot (sink level)  Eating Meals: None  Daily Activity - Total Score: 13      Education Documentation  Body Mechanics, taught by Raquel Carr OT at 1/17/2025  1:23 PM.  Learner: Patient  Readiness: Acceptance  Method: Demonstration  Response: Demonstrated Understanding, Needs Reinforcement    Precautions, taught by Raquel Carr OT at 1/17/2025  1:23 PM.  Learner: Patient  Readiness: Acceptance  Method: Demonstration  Response: Demonstrated Understanding, Needs Reinforcement    ADL Training, taught by Raquel Carr OT at 1/17/2025  1:23 PM.  Learner: Patient  Readiness: Acceptance  Method: Demonstration  Response: Demonstrated Understanding, Needs Reinforcement    Body Mechanics, taught by Raquel Carr OT at 1/17/2025  1:23 PM.  Learner: Patient  Readiness: Acceptance  Method: Demonstration  Response: Demonstrated Understanding, Needs Reinforcement    Mobility Training, taught by Raquel Carr OT at 1/17/2025  1:23 PM.  Learner: Patient  Readiness: Acceptance  Method: Demonstration  Response: Demonstrated Understanding, Needs Reinforcement    Education Comments  No comments found.      Goals:   Encounter Problems       Encounter Problems (Active)       impaired functional daily living skills       Pt will increase Grooming to sba   Upper Body Bathing to sba/cga (sink level)     Lower Body Bathing  to min a x 1-2    Increase Upper Body Dressing  to sba    LE Dressing to min a  x 1-2 with/ without adaptive equipment as needed (Progressing)       Start:  01/17/25    Expected End:  01/31/25            Pt will increase Functional Transfers Bed Mobility to cga/min a x 1-2 with device   Sit to Stand  to cga/min a x 1-2   Functional Mobility  with  a device to cga/min a x 1-2 chair/toliet/room to increase indep/safety in patients discharge environment (Progressing)       Start:  01/17/25    Expected End:  01/31/25

## 2025-01-17 NOTE — CARE PLAN
The patient's goals for the shift include ability to walk and free of pain    The clinical goals for the shift include ability to walk and free of pain

## 2025-01-17 NOTE — H&P
History Of Present Illness  Janine Sandoval is a 69 y.o. female with past medical history of HTN, HLD, deafness, CVA, seizures, schizophrenia, GERD, constipation, who presented to [facility] ED today from group home with generalized weakness. Per the ED physician, the caregivers at the group home are present and assist with the history. Patient speaks ALS.  They note that the patient has been too weak to walk since being discharged from the hospital yesterday.  They state that they have known her for years that she is able to get around okay with her rollator. They note that her legs will buckle underneath her when she tries to stand which is a change. They also state that she has been acting up more confused than normal and is signing differently than in the past and notes that she is using broken up language. States she was not like this before she was taken the hospital last week. Patient's last known well was well over 24 hours ago. They state they are unable to care for her at the facility as they do to not having the equipment necessary to carry her since she is nonmobile.  Patient was having diffuse weakness.  Also noted having blurry vision in both eyes (able to see  and answer questions appropriately) and numbness bilaterally. 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 Denies fever, chills, cough, chest pain, shortness of breath, nausea, vomiting, abdominal pain, urinary symptoms, diarrhea, or constipation. [place]lindsay  service offered, but patient declined. Caregiver interpreting at bedside. Caregiver did state patient appears to be back to her normal self but still slightly confused.    ER course: Hemodynamically stable. Afebrile. 36.4C. /80. HR 82, RR 16, 97% RA. Labs show Glucose 155. Troponin 16. UA ordered. See imaging results below. Pt is being admitted to Dr. Edmondson  who will continue to manage patient. I was asked to do the H&P and initial assessment.     Past Medical History  As above      Surgical History  No past surgical history on file.     Social History  She reports that she has never smoked. She has never been exposed to tobacco smoke. She has never used smokeless tobacco. She reports that she does not drink alcohol and does not use drugs.    Family History  Family History   Problem Relation Name Age of Onset    Parkinsonism Mother      Other (cardiac disorder) Father      Glaucoma Father      Parkinsonism Father          Allergies  Levofloxacin    Review of Systems  10 Point review of systems was performed and is negative except for what is stated in the HPI above.   Physical Exam  Constitutional:       Comments: Sleeping but easily arousable with touch.   HENT:      Head: Normocephalic and atraumatic.      Nose: Nose normal.      Mouth/Throat:      Mouth: Mucous membranes are moist.      Pharynx: Oropharynx is clear.   Eyes:      Extraocular Movements: Extraocular movements intact.      Conjunctiva/sclera: Conjunctivae normal.      Pupils: Pupils are equal, round, and reactive to light.   Cardiovascular:      Rate and Rhythm: Normal rate and regular rhythm.      Pulses: Normal pulses.      Heart sounds: Normal heart sounds.   Pulmonary:      Effort: Pulmonary effort is normal.      Breath sounds: Normal breath sounds.   Abdominal:      General: Abdomen is flat. Bowel sounds are normal.      Palpations: Abdomen is soft.   Musculoskeletal:         General: Normal range of motion.      Cervical back: Normal range of motion and neck supple.      Comments: Weakness noted in bilateral lower extremities. NV intact.   Skin:     General: Skin is warm and dry.      Capillary Refill: Capillary refill takes less than 2 seconds.   Neurological:      General: No focal deficit present.      Comments: Answered questions appropriately with ASL with caregiver at bedside.     Psychiatric:         Behavior: Behavior normal.      Comments: Somnolent          Last Recorded Vitals  Blood pressure 133/80, pulse 82, temperature 36.4 °C (97.5 °F), temperature source Tympanic, resp. rate 16, weight 74.8 kg (165 lb), SpO2 97%.    Relevant Results  Results for orders placed or performed during the hospital encounter of 01/16/25 (from the past 24 hours)   CBC and Auto Differential   Result Value Ref Range    WBC 5.4 4.4 - 11.3 x10*3/uL    nRBC 0.0 0.0 - 0.0 /100 WBCs    RBC 4.32 4.00 - 5.20 x10*6/uL    Hemoglobin 12.9 12.0 - 16.0 g/dL    Hematocrit 40.8 36.0 - 46.0 %    MCV 94 80 - 100 fL    MCH 29.9 26.0 - 34.0 pg    MCHC 31.6 (L) 32.0 - 36.0 g/dL    RDW 11.7 11.5 - 14.5 %    Platelets 154 150 - 450 x10*3/uL    Neutrophils % 44.0 40.0 - 80.0 %    Immature Granulocytes %, Automated 0.2 0.0 - 0.9 %    Lymphocytes % 41.9 13.0 - 44.0 %    Monocytes % 9.1 2.0 - 10.0 %    Eosinophils % 4.1 0.0 - 6.0 %    Basophils % 0.7 0.0 - 2.0 %    Neutrophils Absolute 2.36 1.20 - 7.70 x10*3/uL    Immature Granulocytes Absolute, Automated 0.01 0.00 - 0.70 x10*3/uL    Lymphocytes Absolute 2.25 1.20 - 4.80 x10*3/uL    Monocytes Absolute 0.49 0.10 - 1.00 x10*3/uL    Eosinophils Absolute 0.22 0.00 - 0.70 x10*3/uL    Basophils Absolute 0.04 0.00 - 0.10 x10*3/uL   Comprehensive metabolic panel   Result Value Ref Range    Glucose 155 (H) 74 - 99 mg/dL    Sodium 138 136 - 145 mmol/L    Potassium 3.6 3.5 - 5.3 mmol/L    Chloride 103 98 - 107 mmol/L    Bicarbonate 26 21 - 32 mmol/L    Anion Gap 13 10 - 20 mmol/L    Urea Nitrogen 12 6 - 23 mg/dL    Creatinine 0.80 0.50 - 1.05 mg/dL    eGFR 80 >60 mL/min/1.73m*2    Calcium 9.5 8.6 - 10.3 mg/dL    Albumin 4.1 3.4 - 5.0 g/dL    Alkaline Phosphatase 81 33 - 136 U/L    Total Protein 7.2 6.4 - 8.2 g/dL    AST 31 9 - 39 U/L    Bilirubin, Total 0.3 0.0 - 1.2 mg/dL    ALT 33 7 - 45 U/L   Magnesium   Result Value Ref Range    Magnesium 1.79 1.60 - 2.40 mg/dL   Troponin I, High  Sensitivity   Result Value Ref Range    Troponin I, High Sensitivity 16 (H) 0 - 13 ng/L     CT head wo IV contrast    Result Date: 1/16/2025  Interpreted By:  Fausto Flores, STUDY: CT HEAD WO IV CONTRAST;  1/16/2025 10:53 pm   INDICATION: Signs/Symptoms:weakness, ams.   COMPARISON: Noncontrast head CT 09/16/2024   ACCESSION NUMBER(S): [id number]   ORDERING CLINICIAN: SAI TATE   TECHNIQUE: Axial non-contrast CT images of the head. Coronal and sagittal images were reconstructed.   FINDINGS: BRAIN PARENCHYMA: Hypodensity in the left basal ganglia and corona radiata, likely sequelae of remote infarct, unchanged compared to prior.Sub-cortical and periventricular hypoattenuating foci, likely sequelae of chronic ischemic microangiopathy. Gray-white matter differentiation is preserved. VENTRICLES:Within normal limits for brain volume. No midline shift. EXTRA-AXIAL SPACES: No collections. SOFT TISSUES: Unremarkable. SINUSES: Paranasal sinuses are clear. MASTOIDS: Right mastoid effusion, similar to prior. CALVARIUM: No depressed skull fractures. VESSELS: Calcification of carotid siphons. OTHER FINDINGS: None.       1. No acute intracranial abnormality. If clinical concern persists, consider further evaluation with MRI. 2. Other chronic findings including encephalomalacia in the left basal ganglia/corona radiata is unchanged compared to prior.   MACRO: None   Signed by: Fuasto Flores 1/16/2025 11:25 PM Dictation workstation:   [id number]    XR chest 1 view    Result Date: 1/16/2025  STUDY: Chest Radiograph;  1/16/2025 7:27 PM INDICATION: Weakness. COMPARISON: 9/16/2024 XR Chest. ACCESSION NUMBER(S): [id number] ORDERING CLINICIAN: MAREN ADAMS TECHNIQUE:  Frontal chest was obtained at 19:27 hours. FINDINGS: CARDIOMEDIASTINAL SILHOUETTE: The cardiomediastinal silhouette is mildly enlarged unchanged from previous imaging..  LUNGS: Lungs are clear.  ABDOMEN: No remarkable upper abdominal findings.  BONES: No acute  osseous changes.    1.  Stable cardiomegaly. 2.  No focal pulmonary consolidation pleural effusions.. Signed by Serge Moon MD     Assessment/Plan   Assessment & Plan  Generalized weakness    69 year old female who presented to ED today with generalized weakness. No focal deficits. Group home unable to care for her due to ambulatory dysfunction. PT/OT/SW ordered. Patient will be admitted to the hospital for further medical management.    Generalized weakness  Ambulatory dysfunction  Hx of CVA  Admit to OBS/RMF to Dr. Edmondson  Defer consults to attending per request  See imaging results  Patient's last known well was well over 24 hours ago  UA ordered  PT/OT/SW  Repeat labs in AM (Keppra and Dilantin levels ordered)    Elevated troponin (near baseline)  Troponin 16. Will trend.   Denies chest pain    Chronic conditions  #HTN, HLD, deafness, seizures, schizophrenia, GERD, constipation   Continue home meds as appropriate when nursing completes home med rec  Full code    DVT prophylaxis  Heparin SQ  SCD's as tolerated  Up in chair TID     I spent 35 minutes in the professional and overall care of this patient.    Milly Da Silva, APRN-CNP

## 2025-01-17 NOTE — CONSULTS
"Nutrition Initial Assessment:   Nutrition Assessment    Reason for Assessment: Admission nursing screening (MST=2 for unsure of weight loss)    Patient is a 69 y.o. female presenting with generalized weakness     PmHx: HTN, HLD, CVA, GERD, deaf, seizures, schizophrenia, constipation    Nutrition History:  Energy Intake:  (not established yet)  Food and Nutrient History: Pt was unavailable at time of attempted visit, patient care being performed. Per review of EMR, Pt was recently hospitalized at [facility] 1/7-1/15, then admitted 1/16 to [facility].  Pt is deaf and communicates with ASL.  Vitamin/Herbal Supplement Use: vitamin D3, Creon  Food Allergies/Intolerances:  None  GI Symptoms: Constipation  Oral Problems:  SANAZ       Anthropometrics:  Height: 165.1 cm (5' 5\")   Weight: 74.8 kg (164 lb 14.5 oz)   BMI (Calculated): 27.44  IBW/kg (Dietitian Calculated): 56.8 kg  Percent of IBW: 132 %       Weight History:     Weight Change %:  Weight History / % Weight Change: 1/7 74.8kg, 12/11 74.8kg, 10/22 72.6kg, 9/16 68.9kg, 5/17 68.9kg, 2/26 69.4kg, 1/26 69.4kg  Significant Weight Loss: No    Nutrition Focused Physical Exam Findings:    Subcutaneous Fat Loss:   Orbital Fat Pads: Defer (not available)  Muscle Wasting:     Edema:  Edema: +2 mild  Edema Location: non pitting BUE, 2+ BLE  Physical Findings:  Skin: Negative    Nutrition Significant Labs:  CBC Trend:   Results from last 7 days   Lab Units 01/17/25  0549 01/16/25 1919 01/12/25  0514 01/10/25  1431   WBC AUTO x10*3/uL 4.5 5.4 6.9 7.6   RBC AUTO x10*6/uL 3.73* 4.32 4.11 4.34   HEMOGLOBIN g/dL 11.0* 12.9 12.4 12.9   HEMATOCRIT % 34.2* 40.8 36.1 38.7   MCV fL 92 94 88 89   PLATELETS AUTO x10*3/uL 124* 154 164 174    , BMP Trend:   Results from last 7 days   Lab Units 01/17/25  0548 01/16/25 1919 01/13/25  0814 01/12/25  0514   GLUCOSE mg/dL 146* 155* 146* 196*   CALCIUM mg/dL 8.8 9.5 9.3 9.0   SODIUM mmol/L 139 138 142 144   POTASSIUM mmol/L 3.1* 3.6 3.6 3.4*   CO2 mmol/L 26 " 26 27 26   CHLORIDE mmol/L 104 103 108* 109*   BUN mg/dL 14 12 17 24*   CREATININE mg/dL 0.78 0.80 0.70 0.79    , A1C:  Lab Results   Component Value Date    HGBA1C 5.4 11/04/2024       Nutrition Specific Medications:  Reviewed     I/O:    ;      Dietary Orders (From admission, onward)       Start     Ordered    01/17/25 0923  Oral nutritional supplements  Until discontinued        Question Answer Comment   Deliver with Lunch    Select supplement: Magic Cup        01/17/25 0922    01/17/25 0129  May Participate in Room Service  ( ROOM SERVICE MAY PARTICIPATE)  Once        Question:  .  Answer:  Yes    01/17/25 0128 01/16/25 2344  Adult diet Regular  Diet effective now        Question:  Diet type  Answer:  Regular    01/16/25 2344                     Estimated Needs:      Method for Estimating Needs: 1530-1700kcals ( 27-30kcals/kg IBW)     Method for Estimating Needs: 56-62g (1.0-1.1g/kg IBW)     Method for Estimating Needs: 1 mL/kcal or as per MD        Nutrition Diagnosis   Malnutrition Diagnosis  Patient has Malnutrition Diagnosis:  (unable to determine at this time)    Nutrition Diagnosis  Patient has Nutrition Diagnosis: No       Nutrition Interventions/Recommendations         Nutrition Prescription:  Individualized Nutrition Prescription Provided for : Individualized Nutrition Prescription Provided for :    3079-5903 kcal,    56-62g protein, and   1ml/kcals fluid via    Regular  diet. Will order Magic cup once daily until intakes established        Nutrition Interventions:   Interventions: Meals and snacks, Medical food supplement  Meals and Snacks: General healthful diet  Goal: consume >75% of meals  Medical Food Supplement: Commercial food  Goal: consume >75% of Magic cup once daily(for an additional 290 kcals, 9 gm protein each)         Nutrition Education:   N/A     Nutrition Monitoring and Evaluation   Food/Nutrient Related History Monitoring  Monitoring and Evaluation Plan: Energy intake, Fluid  intake, Amount of food  Energy Intake: Estimated energy intake  Criteria: Meal/ONS intake to meet >75% of estimated needs  Fluid Intake: Estimated fluid intake  Criteria: fluid intake to meet >75% of estimated need  Amount of Food: Estimated amout of food, Medical food intake  Criteria: Pt to consume >75% of meals/ONS    Body Composition/Growth/Weight History  Monitoring and Evaluation Plan: Weight  Weight: Measured weight  Criteria: maintain stable weight    Biochemical Data, Medical Tests and Procedures  Monitoring and Evaluation Plan: Electrolyte/renal panel  Electrolyte and Renal Panel: Potassium  Criteria: labs WNL              Time Spent (min): 45 minutes

## 2025-01-17 NOTE — PROGRESS NOTES
01/17/25 1157   Discharge Planning   Living Arrangements Other (Comment)  (Group home- [facility])   Support Systems Other (Comment)  (caregivers from group home)   Type of Residence Group home   Do you have animals or pets at home? No   Care Facility Name [facility]   Who is requesting discharge planning? Provider   Home or Post Acute Services Post acute facilities (Rehab/SNF/etc)   Type of Post Acute Facility Services Skilled nursing   Expected Discharge Disposition SNF   Does the patient need discharge transport arranged? Yes   RoundTrip coordination needed? Yes   Has discharge transport been arranged? No   Patient Choice   Provider Choice list and CMS website (https://medicare.gov/care-compare#search) for post-acute Quality and Resource Measure Data were provided and reviewed with: Other (Comment)  (Group home care giver)   Intensity of Service   Intensity of Service 0-30 min     Spoke with caregiver on phone with number provided in chart. Introduced self and role as care coordinator. Demographics verified. Patient insurance is Medicare and medicaid. Patient PCP is Roxy. Patient is deaf and uses ASL and can read lips. Patient was independent with ADL until weakness started. Patient uses a walker. PT recs mod. Discussed discharge planning with care giver. SNF list emailed to her for review. Care coodinators will follow for discharge planning.

## 2025-01-17 NOTE — CARE PLAN
The patient's goals for the shift include ability to walk and free of pain    The clinical goals for the shift include ability to walk and free of pain    Problem: Pain - Adult  Goal: Verbalizes/displays adequate comfort level or baseline comfort level  Outcome: Progressing     Problem: Safety - Adult  Goal: Free from fall injury  Outcome: Progressing     Problem: Discharge Planning  Goal: Discharge to home or other facility with appropriate resources  Outcome: Progressing     Problem: Chronic Conditions and Co-morbidities  Goal: Patient's chronic conditions and co-morbidity symptoms are monitored and maintained or improved  Outcome: Progressing     Problem: Skin  Goal: Decreased wound size/increased tissue granulation at next dressing change  Outcome: Progressing  Goal: Participates in plan/prevention/treatment measures  Outcome: Progressing  Goal: Prevent/manage excess moisture  Outcome: Progressing  Goal: Prevent/minimize sheer/friction injuries  Outcome: Progressing  Goal: Promote/optimize nutrition  Outcome: Progressing  Goal: Promote skin healing  Outcome: Progressing     Problem: Pain  Goal: Takes deep breaths with improved pain control throughout the shift  Outcome: Progressing  Goal: Turns in bed with improved pain control throughout the shift  Outcome: Progressing  Goal: Walks with improved pain control throughout the shift  Outcome: Progressing  Goal: Performs ADL's with improved pain control throughout shift  Outcome: Progressing  Goal: Participates in PT with improved pain control throughout the shift  Outcome: Progressing  Goal: Free from opioid side effects throughout the shift  Outcome: Progressing  Goal: Free from acute confusion related to pain meds throughout the shift  Outcome: Progressing     Problem: Nutrition  Goal: Less than 5 days NPO/clear liquids  Outcome: Progressing  Goal: Oral intake greater than 50%  Outcome: Progressing  Goal: Oral intake greater 75%  Outcome: Progressing  Goal:  Consume prescribed supplement  Outcome: Progressing  Goal: Adequate PO fluid intake  Outcome: Progressing  Goal: Nutrition support goals are met within 48 hrs  Outcome: Progressing  Goal: Nutrition support is meeting 75% of nutrient needs  Outcome: Progressing  Goal: Tube feed tolerance  Outcome: Progressing  Goal: BG  mg/dL  Outcome: Progressing  Goal: Lab values WNL  Outcome: Progressing  Goal: Electrolytes WNL  Outcome: Progressing  Goal: Promote healing  Outcome: Progressing  Goal: Maintain stable weight  Outcome: Progressing  Goal: Reduce weight from edema/fluid  Outcome: Progressing  Goal: Gradual weight gain  Outcome: Progressing  Goal: Improve ostomy output  Outcome: Progressing

## 2025-01-17 NOTE — ED PROVIDER NOTES
History of Present Illness     History provided by: Patient and Caregiver  Limitations to History: Mental Illness and Language Barrier  External Records Reviewed with Brief Summary: Discharge Summary from 1/15/25 which showed hospitalization for breakthrough seizure    HPI:  Janine Sandoval is a 69 y.o. female with a past medical history of HTN, HLD, deafness, CVA, seizures, and schizophrenia presents with weakness.  The caregivers at the nursing home are present and assist with the history.  They note that the patient has been too weak to walk since being released.  They state that they have known her for years that she is able to get around okay with her rollator.  They note that her legs will buckle underneath her when she tries to stand which is a change.  They also state that she has been acting up more confused than normal.  States she was not like this before she was taken the hospital last week.  They state they are unable to care for her at the facility as they do not have the equipment necessary to carry her since she is nonmobile.  Patient was having diffuse weakness.  Also noted having blurry vision and numbness bilaterally.    Physical Exam   Triage vitals:  T 36.4 °C (97.5 °F)  HR 82  /80  RR 16  O2 97 % None (Room air)    General: Well appearing and in no acute distress.  HEENT: NCAT. PERRL. Oropharynx pink and moist.  CV: Regular rate, regular rhythm.   Resp: Normal effort.   GI: Soft. No tenderness to palpation. No rebound or guarding.  Extremities: No lower extremity edema. 2+ radial pulses bilaterally.  Neuro: EOMI. No facial droop bilaterally. Notes subjective weakness bilaterally. BUE 4/5 strength. BLE 3/5 strength.   Psych: Appropriate mood and affect    Medical Decision Making & ED Course   Medical Decision Making:  This is a 69 y.o. female with a past medical history of HTN, HLD, deafness, CVA, seizures, and schizophrenia presents with weakness.  Patient noting having severe  weakness of her lower extremities bilaterally after being released from the hospital.  The group home states they are unable to care for her in this condition.  Patient has ALS speaking only.  When taking the history with the , the  notes that the patient is signing differently than she has signed with patient in the past and notes that she is using broken up language.  She has a very nonfocal neurologic examination.  Her vitals are normal.  Labs were obtained here which were overall unremarkable.  Troponin was near baseline.  Chest x-ray is negative as well.  Patient's last known well was well over 24 hours ago.  CT head was negative per wet read but still pending final read by radiology. Case discussed the hospitalist service and patient is admitted for further management.  ----    Differential diagnoses considered include but are not limited to: encephalopathy, ich, acs, hyponatremia, ms, pneumonia     Social Determinants of Health which Significantly Impact Care: None identified     EKG Independent Interpretation: EKG not obtained    Independent Result Review and Interpretation: Relevant laboratory and radiographic results were reviewed and independently interpreted by myself.  As necessary, they are commented on in the ED Course.    Chronic conditions affecting the patient's care: As documented above in [facility]    The patient was discussed with the following consultants/services: None    Care Considerations: As documented above in [facility]    ED Course:  Diagnoses as of 01/16/25 9889   Weakness     Disposition   Patient was signed out to Dr. Klerman at 2300 pending completion of their work-up.  Please see the next provider's transition of care note for the remainder of the patient's care.     Forrest Alvarado MD  Emergency Medicine     Forrest Alvarado MD  01/16/25 4078

## 2025-01-17 NOTE — PROGRESS NOTES
Physical Therapy    Physical Therapy Evaluation    Patient Name: Janine Sandoval  MRN: [id number]  Today's Date: 1/17/2025   Time Calculation  Start Time: 0924  Stop Time: 0939  Time Calculation (min): 15 min  710/710-A    Assessment/Plan   PT Assessment  PT Assessment Results: Decreased strength, Decreased endurance, Impaired balance, Decreased mobility  End of Session Communication: Bedside nurse  End of Session Patient Position: Bed, 2 rail up, Alarm on (All needs in reach and no complaints noted)  IP OR SWING BED PT PLAN  Inpatient or Swing Bed: Inpatient  PT Plan  Treatment/Interventions: Bed mobility, Transfer training, Gait training  PT Plan: Ongoing PT  PT Frequency: 3 times per week  PT Discharge Recommendations: Moderate intensity level of continued care  PT - OK to Discharge: Yes    Subjective     Current Problem:  1. Weakness          Patient Active Problem List   Diagnosis    Screening for colon cancer    Essential hypertension    Stroke (Multi)    Osteoarthritis, knee    Asthma    Anemia    Vitamin D deficiency    Bilateral sensorineural hearing loss    Cerebral infarction    Chest pain    Chronic cough    Colon polyp    Congenital deafness    Cryptogenic stroke (Multi)    Deaf-mutism    Other specified depressive episodes    Diverticulosis of colon    Epiretinal membrane (ERM) of both eyes    Gastroesophageal reflux disease without esophagitis    Glaucoma suspect of both eyes    Hallucinations    Hemiparesis affecting dominant side as late effect of stroke (Multi)    Impacted cerumen of right ear    Internal hemorrhoids    Neuroleptic induced parkinsonism    Nuclear sclerotic cataract    Partial epilepsy with impairment of consciousness (Multi)    PVD (posterior vitreous detachment), both eyes    Acute exacerbation of chronic paranoid schizophrenia (Multi)    Urinary incontinence    Arthritis, degenerative    Hyperlipemia    Epilepsy    Bruise    Murmur, cardiac    Breast cancer screening by mammogram     Benign neoplasm of transverse colon    Anemia due to chronic kidney disease    Chronic back pain    Recurrent falls    Frequent falls    Morbid (severe) obesity due to excess calories (Multi)    Breakthrough seizure (Multi)    Generalized weakness    Weakness     General Visit Information:  General  Reason for Referral: PT Eval and Treat  Referred By: DOMINGO Alford-CNP  Past Medical History Relevant to Rehab: 69 y.o. female with past medical history of HTN, HLD, deafness, CVA, seizures, schizophrenia, GERD, constipation, who presented to [facility] ED today from group home with generalized weakness. Per the ED physician, the caregivers at the group home are present and assist with the history. Patient speaks ALS.  They note that the patient has been too weak to walk since being discharged from the hospital yesterday.  They state that they have known her for years that she is able to get around okay with her rollator. They note that her legs will buckle underneath her when she tries to stand which is a change. They also state that she has been acting up more confused than normal and is signing differently than in the past and notes that she is using broken up language.  Co-Treatment: OT  Co-Treatment Reason: maximize safety and functional mobility  Prior to Session Communication: Bedside nurse  Patient Position Received: Bed, 2 rail up, Alarm off, not on at start of session  General Comment: Attempted writing to pt for communication, however was not clear if pt understood, so used demonstrations with tactile cueing which pt demonstrated understanding well.    Home Living:  Home Living  Home Living Comments: From previous eval in 9/2024, Pt is from a Group Home with 2 ARELY and B HR, 1 story. Bathroom has a walk in shower with grab bar and chair, and a high toilet.    Prior Level of Function:  Prior Function Per Pt/Caregiver Report  Level of [place]: Needs assistance with ADLs, Needs assistance with  homemaking, Needs assistance with functional transfers (Pt amb with rollator and SUP, facility assists with ADLs and does all IADLs, assist on stairs)    Precautions:  Precautions  Precautions Comment: Fall precautions, deaf, uses ASL at baseline, did well understanding demonstrations throughout session    Objective     Pain:  Pain Assessment  Pain Assessment:  (pt did not appear to be in pain during session)    Cognition:  Cognition  Overall Cognitive Status: Unable to assess (pt did well following demonstration commands)    General Assessments:  Sensation  Light Touch: No apparent deficits  Strength  Strength Comments: B LE ROM WFL, B LE strength 4-/5  Dynamic Standing Balance  Dynamic Standing-Comments: Poor with mod A x 2 using RW    Functional Assessments:  Bed Mobility  Bed Mobility:  (supine to sitting: max A x 1, sit to supine: max A x 2)  Transfers  Transfer:  (STS from EOB: mod A x 2)  Ambulation/Gait Training  Ambulation/Gait Training Performed:  (Pt side stepped x 3' using rollator with mod A x 2.)    Outcome Measures:  [facility] Basic Mobility  Turning from your back to your side while in a flat bed without using bedrails: A lot  Moving from lying on your back to sitting on the side of a flat bed without using bedrails: A lot  Moving to and from bed to chair (including a wheelchair): A lot  Standing up from a chair using your arms (e.g. wheelchair or bedside chair): A lot  To walk in hospital room: A lot  Climbing 3-5 steps with railing: Total  Basic Mobility - Total Score: 11    Goals:  Encounter Problems       Encounter Problems (Active)       PT Problem       STG - Pt will transition supine <> sitting with mod A x 1  (Progressing)       Start:  01/17/25    Expected End:  01/31/25            STG - Pt will transfer STS with mod A x 1  (Progressing)       Start:  01/17/25    Expected End:  01/31/25            STG - Pt will amb 25' using RW with mod A x 1  (Progressing)       Start:  01/17/25    Expected  End:  01/31/25               Pain - Adult            Education Documentation  Body Mechanics, taught by Franci Rodas PT at 1/17/2025 10:56 AM.  Learner: Patient  Readiness: Acceptance  Method: Demonstration  Response: Demonstrated Understanding    Mobility Training, taught by Franci Rodas PT at 1/17/2025 10:56 AM.  Learner: Patient  Readiness: Acceptance  Method: Demonstration  Response: Demonstrated Understanding    Education Comments  No comments found.

## 2025-01-18 LAB
ALBUMIN SERPL BCP-MCNC: 3.2 G/DL (ref 3.4–5)
ALP SERPL-CCNC: 66 U/L (ref 33–136)
ALT SERPL W P-5'-P-CCNC: 28 U/L (ref 7–45)
ANION GAP SERPL CALC-SCNC: 11 MMOL/L (ref 10–20)
AST SERPL W P-5'-P-CCNC: 19 U/L (ref 9–39)
ATRIAL RATE: 70 BPM
BILIRUB SERPL-MCNC: 0.3 MG/DL (ref 0–1.2)
BUN SERPL-MCNC: 11 MG/DL (ref 6–23)
CALCIUM SERPL-MCNC: 8.3 MG/DL (ref 8.6–10.3)
CHLORIDE SERPL-SCNC: 105 MMOL/L (ref 98–107)
CO2 SERPL-SCNC: 23 MMOL/L (ref 21–32)
CREAT SERPL-MCNC: 0.73 MG/DL (ref 0.5–1.05)
EGFRCR SERPLBLD CKD-EPI 2021: 89 ML/MIN/1.73M*2
ERYTHROCYTE [DISTWIDTH] IN BLOOD BY AUTOMATED COUNT: 11.7 % (ref 11.5–14.5)
GLUCOSE SERPL-MCNC: 177 MG/DL (ref 74–99)
HCT VFR BLD AUTO: 32.2 % (ref 36–46)
HGB BLD-MCNC: 10.7 G/DL (ref 12–16)
HOLD SPECIMEN: NORMAL
MCH RBC QN AUTO: 30.2 PG (ref 26–34)
MCHC RBC AUTO-ENTMCNC: 33.2 G/DL (ref 32–36)
MCV RBC AUTO: 91 FL (ref 80–100)
NRBC BLD-RTO: 0 /100 WBCS (ref 0–0)
P AXIS: 74 DEGREES
P OFFSET: 179 MS
P ONSET: 122 MS
PLATELET # BLD AUTO: 125 X10*3/UL (ref 150–450)
POTASSIUM SERPL-SCNC: 3.3 MMOL/L (ref 3.5–5.3)
PR INTERVAL: 200 MS
PROT SERPL-MCNC: 5.7 G/DL (ref 6.4–8.2)
Q ONSET: 222 MS
QRS COUNT: 12 BEATS
QRS DURATION: 110 MS
QT INTERVAL: 426 MS
QTC CALCULATION(BAZETT): 460 MS
QTC FREDERICIA: 448 MS
R AXIS: 80 DEGREES
RBC # BLD AUTO: 3.54 X10*6/UL (ref 4–5.2)
SODIUM SERPL-SCNC: 136 MMOL/L (ref 136–145)
T AXIS: 26 DEGREES
T OFFSET: 435 MS
VENTRICULAR RATE: 70 BPM
WBC # BLD AUTO: 4.2 X10*3/UL (ref 4.4–11.3)

## 2025-01-18 PROCEDURE — 80053 COMPREHEN METABOLIC PANEL: CPT | Performed by: INTERNAL MEDICINE

## 2025-01-18 PROCEDURE — 2500000004 HC RX 250 GENERAL PHARMACY W/ HCPCS (ALT 636 FOR OP/ED)

## 2025-01-18 PROCEDURE — 2500000001 HC RX 250 WO HCPCS SELF ADMINISTERED DRUGS (ALT 637 FOR MEDICARE OP)

## 2025-01-18 PROCEDURE — 1100000001 HC PRIVATE ROOM DAILY

## 2025-01-18 PROCEDURE — 85027 COMPLETE CBC AUTOMATED: CPT | Performed by: INTERNAL MEDICINE

## 2025-01-18 PROCEDURE — 2500000005 HC RX 250 GENERAL PHARMACY W/O HCPCS

## 2025-01-18 PROCEDURE — 36415 COLL VENOUS BLD VENIPUNCTURE: CPT | Performed by: INTERNAL MEDICINE

## 2025-01-18 PROCEDURE — 2500000001 HC RX 250 WO HCPCS SELF ADMINISTERED DRUGS (ALT 637 FOR MEDICARE OP): Performed by: INTERNAL MEDICINE

## 2025-01-18 PROCEDURE — 2500000004 HC RX 250 GENERAL PHARMACY W/ HCPCS (ALT 636 FOR OP/ED): Performed by: INTERNAL MEDICINE

## 2025-01-18 PROCEDURE — 2500000002 HC RX 250 W HCPCS SELF ADMINISTERED DRUGS (ALT 637 FOR MEDICARE OP, ALT 636 FOR OP/ED)

## 2025-01-18 RX ORDER — POTASSIUM CHLORIDE 1.5 G/1.58G
20 POWDER, FOR SOLUTION ORAL 2 TIMES DAILY
Status: COMPLETED | OUTPATIENT
Start: 2025-01-18 | End: 2025-01-18

## 2025-01-18 RX ADMIN — HEPARIN SODIUM 5000 UNITS: 5000 INJECTION INTRAVENOUS; SUBCUTANEOUS at 09:17

## 2025-01-18 RX ADMIN — SERTRALINE HYDROCHLORIDE 100 MG: 100 TABLET ORAL at 09:16

## 2025-01-18 RX ADMIN — LEVETIRACETAM 1000 MG: 500 TABLET, FILM COATED ORAL at 09:16

## 2025-01-18 RX ADMIN — HEPARIN SODIUM 5000 UNITS: 5000 INJECTION INTRAVENOUS; SUBCUTANEOUS at 17:22

## 2025-01-18 RX ADMIN — LIDOCAINE 4% 1 PATCH: 40 PATCH TOPICAL at 09:55

## 2025-01-18 RX ADMIN — QUETIAPINE FUMARATE 50 MG: 25 TABLET ORAL at 17:19

## 2025-01-18 RX ADMIN — POTASSIUM CHLORIDE 20 MEQ: 1.5 POWDER, FOR SOLUTION ORAL at 21:05

## 2025-01-18 RX ADMIN — QUETIAPINE FUMARATE 25 MG: 25 TABLET ORAL at 06:04

## 2025-01-18 RX ADMIN — BENZTROPINE MESYLATE 0.5 MG: 1 TABLET ORAL at 09:17

## 2025-01-18 RX ADMIN — HEPARIN SODIUM 5000 UNITS: 5000 INJECTION INTRAVENOUS; SUBCUTANEOUS at 01:57

## 2025-01-18 RX ADMIN — TRAZODONE HYDROCHLORIDE 50 MG: 50 TABLET ORAL at 21:03

## 2025-01-18 RX ADMIN — PANCRELIPASE 1 CAPSULE: 30000; 6000; 19000 CAPSULE, DELAYED RELEASE PELLETS ORAL at 17:19

## 2025-01-18 RX ADMIN — POLYETHYLENE GLYCOL 3350 17 G: 17 POWDER, FOR SOLUTION ORAL at 09:17

## 2025-01-18 RX ADMIN — EXTENDED PHENYTOIN SODIUM 300 MG: 100 CAPSULE ORAL at 21:05

## 2025-01-18 RX ADMIN — DOCUSATE SODIUM 100 MG: 100 CAPSULE, LIQUID FILLED ORAL at 21:03

## 2025-01-18 RX ADMIN — DEXTROSE AND SODIUM CHLORIDE 75 ML/HR: 5; 450 INJECTION, SOLUTION INTRAVENOUS at 21:12

## 2025-01-18 RX ADMIN — DOCUSATE SODIUM 100 MG: 100 CAPSULE, LIQUID FILLED ORAL at 09:16

## 2025-01-18 RX ADMIN — PANCRELIPASE 1 CAPSULE: 30000; 6000; 19000 CAPSULE, DELAYED RELEASE PELLETS ORAL at 09:17

## 2025-01-18 RX ADMIN — FAMOTIDINE 20 MG: 20 TABLET, FILM COATED ORAL at 09:17

## 2025-01-18 RX ADMIN — PANCRELIPASE 1 CAPSULE: 30000; 6000; 19000 CAPSULE, DELAYED RELEASE PELLETS ORAL at 12:32

## 2025-01-18 RX ADMIN — CLOPIDOGREL BISULFATE 75 MG: 75 TABLET ORAL at 09:16

## 2025-01-18 RX ADMIN — Medication 2000 UNITS: at 09:16

## 2025-01-18 RX ADMIN — LOSARTAN POTASSIUM 25 MG: 25 TABLET, FILM COATED ORAL at 09:16

## 2025-01-18 RX ADMIN — POTASSIUM CHLORIDE 20 MEQ: 1.5 POWDER, FOR SOLUTION ORAL at 12:30

## 2025-01-18 RX ADMIN — ATORVASTATIN CALCIUM 80 MG: 80 TABLET, FILM COATED ORAL at 09:16

## 2025-01-18 RX ADMIN — Medication 5 MG: at 21:03

## 2025-01-18 RX ADMIN — QUETIAPINE FUMARATE 200 MG: 100 TABLET ORAL at 21:04

## 2025-01-18 RX ADMIN — BENZTROPINE MESYLATE 1 MG: 1 TABLET ORAL at 21:06

## 2025-01-18 RX ADMIN — BENZTROPINE MESYLATE 0.5 MG: 1 TABLET ORAL at 12:31

## 2025-01-18 ASSESSMENT — PAIN SCALES - GENERAL
PAINLEVEL_OUTOF10: 0 - NO PAIN
PAINLEVEL_OUTOF10: 0 - NO PAIN

## 2025-01-18 NOTE — CARE PLAN
The patient's goals for the shift include ability to walk and free of pain    The clinical goals for the shift include maintain comfort and safety    Problem: Pain - Adult  Goal: Verbalizes/displays adequate comfort level or baseline comfort level  Outcome: Progressing     Problem: Safety - Adult  Goal: Free from fall injury  Outcome: Progressing     Problem: Discharge Planning  Goal: Discharge to home or other facility with appropriate resources  Outcome: Progressing     Problem: Chronic Conditions and Co-morbidities  Goal: Patient's chronic conditions and co-morbidity symptoms are monitored and maintained or improved  Outcome: Progressing     Problem: Skin  Goal: Decreased wound size/increased tissue granulation at next dressing change  Outcome: Progressing  Goal: Participates in plan/prevention/treatment measures  Outcome: Progressing  Goal: Prevent/manage excess moisture  Outcome: Progressing  Goal: Prevent/minimize sheer/friction injuries  Outcome: Progressing  Goal: Promote/optimize nutrition  Outcome: Progressing  Goal: Promote skin healing  Outcome: Progressing     Problem: Pain  Goal: Takes deep breaths with improved pain control throughout the shift  Outcome: Progressing  Goal: Turns in bed with improved pain control throughout the shift  Outcome: Progressing  Goal: Walks with improved pain control throughout the shift  Outcome: Progressing  Goal: Performs ADL's with improved pain control throughout shift  Outcome: Progressing  Goal: Participates in PT with improved pain control throughout the shift  Outcome: Progressing  Goal: Free from opioid side effects throughout the shift  Outcome: Progressing  Goal: Free from acute confusion related to pain meds throughout the shift  Outcome: Progressing     Problem: Nutrition  Goal: Oral intake greater 75%  Outcome: Progressing  Goal: Consume prescribed supplement  Outcome: Progressing  Goal: Adequate PO fluid intake  Outcome: Progressing  Goal: Lab values  WNL  Outcome: Progressing  Goal: Maintain stable weight  Outcome: Progressing

## 2025-01-18 NOTE — CARE PLAN
The patient's goals for the shift include ability to walk and free of pain    The clinical goals for the shift include comfort and safety

## 2025-01-18 NOTE — PROGRESS NOTES
Janine Sandoval is a 69 y.o. female on day 1 of admission presenting with Generalized weakness.      Subjective   Patient fully evaluated  01/18  for    Problem List Items Addressed This Visit       Weakness - Primary     Patient seen resting in bed with head of bed elevated, no s/s or c/o acute difficulties at this time.  Vital signs for last 24 hours Temp:  [36.4 °C (97.5 °F)-36.6 °C (97.9 °F)] 36.5 °C (97.7 °F)  Heart Rate:  [77-88] 79  Resp:  [16-18] 18  BP: (127-156)/(60-72) 127/64    I/O this shift:  In: 240 [P.O.:240]  Out: -   Patient still requiring frequent cardiac and SPO2 monitoring. Continue aggressive pulmonary hygiene and oral hygiene. Off loading as tolerated for skin integrity. Medications and labs reviewed-   Results for orders placed or performed during the hospital encounter of 01/16/25 (from the past 24 hours)   CBC   Result Value Ref Range    WBC 4.2 (L) 4.4 - 11.3 x10*3/uL    nRBC 0.0 0.0 - 0.0 /100 WBCs    RBC 3.54 (L) 4.00 - 5.20 x10*6/uL    Hemoglobin 10.7 (L) 12.0 - 16.0 g/dL    Hematocrit 32.2 (L) 36.0 - 46.0 %    MCV 91 80 - 100 fL    MCH 30.2 26.0 - 34.0 pg    MCHC 33.2 32.0 - 36.0 g/dL    RDW 11.7 11.5 - 14.5 %    Platelets 125 (L) 150 - 450 x10*3/uL   Comprehensive Metabolic Panel   Result Value Ref Range    Glucose 177 (H) 74 - 99 mg/dL    Sodium 136 136 - 145 mmol/L    Potassium 3.3 (L) 3.5 - 5.3 mmol/L    Chloride 105 98 - 107 mmol/L    Bicarbonate 23 21 - 32 mmol/L    Anion Gap 11 10 - 20 mmol/L    Urea Nitrogen 11 6 - 23 mg/dL    Creatinine 0.73 0.50 - 1.05 mg/dL    eGFR 89 >60 mL/min/1.73m*2    Calcium 8.3 (L) 8.6 - 10.3 mg/dL    Albumin 3.2 (L) 3.4 - 5.0 g/dL    Alkaline Phosphatase 66 33 - 136 U/L    Total Protein 5.7 (L) 6.4 - 8.2 g/dL    AST 19 9 - 39 U/L    Bilirubin, Total 0.3 0.0 - 1.2 mg/dL    ALT 28 7 - 45 U/L      Patient recently received an antibiotic (last 12 hours)       None           Plan discussed with interdisciplinary team, seen by dietician with - Nutrition  Prescription: Individualized Nutrition Prescription Provided for : Individualized Nutrition Prescription Provided for :    [id number] kcal,    56-62g protein, and   1ml/kcals fluid via   Regular  diet. Will order Magic cup once daily until intakes established. Appreciate input and agree with plan. Will continue current and repeat labs in the AM. Potassium 3.3 today, replaced, will recheck in the AM.     Discharge planning discussed with patient and care team. Therapy evaluations ordered. [facility]. Per [facility]- After reviewing SNF list, patient's sister called, verbalized preference for 1. [facility]. [facility] 3. [facility] 4. [facility] 5. [facility]; DCS tasked with building referrals; patient will not need precert. [facility]     Patient aware and agreeable to current plan, continue plan as above.     I spent a total of 50 minutes on the date of the service which included preparing to see the patient, face-to-face patient care, completing clinical documentation, obtaining and/or reviewing separately obtained history, performing a medically appropriate examination, counseling and educating the patient/family/caregiver, ordering medications, tests, or procedures, communicating with other HCPs (not separately reported), independently interpreting results (not separately reported), communicating results to the patient/family/caregiver, and care coordination (not separately reported).        Objective     Last Recorded Vitals  /64 (BP Location: Left arm, Patient Position: Lying)   Pulse 79   Temp 36.5 °C (97.7 °F) (Temporal)   Resp 18   Wt 74.8 kg (164 lb 14.5 oz)   SpO2 96%   Intake/Output last 3 Shifts:    Intake/Output Summary (Last 24 hours) at 1/18/2025 1527  Last data filed at 1/18/2025 0800  Gross per 24 hour   Intake 367.5 ml   Output 1050 ml   Net -682.5 ml       Admission Weight  Weight: 74.8 kg (165 lb) (01/16/25 1855)    Daily Weight  01/17/25 : 74.8 kg (164 lb 14.5  oz)    Image Results  ECG 12 lead  Sinus rhythm  Left ventricular hypertrophy  Minimal ST elevation, lateral leads      Physical Exam    Relevant Results               Assessment/Plan                  Assessment & Plan  Generalized weakness    Weakness          Juan Luis Edmondson MD   Physician  Internal Medicine     H&P     Signed     Date of Service: 1/17/2025 10:24 AM     Signed       Expand All Collapse All    History Of Present Illness  Janine Sandoval is a 69 y.o. female with past medical history of HTN, HLD, deafness, CVA, seizures, schizophrenia, GERD, constipation, who presented to [facility] ED today from group home with generalized weakness. Per the ED physician, the caregivers at the group home are present and assist with the history. Patient speaks ALS.  They note that the patient has been too weak to walk since being discharged from the hospital yesterday.  They state that they have known her for years that she is able to get around okay with her rollator. They note that her legs will buckle underneath her when she tries to stand which is a change. They also state that she has been acting up more confused than normal and is signing differently than in the past and notes that she is using broken up language. States she was not like this before she was taken the hospital last week. Patient's last known well was well over 24 hours ago. They state they are unable to care for her at the facility as they do to not having the equipment necessary to carry her since she is nonmobile.  Patient was having diffuse weakness.  Also noted having blurry vision in both eyes (able to see  and answer questions appropriately) and numbness bilaterally. 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 Denies fever, chills, cough, chest pain, shortness of breath, nausea, vomiting, abdominal pain, urinary symptoms,  diarrhea, or constipation. [facility]  service offered, but patient declined. Caregiver interpreting at bedside. Caregiver did state patient appears to be back to her normal self but still slightly confused.     ER course: Hemodynamically stable. Afebrile. 36.4C. /80. HR 82, RR 16, 97% RA. Labs show Glucose 155. Troponin 16. UA ordered. See imaging results below. Pt is being admitted to Dr. Edmondson who will continue to manage patient. I was asked to do the H&P and initial assessment.     Past Medical History  As above        Surgical History  Surgical History   No past surgical history on file.        Social History  She reports that she has never smoked. She has never been exposed to tobacco smoke. She has never used smokeless tobacco. She reports that she does not drink alcohol and does not use drugs.     Family History  Family History          Family History   Problem Relation Name Age of Onset    Parkinsonism Mother        Other (cardiac disorder) Father        Glaucoma Father        Parkinsonism Father                Allergies  Levofloxacin     Review of Systems  10 Point review of systems was performed and is negative except for what is stated in the HPI above.   Physical Exam  Constitutional:       Comments: Sleeping but easily arousable with touch.   HENT:      Head: Normocephalic and atraumatic.      Nose: Nose normal.      Mouth/Throat:      Mouth: Mucous membranes are moist.      Pharynx: Oropharynx is clear.   Eyes:      Extraocular Movements: Extraocular movements intact.      Conjunctiva/sclera: Conjunctivae normal.      Pupils: Pupils are equal, round, and reactive to light.   Cardiovascular:      Rate and Rhythm: Normal rate and regular rhythm.      Pulses: Normal pulses.      Heart sounds: Normal heart sounds.   Pulmonary:      Effort: Pulmonary effort is normal.      Breath sounds: Normal breath sounds.   Abdominal:      General: Abdomen is flat. Bowel sounds are normal.       "Palpations: Abdomen is soft.   Musculoskeletal:         General: Normal range of motion.      Cervical back: Normal range of motion and neck supple.      Comments: Weakness noted in bilateral lower extremities. NV intact.   Skin:     General: Skin is warm and dry.      Capillary Refill: Capillary refill takes less than 2 seconds.   Neurological:      General: No focal deficit present.      Comments: Answered questions appropriately with ASL with caregiver at bedside.    Psychiatric:         Behavior: Behavior normal.      Comments: Somnolent            Last Recorded Vitals  Blood pressure 147/72, pulse 76, temperature 36.2 °C (97.2 °F), temperature source Temporal, resp. rate 18, height 1.651 m (5' 5\"), weight 74.8 kg (164 lb 14.5 oz), SpO2 97%.     Relevant Results        Results for orders placed or performed during the hospital encounter of 01/16/25 (from the past 24 hours)   CBC and Auto Differential   Result Value Ref Range     WBC 5.4 4.4 - 11.3 x10*3/uL     nRBC 0.0 0.0 - 0.0 /100 WBCs     RBC 4.32 4.00 - 5.20 x10*6/uL     Hemoglobin 12.9 12.0 - 16.0 g/dL     Hematocrit 40.8 36.0 - 46.0 %     MCV 94 80 - 100 fL     MCH 29.9 26.0 - 34.0 pg     MCHC 31.6 (L) 32.0 - 36.0 g/dL     RDW 11.7 11.5 - 14.5 %     Platelets 154 150 - 450 x10*3/uL     Neutrophils % 44.0 40.0 - 80.0 %     Immature Granulocytes %, Automated 0.2 0.0 - 0.9 %     Lymphocytes % 41.9 13.0 - 44.0 %     Monocytes % 9.1 2.0 - 10.0 %     Eosinophils % 4.1 0.0 - 6.0 %     Basophils % 0.7 0.0 - 2.0 %     Neutrophils Absolute 2.36 1.20 - 7.70 x10*3/uL     Immature Granulocytes Absolute, Automated 0.01 0.00 - 0.70 x10*3/uL     Lymphocytes Absolute 2.25 1.20 - 4.80 x10*3/uL     Monocytes Absolute 0.49 0.10 - 1.00 x10*3/uL     Eosinophils Absolute 0.22 0.00 - 0.70 x10*3/uL     Basophils Absolute 0.04 0.00 - 0.10 x10*3/uL   Comprehensive metabolic panel   Result Value Ref Range     Glucose 155 (H) 74 - 99 mg/dL     Sodium 138 136 - 145 mmol/L     " Potassium 3.6 3.5 - 5.3 mmol/L     Chloride 103 98 - 107 mmol/L     Bicarbonate 26 21 - 32 mmol/L     Anion Gap 13 10 - 20 mmol/L     Urea Nitrogen 12 6 - 23 mg/dL     Creatinine 0.80 0.50 - 1.05 mg/dL     eGFR 80 >60 mL/min/1.73m*2     Calcium 9.5 8.6 - 10.3 mg/dL     Albumin 4.1 3.4 - 5.0 g/dL     Alkaline Phosphatase 81 33 - 136 U/L     Total Protein 7.2 6.4 - 8.2 g/dL     AST 31 9 - 39 U/L     Bilirubin, Total 0.3 0.0 - 1.2 mg/dL     ALT 33 7 - 45 U/L   Magnesium   Result Value Ref Range     Magnesium 1.79 1.60 - 2.40 mg/dL   Troponin I, High Sensitivity   Result Value Ref Range     Troponin I, High Sensitivity 16 (H) 0 - 13 ng/L   ECG 12 lead   Result Value Ref Range     Ventricular Rate 81 BPM     Atrial Rate 81 BPM     MS Interval 210 ms     QRS Duration 102 ms     QT Interval 410 ms     QTC Calculation(Bazett) 476 ms     P Axis 31 degrees     R Axis 53 degrees     T Axis 39 degrees     QRS Count 13 beats     Q Onset 249 ms     T Offset 454 ms     QTC Fredericia 453 ms   Levetiracetam   Result Value Ref Range     Keppra 13 10 - 40 ug/mL   Phenytoin level, total   Result Value Ref Range     Phenytoin 8.5 (L) 10.0 - 20.0 ug/mL   Basic metabolic panel   Result Value Ref Range     Glucose 146 (H) 74 - 99 mg/dL     Sodium 139 136 - 145 mmol/L     Potassium 3.1 (L) 3.5 - 5.3 mmol/L     Chloride 104 98 - 107 mmol/L     Bicarbonate 26 21 - 32 mmol/L     Anion Gap 12 10 - 20 mmol/L     Urea Nitrogen 14 6 - 23 mg/dL     Creatinine 0.78 0.50 - 1.05 mg/dL     eGFR 82 >60 mL/min/1.73m*2     Calcium 8.8 8.6 - 10.3 mg/dL   CBC   Result Value Ref Range     WBC 4.5 4.4 - 11.3 x10*3/uL     nRBC 0.0 0.0 - 0.0 /100 WBCs     RBC 3.73 (L) 4.00 - 5.20 x10*6/uL     Hemoglobin 11.0 (L) 12.0 - 16.0 g/dL     Hematocrit 34.2 (L) 36.0 - 46.0 %     MCV 92 80 - 100 fL     MCH 29.5 26.0 - 34.0 pg     MCHC 32.2 32.0 - 36.0 g/dL     RDW 11.9 11.5 - 14.5 %     Platelets 124 (L) 150 - 450 x10*3/uL   Troponin I, High Sensitivity   Result Value  Ref Range     Troponin I, High Sensitivity 17 (H) 0 - 13 ng/L      CT head wo IV contrast     Result Date: 1/16/2025  Interpreted By:  Fausto Flores, STUDY: CT HEAD WO IV CONTRAST;  1/16/2025 10:53 pm   INDICATION: Signs/Symptoms:weakness, ams.   COMPARISON: Noncontrast head CT 09/16/2024   ACCESSION NUMBER(S): [id number]   ORDERING CLINICIAN: SAI TATE   TECHNIQUE: Axial non-contrast CT images of the head. Coronal and sagittal images were reconstructed.   FINDINGS: BRAIN PARENCHYMA: Hypodensity in the left basal ganglia and corona radiata, likely sequelae of remote infarct, unchanged compared to prior.Sub-cortical and periventricular hypoattenuating foci, likely sequelae of chronic ischemic microangiopathy. Gray-white matter differentiation is preserved. VENTRICLES:Within normal limits for brain volume. No midline shift. EXTRA-AXIAL SPACES: No collections. SOFT TISSUES: Unremarkable. SINUSES: Paranasal sinuses are clear. MASTOIDS: Right mastoid effusion, similar to prior. CALVARIUM: No depressed skull fractures. VESSELS: Calcification of carotid siphons. OTHER FINDINGS: None.        1. No acute intracranial abnormality. If clinical concern persists, consider further evaluation with MRI. 2. Other chronic findings including encephalomalacia in the left basal ganglia/corona radiata is unchanged compared to prior.   MACRO: None   Signed by: Fausto Flores 1/16/2025 11:25 PM Dictation workstation:   [id number]     XR chest 1 view     Result Date: 1/16/2025  STUDY: Chest Radiograph;  1/16/2025 7:27 PM INDICATION: Weakness. COMPARISON: 9/16/2024 XR Chest. ACCESSION NUMBER(S): [id number] ORDERING CLINICIAN: MAREN ADAMS TECHNIQUE:  Frontal chest was obtained at 19:27 hours. FINDINGS: CARDIOMEDIASTINAL SILHOUETTE: The cardiomediastinal silhouette is mildly enlarged unchanged from previous imaging..  LUNGS: Lungs are clear.  ABDOMEN: No remarkable upper abdominal findings.  BONES: No acute osseous changes.     1.   Stable cardiomegaly. 2.  No focal pulmonary consolidation pleural effusions.. Signed by Serge Moon MD        Assessment/Plan     Assessment & Plan  Generalized weakness     Weakness     69 year old female who presented to ED today with generalized weakness. No focal deficits. Group home unable to care for her due to ambulatory dysfunction. PT/OT/SW ordered. Patient will be admitted to the hospital for further medical management.     Generalized weakness  Ambulatory dysfunction  Hx of CVA  Admit to OBS/RMF to Dr. Edmondson  Defer consults to attending per request  See imaging results  Patient's last known well was well over 24 hours ago  UA ordered  PT/OT/SW  Repeat labs in AM (Keppra and Dilantin levels ordered)     Elevated troponin (near baseline)  Troponin 16. Will trend.   Denies chest pain     Chronic conditions  #HTN, HLD, deafness, seizures, schizophrenia, GERD, constipation   Continue home meds as appropriate when nursing completes home med rec  Full code     DVT prophylaxis  Heparin SQ  SCD's as tolerated  Up in chair TID      I spent 60 minutes in the professional and overall care of this patient.    Patient fully evaluated on January 17.  Correct electrolytes today.  Physical and Occupational Therapy consultations with possible discharge to skilled nursing when approved  MD Shelley Grant

## 2025-01-18 NOTE — PROGRESS NOTES
01/18/25 1124   Discharge Planning   Living Arrangements Other (Comment)   Support Systems Family members;Home care staff   Type of Residence prison   Care [facility]   Expected Discharge Disposition SNF   Does the patient need discharge transport arranged? Yes   RoundTrip coordination needed? Yes     Patient's sister/POA at the bedside requesting SNF list of facilities close to her home in [place] [address]; list delivered to the bedside.  Oscar CRISTINA TCC  1250:  After reviewing SNF list, patient's sister called, verbalized [facility] for  [facility]  2. [facility]  3. [place]  4. [place]  5. [facility]; DCS tasked with building referrals; patient will not need precert.  Oscar CRISTINA TCC

## 2025-01-19 VITALS
HEIGHT: 65 IN | TEMPERATURE: 97.3 F | BODY MASS INDEX: 27.47 KG/M2 | RESPIRATION RATE: 18 BRPM | OXYGEN SATURATION: 97 % | HEART RATE: 81 BPM | DIASTOLIC BLOOD PRESSURE: 59 MMHG | SYSTOLIC BLOOD PRESSURE: 135 MMHG | WEIGHT: 164.9 LBS

## 2025-01-19 LAB
ALBUMIN SERPL BCP-MCNC: 3 G/DL (ref 3.4–5)
ALP SERPL-CCNC: 55 U/L (ref 33–136)
ALT SERPL W P-5'-P-CCNC: 24 U/L (ref 7–45)
ANION GAP SERPL CALC-SCNC: 11 MMOL/L (ref 10–20)
AST SERPL W P-5'-P-CCNC: 15 U/L (ref 9–39)
BILIRUB SERPL-MCNC: 0.3 MG/DL (ref 0–1.2)
BUN SERPL-MCNC: 10 MG/DL (ref 6–23)
CALCIUM SERPL-MCNC: 7.9 MG/DL (ref 8.6–10.3)
CHLORIDE SERPL-SCNC: 105 MMOL/L (ref 98–107)
CO2 SERPL-SCNC: 24 MMOL/L (ref 21–32)
CREAT SERPL-MCNC: 0.63 MG/DL (ref 0.5–1.05)
EGFRCR SERPLBLD CKD-EPI 2021: >90 ML/MIN/1.73M*2
ERYTHROCYTE [DISTWIDTH] IN BLOOD BY AUTOMATED COUNT: 11.8 % (ref 11.5–14.5)
GLUCOSE SERPL-MCNC: 304 MG/DL (ref 74–99)
HCT VFR BLD AUTO: 33.2 % (ref 36–46)
HGB BLD-MCNC: 10.5 G/DL (ref 12–16)
MCH RBC QN AUTO: 29.5 PG (ref 26–34)
MCHC RBC AUTO-ENTMCNC: 31.6 G/DL (ref 32–36)
MCV RBC AUTO: 93 FL (ref 80–100)
NRBC BLD-RTO: 0 /100 WBCS (ref 0–0)
PLATELET # BLD AUTO: 134 X10*3/UL (ref 150–450)
POTASSIUM SERPL-SCNC: 3.3 MMOL/L (ref 3.5–5.3)
PROT SERPL-MCNC: 5.4 G/DL (ref 6.4–8.2)
RBC # BLD AUTO: 3.56 X10*6/UL (ref 4–5.2)
SODIUM SERPL-SCNC: 137 MMOL/L (ref 136–145)
WBC # BLD AUTO: 4.3 X10*3/UL (ref 4.4–11.3)

## 2025-01-19 PROCEDURE — 99223 1ST HOSP IP/OBS HIGH 75: CPT | Performed by: PHYSICIAN ASSISTANT

## 2025-01-19 PROCEDURE — 2500000005 HC RX 250 GENERAL PHARMACY W/O HCPCS

## 2025-01-19 PROCEDURE — 80053 COMPREHEN METABOLIC PANEL: CPT | Performed by: INTERNAL MEDICINE

## 2025-01-19 PROCEDURE — 2500000002 HC RX 250 W HCPCS SELF ADMINISTERED DRUGS (ALT 637 FOR MEDICARE OP, ALT 636 FOR OP/ED)

## 2025-01-19 PROCEDURE — 1100000001 HC PRIVATE ROOM DAILY

## 2025-01-19 PROCEDURE — 2500000004 HC RX 250 GENERAL PHARMACY W/ HCPCS (ALT 636 FOR OP/ED): Performed by: INTERNAL MEDICINE

## 2025-01-19 PROCEDURE — 85027 COMPLETE CBC AUTOMATED: CPT | Performed by: INTERNAL MEDICINE

## 2025-01-19 PROCEDURE — 2500000004 HC RX 250 GENERAL PHARMACY W/ HCPCS (ALT 636 FOR OP/ED)

## 2025-01-19 PROCEDURE — 36415 COLL VENOUS BLD VENIPUNCTURE: CPT | Performed by: INTERNAL MEDICINE

## 2025-01-19 PROCEDURE — 2500000001 HC RX 250 WO HCPCS SELF ADMINISTERED DRUGS (ALT 637 FOR MEDICARE OP)

## 2025-01-19 RX ADMIN — DEXTROSE AND SODIUM CHLORIDE 75 ML/HR: 5; 450 INJECTION, SOLUTION INTRAVENOUS at 10:22

## 2025-01-19 RX ADMIN — POLYETHYLENE GLYCOL 3350 17 G: 17 POWDER, FOR SOLUTION ORAL at 08:08

## 2025-01-19 RX ADMIN — FAMOTIDINE 20 MG: 20 TABLET, FILM COATED ORAL at 08:07

## 2025-01-19 RX ADMIN — DEXTROSE AND SODIUM CHLORIDE 75 ML/HR: 5; 450 INJECTION, SOLUTION INTRAVENOUS at 23:28

## 2025-01-19 RX ADMIN — HEPARIN SODIUM 5000 UNITS: 5000 INJECTION INTRAVENOUS; SUBCUTANEOUS at 01:59

## 2025-01-19 RX ADMIN — BENZTROPINE MESYLATE 0.5 MG: 1 TABLET ORAL at 08:06

## 2025-01-19 RX ADMIN — CLOPIDOGREL BISULFATE 75 MG: 75 TABLET ORAL at 08:07

## 2025-01-19 RX ADMIN — ATORVASTATIN CALCIUM 80 MG: 80 TABLET, FILM COATED ORAL at 08:05

## 2025-01-19 RX ADMIN — PANCRELIPASE 1 CAPSULE: 30000; 6000; 19000 CAPSULE, DELAYED RELEASE PELLETS ORAL at 08:05

## 2025-01-19 RX ADMIN — LIDOCAINE 4% 1 PATCH: 40 PATCH TOPICAL at 08:08

## 2025-01-19 RX ADMIN — BENZTROPINE MESYLATE 1 MG: 1 TABLET ORAL at 20:25

## 2025-01-19 RX ADMIN — DOCUSATE SODIUM 100 MG: 100 CAPSULE, LIQUID FILLED ORAL at 20:25

## 2025-01-19 RX ADMIN — SERTRALINE HYDROCHLORIDE 100 MG: 100 TABLET ORAL at 08:08

## 2025-01-19 RX ADMIN — LEVETIRACETAM 1000 MG: 500 TABLET, FILM COATED ORAL at 08:08

## 2025-01-19 RX ADMIN — QUETIAPINE FUMARATE 50 MG: 25 TABLET ORAL at 15:12

## 2025-01-19 RX ADMIN — PANCRELIPASE 1 CAPSULE: 30000; 6000; 19000 CAPSULE, DELAYED RELEASE PELLETS ORAL at 18:20

## 2025-01-19 RX ADMIN — BENZTROPINE MESYLATE 0.5 MG: 1 TABLET ORAL at 12:44

## 2025-01-19 RX ADMIN — TRAZODONE HYDROCHLORIDE 50 MG: 50 TABLET ORAL at 20:25

## 2025-01-19 RX ADMIN — DOCUSATE SODIUM 100 MG: 100 CAPSULE, LIQUID FILLED ORAL at 08:07

## 2025-01-19 RX ADMIN — Medication 2000 UNITS: at 08:07

## 2025-01-19 RX ADMIN — EXTENDED PHENYTOIN SODIUM 300 MG: 100 CAPSULE ORAL at 20:25

## 2025-01-19 RX ADMIN — LOSARTAN POTASSIUM 25 MG: 25 TABLET, FILM COATED ORAL at 08:08

## 2025-01-19 RX ADMIN — QUETIAPINE FUMARATE 200 MG: 100 TABLET ORAL at 20:25

## 2025-01-19 RX ADMIN — HEPARIN SODIUM 5000 UNITS: 5000 INJECTION INTRAVENOUS; SUBCUTANEOUS at 08:07

## 2025-01-19 RX ADMIN — QUETIAPINE FUMARATE 25 MG: 25 TABLET ORAL at 07:59

## 2025-01-19 RX ADMIN — PANCRELIPASE 1 CAPSULE: 30000; 6000; 19000 CAPSULE, DELAYED RELEASE PELLETS ORAL at 12:44

## 2025-01-19 RX ADMIN — HEPARIN SODIUM 5000 UNITS: 5000 INJECTION INTRAVENOUS; SUBCUTANEOUS at 18:21

## 2025-01-19 RX ADMIN — ACETAMINOPHEN 650 MG: 325 TABLET, FILM COATED ORAL at 22:00

## 2025-01-19 RX ADMIN — Medication 5 MG: at 20:25

## 2025-01-19 ASSESSMENT — PAIN DESCRIPTION - LOCATION: LOCATION: LEG

## 2025-01-19 ASSESSMENT — PAIN SCALES - GENERAL
PAINLEVEL_OUTOF10: 10 - WORST POSSIBLE PAIN
PAINLEVEL_OUTOF10: 0 - NO PAIN

## 2025-01-19 ASSESSMENT — PAIN - FUNCTIONAL ASSESSMENT
PAIN_FUNCTIONAL_ASSESSMENT: FLACC (FACE, LEGS, ACTIVITY, CRY, CONSOLABILITY)
PAIN_FUNCTIONAL_ASSESSMENT: 0-10

## 2025-01-19 ASSESSMENT — PAIN SCALES - PAIN ASSESSMENT IN ADVANCED DEMENTIA (PAINAD): TOTALSCORE: MEDICATION (SEE MAR)

## 2025-01-19 ASSESSMENT — ENCOUNTER SYMPTOMS
APPETITE CHANGE: 0
WEAKNESS: 1

## 2025-01-19 ASSESSMENT — PAIN DESCRIPTION - ORIENTATION: ORIENTATION: RIGHT;LEFT;LOWER

## 2025-01-19 NOTE — CARE PLAN
The patient's goals for the shift include comfort     The clinical goals for the shift include maintain comfort and safety

## 2025-01-19 NOTE — CARE PLAN
The patient's goals for the shift include ability to walk and free of pain    The clinical goals for the shift include comfort and safety    Problem: Pain - Adult  Goal: Verbalizes/displays adequate comfort level or baseline comfort level  Outcome: Progressing     Problem: Safety - Adult  Goal: Free from fall injury  Outcome: Progressing     Problem: Discharge Planning  Goal: Discharge to home or other facility with appropriate resources  Outcome: Progressing     Problem: Chronic Conditions and Co-morbidities  Goal: Patient's chronic conditions and co-morbidity symptoms are monitored and maintained or improved  Outcome: Progressing     Problem: Skin  Goal: Decreased wound size/increased tissue granulation at next dressing change  Outcome: Progressing  Flowsheets (Taken 1/19/2025 1846)  Decreased wound size/increased tissue granulation at next dressing change: Promote sleep for wound healing  Goal: Participates in plan/prevention/treatment measures  Outcome: Progressing  Flowsheets (Taken 1/19/2025 1846)  Participates in plan/prevention/treatment measures: Elevate heels  Goal: Prevent/manage excess moisture  Outcome: Progressing  Flowsheets (Taken 1/19/2025 1846)  Prevent/manage excess moisture: Monitor for/manage infection if present  Goal: Prevent/minimize sheer/friction injuries  Outcome: Progressing  Flowsheets (Taken 1/19/2025 1846)  Prevent/minimize sheer/friction injuries: Use pull sheet  Goal: Promote/optimize nutrition  Outcome: Progressing  Flowsheets (Taken 1/19/2025 1846)  Promote/optimize nutrition: Consume > 50% meals/supplements  Goal: Promote skin healing  Outcome: Progressing  Flowsheets (Taken 1/19/2025 1846)  Promote skin healing: Turn/reposition every 2 hours/use positioning/transfer devices     Problem: Pain  Goal: Takes deep breaths with improved pain control throughout the shift  Outcome: Progressing  Goal: Turns in bed with improved pain control throughout the shift  Outcome:  Progressing  Goal: Walks with improved pain control throughout the shift  Outcome: Progressing  Goal: Performs ADL's with improved pain control throughout shift  Outcome: Progressing  Goal: Participates in PT with improved pain control throughout the shift  Outcome: Progressing  Goal: Free from opioid side effects throughout the shift  Outcome: Progressing  Goal: Free from acute confusion related to pain meds throughout the shift  Outcome: Progressing     Problem: Nutrition  Goal: Oral intake greater 75%  Outcome: Progressing  Goal: Consume prescribed supplement  Outcome: Progressing  Goal: Adequate PO fluid intake  Outcome: Progressing  Goal: Lab values WNL  Outcome: Progressing  Goal: Maintain stable weight  Outcome: Progressing

## 2025-01-19 NOTE — CONSULTS
"Department of Internal Medicine  Gastroenterology  Consult note      Reason for Consult: Patient and family are agreeable to PEG placement.  Patient has been having labile oral intake and signs of protein insufficiency    Chief Complaint: Generalized weakness    History Obtained from: chart review and patient reports ( 33911)    History of Present Illness      The patient is a 69 y.o. female with signficant past medical history of HLD, deafness, CVA, seizures, constipation, GERD, IPMN versus pancreatic mass and schizophrenia who presents for generalized weakness.  Of note she was admitted earlier this month for breakthrough seizures (1/7-1/15/25).    Patient states has been having issues with swallowing but is unable to discern solids or liquids.  She is unable to discern frequency.  She endorses nausea and some vomiting but states the vomiting was before.  She is unable to delineate when \"before\" was.  She denies any hematemesis or coffee-ground emesis.  She states she has been working with speech therapy but cannot state whether it is helping.  She did not respond when asked how eating was going.    She denies odynophagia or acid reflux.  She endorses a history of chronic constipation and is unsure when her last bowel movement was.  She is unable to delineate frequency of bowel movements.  She denies any melena or hematochezia.    She is agreeable to a PEG tube but is unclear whether she understands what this is as she does not answer all questions appropriately.    Allergies  Levofloxacin    Current Medication    Current Facility-Administered Medications:     acetaminophen (Tylenol) tablet 650 mg, 650 mg, oral, q4h PRN, HERNAN Alford    albuterol 2.5 mg /3 mL (0.083 %) nebulizer solution 2.5 mg, 2.5 mg, nebulization, q4h PRN, HERNAN Alford    atorvastatin (Lipitor) tablet 80 mg, 80 mg, oral, Daily, HERNAN Alford, 80 mg at 01/19/25 0805    benztropine " (Cogentin) tablet 0.5 mg, 0.5 mg, oral, BID, Milly Da Silva, APRN-CNP, 0.5 mg at 01/19/25 0806    benztropine (Cogentin) tablet 1 mg, 1 mg, oral, Nightly, Milly Da Silva, APRN-CNP, 1 mg at 01/18/25 2106    cholecalciferol (Vitamin D-3) tablet 2,000 Units, 2,000 Units, oral, Daily, Milly Da Silva, APRN-CNP, 2,000 Units at 01/19/25 0807    clopidogrel (Plavix) tablet 75 mg, 75 mg, oral, Daily, Milly Da Silva, APRN-CNP, 75 mg at 01/19/25 0807    dextrose 5%-0.45 % sodium chloride infusion, 75 mL/hr, intravenous, Continuous, Juan Luis Edmondson MD, Last Rate: 75 mL/hr at 01/19/25 1022, 75 mL/hr at 01/19/25 1022    docusate sodium (Colace) capsule 100 mg, 100 mg, oral, BID, Milly Da Silva, APRN-CNP, 100 mg at 01/19/25 0807    famotidine (Pepcid) tablet 20 mg, 20 mg, oral, Daily, Milly Da Silva, APRN-CNP, 20 mg at 01/19/25 0807    heparin (porcine) injection 5,000 Units, 5,000 Units, subcutaneous, q8h JEANETTE, Milly  Vargheseer, APRN-CNP, 5,000 Units at 01/19/25 0807    levETIRAcetam (Keppra) tablet 1,000 mg, 1,000 mg, oral, Daily, Milly Da Silva, APRN-CNP, 1,000 mg at 01/19/25 0808    lidocaine 4 % patch 1 patch, 1 patch, transdermal, Daily, Milly Da Silva, APRN-CNP, 1 patch at 01/19/25 0808    losartan (Cozaar) tablet 25 mg, 25 mg, oral, Daily, Milly Da Silva, APRN-CNP, 25 mg at 01/19/25 0808    melatonin tablet 5 mg, 5 mg, oral, Nightly, Milly Da Silva, APRN-CNP, 5 mg at 01/18/25 2103    pancrelipase (Lip-Prot-Amyl) (Creon) 6,000-19,000 -30,000 unit per capsule 1 capsule, 1 capsule, oral, TID, DOMINGO Alford-CNP, 1 capsule at 01/19/25 0805    phenytoin ER (Dilantin) capsule 300 mg, 300 mg, oral, Nightly, HERNAN Alford, 300 mg at 01/18/25 2105    polyethylene glycol (Glycolax, Miralax) packet 17 g, 17 g, oral, Daily, HERNAN Alford, 17 g at 01/19/25 0808    QUEtiapine (SEROquel) tablet 200 mg, 200 mg, oral, Nightly, Milly GARNER  Avinash, APRN-CNP, 200 mg at 01/18/25 2104    QUEtiapine (SEROquel) tablet 25 mg, 25 mg, oral, Daily, Milly PASCUAL Avinash APRN-CNP, 25 mg at 01/19/25 0759    QUEtiapine (SEROquel) tablet 50 mg, 50 mg, oral, Daily, Milly PASCUAL Avinash, APRN-CNP, 50 mg at 01/18/25 1719    sertraline (Zoloft) tablet 100 mg, 100 mg, oral, Daily, Milly  Avinash, APRN-CNP, 100 mg at 01/19/25 0808    traZODone (Desyrel) tablet 50 mg, 50 mg, oral, Nightly, Milly  Avinash, APRN-CNP, 50 mg at 01/18/25 2103    Past Medical History  Active Ambulatory Problems     Diagnosis Date Noted    Screening for colon cancer 10/06/2023    Essential hypertension 10/06/2023    Stroke (Multi) 10/06/2023    Osteoarthritis, knee 10/06/2023    Asthma 10/06/2023    Anemia 10/06/2023    Vitamin D deficiency 10/06/2023    Bilateral sensorineural hearing loss 10/30/2023    Cerebral infarction 06/09/2012    Chest pain 10/30/2023    Chronic cough 10/30/2023    Colon polyp 10/21/2013    Congenital deafness 07/31/2012    Cryptogenic stroke (Multi) 06/08/2012    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    Other specified depressive episodes 10/30/2023    Diverticulosis of colon 07/13/2015    Epiretinal membrane (ERM) of both eyes 10/25/2016    Gastroesophageal reflux disease without esophagitis 10/30/2023    Glaucoma suspect of both eyes 10/25/2016    Hallucinations 08/15/2013    Hemiparesis affecting dominant side as late effect of stroke (Multi) 11/04/2014    Impacted cerumen of right ear 10/30/2023    Internal hemorrhoids 07/13/2015    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    Nuclear sclerotic cataract 10/25/2016    Partial epilepsy with impairment of consciousness (Multi) 08/09/2015    PVD (posterior vitreous detachment), both eyes 10/25/2016    Acute exacerbation of chronic paranoid schizophrenia (Multi) 04/15/2016    Urinary incontinence 10/30/2023    Arthritis, degenerative 10/09/2017    Hyperlipemia 10/30/2023    Epilepsy 06/08/2012    Bruise 11/27/2023     "Murmur, cardiac 11/27/2023    Breast cancer screening by mammogram 01/26/2024    Benign neoplasm of transverse colon 12/21/2023    Anemia due to chronic kidney disease 01/26/2024    Chronic back pain 10/08/2022    Recurrent falls 01/26/2024    Frequent falls 09/16/2024    Morbid (severe) obesity due to excess calories (Multi) 10/22/2024    Breakthrough seizure (Multi) 01/10/2025     Resolved Ambulatory Problems     Diagnosis Date Noted    Seizure disorder (Multi) 10/03/2023    Other hyperlipidemia 10/06/2023    Stroke (cerebrum) (Multi) 10/06/2023    Hemiparesis following cerebrovascular accident (Multi) 11/04/2014    History of stroke 10/30/2023     Past Medical History:   Diagnosis Date    Depression     Gastro-esophageal reflux disease without esophagitis 12/21/2022    HL (hearing loss)     Hyperlipidemia, unspecified 09/25/2019    Hypertension     Personal history of transient ischemic attack (TIA), and cerebral infarction without residual deficits 12/21/2022       Past Surgical History  No past surgical history on file.    Family History  Family History   Problem Relation Name Age of Onset    Parkinsonism Mother      Other (cardiac disorder) Father      Glaucoma Father      Parkinsonism Father       No family history of stomach or colon cancer    Social History  TOBACCO:  reports that she has never smoked. She has never been exposed to tobacco smoke. She has never used smokeless tobacco.  ETOH:  reports no history of alcohol use.  DRUGS:  reports no history of drug use.  MARITAL STATUS:   OCCUPATION:    Review of Systems  Review of Systems   Constitutional:  Negative for appetite change.   Eyes:  Positive for visual disturbance (States in the past).   Neurological:  Positive for weakness.       PHYSICAL EXAM  VS: /63 (BP Location: Left arm, Patient Position: Lying)   Pulse 75   Temp 36.5 °C (97.7 °F) (Temporal)   Resp 18   Ht 1.651 m (5' 5\")   Wt 74.8 kg (164 lb 14.5 oz)   SpO2 96%   BMI 27.44 " kg/m²  Body mass index is 27.44 kg/m².  Physical Exam     DATA  Recent blood work and relevant radiology and endoscopic studies were reviewed and discussed with the patient   Results from last 7 days   Lab Units 01/19/25  0608   WBC AUTO x10*3/uL 4.3*   RBC AUTO x10*6/uL 3.56*   HEMOGLOBIN g/dL 10.5*   HEMATOCRIT % 33.2*   MCV fL 93   MCHC g/dL 31.6*   RDW % 11.8   PLATELETS AUTO x10*3/uL 134*       Results from last 72 hours   Lab Units 01/19/25  0608   SODIUM mmol/L 137   POTASSIUM mmol/L 3.3*   CHLORIDE mmol/L 105   CO2 mmol/L 24   BUN mg/dL 10   CREATININE mg/dL 0.63   CALCIUM mg/dL 7.9*   PROTEIN TOTAL g/dL 5.4*   BILIRUBIN TOTAL mg/dL 0.3   ALK PHOS U/L 55   AST U/L 15   ALT U/L 24                   RADIOLOGY REVIEW  MRCP pancreas 1/12/2025  IMPRESSION:  Extremely limited study with a few sequences obtained as the patient could not complete the study.  1. Diffusely dilated pancreatic duct measuring up to 1.2 cm with a evidence of caliber change at the level of the pancreatic head with a evidence of nonspecific soft tissue thickening measuring 2.2 cm would  be concerning for obstructive pathology. Further assessment is precluded given aforementioned limitation and advise further assessment by EUS/ERCP or redo MRI with sedation.    ENDOSCOPIC REVIEW  Colonoscopy by Dr. Watt 12/21/2023  One 6 mm polyp in the transverse colon, removed with a cold snare.  Resected and retrieved  Diverticulosis in the sigmoid colon  Nonbleeding internal hemorrhoids  Examination was otherwise normal on direct and retroflexion views    IMPRESSION/RECOMMENDATIONS  The patient is a 69 y.o. female with signficant past medical history of HLD, deafness, CVA, seizures, constipation, GERD, IPMN versus pancreatic mass and schizophrenia who presents for generalized weakness.  Of note she was admitted earlier this month for breakthrough seizures (1/7-1/15/25).    Poor oral intake no documented issues of dysphagia or work with speech  pathology.  Never had esophagram or EGD.  Patient agreeable to PEG but unclear if she understands or is consentable  Atrophic pancreas dilated PD and possible mass versus IPMN, likely represents chronic pancreatitis, dilated PD (1.2 cm), currently on Creon, case reviewed by Dr. Thompson with no endoscopic intervention recommended  Chronic antiplatelet Plavix    PLAN  -Recommend modified barium swallow and esophagram for further evaluation of dysphagia  -If there is an issue with dysphagia will need to work with speech pathology and consider endoscopy  -There is no if she will need to consider geriatric involvement for failure to thrive and appetite stimulation  -Currently on a general diet and tolerating pills.  Did not eat breakfast this a.m. per nursing  -Currently on famotidine and Plavix  -Continue supportive care per primary team    Discussed with Dr Castillo, GI to follow    (Electronically signed byNidia Olson PA-C on 1/19/2025 at 12:26 PM)    Inpatient consult to Gastroenterology  Consult performed by: Nidia Olson PA-C  Consult ordered by: Juan Luis Edmondson MD

## 2025-01-19 NOTE — PROGRESS NOTES
Janine Sandoval is a 69 y.o. female on day 2 of admission presenting with Generalized weakness.      Subjective   Patient fully evaluated  01/18  for    Problem List Items Addressed This Visit       Weakness - Primary     Patient seen resting in bed with head of bed elevated, no s/s or c/o acute difficulties at this time.  Vital signs for last 24 hours Temp:  [36.2 °C (97.2 °F)-36.6 °C (97.9 °F)] 36.5 °C (97.7 °F)  Heart Rate:  [74-86] 75  Resp:  [18] 18  BP: (131-145)/(63-69) 131/63    I/O this shift:  In: -   Out: 500 [Urine:500]  Patient still requiring frequent cardiac and SPO2 monitoring. Continue aggressive pulmonary hygiene and oral hygiene. Off loading as tolerated for skin integrity. Medications and labs reviewed-   Results for orders placed or performed during the hospital encounter of 01/16/25 (from the past 24 hours)   CBC   Result Value Ref Range    WBC 4.3 (L) 4.4 - 11.3 x10*3/uL    nRBC 0.0 0.0 - 0.0 /100 WBCs    RBC 3.56 (L) 4.00 - 5.20 x10*6/uL    Hemoglobin 10.5 (L) 12.0 - 16.0 g/dL    Hematocrit 33.2 (L) 36.0 - 46.0 %    MCV 93 80 - 100 fL    MCH 29.5 26.0 - 34.0 pg    MCHC 31.6 (L) 32.0 - 36.0 g/dL    RDW 11.8 11.5 - 14.5 %    Platelets 134 (L) 150 - 450 x10*3/uL   Comprehensive Metabolic Panel   Result Value Ref Range    Glucose 304 (H) 74 - 99 mg/dL    Sodium 137 136 - 145 mmol/L    Potassium 3.3 (L) 3.5 - 5.3 mmol/L    Chloride 105 98 - 107 mmol/L    Bicarbonate 24 21 - 32 mmol/L    Anion Gap 11 10 - 20 mmol/L    Urea Nitrogen 10 6 - 23 mg/dL    Creatinine 0.63 0.50 - 1.05 mg/dL    eGFR >90 >60 mL/min/1.73m*2    Calcium 7.9 (L) 8.6 - 10.3 mg/dL    Albumin 3.0 (L) 3.4 - 5.0 g/dL    Alkaline Phosphatase 55 33 - 136 U/L    Total Protein 5.4 (L) 6.4 - 8.2 g/dL    AST 15 9 - 39 U/L    Bilirubin, Total 0.3 0.0 - 1.2 mg/dL    ALT 24 7 - 45 U/L      Patient recently received an antibiotic (last 12 hours)       None           Plan discussed with interdisciplinary team, seen by dietician with -  Nutrition Prescription: Individualized Nutrition Prescription Provided for : Individualized Nutrition Prescription Provided for :    [id number] kcal,    56-62g protein, and   1ml/kcals fluid via   Regular  diet. Will order Magic cup once daily until intakes established. Appreciate input and agree with plan. Will continue current and repeat labs in the AM. Potassium 3.3 today, replaced, will recheck in the AM.     Discharge planning discussed with patient and care team. Therapy evaluations ordered. [facility]-11. Per [facility]- After reviewing SNF list, patient's sister called, verbalized preference for 1. [facility]. [facility] 3. [facility]. [facility]. [facility]; DCS tasked with building referrals; patient will not need precert. [facility]     Patient aware and agreeable to current plan, continue plan as above.     I spent a total of 50 minutes on the date of the service which included preparing to see the patient, face-to-face patient care, completing clinical documentation, obtaining and/or reviewing separately obtained history, performing a medically appropriate examination, counseling and educating the patient/family/caregiver, ordering medications, tests, or procedures, communicating with other HCPs (not separately reported), independently interpreting results (not separately reported), communicating results to the patient/family/caregiver, and care coordination (not separately reported).        Objective     Last Recorded Vitals  /63 (BP Location: Left arm, Patient Position: Lying)   Pulse 75   Temp 36.5 °C (97.7 °F) (Temporal)   Resp 18   Wt 74.8 kg (164 lb 14.5 oz)   SpO2 96%   Intake/Output last 3 Shifts:    Intake/Output Summary (Last 24 hours) at 1/19/2025 1339  Last data filed at 1/19/2025 0700  Gross per 24 hour   Intake --   Output 2500 ml   Net -2500 ml       Admission Weight  Weight: 74.8 kg (165 lb) (01/16/25 1855)    Daily Weight  01/17/25 : 74.8 kg (164 lb 14.5  oz)    Image Results  Electrocardiogram, 12-lead  Normal sinus rhythm with sinus arrhythmia  Cannot rule out Anterior infarct , age undetermined  Abnormal ECG  When compared with ECG of 16-SEP-2024 01:36,  No significant change was found  See ED provider note for full interpretation and clinical correlation  Confirmed by Patricia Fay ([id number]) on 1/18/2025 5:04:39 PM      Physical Exam    Relevant Results               Assessment/Plan                  Assessment & Plan  Generalized weakness    Weakness          Juan Luis Edmondson MD   Physician  Internal Medicine     H&P     Signed     Date of Service: 1/17/2025 10:24 AM     Signed       Expand All Collapse All    History Of Present Illness  Janine Sandoval is a 69 y.o. female with past medical history of HTN, HLD, deafness, CVA, seizures, schizophrenia, GERD, constipation, who presented to [facility] ED today from group home with generalized weakness. Per the ED physician, the caregivers at the group home are present and assist with the history. Patient speaks ALS.  They note that the patient has been too weak to walk since being discharged from the hospital yesterday.  They state that they have known her for years that she is able to get around okay with her rollator. They note that her legs will buckle underneath her when she tries to stand which is a change. They also state that she has been acting up more confused than normal and is signing differently than in the past and notes that she is using broken up language. States she was not like this before she was taken the hospital last week. Patient's last known well was well over 24 hours ago. They state they are unable to care for her at the facility as they do to not having the equipment necessary to carry her since she is nonmobile.  Patient was having diffuse weakness.  Also noted having blurry vision in both eyes (able to see  and answer questions appropriately) and numbness bilaterally. 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 Denies fever, chills, cough, chest pain, shortness of breath, nausea, vomiting, abdominal pain, urinary symptoms, diarrhea, or constipation. Anju  service offered, but patient declined. Caregiver interpreting at bedside. Caregiver did state patient appears to be back to her normal self but still slightly confused.     ER course: Hemodynamically stable. Afebrile. 36.4C. /80. HR 82, RR 16, 97% RA. Labs show Glucose 155. Troponin 16. UA ordered. See imaging results below. Pt is being admitted to Dr. Edmondson who will continue to manage patient. I was asked to do the H&P and initial assessment.     Past Medical History  As above        Surgical History  Surgical History   No past surgical history on file.        Social History  She reports that she has never smoked. She has never been exposed to tobacco smoke. She has never used smokeless tobacco. She reports that she does not drink alcohol and does not use drugs.     Family History  Family History          Family History   Problem Relation Name Age of Onset    Parkinsonism Mother        Other (cardiac disorder) Father        Glaucoma Father        Parkinsonism Father                Allergies  Levofloxacin     Review of Systems  10 Point review of systems was performed and is negative except for what is stated in the HPI above.   Physical Exam  Constitutional:       Comments: Sleeping but easily arousable with touch.   HENT:      Head: Normocephalic and atraumatic.      Nose: Nose normal.      Mouth/Throat:      Mouth: Mucous membranes are moist.      Pharynx: Oropharynx is clear.   Eyes:      Extraocular Movements: Extraocular movements intact.      Conjunctiva/sclera: Conjunctivae normal.      Pupils: Pupils are equal, round, and reactive to light.   Cardiovascular:      Rate and Rhythm: Normal rate and  "regular rhythm.      Pulses: Normal pulses.      Heart sounds: Normal heart sounds.   Pulmonary:      Effort: Pulmonary effort is normal.      Breath sounds: Normal breath sounds.   Abdominal:      General: Abdomen is flat. Bowel sounds are normal.      Palpations: Abdomen is soft.   Musculoskeletal:         General: Normal range of motion.      Cervical back: Normal range of motion and neck supple.      Comments: Weakness noted in bilateral lower extremities. NV intact.   Skin:     General: Skin is warm and dry.      Capillary Refill: Capillary refill takes less than 2 seconds.   Neurological:      General: No focal deficit present.      Comments: Answered questions appropriately with ASL with caregiver at bedside.    Psychiatric:         Behavior: Behavior normal.      Comments: Somnolent            Last Recorded Vitals  Blood pressure 147/72, pulse 76, temperature 36.2 °C (97.2 °F), temperature source Temporal, resp. rate 18, height 1.651 m (5' 5\"), weight 74.8 kg (164 lb 14.5 oz), SpO2 97%.     Relevant Results        Results for orders placed or performed during the hospital encounter of 01/16/25 (from the past 24 hours)   CBC and Auto Differential   Result Value Ref Range     WBC 5.4 4.4 - 11.3 x10*3/uL     nRBC 0.0 0.0 - 0.0 /100 WBCs     RBC 4.32 4.00 - 5.20 x10*6/uL     Hemoglobin 12.9 12.0 - 16.0 g/dL     Hematocrit 40.8 36.0 - 46.0 %     MCV 94 80 - 100 fL     MCH 29.9 26.0 - 34.0 pg     MCHC 31.6 (L) 32.0 - 36.0 g/dL     RDW 11.7 11.5 - 14.5 %     Platelets 154 150 - 450 x10*3/uL     Neutrophils % 44.0 40.0 - 80.0 %     Immature Granulocytes %, Automated 0.2 0.0 - 0.9 %     Lymphocytes % 41.9 13.0 - 44.0 %     Monocytes % 9.1 2.0 - 10.0 %     Eosinophils % 4.1 0.0 - 6.0 %     Basophils % 0.7 0.0 - 2.0 %     Neutrophils Absolute 2.36 1.20 - 7.70 x10*3/uL     Immature Granulocytes Absolute, Automated 0.01 0.00 - 0.70 x10*3/uL     Lymphocytes Absolute 2.25 1.20 - 4.80 x10*3/uL     Monocytes Absolute 0.49 " 0.10 - 1.00 x10*3/uL     Eosinophils Absolute 0.22 0.00 - 0.70 x10*3/uL     Basophils Absolute 0.04 0.00 - 0.10 x10*3/uL   Comprehensive metabolic panel   Result Value Ref Range     Glucose 155 (H) 74 - 99 mg/dL     Sodium 138 136 - 145 mmol/L     Potassium 3.6 3.5 - 5.3 mmol/L     Chloride 103 98 - 107 mmol/L     Bicarbonate 26 21 - 32 mmol/L     Anion Gap 13 10 - 20 mmol/L     Urea Nitrogen 12 6 - 23 mg/dL     Creatinine 0.80 0.50 - 1.05 mg/dL     eGFR 80 >60 mL/min/1.73m*2     Calcium 9.5 8.6 - 10.3 mg/dL     Albumin 4.1 3.4 - 5.0 g/dL     Alkaline Phosphatase 81 33 - 136 U/L     Total Protein 7.2 6.4 - 8.2 g/dL     AST 31 9 - 39 U/L     Bilirubin, Total 0.3 0.0 - 1.2 mg/dL     ALT 33 7 - 45 U/L   Magnesium   Result Value Ref Range     Magnesium 1.79 1.60 - 2.40 mg/dL   Troponin I, High Sensitivity   Result Value Ref Range     Troponin I, High Sensitivity 16 (H) 0 - 13 ng/L   ECG 12 lead   Result Value Ref Range     Ventricular Rate 81 BPM     Atrial Rate 81 BPM     WV Interval 210 ms     QRS Duration 102 ms     QT Interval 410 ms     QTC Calculation(Bazett) 476 ms     P Axis 31 degrees     R Axis 53 degrees     T Axis 39 degrees     QRS Count 13 beats     Q Onset 249 ms     T Offset 454 ms     QTC Fredericia 453 ms   Levetiracetam   Result Value Ref Range     Keppra 13 10 - 40 ug/mL   Phenytoin level, total   Result Value Ref Range     Phenytoin 8.5 (L) 10.0 - 20.0 ug/mL   Basic metabolic panel   Result Value Ref Range     Glucose 146 (H) 74 - 99 mg/dL     Sodium 139 136 - 145 mmol/L     Potassium 3.1 (L) 3.5 - 5.3 mmol/L     Chloride 104 98 - 107 mmol/L     Bicarbonate 26 21 - 32 mmol/L     Anion Gap 12 10 - 20 mmol/L     Urea Nitrogen 14 6 - 23 mg/dL     Creatinine 0.78 0.50 - 1.05 mg/dL     eGFR 82 >60 mL/min/1.73m*2     Calcium 8.8 8.6 - 10.3 mg/dL   CBC   Result Value Ref Range     WBC 4.5 4.4 - 11.3 x10*3/uL     nRBC 0.0 0.0 - 0.0 /100 WBCs     RBC 3.73 (L) 4.00 - 5.20 x10*6/uL     Hemoglobin 11.0 (L) 12.0  - 16.0 g/dL     Hematocrit 34.2 (L) 36.0 - 46.0 %     MCV 92 80 - 100 fL     MCH 29.5 26.0 - 34.0 pg     MCHC 32.2 32.0 - 36.0 g/dL     RDW 11.9 11.5 - 14.5 %     Platelets 124 (L) 150 - 450 x10*3/uL   Troponin I, High Sensitivity   Result Value Ref Range     Troponin I, High Sensitivity 17 (H) 0 - 13 ng/L      CT head wo IV contrast     Result Date: 1/16/2025  Interpreted By:  Fausto Flores, STUDY: CT HEAD WO IV CONTRAST;  1/16/2025 10:53 pm   INDICATION: Signs/Symptoms:weakness, ams.   COMPARISON: Noncontrast head CT 09/16/2024   ACCESSION NUMBER(S): [id number]   ORDERING CLINICIAN: SAI TATE   TECHNIQUE: Axial non-contrast CT images of the head. Coronal and sagittal images were reconstructed.   FINDINGS: BRAIN PARENCHYMA: Hypodensity in the left basal ganglia and corona radiata, likely sequelae of remote infarct, unchanged compared to prior.Sub-cortical and periventricular hypoattenuating foci, likely sequelae of chronic ischemic microangiopathy. Gray-white matter differentiation is preserved. VENTRICLES:Within normal limits for brain volume. No midline shift. EXTRA-AXIAL SPACES: No collections. SOFT TISSUES: Unremarkable. SINUSES: Paranasal sinuses are clear. MASTOIDS: Right mastoid effusion, similar to prior. CALVARIUM: No depressed skull fractures. VESSELS: Calcification of carotid siphons. OTHER FINDINGS: None.        1. No acute intracranial abnormality. If clinical concern persists, consider further evaluation with MRI. 2. Other chronic findings including encephalomalacia in the left basal ganglia/corona radiata is unchanged compared to prior.   MACRO: None   Signed by: Fausto Flores 1/16/2025 11:25 PM Dictation workstation:   [id number]     XR chest 1 view     Result Date: 1/16/2025  STUDY: Chest Radiograph;  1/16/2025 7:27 PM INDICATION: Weakness. COMPARISON: 9/16/2024 XR Chest. ACCESSION NUMBER(S): [id number] ORDERING CLINICIAN: MAREN ADAMS TECHNIQUE:  Frontal chest was obtained at 19:27  hours. FINDINGS: CARDIOMEDIASTINAL SILHOUETTE: The cardiomediastinal silhouette is mildly enlarged unchanged from previous imaging..  LUNGS: Lungs are clear.  ABDOMEN: No remarkable upper abdominal findings.  BONES: No acute osseous changes.     1.  Stable cardiomegaly. 2.  No focal pulmonary consolidation pleural effusions.. Signed by Serge Moon MD        Assessment/Plan     Assessment & Plan  Generalized weakness     Weakness     69 year old female who presented to ED today with generalized weakness. No focal deficits. Group home unable to care for her due to ambulatory dysfunction. PT/OT/SW ordered. Patient will be admitted to the hospital for further medical management.     Generalized weakness  Ambulatory dysfunction  Hx of CVA  Admit to OBS/RMF to Dr. Edmondson  Defer consults to attending per request  See imaging results  Patient's last known well was well over 24 hours ago  UA ordered  PT/OT/SW  Repeat labs in AM (Keppra and Dilantin levels ordered)     Elevated troponin (near baseline)  Troponin 16. Will trend.   Denies chest pain     Chronic conditions  #HTN, HLD, deafness, seizures, schizophrenia, GERD, constipation   Continue home meds as appropriate when nursing completes home med rec  Full code     DVT prophylaxis  Heparin SQ  SCD's as tolerated  Up in chair TID      I spent 60 minutes in the professional and overall care of this patient.    Patient fully evaluated on January 17.  Correct electrolytes today.  Physical and Occupational Therapy consultations with possible discharge to skilled nursing when approved  Juan Luis Edmondson MD                    Patient fully evaluated  01/19  for    Problem List Items Addressed This Visit       Weakness - Primary     Patient seen resting in bed with head of bed elevated, no s/s or c/o acute difficulties at this time.  Vital signs for last 24 hours Temp:  [36.2 °C (97.2 °F)-36.6 °C (97.9 °F)] 36.5 °C (97.7 °F)  Heart Rate:  [74-86] 75  Resp:  [18] 18  BP:  (131-145)/(63-69) 131/63    I/O this shift:  In: -   Out: 500 [Urine:500]  Patient still requiring frequent cardiac and SPO2 monitoring. Continue aggressive pulmonary hygiene and oral hygiene. Off loading as tolerated for skin integrity. Medications and labs reviewed-   Results for orders placed or performed during the hospital encounter of 01/16/25 (from the past 24 hours)   CBC   Result Value Ref Range    WBC 4.3 (L) 4.4 - 11.3 x10*3/uL    nRBC 0.0 0.0 - 0.0 /100 WBCs    RBC 3.56 (L) 4.00 - 5.20 x10*6/uL    Hemoglobin 10.5 (L) 12.0 - 16.0 g/dL    Hematocrit 33.2 (L) 36.0 - 46.0 %    MCV 93 80 - 100 fL    MCH 29.5 26.0 - 34.0 pg    MCHC 31.6 (L) 32.0 - 36.0 g/dL    RDW 11.8 11.5 - 14.5 %    Platelets 134 (L) 150 - 450 x10*3/uL   Comprehensive Metabolic Panel   Result Value Ref Range    Glucose 304 (H) 74 - 99 mg/dL    Sodium 137 136 - 145 mmol/L    Potassium 3.3 (L) 3.5 - 5.3 mmol/L    Chloride 105 98 - 107 mmol/L    Bicarbonate 24 21 - 32 mmol/L    Anion Gap 11 10 - 20 mmol/L    Urea Nitrogen 10 6 - 23 mg/dL    Creatinine 0.63 0.50 - 1.05 mg/dL    eGFR >90 >60 mL/min/1.73m*2    Calcium 7.9 (L) 8.6 - 10.3 mg/dL    Albumin 3.0 (L) 3.4 - 5.0 g/dL    Alkaline Phosphatase 55 33 - 136 U/L    Total Protein 5.4 (L) 6.4 - 8.2 g/dL    AST 15 9 - 39 U/L    Bilirubin, Total 0.3 0.0 - 1.2 mg/dL    ALT 24 7 - 45 U/L      Patient recently received an antibiotic (last 12 hours)       None           Plan discussed with interdisciplinary team, electrolytes replaced, slow but progressive improvement, returning to baseline more animated today, anticipate discharge 24- 48 hours. Will continue current and repeat labs in the AM.     Discharge planning discussed with patient and care team. Therapy evaluations ordered. Anticipate HHC/SNF at discharge. Patient aware and agreeable to current plan, continue plan as above.     I spent a total of 50 minutes on the date of the service which included preparing to see the patient, face-to-face  patient care, completing clinical documentation, obtaining and/or reviewing separately obtained history, performing a medically appropriate examination, counseling and educating the patient/family/caregiver, ordering medications, tests, or procedures, communicating with other HCPs (not separately reported), independently interpreting results (not separately reported), communicating results to the patient/family/caregiver, and care coordination (not separately reported).             Shelley Perdomo

## 2025-01-20 LAB
ALBUMIN SERPL BCP-MCNC: 3.2 G/DL (ref 3.4–5)
ALP SERPL-CCNC: 65 U/L (ref 33–136)
ALT SERPL W P-5'-P-CCNC: 29 U/L (ref 7–45)
ANION GAP SERPL CALC-SCNC: 9 MMOL/L (ref 10–20)
AST SERPL W P-5'-P-CCNC: 18 U/L (ref 9–39)
BILIRUB SERPL-MCNC: 0.2 MG/DL (ref 0–1.2)
BUN SERPL-MCNC: 13 MG/DL (ref 6–23)
CALCIUM SERPL-MCNC: 8.6 MG/DL (ref 8.6–10.3)
CHLORIDE SERPL-SCNC: 107 MMOL/L (ref 98–107)
CO2 SERPL-SCNC: 25 MMOL/L (ref 21–32)
CREAT SERPL-MCNC: 0.64 MG/DL (ref 0.5–1.05)
EGFRCR SERPLBLD CKD-EPI 2021: >90 ML/MIN/1.73M*2
ERYTHROCYTE [DISTWIDTH] IN BLOOD BY AUTOMATED COUNT: 11.9 % (ref 11.5–14.5)
GLUCOSE SERPL-MCNC: 153 MG/DL (ref 74–99)
HCT VFR BLD AUTO: 33.5 % (ref 36–46)
HGB BLD-MCNC: 10.9 G/DL (ref 12–16)
HOLD SPECIMEN: NORMAL
MCH RBC QN AUTO: 30.1 PG (ref 26–34)
MCHC RBC AUTO-ENTMCNC: 32.5 G/DL (ref 32–36)
MCV RBC AUTO: 93 FL (ref 80–100)
NRBC BLD-RTO: 0 /100 WBCS (ref 0–0)
PLATELET # BLD AUTO: 138 X10*3/UL (ref 150–450)
POTASSIUM SERPL-SCNC: 3.6 MMOL/L (ref 3.5–5.3)
PROT SERPL-MCNC: 5.8 G/DL (ref 6.4–8.2)
RBC # BLD AUTO: 3.62 X10*6/UL (ref 4–5.2)
SODIUM SERPL-SCNC: 137 MMOL/L (ref 136–145)
WBC # BLD AUTO: 4.7 X10*3/UL (ref 4.4–11.3)

## 2025-01-20 PROCEDURE — 2500000004 HC RX 250 GENERAL PHARMACY W/ HCPCS (ALT 636 FOR OP/ED)

## 2025-01-20 PROCEDURE — 2500000001 HC RX 250 WO HCPCS SELF ADMINISTERED DRUGS (ALT 637 FOR MEDICARE OP)

## 2025-01-20 PROCEDURE — 99232 SBSQ HOSP IP/OBS MODERATE 35: CPT | Performed by: PHYSICIAN ASSISTANT

## 2025-01-20 PROCEDURE — 1100000001 HC PRIVATE ROOM DAILY

## 2025-01-20 PROCEDURE — 92610 EVALUATE SWALLOWING FUNCTION: CPT | Mod: GN | Performed by: SPEECH-LANGUAGE PATHOLOGIST

## 2025-01-20 PROCEDURE — 2500000005 HC RX 250 GENERAL PHARMACY W/O HCPCS

## 2025-01-20 PROCEDURE — 36415 COLL VENOUS BLD VENIPUNCTURE: CPT | Performed by: INTERNAL MEDICINE

## 2025-01-20 PROCEDURE — 85027 COMPLETE CBC AUTOMATED: CPT | Performed by: INTERNAL MEDICINE

## 2025-01-20 PROCEDURE — 80053 COMPREHEN METABOLIC PANEL: CPT | Performed by: INTERNAL MEDICINE

## 2025-01-20 PROCEDURE — 2500000002 HC RX 250 W HCPCS SELF ADMINISTERED DRUGS (ALT 637 FOR MEDICARE OP, ALT 636 FOR OP/ED)

## 2025-01-20 RX ORDER — DOCUSATE SODIUM 100 MG/1
100 CAPSULE, LIQUID FILLED ORAL 2 TIMES DAILY
Start: 2025-01-20

## 2025-01-20 RX ADMIN — LOSARTAN POTASSIUM 25 MG: 25 TABLET, FILM COATED ORAL at 10:31

## 2025-01-20 RX ADMIN — CLOPIDOGREL BISULFATE 75 MG: 75 TABLET ORAL at 10:32

## 2025-01-20 RX ADMIN — PANCRELIPASE 1 CAPSULE: 30000; 6000; 19000 CAPSULE, DELAYED RELEASE PELLETS ORAL at 09:09

## 2025-01-20 RX ADMIN — Medication 2000 UNITS: at 10:31

## 2025-01-20 RX ADMIN — HEPARIN SODIUM 5000 UNITS: 5000 INJECTION INTRAVENOUS; SUBCUTANEOUS at 10:32

## 2025-01-20 RX ADMIN — SERTRALINE HYDROCHLORIDE 100 MG: 100 TABLET ORAL at 10:31

## 2025-01-20 RX ADMIN — BENZTROPINE MESYLATE 1 MG: 1 TABLET ORAL at 21:16

## 2025-01-20 RX ADMIN — POLYETHYLENE GLYCOL 3350 17 G: 17 POWDER, FOR SOLUTION ORAL at 10:32

## 2025-01-20 RX ADMIN — BENZTROPINE MESYLATE 0.5 MG: 1 TABLET ORAL at 12:54

## 2025-01-20 RX ADMIN — EXTENDED PHENYTOIN SODIUM 300 MG: 100 CAPSULE ORAL at 21:16

## 2025-01-20 RX ADMIN — PANCRELIPASE 1 CAPSULE: 30000; 6000; 19000 CAPSULE, DELAYED RELEASE PELLETS ORAL at 12:54

## 2025-01-20 RX ADMIN — QUETIAPINE FUMARATE 200 MG: 100 TABLET ORAL at 21:16

## 2025-01-20 RX ADMIN — FAMOTIDINE 20 MG: 20 TABLET, FILM COATED ORAL at 10:31

## 2025-01-20 RX ADMIN — DOCUSATE SODIUM 100 MG: 100 CAPSULE, LIQUID FILLED ORAL at 10:31

## 2025-01-20 RX ADMIN — ATORVASTATIN CALCIUM 80 MG: 80 TABLET, FILM COATED ORAL at 10:31

## 2025-01-20 RX ADMIN — LIDOCAINE 4% 1 PATCH: 40 PATCH TOPICAL at 10:33

## 2025-01-20 RX ADMIN — QUETIAPINE FUMARATE 50 MG: 25 TABLET ORAL at 14:29

## 2025-01-20 RX ADMIN — TRAZODONE HYDROCHLORIDE 50 MG: 50 TABLET ORAL at 21:17

## 2025-01-20 RX ADMIN — QUETIAPINE FUMARATE 25 MG: 25 TABLET ORAL at 06:23

## 2025-01-20 RX ADMIN — BENZTROPINE MESYLATE 0.5 MG: 1 TABLET ORAL at 10:32

## 2025-01-20 RX ADMIN — HEPARIN SODIUM 5000 UNITS: 5000 INJECTION INTRAVENOUS; SUBCUTANEOUS at 00:02

## 2025-01-20 RX ADMIN — HEPARIN SODIUM 5000 UNITS: 5000 INJECTION INTRAVENOUS; SUBCUTANEOUS at 18:29

## 2025-01-20 RX ADMIN — Medication 5 MG: at 21:16

## 2025-01-20 RX ADMIN — PANCRELIPASE 1 CAPSULE: 30000; 6000; 19000 CAPSULE, DELAYED RELEASE PELLETS ORAL at 18:29

## 2025-01-20 RX ADMIN — DOCUSATE SODIUM 100 MG: 100 CAPSULE, LIQUID FILLED ORAL at 21:16

## 2025-01-20 RX ADMIN — LEVETIRACETAM 1000 MG: 500 TABLET, FILM COATED ORAL at 10:32

## 2025-01-20 ASSESSMENT — PAIN SCALES - GENERAL
PAINLEVEL_OUTOF10: 0 - NO PAIN
PAINLEVEL_OUTOF10: 2

## 2025-01-20 ASSESSMENT — PAIN SCALES - WONG BAKER: WONGBAKER_NUMERICALRESPONSE: NO HURT

## 2025-01-20 NOTE — DISCHARGE SUMMARY
Discharge Diagnosis  Generalized weakness    Issues Requiring Follow-Up  Patient fully evaluated  01/20 for   Assessment & Plan  Generalized weakness    Weakness    Patient with significant clinical improvement noted, patient medically cleared for discharge today. Patient seen resting in bed with head of bed elevated, no s/s or c/o acute difficulties at this time. Vital signs for last 24 hours:  Temp:  [36.1 °C (97 °F)-36.8 °C (98.2 °F)] 36.8 °C (98.2 °F)  Heart Rate:  [72-81] 80  Resp:  [18] 18  BP: (135-155)/(59-76) 139/63 Medications and labs reviewed-   Results for orders placed or performed during the hospital encounter of 01/16/25 (from the past 24 hours)   CBC   Result Value Ref Range    WBC 4.7 4.4 - 11.3 x10*3/uL    nRBC 0.0 0.0 - 0.0 /100 WBCs    RBC 3.62 (L) 4.00 - 5.20 x10*6/uL    Hemoglobin 10.9 (L) 12.0 - 16.0 g/dL    Hematocrit 33.5 (L) 36.0 - 46.0 %    MCV 93 80 - 100 fL    MCH 30.1 26.0 - 34.0 pg    MCHC 32.5 32.0 - 36.0 g/dL    RDW 11.9 11.5 - 14.5 %    Platelets 138 (L) 150 - 450 x10*3/uL   Comprehensive Metabolic Panel   Result Value Ref Range    Glucose 153 (H) 74 - 99 mg/dL    Sodium 137 136 - 145 mmol/L    Potassium 3.6 3.5 - 5.3 mmol/L    Chloride 107 98 - 107 mmol/L    Bicarbonate 25 21 - 32 mmol/L    Anion Gap 9 (L) 10 - 20 mmol/L    Urea Nitrogen 13 6 - 23 mg/dL    Creatinine 0.64 0.50 - 1.05 mg/dL    eGFR >90 >60 mL/min/1.73m*2    Calcium 8.6 8.6 - 10.3 mg/dL    Albumin 3.2 (L) 3.4 - 5.0 g/dL    Alkaline Phosphatase 65 33 - 136 U/L    Total Protein 5.8 (L) 6.4 - 8.2 g/dL    AST 18 9 - 39 U/L    Bilirubin, Total 0.2 0.0 - 1.2 mg/dL    ALT 29 7 - 45 U/L   SST TOP   Result Value Ref Range    Extra Tube Hold for add-ons.       Patient recently received an antibiotic (last 12 hours)       None           No results found for the last 90 days.       Continue aggressive pulmonary hygiene and oral hygiene. Off loading as tolerated for skin integrity.    No acute events overnight, patient denies  chest pain, worsening SOB at this time.  Plan discussed with interdisciplinary team, ok to discharge to SNF, will continue current and repeat labs in the AM if patient still hospitalized. Patient aware and agreeable to current plan, continue plan as above.     I spent 30 minutes on the date of the service which included preparing to see the patient, face-to-face patient care, completing clinical documentation, obtaining and/or reviewing separately obtained history, performing a medically appropriate examination, counseling and educating the patient/family/caregiver, ordering medications, tests, or procedures, communicating with other HCPs (not separately reported), independently interpreting results (not separately reported), communicating results to the patient/family/caregiver, and care coordination (not separately reported    Discharge Meds     Medication List      CHANGE how you take these medications     docusate sodium 100 mg capsule; Commonly known as: Colace; Take 1   capsule (100 mg) by mouth 2 times a day.; What changed: how much to take,   how to take this, when to take this, additional instructions   melatonin 5 mg capsule; Take 1 capsule (5 mg) by mouth once daily.; What   changed: when to take this     CONTINUE taking these medications     acetaminophen 500 mg tablet; Commonly known as: Tylenol; TAKE 1 TABLET   BY MOUTH EVERY 4 HOURS AS NEEDED FOR MILD-MODERATE PAIN, CAN TAKE 2   TABLETS EVERY 8 HOURS AS NEEDED FOR MODERATE-SEVERE PAIN   albuterol 2.5 mg /3 mL (0.083 %) nebulizer solution; 1 VIAL VIA AEROSOL   EVERY 4 HOURS AS NEEDED FOR SHORTNESS OF BREATH / CHEST TIGHTNESS   atorvastatin 80 mg tablet; Commonly known as: Lipitor; Take 1 tablet (80   mg) by mouth once daily.   benztropine 0.5 mg tablet; Commonly known as: Cogentin; TAKE 1 TABLET BY   MOUTH IN THE MORNING AND AFTERNOON ~108Q2 TAKE 2 TABLETS BY MOUTH EVERY   NIGHT AT BEDTIME   cholecalciferol 50 MCG (2000 UT) tablet; Commonly known as:  Vitamin D-3;   TAKE 1 TABLET BY MOUTH ONCE DAILY   clopidogrel 75 mg tablet; Commonly known as: Plavix; Take 1 tablet (75   mg) by mouth once daily.   * Creon 3,000-9,500- 15,000 unit capsule; Generic drug:   lipase-protease-amylase; Take 2 capsules by mouth 3 times a day before   meals.   * Creon 3,000-9,500- 15,000 unit capsule; Generic drug:   lipase-protease-amylase; Take 2 capsules by mouth 3 times a day before   meals.   famotidine 20 mg tablet; Commonly known as: Pepcid; Take 1 tablet (20   mg) by mouth once daily.   haloperidol decanoate 100 mg/mL injection; Commonly known as: Haldol   Decanoate   levETIRAcetam 1,000 mg tablet; Commonly known as: Keppra; Take 1 tablet   (1,000 mg) by mouth once daily.   lidocaine 5 % patch; Commonly known as: Lidoderm; APPLY 1 PATCH   TOPICALLY TO PAINFUL AREA ONCE DAILY *LEAVE IN PLACE FOR 12 HOURS, REMOVE   AFTER 12 HOURS. DO NOT REAPPLY UNTIL 12 HOURS HAVE LAPSED*   losartan 25 mg tablet; Commonly known as: Cozaar; Take 1 tablet (25 mg)   by mouth once daily.   phenytoin  mg capsule; Commonly known as: Dilantin; Take 3   capsules (300 mg) by mouth once daily at bedtime.   * QUEtiapine 200 mg tablet; Commonly known as: SEROquel   * QUEtiapine 25 mg tablet; Commonly known as: SEROquel   sertraline 100 mg tablet; Commonly known as: Zoloft; TAKE 1 TABLET BY   MOUTH ONCE DAILY   traZODone 50 mg tablet; Commonly known as: Desyrel; Take 1 tablet (50   mg) by mouth once daily at bedtime.   Ultra-Light Rollator misc; Generic drug: walker; Use as directed  * This list has 4 medication(s) that are the same as other medications   prescribed for you. Read the directions carefully, and ask your doctor or   other care provider to review them with you.       Test Results Pending At Discharge  Pending Labs       No current pending labs.            Hospital Course         Juan Luis Edmondson MD   Physician  Internal Medicine     Progress Notes     Signed     Date of Service: 1/19/2025   1:39 PM     Signed       Expand All Collapse All    Janine Sandoval is a 69 y.o. female on day 2 of admission presenting with Generalized weakness.           Subjective  Patient fully evaluated  01/18  for    Problem List Items Addressed This Visit         Weakness - Primary      Patient seen resting in bed with head of bed elevated, no s/s or c/o acute difficulties at this time.  Vital signs for last 24 hours Temp:  [36.2 °C (97.2 °F)-36.6 °C (97.9 °F)] 36.5 °C (97.7 °F)  Heart Rate:  [74-86] 75  Resp:  [18] 18  BP: (131-145)/(63-69) 131/63    I/O this shift:  In: -   Out: 500 [Urine:500]  Patient still requiring frequent cardiac and SPO2 monitoring. Continue aggressive pulmonary hygiene and oral hygiene. Off loading as tolerated for skin integrity. Medications and labs reviewed-         Results for orders placed or performed during the hospital encounter of 01/16/25 (from the past 24 hours)   CBC   Result Value Ref Range     WBC 4.3 (L) 4.4 - 11.3 x10*3/uL     nRBC 0.0 0.0 - 0.0 /100 WBCs     RBC 3.56 (L) 4.00 - 5.20 x10*6/uL     Hemoglobin 10.5 (L) 12.0 - 16.0 g/dL     Hematocrit 33.2 (L) 36.0 - 46.0 %     MCV 93 80 - 100 fL     MCH 29.5 26.0 - 34.0 pg     MCHC 31.6 (L) 32.0 - 36.0 g/dL     RDW 11.8 11.5 - 14.5 %     Platelets 134 (L) 150 - 450 x10*3/uL   Comprehensive Metabolic Panel   Result Value Ref Range     Glucose 304 (H) 74 - 99 mg/dL     Sodium 137 136 - 145 mmol/L     Potassium 3.3 (L) 3.5 - 5.3 mmol/L     Chloride 105 98 - 107 mmol/L     Bicarbonate 24 21 - 32 mmol/L     Anion Gap 11 10 - 20 mmol/L     Urea Nitrogen 10 6 - 23 mg/dL     Creatinine 0.63 0.50 - 1.05 mg/dL     eGFR >90 >60 mL/min/1.73m*2     Calcium 7.9 (L) 8.6 - 10.3 mg/dL     Albumin 3.0 (L) 3.4 - 5.0 g/dL     Alkaline Phosphatase 55 33 - 136 U/L     Total Protein 5.4 (L) 6.4 - 8.2 g/dL     AST 15 9 - 39 U/L     Bilirubin, Total 0.3 0.0 - 1.2 mg/dL     ALT 24 7 - 45 U/L      Patient recently received an antibiotic (last 12 hours)          None             Plan discussed with interdisciplinary team, seen by dietician with - Nutrition Prescription: Individualized Nutrition Prescription Provided for : Individualized Nutrition Prescription Provided for :    [id number] kcal,    56-62g protein, and   1ml/kcals fluid via   Regular  diet. Will order Magic cup once daily until intakes established. Appreciate input and agree with plan. Will continue current and repeat labs in the AM. Potassium 3.3 today, replaced, will recheck in the AM.      Discharge planning discussed with patient and care team. Therapy evaluations ordered. [facility]-11. Per [facility]- After reviewing SNF list, patient's sister called, verbalized preference for [facility]. [facility]. [facility]. [facility]. [facility]. [facility]; DCS tasked with building referrals; patient will not need precert. [facility]      Patient aware and agreeable to current plan, continue plan as above.      I spent a total of 50 minutes on the date of the service which included preparing to see the patient, face-to-face patient care, completing clinical documentation, obtaining and/or reviewing separately obtained history, performing a medically appropriate examination, counseling and educating the patient/family/caregiver, ordering medications, tests, or procedures, communicating with other HCPs (not separately reported), independently interpreting results (not separately reported), communicating results to the patient/family/caregiver, and care coordination (not separately reported).               Objective  Last Recorded Vitals  /63 (BP Location: Left arm, Patient Position: Lying)   Pulse 75   Temp 36.5 °C (97.7 °F) (Temporal)   Resp 18   Wt 74.8 kg (164 lb 14.5 oz)   SpO2 96%   Intake/Output last 3 Shifts:     Intake/Output Summary (Last 24 hours) at 1/19/2025 1339  Last data filed at 1/19/2025 0700      Gross per 24 hour   Intake --   Output 2500 ml   Net -2500 ml          Admission Weight  Weight: 74.8 kg (165 lb) (01/16/25 1855)     Daily Weight  01/17/25 : 74.8 kg (164 lb 14.5 oz)     Image Results  Electrocardiogram, 12-lead  Normal sinus rhythm with sinus arrhythmia  Cannot rule out Anterior infarct , age undetermined  Abnormal ECG  When compared with ECG of 16-SEP-2024 01:36,  No significant change was found  See ED provider note for full interpretation and clinical correlation  Confirmed by Patricia Fay ([id number]) on 1/18/2025 5:04:39 PM        Physical Exam     Relevant Results                       Assessment/Plan                       Assessment & Plan  Generalized weakness     Weakness           Juan Luis Edmondson MD   Physician  Internal Medicine     H&P     Signed     Date of Service: 1/17/2025 10:24 AM      Signed        Expand All Collapse All    History Of Present Illness  Janine Sandoval is a 69 y.o. female with past medical history of HTN, HLD, deafness, CVA, seizures, schizophrenia, GERD, constipation, who presented to [facility] ED today from group home with generalized weakness. Per the ED physician, the caregivers at the group home are present and assist with the history. Patient speaks ALS.  They note that the patient has been too weak to walk since being discharged from the hospital yesterday.  They state that they have known her for years that she is able to get around okay with her rollator. They note that her legs will buckle underneath her when she tries to stand which is a change. They also state that she has been acting up more confused than normal and is signing differently than in the past and notes that she is using broken up language. States she was not like this before she was taken the hospital last week. Patient's last known well was well over 24 hours ago. They state they are unable to care for her at the facility as they do to not having the equipment necessary to carry her since she is nonmobile.  Patient was having diffuse weakness.  Also  noted having blurry vision in both eyes (able to see  and answer questions appropriately) and numbness bilaterally. 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 Denies fever, chills, cough, chest pain, shortness of breath, nausea, vomiting, abdominal pain, urinary symptoms, diarrhea, or constipation. Anju  service offered, but patient declined. Caregiver interpreting at bedside. Caregiver did state patient appears to be back to her normal self but still slightly confused.     ER course: Hemodynamically stable. Afebrile. 36.4C. /80. HR 82, RR 16, 97% RA. Labs show Glucose 155. Troponin 16. UA ordered. See imaging results below. Pt is being admitted to Dr. Edmondson who will continue to manage patient. I was asked to do the H&P and initial assessment.     Past Medical History  As above        Surgical History  Surgical History   No past surgical history on file.         Social History  She reports that she has never smoked. She has never been exposed to tobacco smoke. She has never used smokeless tobacco. She reports that she does not drink alcohol and does not use drugs.     Family History  Family History               Family History   Problem Relation Name Age of Onset    Parkinsonism Mother        Other (cardiac disorder) Father        Glaucoma Father        Parkinsonism Father                Allergies  Levofloxacin     Review of Systems  10 Point review of systems was performed and is negative except for what is stated in the HPI above.   Physical Exam  Constitutional:       Comments: Sleeping but easily arousable with touch.   HENT:      Head: Normocephalic and atraumatic.      Nose: Nose normal.      Mouth/Throat:      Mouth: Mucous membranes are moist.      Pharynx: Oropharynx is clear.   Eyes:      Extraocular Movements: Extraocular movements intact.      Conjunctiva/sclera:  "Conjunctivae normal.      Pupils: Pupils are equal, round, and reactive to light.   Cardiovascular:      Rate and Rhythm: Normal rate and regular rhythm.      Pulses: Normal pulses.      Heart sounds: Normal heart sounds.   Pulmonary:      Effort: Pulmonary effort is normal.      Breath sounds: Normal breath sounds.   Abdominal:      General: Abdomen is flat. Bowel sounds are normal.      Palpations: Abdomen is soft.   Musculoskeletal:         General: Normal range of motion.      Cervical back: Normal range of motion and neck supple.      Comments: Weakness noted in bilateral lower extremities. NV intact.   Skin:     General: Skin is warm and dry.      Capillary Refill: Capillary refill takes less than 2 seconds.   Neurological:      General: No focal deficit present.      Comments: Answered questions appropriately with ASL with caregiver at bedside.    Psychiatric:         Behavior: Behavior normal.      Comments: Somnolent            Last Recorded Vitals  Blood pressure 147/72, pulse 76, temperature 36.2 °C (97.2 °F), temperature source Temporal, resp. rate 18, height 1.651 m (5' 5\"), weight 74.8 kg (164 lb 14.5 oz), SpO2 97%.     Relevant Results            Results for orders placed or performed during the hospital encounter of 01/16/25 (from the past 24 hours)   CBC and Auto Differential   Result Value Ref Range     WBC 5.4 4.4 - 11.3 x10*3/uL     nRBC 0.0 0.0 - 0.0 /100 WBCs     RBC 4.32 4.00 - 5.20 x10*6/uL     Hemoglobin 12.9 12.0 - 16.0 g/dL     Hematocrit 40.8 36.0 - 46.0 %     MCV 94 80 - 100 fL     MCH 29.9 26.0 - 34.0 pg     MCHC 31.6 (L) 32.0 - 36.0 g/dL     RDW 11.7 11.5 - 14.5 %     Platelets 154 150 - 450 x10*3/uL     Neutrophils % 44.0 40.0 - 80.0 %     Immature Granulocytes %, Automated 0.2 0.0 - 0.9 %     Lymphocytes % 41.9 13.0 - 44.0 %     Monocytes % 9.1 2.0 - 10.0 %     Eosinophils % 4.1 0.0 - 6.0 %     Basophils % 0.7 0.0 - 2.0 %     Neutrophils Absolute 2.36 1.20 - 7.70 x10*3/uL     " Immature Granulocytes Absolute, Automated 0.01 0.00 - 0.70 x10*3/uL     Lymphocytes Absolute 2.25 1.20 - 4.80 x10*3/uL     Monocytes Absolute 0.49 0.10 - 1.00 x10*3/uL     Eosinophils Absolute 0.22 0.00 - 0.70 x10*3/uL     Basophils Absolute 0.04 0.00 - 0.10 x10*3/uL   Comprehensive metabolic panel   Result Value Ref Range     Glucose 155 (H) 74 - 99 mg/dL     Sodium 138 136 - 145 mmol/L     Potassium 3.6 3.5 - 5.3 mmol/L     Chloride 103 98 - 107 mmol/L     Bicarbonate 26 21 - 32 mmol/L     Anion Gap 13 10 - 20 mmol/L     Urea Nitrogen 12 6 - 23 mg/dL     Creatinine 0.80 0.50 - 1.05 mg/dL     eGFR 80 >60 mL/min/1.73m*2     Calcium 9.5 8.6 - 10.3 mg/dL     Albumin 4.1 3.4 - 5.0 g/dL     Alkaline Phosphatase 81 33 - 136 U/L     Total Protein 7.2 6.4 - 8.2 g/dL     AST 31 9 - 39 U/L     Bilirubin, Total 0.3 0.0 - 1.2 mg/dL     ALT 33 7 - 45 U/L   Magnesium   Result Value Ref Range     Magnesium 1.79 1.60 - 2.40 mg/dL   Troponin I, High Sensitivity   Result Value Ref Range     Troponin I, High Sensitivity 16 (H) 0 - 13 ng/L   ECG 12 lead   Result Value Ref Range     Ventricular Rate 81 BPM     Atrial Rate 81 BPM     HI Interval 210 ms     QRS Duration 102 ms     QT Interval 410 ms     QTC Calculation(Bazett) 476 ms     P Axis 31 degrees     R Axis 53 degrees     T Axis 39 degrees     QRS Count 13 beats     Q Onset 249 ms     T Offset 454 ms     QTC Fredericia 453 ms   Levetiracetam   Result Value Ref Range     Keppra 13 10 - 40 ug/mL   Phenytoin level, total   Result Value Ref Range     Phenytoin 8.5 (L) 10.0 - 20.0 ug/mL   Basic metabolic panel   Result Value Ref Range     Glucose 146 (H) 74 - 99 mg/dL     Sodium 139 136 - 145 mmol/L     Potassium 3.1 (L) 3.5 - 5.3 mmol/L     Chloride 104 98 - 107 mmol/L     Bicarbonate 26 21 - 32 mmol/L     Anion Gap 12 10 - 20 mmol/L     Urea Nitrogen 14 6 - 23 mg/dL     Creatinine 0.78 0.50 - 1.05 mg/dL     eGFR 82 >60 mL/min/1.73m*2     Calcium 8.8 8.6 - 10.3 mg/dL   CBC   Result  Value Ref Range     WBC 4.5 4.4 - 11.3 x10*3/uL     nRBC 0.0 0.0 - 0.0 /100 WBCs     RBC 3.73 (L) 4.00 - 5.20 x10*6/uL     Hemoglobin 11.0 (L) 12.0 - 16.0 g/dL     Hematocrit 34.2 (L) 36.0 - 46.0 %     MCV 92 80 - 100 fL     MCH 29.5 26.0 - 34.0 pg     MCHC 32.2 32.0 - 36.0 g/dL     RDW 11.9 11.5 - 14.5 %     Platelets 124 (L) 150 - 450 x10*3/uL   Troponin I, High Sensitivity   Result Value Ref Range     Troponin I, High Sensitivity 17 (H) 0 - 13 ng/L      CT head wo IV contrast     Result Date: 1/16/2025  Interpreted By:  Fausto Flores, STUDY: CT HEAD WO IV CONTRAST;  1/16/2025 10:53 pm   INDICATION: Signs/Symptoms:weakness, ams.   COMPARISON: Noncontrast head CT 09/16/2024   ACCESSION NUMBER(S): [id number]   ORDERING CLINICIAN: SAI TATE   TECHNIQUE: Axial non-contrast CT images of the head. Coronal and sagittal images were reconstructed.   FINDINGS: BRAIN PARENCHYMA: Hypodensity in the left basal ganglia and corona radiata, likely sequelae of remote infarct, unchanged compared to prior.Sub-cortical and periventricular hypoattenuating foci, likely sequelae of chronic ischemic microangiopathy. Gray-white matter differentiation is preserved. VENTRICLES:Within normal limits for brain volume. No midline shift. EXTRA-AXIAL SPACES: No collections. SOFT TISSUES: Unremarkable. SINUSES: Paranasal sinuses are clear. MASTOIDS: Right mastoid effusion, similar to prior. CALVARIUM: No depressed skull fractures. VESSELS: Calcification of carotid siphons. OTHER FINDINGS: None.        1. No acute intracranial abnormality. If clinical concern persists, consider further evaluation with MRI. 2. Other chronic findings including encephalomalacia in the left basal ganglia/corona radiata is unchanged compared to prior.   MACRO: None   Signed by: Fausto Flores 1/16/2025 11:25 PM Dictation workstation:   [id number]     XR chest 1 view     Result Date: 1/16/2025  STUDY: Chest Radiograph;  1/16/2025 7:27 PM INDICATION: Weakness.  COMPARISON: 9/16/2024 XR Chest. ACCESSION NUMBER(S): [id number] ORDERING CLINICIAN: SERGE ADAMS TECHNIQUE:  Frontal chest was obtained at 19:27 hours. FINDINGS: CARDIOMEDIASTINAL SILHOUETTE: The cardiomediastinal silhouette is mildly enlarged unchanged from previous imaging..  LUNGS: Lungs are clear.  ABDOMEN: No remarkable upper abdominal findings.  BONES: No acute osseous changes.     1.  Stable cardiomegaly. 2.  No focal pulmonary consolidation pleural effusions.. Signed by Serge Moon MD        Assessment/Plan     Assessment & Plan  Generalized weakness     Weakness     69 year old female who presented to ED today with generalized weakness. No focal deficits. Group home unable to care for her due to ambulatory dysfunction. PT/OT/SW ordered. Patient will be admitted to the hospital for further medical management.     Generalized weakness  Ambulatory dysfunction  Hx of CVA  Admit to OBS/RMF to Dr. Edmondson  Defer consults to attending per request  See imaging results  Patient's last known well was well over 24 hours ago  UA ordered  PT/OT/SW  Repeat labs in AM (Keppra and Dilantin levels ordered)     Elevated troponin (near baseline)  Troponin 16. Will trend.   Denies chest pain     Chronic conditions  #HTN, HLD, deafness, seizures, schizophrenia, GERD, constipation   Continue home meds as appropriate when nursing completes home med rec  Full code     DVT prophylaxis  Heparin SQ  SCD's as tolerated  Up in chair TID      I spent 60 minutes in the professional and overall care of this patient.     Patient fully evaluated on January 17.  Correct electrolytes today.  Physical and Occupational Therapy consultations with possible discharge to skilled nursing when approved  Juan Luis Edmondson MD                     Patient fully evaluated  01/19  for    Problem List Items Addressed This Visit         Weakness - Primary      Patient seen resting in bed with head of bed elevated, no s/s or c/o acute difficulties at this  time.  Vital signs for last 24 hours Temp:  [36.2 °C (97.2 °F)-36.6 °C (97.9 °F)] 36.5 °C (97.7 °F)  Heart Rate:  [74-86] 75  Resp:  [18] 18  BP: (131-145)/(63-69) 131/63    I/O this shift:  In: -   Out: 500 [Urine:500]  Patient still requiring frequent cardiac and SPO2 monitoring. Continue aggressive pulmonary hygiene and oral hygiene. Off loading as tolerated for skin integrity. Medications and labs reviewed-         Results for orders placed or performed during the hospital encounter of 01/16/25 (from the past 24 hours)   CBC   Result Value Ref Range     WBC 4.3 (L) 4.4 - 11.3 x10*3/uL     nRBC 0.0 0.0 - 0.0 /100 WBCs     RBC 3.56 (L) 4.00 - 5.20 x10*6/uL     Hemoglobin 10.5 (L) 12.0 - 16.0 g/dL     Hematocrit 33.2 (L) 36.0 - 46.0 %     MCV 93 80 - 100 fL     MCH 29.5 26.0 - 34.0 pg     MCHC 31.6 (L) 32.0 - 36.0 g/dL     RDW 11.8 11.5 - 14.5 %     Platelets 134 (L) 150 - 450 x10*3/uL   Comprehensive Metabolic Panel   Result Value Ref Range     Glucose 304 (H) 74 - 99 mg/dL     Sodium 137 136 - 145 mmol/L     Potassium 3.3 (L) 3.5 - 5.3 mmol/L     Chloride 105 98 - 107 mmol/L     Bicarbonate 24 21 - 32 mmol/L     Anion Gap 11 10 - 20 mmol/L     Urea Nitrogen 10 6 - 23 mg/dL     Creatinine 0.63 0.50 - 1.05 mg/dL     eGFR >90 >60 mL/min/1.73m*2     Calcium 7.9 (L) 8.6 - 10.3 mg/dL     Albumin 3.0 (L) 3.4 - 5.0 g/dL     Alkaline Phosphatase 55 33 - 136 U/L     Total Protein 5.4 (L) 6.4 - 8.2 g/dL     AST 15 9 - 39 U/L     Bilirubin, Total 0.3 0.0 - 1.2 mg/dL     ALT 24 7 - 45 U/L      Patient recently received an antibiotic (last 12 hours)         None             Plan discussed with interdisciplinary team, electrolytes replaced, slow but progressive improvement, returning to baseline more animated today, anticipate discharge 24- 48 hours. Will continue current and repeat labs in the AM.      Discharge planning discussed with patient and care team. Therapy evaluations ordered. Anticipate HHC/SNF at discharge. Patient  aware and agreeable to current plan, continue plan as above.      I spent a total of 50 minutes on the date of the service which included preparing to see the patient, face-to-face patient care, completing clinical documentation, obtaining and/or reviewing separately obtained history, performing a medically appropriate examination, counseling and educating the patient/family/caregiver, ordering medications, tests, or procedures, communicating with other HCPs (not separately reported), independently interpreting results (not separately reported), communicating results to the patient/family/caregiver, and care coordination (not separately reported).         Shelley Perdomo                      Revision History         Pertinent Physical Exam At Time of Discharge  Physical Exam  No acute events overnight, patient denies chest pain, worsening SOB at this time.  Outpatient Follow-Up  Future Appointments   Date Time Provider Department Center   1/27/2025  3:30 PM Bandar Wagner MD [id number] [facility]   3/10/2025  2:40 PM DOMINGO Amos-CNP [id number]   3/14/2025 10:20 AM Jesi Ramsey MD [id number] [facility]         Shelley Perdomo

## 2025-01-20 NOTE — PROGRESS NOTES
Department of Internal Medicine  Gastroenterology  Progress note      Subjective  GI is following for kind consultation    She is evaluated by speech therapy who noted no issues with oropharyngeal dysphagia.    Today, she denies nausea, vomiting, or bowel movements.  She endorses feeling constipated.  She continues to endorse esophageal dysphagia but cannot elicit whether it solids or liquids she is having an issue with.      Current Medication    Current Facility-Administered Medications:     acetaminophen (Tylenol) tablet 650 mg, 650 mg, oral, q4h PRN, DOMINGO Alford-CNP, 650 mg at 01/19/25 2200    albuterol 2.5 mg /3 mL (0.083 %) nebulizer solution 2.5 mg, 2.5 mg, nebulization, q4h PRN, DOMINGO Alford-CNP    atorvastatin (Lipitor) tablet 80 mg, 80 mg, oral, Daily, DOMINGO Alford-CNP, 80 mg at 01/19/25 0805    benztropine (Cogentin) tablet 0.5 mg, 0.5 mg, oral, BID, DOMINGO Alford-CNP, 0.5 mg at 01/19/25 1244    benztropine (Cogentin) tablet 1 mg, 1 mg, oral, Nightly, DOMINGO Alford-CNP, 1 mg at 01/19/25 2025    cholecalciferol (Vitamin D-3) tablet 2,000 Units, 2,000 Units, oral, Daily, DOMINGO Alford-CNP, 2,000 Units at 01/19/25 0807    clopidogrel (Plavix) tablet 75 mg, 75 mg, oral, Daily, DOMINGO Alford-CNP, 75 mg at 01/19/25 0807    dextrose 5%-0.45 % sodium chloride infusion, 75 mL/hr, intravenous, Continuous, Juan Luis Edmondson MD, Last Rate: 75 mL/hr at 01/19/25 2328, 75 mL/hr at 01/19/25 2328    docusate sodium (Colace) capsule 100 mg, 100 mg, oral, BID, DOMINGO Alford-CNP, 100 mg at 01/19/25 2025    famotidine (Pepcid) tablet 20 mg, 20 mg, oral, Daily, HERNAN Alford, 20 mg at 01/19/25 0807    heparin (porcine) injection 5,000 Units, 5,000 Units, subcutaneous, q8h [facility], HERNAN Alford, 5,000 Units at 01/20/25 0002    levETIRAcetam (Keppra) tablet 1,000 mg, 1,000 mg, oral, Daily, Milly GARNER  Avinash, APRN-CNP, 1,000 mg at 01/19/25 0808    lidocaine 4 % patch 1 patch, 1 patch, transdermal, Daily, [facility] DOMINGO Da Silva-CNP, 1 patch at 01/19/25 0808    losartan (Cozaar) tablet 25 mg, 25 mg, oral, Daily, [facility] Avinash APRN-CNP, 25 mg at 01/19/25 0808    melatonin tablet 5 mg, 5 mg, oral, Nightly, [facility] Avinash APRN-CNP, 5 mg at 01/19/25 2025    pancrelipase (Lip-Prot-Amyl) (Creon) 6,000-19,000 -30,000 unit per capsule 1 capsule, 1 capsule, oral, TID, [facility] Avinash, APRN-CNP, 1 capsule at 01/20/25 0909    phenytoin ER (Dilantin) capsule 300 mg, 300 mg, oral, Nightly, [facility] Avinash, APRN-CNP, 300 mg at 01/19/25 2025    polyethylene glycol (Glycolax, Miralax) packet 17 g, 17 g, oral, Daily, [facility] Avinash, APRN-CNP, 17 g at 01/19/25 0808    QUEtiapine (SEROquel) tablet 200 mg, 200 mg, oral, Nightly, [facility] Vargheseer, APRN-CNP, 200 mg at 01/19/25 2025    QUEtiapine (SEROquel) tablet 25 mg, 25 mg, oral, Daily, [facility] Avinash, APRN-CNP, 25 mg at 01/20/25 0623    QUEtiapine (SEROquel) tablet 50 mg, 50 mg, oral, Daily, [facility] Vargheseer, APRN-CNP, 50 mg at 01/19/25 1512    sertraline (Zoloft) tablet 100 mg, 100 mg, oral, Daily, [facility] Avinash, APRN-CNP, 100 mg at 01/19/25 0808    traZODone (Desyrel) tablet 50 mg, 50 mg, oral, Nightly, [facility] Vargheseer, APRN-CNP, 50 mg at 01/19/25 2025    Past Medical History  Active Ambulatory Problems     Diagnosis Date Noted    Screening for colon cancer 10/06/2023    Essential hypertension 10/06/2023    Stroke (Multi) 10/06/2023    Osteoarthritis, knee 10/06/2023    Asthma 10/06/2023    Anemia 10/06/2023    Vitamin D deficiency 10/06/2023    Bilateral sensorineural hearing loss 10/30/2023    Cerebral infarction 06/09/2012    Chest pain 10/30/2023    Chronic cough 10/30/2023    Colon polyp 10/21/2013    Congenital deafness 07/31/2012    Cryptogenic stroke (Multi) 06/08/2012    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    Other specified  depressive episodes 10/30/2023    Diverticulosis of colon 07/13/2015    Epiretinal membrane (ERM) of both eyes 10/25/2016    Gastroesophageal reflux disease without esophagitis 10/30/2023    Glaucoma suspect of both eyes 10/25/2016    Hallucinations 08/15/2013    Hemiparesis affecting dominant side as late effect of stroke (Multi) 11/04/2014    Impacted cerumen of right ear 10/30/2023    Internal hemorrhoids 07/13/2015    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    Nuclear sclerotic cataract 10/25/2016    Partial epilepsy with impairment of consciousness (Multi) 08/09/2015    PVD (posterior vitreous detachment), both eyes 10/25/2016    Acute exacerbation of chronic paranoid schizophrenia (Multi) 04/15/2016    Urinary incontinence 10/30/2023    Arthritis, degenerative 10/09/2017    Hyperlipemia 10/30/2023    Epilepsy 06/08/2012    Bruise 11/27/2023    Murmur, cardiac 11/27/2023    Breast cancer screening by mammogram 01/26/2024    Benign neoplasm of transverse colon 12/21/2023    Anemia due to chronic kidney disease 01/26/2024    Chronic back pain 10/08/2022    Recurrent falls 01/26/2024    Frequent falls 09/16/2024    Morbid (severe) obesity due to excess calories (Multi) 10/22/2024    Breakthrough seizure (Multi) 01/10/2025     Resolved Ambulatory Problems     Diagnosis Date Noted    Seizure disorder (Multi) 10/03/2023    Other hyperlipidemia 10/06/2023    Stroke (cerebrum) (Multi) 10/06/2023    Hemiparesis following cerebrovascular accident (Multi) 11/04/2014    History of stroke 10/30/2023     Past Medical History:   Diagnosis Date    Depression     Gastro-esophageal reflux disease without esophagitis 12/21/2022    HL (hearing loss)     Hyperlipidemia, unspecified 09/25/2019    Hypertension     Personal history of transient ischemic attack (TIA), and cerebral infarction without residual deficits 12/21/2022       PHYSICAL EXAM  VS: /73 (BP Location: Left arm, Patient Position: Lying)   Pulse 72   Temp  "36.1 °C (97 °F) (Temporal)   Resp 18   Ht 1.651 m (5' 5\")   Wt 74.8 kg (164 lb 14.5 oz)   SpO2 97%   BMI 27.44 kg/m²  Body mass index is 27.44 kg/m².  Physical Exam  Constitutional:       General: She is not in acute distress.     Appearance: Normal appearance.   HENT:      Head: Normocephalic and atraumatic.      Mouth/Throat:      Mouth: Mucous membranes are moist.      Pharynx: Oropharynx is clear.   Eyes:      General: No scleral icterus.     Extraocular Movements: Extraocular movements intact.      Conjunctiva/sclera: Conjunctivae normal.      Pupils: Pupils are equal, round, and reactive to light.   Cardiovascular:      Rate and Rhythm: Normal rate and regular rhythm.      Heart sounds: No murmur heard.  Pulmonary:      Effort: Pulmonary effort is normal.      Breath sounds: No wheezing or rhonchi.   Abdominal:      General: Bowel sounds are normal. There is no distension.      Palpations: Abdomen is soft. There is no mass.      Tenderness: There is no abdominal tenderness.      Hernia: No hernia is present.   Musculoskeletal:         General: No swelling or deformity.   Skin:     General: Skin is warm.      Coloration: Skin is not jaundiced.   Neurological:      General: No focal deficit present.      Mental Status: She is alert and oriented to person, place, and time.   Psychiatric:         Mood and Affect: Mood normal.          DATA  Recent blood work and relevant radiology and endoscopic studies were reviewed and discussed with the patient   Results from last 7 days   Lab Units 01/20/25  0604   WBC AUTO x10*3/uL 4.7   RBC AUTO x10*6/uL 3.62*   HEMOGLOBIN g/dL 10.9*   HEMATOCRIT % 33.5*   MCV fL 93   MCHC g/dL 32.5   RDW % 11.9   PLATELETS AUTO x10*3/uL 138*       Results from last 72 hours   Lab Units 01/20/25  0604   SODIUM mmol/L 137   POTASSIUM mmol/L 3.6   CHLORIDE mmol/L 107   CO2 mmol/L 25   BUN mg/dL 13   CREATININE mg/dL 0.64   CALCIUM mg/dL 8.6   PROTEIN TOTAL g/dL 5.8*   BILIRUBIN TOTAL " mg/dL 0.2   ALK PHOS U/L 65   AST U/L 18   ALT U/L 29                   RADIOLOGY REVIEW  NA    ENDOSCOPIC REVIEW  NA    IMPRESSION/RECOMMENDATIONS  The patient is a 69 y.o. female with signficant past medical history of HLD, deafness, CVA, seizures, constipation, GERD, IPMN versus pancreatic mass and schizophrenia who presents for generalized weakness.  Of note she was admitted earlier this month for breakthrough seizures (1/7-1/15/25).     Poor oral intake no documented issues of dysphagia. speech pathology assessment noted no oropharyngeal dysphagia.  Never had esophagram or EGD. Patient complaining of things getting stuck on the way down, cannot illicit if solids, liquids, or both  Atrophic pancreas dilated PD and possible mass versus IPMN, likely represents chronic pancreatitis, dilated PD (1.2 cm), currently on Creon, case reviewed by Dr. Thompson with no endoscopic intervention recommended  Chronic antiplatelet Plavix     PLAN  -Given no documented issue with dysphagia no indication for PEG tube at this time  -Patient continues to endorse possible esophageal dysphagia without being able to elicit whether the issues with solids, liquids, or both.  Will order esophagram for noninvasive studies.  If abdomen topical abnormality noted may need to consider outpatient EGD off Plavix  -Patient reporting constipation requesting enema, ordered  -There is no if she will need to consider geriatric involvement for failure to thrive and appetite stimulation  -Currently on a general diet and tolerating pills.  Did not eat breakfast this a.m. per nursing  -Currently on famotidine and Plavix  -Continue supportive care per primary team     Discussed with HOWARD Piña to follow    (Electronically signed byNidia Olson PA-C on 1/20/2025 at 10:40 AM)

## 2025-01-20 NOTE — PROGRESS NOTES
Speech-Language Pathology    SLP Adult Inpatient Speech-Language Pathology Clinical Swallow Evaluation    Patient Name: Janine Sandoval  MRN: [id number]  Today's Date: 1/20/2025   Time Calculation  Start Time: 0735  Stop Time: 0750  Time Calculation (min): 15 min     Current Problem:   1. Weakness          Recommendations:  Solid Diet Recommendations : Regular (IDDSI Level 7)    Liquid Diet Recommendations: Thin (IDDSI Level 0)    Compensatory Swallowing Strategies: One to one assist with meals, Small bites/sips, Eat/feed slowly    Medication Administration Recommendations: Whole, With Liquid (per RN)    Assessment:  This assessment is based on pt's spontaneous response to PO intake at   bedside, to which she is receptive.      Pt presents with intact oral phase of the swallow, and suspected functional pharyngeal swallow given clinical bedside indicators.  Pt is managing secretions.     PO trials water by cup and straw, puree, soft solids, solids.   ORAL PHASE: labial seal is intact, there is no labial loss of the oral bolus. Mastication is effective, missing teeth noted.  Oral bolus formation and manipulation WFL. No significant oral residue after the swallow.   No pocketing.   PHARYNGEAL PHASE: no overt s/s aspiration, no change in vocal quality or respirations.     Will suggest pt maintain her baseline oral  diet of regular textures and thin liquids. Will need 1:1 assist at meals.   This pt does not present with concern for safe swallow, no indications for further instrumental testing at this time.   ST to sign off, please make NPO if any changes in medical status or mentation that may impact safe chewing or swallowing, and consult ST for further testing.        Plan:  SLP Plan: No skilled SLP  No Skilled SLP: At baseline function, No acute SLP goals identified  Discussed Risks/Benefits: Yes, Nursing  Patient/Caregiver Agreeable: Yes    Subjective   Pt seen bedside, she is awake, takes food, cup from SLP and self  feeds with assist/guidance from SLP. She is receptive to PO intake, moves slowly, takes extra time to eat/drink. Per RN, pt is taking meds whole without concern for dysphagia, no coughing/choking at meals. Pt is from group home.  She is nonverbal during this session.     Phone conversation with caregiver from group home states that Janine does speak, but speech is not as clear as she would like d/t deafness and history of stroke. Per caregiver, pt does eat well at group home, regular textured solids and thin liquids, no restrictions.    Per chart review:  The patient is a 69 y.o. female with signficant past medical history of HLD, deafness, CVA, seizures, constipation, GERD, IPMN versus pancreatic mass and schizophrenia who presents for generalized weakness.  Of note she was admitted earlier this month for breakthrough seizures (1/7-1/15/25).     CXR 1/16  IMPRESSION:  1.  Stable cardiomegaly.  2.  No focal pulmonary consolidation pleural effusions..    CT Head wo IV contrast 1/16  IMPRESSION:  1. No acute intracranial abnormality. If clinical concern persists,  consider further evaluation with MRI.  2. Other chronic findings including encephalomalacia in the left  basal ganglia/corona radiata is unchanged compared to prior.    General Visit Information:  Patient Class: Inpatient  Living Environment: Group home  Reason for Referral: assess oropharyngeal swallow  [employer] Diet: regular textures at group home  Current Diet : regular textures, thin liquids.  Baseline Assessment:  Respiratory Status: Room air  Behavior/Cognition: Cooperative, Pleasant mood  Patient Positioning: Upright in Bed  Oral/Motor Assessment:  Dentition: Some Missing Teeth, Adequate/Natural  Oral Motor: Unable to Complete    Inpatient:  Education Documentation  SLP has provided pt and caregiver/RN HOWARD Marcos, Dr. Edmondson, and night nurse with the results and recommendations of this session.

## 2025-01-20 NOTE — CARE PLAN
The patient's goals for the shift include ability to walk and free of pain    The clinical goals for the shift include Pain Management; Maintain Comfort and Safety Measures      Problem: Pain - Adult  Goal: Verbalizes/displays adequate comfort level or baseline comfort level  Outcome: Progressing     Problem: Safety - Adult  Goal: Free from fall injury  Outcome: Progressing     Problem: Chronic Conditions and Co-morbidities  Goal: Patient's chronic conditions and co-morbidity symptoms are monitored and maintained or improved  Outcome: Progressing     Problem: Skin  Goal: Decreased wound size/increased tissue granulation at next dressing change  Outcome: Progressing  Flowsheets (Taken 1/20/2025 1423)  Decreased wound size/increased tissue granulation at next dressing change: Promote sleep for wound healing  Goal: Participates in plan/prevention/treatment measures  Outcome: Progressing  Flowsheets (Taken 1/20/2025 1423)  Participates in plan/prevention/treatment measures: Elevate heels  Goal: Prevent/manage excess moisture  Outcome: Progressing  Flowsheets (Taken 1/20/2025 1423)  Prevent/manage excess moisture: Monitor for/manage infection if present  Goal: Prevent/minimize sheer/friction injuries  Outcome: Progressing  Flowsheets (Taken 1/20/2025 1423)  Prevent/minimize sheer/friction injuries: Use pull sheet  Goal: Promote/optimize nutrition  Outcome: Progressing  Flowsheets (Taken 1/20/2025 1423)  Promote/optimize nutrition: Consume > 50% meals/supplements  Goal: Promote skin healing  Outcome: Progressing  Flowsheets (Taken 1/20/2025 1423)  Promote skin healing: Turn/reposition every 2 hours/use positioning/transfer devices     Problem: Pain  Goal: Takes deep breaths with improved pain control throughout the shift  Outcome: Progressing  Goal: Turns in bed with improved pain control throughout the shift  Outcome: Progressing  Goal: Walks with improved pain control throughout the shift  Outcome: Progressing  Goal:  Performs ADL's with improved pain control throughout shift  Outcome: Progressing  Goal: Participates in PT with improved pain control throughout the shift  Outcome: Progressing  Goal: Free from opioid side effects throughout the shift  Outcome: Progressing  Goal: Free from acute confusion related to pain meds throughout the shift  Outcome: Progressing     Problem: Nutrition  Goal: Oral intake greater 75%  Outcome: Progressing  Goal: Consume prescribed supplement  Outcome: Progressing  Goal: Adequate PO fluid intake  Outcome: Progressing  Goal: Lab values WNL  Outcome: Progressing  Goal: Maintain stable weight  Outcome: Progressing

## 2025-01-20 NOTE — PROGRESS NOTES
[facility] would like to onsite. Patel is reviewing and asking questions still. Let POA sister know that all other facilities declined. She is hoping [facility] says yes.

## 2025-01-21 ENCOUNTER — APPOINTMENT (OUTPATIENT)
Dept: RADIOLOGY | Facility: HOSPITAL | Age: 70
End: 2025-01-21
Payer: MEDICARE

## 2025-01-21 VITALS
TEMPERATURE: 99 F | BODY MASS INDEX: 27.47 KG/M2 | HEIGHT: 65 IN | OXYGEN SATURATION: 94 % | HEART RATE: 85 BPM | SYSTOLIC BLOOD PRESSURE: 164 MMHG | DIASTOLIC BLOOD PRESSURE: 78 MMHG | RESPIRATION RATE: 18 BRPM | WEIGHT: 164.9 LBS

## 2025-01-21 LAB
ATRIAL RATE: 97 BPM
P AXIS: 62 DEGREES
P OFFSET: 196 MS
P ONSET: 92 MS
PR INTERVAL: 254 MS
Q ONSET: 219 MS
QRS COUNT: 16 BEATS
QRS DURATION: 86 MS
QT INTERVAL: 354 MS
QTC CALCULATION(BAZETT): 449 MS
QTC FREDERICIA: 415 MS
R AXIS: 46 DEGREES
T AXIS: -6 DEGREES
T OFFSET: 396 MS
VENTRICULAR RATE: 97 BPM

## 2025-01-21 PROCEDURE — 2500000001 HC RX 250 WO HCPCS SELF ADMINISTERED DRUGS (ALT 637 FOR MEDICARE OP)

## 2025-01-21 PROCEDURE — 2500000004 HC RX 250 GENERAL PHARMACY W/ HCPCS (ALT 636 FOR OP/ED)

## 2025-01-21 PROCEDURE — 1100000001 HC PRIVATE ROOM DAILY

## 2025-01-21 PROCEDURE — 2500000002 HC RX 250 W HCPCS SELF ADMINISTERED DRUGS (ALT 637 FOR MEDICARE OP, ALT 636 FOR OP/ED)

## 2025-01-21 PROCEDURE — 2500000005 HC RX 250 GENERAL PHARMACY W/O HCPCS

## 2025-01-21 PROCEDURE — 2500000004 HC RX 250 GENERAL PHARMACY W/ HCPCS (ALT 636 FOR OP/ED): Performed by: INTERNAL MEDICINE

## 2025-01-21 RX ADMIN — QUETIAPINE FUMARATE 50 MG: 25 TABLET ORAL at 17:25

## 2025-01-21 RX ADMIN — PANCRELIPASE 1 CAPSULE: 30000; 6000; 19000 CAPSULE, DELAYED RELEASE PELLETS ORAL at 17:25

## 2025-01-21 RX ADMIN — SERTRALINE HYDROCHLORIDE 100 MG: 100 TABLET ORAL at 09:11

## 2025-01-21 RX ADMIN — ATORVASTATIN CALCIUM 80 MG: 80 TABLET, FILM COATED ORAL at 09:11

## 2025-01-21 RX ADMIN — LOSARTAN POTASSIUM 25 MG: 25 TABLET, FILM COATED ORAL at 09:11

## 2025-01-21 RX ADMIN — DOCUSATE SODIUM 100 MG: 100 CAPSULE, LIQUID FILLED ORAL at 20:28

## 2025-01-21 RX ADMIN — QUETIAPINE FUMARATE 200 MG: 100 TABLET ORAL at 20:27

## 2025-01-21 RX ADMIN — HEPARIN SODIUM 5000 UNITS: 5000 INJECTION INTRAVENOUS; SUBCUTANEOUS at 09:11

## 2025-01-21 RX ADMIN — DEXTROSE AND SODIUM CHLORIDE 75 ML/HR: 5; 450 INJECTION, SOLUTION INTRAVENOUS at 01:23

## 2025-01-21 RX ADMIN — POLYETHYLENE GLYCOL 3350 17 G: 17 POWDER, FOR SOLUTION ORAL at 09:10

## 2025-01-21 RX ADMIN — FAMOTIDINE 20 MG: 20 TABLET, FILM COATED ORAL at 09:11

## 2025-01-21 RX ADMIN — EXTENDED PHENYTOIN SODIUM 300 MG: 100 CAPSULE ORAL at 20:28

## 2025-01-21 RX ADMIN — Medication 2000 UNITS: at 09:10

## 2025-01-21 RX ADMIN — TRAZODONE HYDROCHLORIDE 50 MG: 50 TABLET ORAL at 20:28

## 2025-01-21 RX ADMIN — DOCUSATE SODIUM 100 MG: 100 CAPSULE, LIQUID FILLED ORAL at 09:11

## 2025-01-21 RX ADMIN — ACETAMINOPHEN 650 MG: 325 TABLET, FILM COATED ORAL at 20:28

## 2025-01-21 RX ADMIN — QUETIAPINE FUMARATE 25 MG: 25 TABLET ORAL at 06:04

## 2025-01-21 RX ADMIN — CLOPIDOGREL BISULFATE 75 MG: 75 TABLET ORAL at 09:11

## 2025-01-21 RX ADMIN — BENZTROPINE MESYLATE 1 MG: 1 TABLET ORAL at 20:39

## 2025-01-21 RX ADMIN — BENZTROPINE MESYLATE 0.5 MG: 1 TABLET ORAL at 17:25

## 2025-01-21 RX ADMIN — HEPARIN SODIUM 5000 UNITS: 5000 INJECTION INTRAVENOUS; SUBCUTANEOUS at 00:27

## 2025-01-21 RX ADMIN — LEVETIRACETAM 1000 MG: 500 TABLET, FILM COATED ORAL at 09:11

## 2025-01-21 RX ADMIN — HEPARIN SODIUM 5000 UNITS: 5000 INJECTION INTRAVENOUS; SUBCUTANEOUS at 17:26

## 2025-01-21 RX ADMIN — LIDOCAINE 4% 1 PATCH: 40 PATCH TOPICAL at 09:10

## 2025-01-21 RX ADMIN — Medication 5 MG: at 20:27

## 2025-01-21 ASSESSMENT — PAIN SCALES - GENERAL
PAINLEVEL_OUTOF10: 0 - NO PAIN
PAINLEVEL_OUTOF10: 0 - NO PAIN

## 2025-01-21 ASSESSMENT — COGNITIVE AND FUNCTIONAL STATUS - GENERAL
MOVING FROM LYING ON BACK TO SITTING ON SIDE OF FLAT BED WITH BEDRAILS: A LOT
MOBILITY SCORE: 18
TURNING FROM BACK TO SIDE WHILE IN FLAT BAD: A LITTLE
STANDING UP FROM CHAIR USING ARMS: A LOT
TURNING FROM BACK TO SIDE WHILE IN FLAT BAD: A LOT
WALKING IN HOSPITAL ROOM: A LOT
CLIMB 3 TO 5 STEPS WITH RAILING: A LITTLE
WALKING IN HOSPITAL ROOM: A LITTLE
MOVING TO AND FROM BED TO CHAIR: A LITTLE
CLIMB 3 TO 5 STEPS WITH RAILING: TOTAL
MOVING FROM LYING ON BACK TO SITTING ON SIDE OF FLAT BED WITH BEDRAILS: A LITTLE
MOVING TO AND FROM BED TO CHAIR: A LOT
STANDING UP FROM CHAIR USING ARMS: A LITTLE
MOBILITY SCORE: 11

## 2025-01-21 ASSESSMENT — PAIN - FUNCTIONAL ASSESSMENT: PAIN_FUNCTIONAL_ASSESSMENT: 0-10

## 2025-01-21 NOTE — PROGRESS NOTES
Physical Therapy    Physical Therapy Treatment    Patient Name: Janine Sandoval  MRN: [id number]  Today's Date: 1/21/2025  Time Calculation  Start Time: 1405  Stop Time: 1430  Time Calculation (min): 25 min     710/710-A    Assessment/Plan   PT Assessment  PT Assessment Results: Decreased strength, Decreased endurance, Impaired balance, Decreased mobility  End of Session Communication: Bedside nurse  End of Session Patient Position: Bed, 2 rail up, Alarm on (All needs in reach and pt in no apparent distress)     PT Plan  Treatment/Interventions: Bed mobility, Transfer training, Gait training  PT Plan: Ongoing PT  PT Frequency: 3 times per week  PT Discharge Recommendations: Moderate intensity level of continued care  PT - OK to Discharge: Yes    Current Problem:  Patient Active Problem List   Diagnosis    Screening for colon cancer    Essential hypertension    Stroke (Multi)    Osteoarthritis, knee    Asthma    Anemia    Vitamin D deficiency    Bilateral sensorineural hearing loss    Cerebral infarction    Chest pain    Chronic cough    Colon polyp    Congenital deafness    Cryptogenic stroke (Multi)    Deaf-mutism    Other specified depressive episodes    Diverticulosis of colon    Epiretinal membrane (ERM) of both eyes    Gastroesophageal reflux disease without esophagitis    Glaucoma suspect of both eyes    Hallucinations    Hemiparesis affecting dominant side as late effect of stroke (Multi)    Impacted cerumen of right ear    Internal hemorrhoids    Neuroleptic induced parkinsonism    Nuclear sclerotic cataract    Partial epilepsy with impairment of consciousness (Multi)    PVD (posterior vitreous detachment), both eyes    Acute exacerbation of chronic paranoid schizophrenia (Multi)    Urinary incontinence    Arthritis, degenerative    Hyperlipemia    Epilepsy    Bruise    Murmur, cardiac    Breast cancer screening by mammogram    Benign neoplasm of transverse colon    Anemia due to chronic kidney disease     Chronic back pain    Recurrent falls    Frequent falls    Morbid (severe) obesity due to excess calories (Multi)    Breakthrough seizure (Multi)    Generalized weakness    Weakness     General Visit Information:   PT  Visit  PT Received On: 01/21/25  General  Reason for Referral: PT Eval and Treat  Referred By: DOMINGO Alford-CNP  Past Medical History Relevant to Rehab: 69 y.o. female with past medical history of HTN, HLD, deafness, CVA, seizures, schizophrenia, GERD, constipation, who presented to [facility] ED today from group home with generalized weakness. Per the ED physician, the caregivers at the group home are present and assist with the history. Patient speaks ALS.  They note that the patient has been too weak to walk since being discharged from the hospital yesterday.  They state that they have known her for years that she is able to get around okay with her rollator. They note that her legs will buckle underneath her when she tries to stand which is a change. They also state that she has been acting up more confused than normal and is signing differently than in the past and notes that she is using broken up language.  Co-Treatment: OT  Co-Treatment Reason: maximize safety and functional mobility  Prior to Session Communication: Bedside nurse  Patient Position Received: Bed, 2 rail up, Alarm on (Agreeable to PT)  General Comment: Attempted writing to pt for communication, however was not clear if pt understood, so used demonstrations with tactile cueing which pt demonstrated understanding well.    Subjective     Precautions:  Precautions  Precautions Comment: Fall precautions, deaf, uses ASL at baseline, did well understanding demonstrations throughout session    Objective     Pain:  Pain Assessment  Pain Assessment:  (pt did not appear to be in pain)    Cognition:  Cognition  Overall Cognitive Status: Unable to assess    Treatments:  Bed Mobility  Bed Mobility:  (supine <> sitting: mod-max A x  1)  Ambulation/Gait Training  Ambulation/Gait Training Performed:  (Pt side stepped x 3' using RW with mod-max A x 1)  Transfers  Transfer:  (STS from EOB: max A x 1)    Outcome Measures:  [facility] Basic Mobility  Turning from your back to your side while in a flat bed without using bedrails: A lot  Moving from lying on your back to sitting on the side of a flat bed without using bedrails: A lot  Moving to and from bed to chair (including a wheelchair): A lot  Standing up from a chair using your arms (e.g. wheelchair or bedside chair): A lot  To walk in hospital room: A lot  Climbing 3-5 steps with railing: Total  Basic Mobility - Total Score: 11     Education Documentation  Body Mechanics, taught by Franci Rodas PT at 1/21/2025  3:11 PM.  Learner: Patient  Readiness: Acceptance  Method: Demonstration  Response: Demonstrated Understanding    Mobility Training, taught by Franci Rodas PT at 1/21/2025  3:11 PM.  Learner: Patient  Readiness: Acceptance  Method: Demonstration  Response: Demonstrated Understanding    Education Comments  No comments found.    EDUCATION:     Encounter Problems       Encounter Problems (Active)       PT Problem       STG - Pt will transition supine <> sitting with mod A x 1  (Progressing)       Start:  01/17/25    Expected End:  01/31/25            STG - Pt will transfer STS with mod A x 1  (Progressing)       Start:  01/17/25    Expected End:  01/31/25            STG - Pt will amb 25' using RW with mod A x 1  (Progressing)       Start:  01/17/25    Expected End:  01/31/25               Pain - Adult

## 2025-01-21 NOTE — DISCHARGE SUMMARY
Discharge Diagnosis  Generalized weakness    Issues Requiring Follow-Up  Patient fully evaluated  01/20 for   Assessment & Plan  Generalized weakness    Weakness    Patient with significant clinical improvement noted, patient medically cleared for discharge today. Patient seen resting in bed with head of bed elevated, no s/s or c/o acute difficulties at this time. Vital signs for last 24 hours:  Temp:  [36.4 °C (97.5 °F)-36.8 °C (98.2 °F)] 36.4 °C (97.5 °F)  Heart Rate:  [71-80] 71  Resp:  [18] 18  BP: (133-165)/(63-74) 165/73 Medications and labs reviewed-   No results found for this or any previous visit (from the past 24 hours).     Patient recently received an antibiotic (last 12 hours)       None           No results found for the last 90 days.       Continue aggressive pulmonary hygiene and oral hygiene. Off loading as tolerated for skin integrity.    No acute events overnight, patient denies chest pain, worsening SOB at this time.  Plan discussed with interdisciplinary team, ok to discharge to SNF, will continue current and repeat labs in the AM if patient still hospitalized. Patient aware and agreeable to current plan, continue plan as above.     I spent 30 minutes on the date of the service which included preparing to see the patient, face-to-face patient care, completing clinical documentation, obtaining and/or reviewing separately obtained history, performing a medically appropriate examination, counseling and educating the patient/family/caregiver, ordering medications, tests, or procedures, communicating with other HCPs (not separately reported), independently interpreting results (not separately reported), communicating results to the patient/family/caregiver, and care coordination (not separately reported    Discharge Meds     Medication List      CHANGE how you take these medications     docusate sodium 100 mg capsule; Commonly known as: Colace; Take 1   capsule (100 mg) by mouth 2 times a day.; What  changed: how much to take,   how to take this, when to take this, additional instructions   melatonin 5 mg capsule; Take 1 capsule (5 mg) by mouth once daily.; What   changed: when to take this     CONTINUE taking these medications     acetaminophen 500 mg tablet; Commonly known as: Tylenol; TAKE 1 TABLET   BY MOUTH EVERY 4 HOURS AS NEEDED FOR MILD-MODERATE PAIN, CAN TAKE 2   TABLETS EVERY 8 HOURS AS NEEDED FOR MODERATE-SEVERE PAIN   albuterol 2.5 mg /3 mL (0.083 %) nebulizer solution; 1 VIAL VIA AEROSOL   EVERY 4 HOURS AS NEEDED FOR SHORTNESS OF BREATH / CHEST TIGHTNESS   atorvastatin 80 mg tablet; Commonly known as: Lipitor; Take 1 tablet (80   mg) by mouth once daily.   benztropine 0.5 mg tablet; Commonly known as: Cogentin; TAKE 1 TABLET BY   MOUTH IN THE MORNING AND AFTERNOON ~108Q2 TAKE 2 TABLETS BY MOUTH EVERY   NIGHT AT BEDTIME   cholecalciferol 50 MCG (2000 UT) tablet; Commonly known as: Vitamin D-3;   TAKE 1 TABLET BY MOUTH ONCE DAILY   clopidogrel 75 mg tablet; Commonly known as: Plavix; Take 1 tablet (75   mg) by mouth once daily.   * Creon 3,000-9,500- 15,000 unit capsule; Generic drug:   lipase-protease-amylase; Take 2 capsules by mouth 3 times a day before   meals.   * Creon 3,000-9,500- 15,000 unit capsule; Generic drug:   lipase-protease-amylase; Take 2 capsules by mouth 3 times a day before   meals.   famotidine 20 mg tablet; Commonly known as: Pepcid; Take 1 tablet (20   mg) by mouth once daily.   haloperidol decanoate 100 mg/mL injection; Commonly known as: Haldol   Decanoate   levETIRAcetam 1,000 mg tablet; Commonly known as: Keppra; Take 1 tablet   (1,000 mg) by mouth once daily.   lidocaine 5 % patch; Commonly known as: Lidoderm; APPLY 1 PATCH   TOPICALLY TO PAINFUL AREA ONCE DAILY *LEAVE IN PLACE FOR 12 HOURS, REMOVE   AFTER 12 HOURS. DO NOT REAPPLY UNTIL 12 HOURS HAVE LAPSED*   losartan 25 mg tablet; Commonly known as: Cozaar; Take 1 tablet (25 mg)   by mouth once daily.   phenytoin   mg capsule; Commonly known as: Dilantin; Take 3   capsules (300 mg) by mouth once daily at bedtime.   * QUEtiapine 200 mg tablet; Commonly known as: SEROquel   * QUEtiapine 25 mg tablet; Commonly known as: SEROquel   sertraline 100 mg tablet; Commonly known as: Zoloft; TAKE 1 TABLET BY   MOUTH ONCE DAILY   traZODone 50 mg tablet; Commonly known as: Desyrel; Take 1 tablet (50   mg) by mouth once daily at bedtime.   Ultra-Light Rollator misc; Generic drug: walker; Use as directed  * This list has 4 medication(s) that are the same as other medications   prescribed for you. Read the directions carefully, and ask your doctor or   other care provider to review them with you.       Test Results Pending At Discharge  Pending Labs       No current pending labs.            Hospital Course         Juan Luis Edmondson MD   Physician  Internal Medicine     Progress Notes     Signed     Date of Service: 1/19/2025  1:39 PM     Signed       Expand All Collapse All    [facility] is a 69 y.o. female on day 2 of admission presenting with Generalized weakness.           Subjective  Patient fully evaluated  01/18  for    Problem List Items Addressed This Visit         Weakness - Primary      Patient seen resting in bed with head of bed elevated, no s/s or c/o acute difficulties at this time.  Vital signs for last 24 hours Temp:  [36.2 °C (97.2 °F)-36.6 °C (97.9 °F)] 36.5 °C (97.7 °F)  Heart Rate:  [74-86] 75  Resp:  [18] 18  BP: (131-145)/(63-69) 131/63    I/O this shift:  In: -   Out: 500 [Urine:500]  Patient still requiring frequent cardiac and SPO2 monitoring. Continue aggressive pulmonary hygiene and oral hygiene. Off loading as tolerated for skin integrity. Medications and labs reviewed-         Results for orders placed or performed during the hospital encounter of 01/16/25 (from the past 24 hours)   CBC   Result Value Ref Range     WBC 4.3 (L) 4.4 - 11.3 x10*3/uL     nRBC 0.0 0.0 - 0.0 /100 WBCs     RBC 3.56 (L) 4.00 - 5.20  x10*6/uL     Hemoglobin 10.5 (L) 12.0 - 16.0 g/dL     Hematocrit 33.2 (L) 36.0 - 46.0 %     MCV 93 80 - 100 fL     MCH 29.5 26.0 - 34.0 pg     MCHC 31.6 (L) 32.0 - 36.0 g/dL     RDW 11.8 11.5 - 14.5 %     Platelets 134 (L) 150 - 450 x10*3/uL   Comprehensive Metabolic Panel   Result Value Ref Range     Glucose 304 (H) 74 - 99 mg/dL     Sodium 137 136 - 145 mmol/L     Potassium 3.3 (L) 3.5 - 5.3 mmol/L     Chloride 105 98 - 107 mmol/L     Bicarbonate 24 21 - 32 mmol/L     Anion Gap 11 10 - 20 mmol/L     Urea Nitrogen 10 6 - 23 mg/dL     Creatinine 0.63 0.50 - 1.05 mg/dL     eGFR >90 >60 mL/min/1.73m*2     Calcium 7.9 (L) 8.6 - 10.3 mg/dL     Albumin 3.0 (L) 3.4 - 5.0 g/dL     Alkaline Phosphatase 55 33 - 136 U/L     Total Protein 5.4 (L) 6.4 - 8.2 g/dL     AST 15 9 - 39 U/L     Bilirubin, Total 0.3 0.0 - 1.2 mg/dL     ALT 24 7 - 45 U/L      Patient recently received an antibiotic (last 12 hours)         None             Plan discussed with interdisciplinary team, seen by dietician with - Nutrition Prescription: Individualized Nutrition Prescription Provided for : Individualized Nutrition Prescription Provided for :    [id number] kcal,    56-62g protein, and   1ml/kcals fluid via   Regular  diet. Will order Magic cup once daily until intakes established. Appreciate input and agree with plan. Will continue current and repeat labs in the AM. Potassium 3.3 today, replaced, will recheck in the AM.      Discharge planning discussed with patient and care team. Therapy evaluations ordered. [facility]. Per [facility]- After reviewing SNF list, patient's sister called, verbalized preference for 1. [facility] 2. [facility] 3. [facility] 4. [facility] 5. [facility]; DCS tasked with building referrals; patient will not need precert. [facility]      Patient aware and agreeable to current plan, continue plan as above.      I spent a total of 50 minutes on the date of the service which included preparing to see the  patient, face-to-face patient care, completing clinical documentation, obtaining and/or reviewing separately obtained history, performing a medically appropriate examination, counseling and educating the patient/family/caregiver, ordering medications, tests, or procedures, communicating with other HCPs (not separately reported), independently interpreting results (not separately reported), communicating results to the patient/family/caregiver, and care coordination (not separately reported).               Objective  Last Recorded Vitals  /63 (BP Location: Left arm, Patient Position: Lying)   Pulse 75   Temp 36.5 °C (97.7 °F) (Temporal)   Resp 18   Wt 74.8 kg (164 lb 14.5 oz)   SpO2 96%   Intake/Output last 3 Shifts:     Intake/Output Summary (Last 24 hours) at 1/19/2025 1339  Last data filed at 1/19/2025 0700      Gross per 24 hour   Intake --   Output 2500 ml   Net -2500 ml         Admission Weight  Weight: 74.8 kg (165 lb) (01/16/25 1855)     Daily Weight  01/17/25 : 74.8 kg (164 lb 14.5 oz)     Image Results  Electrocardiogram, 12-lead  Normal sinus rhythm with sinus arrhythmia  Cannot rule out Anterior infarct , age undetermined  Abnormal ECG  When compared with ECG of 16-SEP-2024 01:36,  No significant change was found  See ED provider note for full interpretation and clinical correlation  Confirmed by Patricia Fay ([id number]) on 1/18/2025 5:04:39 PM        Physical Exam     Relevant Results                       Assessment/Plan                       Assessment & Plan  Generalized weakness     Weakness           Juan Luis Edmondson MD   Physician  Internal Medicine     H&P     Signed     Date of Service: 1/17/2025 10:24 AM      Signed        Expand All Collapse All    History Of Present Illness  Janine Sandoval is a 69 y.o. female with past medical history of HTN, HLD, deafness, CVA, seizures, schizophrenia, GERD, constipation, who presented to [facility] ED today from group home with generalized  weakness. Per the ED physician, the caregivers at the group home are present and assist with the history. Patient speaks ALS.  They note that the patient has been too weak to walk since being discharged from the hospital yesterday.  They state that they have known her for years that she is able to get around okay with her rollator. They note that her legs will buckle underneath her when she tries to stand which is a change. They also state that she has been acting up more confused than normal and is signing differently than in the past and notes that she is using broken up language. States she was not like this before she was taken the hospital last week. Patient's last known well was well over 24 hours ago. They state they are unable to care for her at the facility as they do to not having the equipment necessary to carry her since she is nonmobile.  Patient was having diffuse weakness.  Also noted having blurry vision in both eyes (able to see  and answer questions appropriately) and numbness bilaterally. 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 Denies fever, chills, cough, chest pain, shortness of breath, nausea, vomiting, abdominal pain, urinary symptoms, diarrhea, or constipation. Anju  service offered, but patient declined. Caregiver interpreting at bedside. Caregiver did state patient appears to be back to her normal self but still slightly confused.     ER course: Hemodynamically stable. Afebrile. 36.4C. /80. HR 82, RR 16, 97% RA. Labs show Glucose 155. Troponin 16. UA ordered. See imaging results below. Pt is being admitted to Dr. Edmondson who will continue to manage patient. I was asked to do the H&P and initial assessment.     Past Medical History  As above        Surgical History  Surgical History   No past surgical history on file.         Social History  She  reports that she has never smoked. She has never been exposed to tobacco smoke. She has never used smokeless tobacco. She reports that she does not drink alcohol and does not use drugs.     Family History  Family History               Family History   Problem Relation Name Age of Onset    Parkinsonism Mother        Other (cardiac disorder) Father        Glaucoma Father        Parkinsonism Father                Allergies  Levofloxacin     Review of Systems  10 Point review of systems was performed and is negative except for what is stated in the HPI above.   Physical Exam  Constitutional:       Comments: Sleeping but easily arousable with touch.   HENT:      Head: Normocephalic and atraumatic.      Nose: Nose normal.      Mouth/Throat:      Mouth: Mucous membranes are moist.      Pharynx: Oropharynx is clear.   Eyes:      Extraocular Movements: Extraocular movements intact.      Conjunctiva/sclera: Conjunctivae normal.      Pupils: Pupils are equal, round, and reactive to light.   Cardiovascular:      Rate and Rhythm: Normal rate and regular rhythm.      Pulses: Normal pulses.      Heart sounds: Normal heart sounds.   Pulmonary:      Effort: Pulmonary effort is normal.      Breath sounds: Normal breath sounds.   Abdominal:      General: Abdomen is flat. Bowel sounds are normal.      Palpations: Abdomen is soft.   Musculoskeletal:         General: Normal range of motion.      Cervical back: Normal range of motion and neck supple.      Comments: Weakness noted in bilateral lower extremities. NV intact.   Skin:     General: Skin is warm and dry.      Capillary Refill: Capillary refill takes less than 2 seconds.   Neurological:      General: No focal deficit present.      Comments: Answered questions appropriately with ASL with caregiver at bedside.    Psychiatric:         Behavior: Behavior normal.      Comments: Somnolent            Last Recorded Vitals  Blood pressure 147/72, pulse 76, temperature 36.2 °C (97.2 °F),  "temperature source Temporal, resp. rate 18, height 1.651 m (5' 5\"), weight 74.8 kg (164 lb 14.5 oz), SpO2 97%.     Relevant Results            Results for orders placed or performed during the hospital encounter of 01/16/25 (from the past 24 hours)   CBC and Auto Differential   Result Value Ref Range     WBC 5.4 4.4 - 11.3 x10*3/uL     nRBC 0.0 0.0 - 0.0 /100 WBCs     RBC 4.32 4.00 - 5.20 x10*6/uL     Hemoglobin 12.9 12.0 - 16.0 g/dL     Hematocrit 40.8 36.0 - 46.0 %     MCV 94 80 - 100 fL     MCH 29.9 26.0 - 34.0 pg     MCHC 31.6 (L) 32.0 - 36.0 g/dL     RDW 11.7 11.5 - 14.5 %     Platelets 154 150 - 450 x10*3/uL     Neutrophils % 44.0 40.0 - 80.0 %     Immature Granulocytes %, Automated 0.2 0.0 - 0.9 %     Lymphocytes % 41.9 13.0 - 44.0 %     Monocytes % 9.1 2.0 - 10.0 %     Eosinophils % 4.1 0.0 - 6.0 %     Basophils % 0.7 0.0 - 2.0 %     Neutrophils Absolute 2.36 1.20 - 7.70 x10*3/uL     Immature Granulocytes Absolute, Automated 0.01 0.00 - 0.70 x10*3/uL     Lymphocytes Absolute 2.25 1.20 - 4.80 x10*3/uL     Monocytes Absolute 0.49 0.10 - 1.00 x10*3/uL     Eosinophils Absolute 0.22 0.00 - 0.70 x10*3/uL     Basophils Absolute 0.04 0.00 - 0.10 x10*3/uL   Comprehensive metabolic panel   Result Value Ref Range     Glucose 155 (H) 74 - 99 mg/dL     Sodium 138 136 - 145 mmol/L     Potassium 3.6 3.5 - 5.3 mmol/L     Chloride 103 98 - 107 mmol/L     Bicarbonate 26 21 - 32 mmol/L     Anion Gap 13 10 - 20 mmol/L     Urea Nitrogen 12 6 - 23 mg/dL     Creatinine 0.80 0.50 - 1.05 mg/dL     eGFR 80 >60 mL/min/1.73m*2     Calcium 9.5 8.6 - 10.3 mg/dL     Albumin 4.1 3.4 - 5.0 g/dL     Alkaline Phosphatase 81 33 - 136 U/L     Total Protein 7.2 6.4 - 8.2 g/dL     AST 31 9 - 39 U/L     Bilirubin, Total 0.3 0.0 - 1.2 mg/dL     ALT 33 7 - 45 U/L   Magnesium   Result Value Ref Range     Magnesium 1.79 1.60 - 2.40 mg/dL   Troponin I, High Sensitivity   Result Value Ref Range     Troponin I, High Sensitivity 16 (H) 0 - 13 ng/L   ECG " 12 lead   Result Value Ref Range     Ventricular Rate 81 BPM     Atrial Rate 81 BPM     MO Interval 210 ms     QRS Duration 102 ms     QT Interval 410 ms     QTC Calculation(Bazett) 476 ms     P Axis 31 degrees     R Axis 53 degrees     T Axis 39 degrees     QRS Count 13 beats     Q Onset 249 ms     T Offset 454 ms     QTC Fredericia 453 ms   Levetiracetam   Result Value Ref Range     Keppra 13 10 - 40 ug/mL   Phenytoin level, total   Result Value Ref Range     Phenytoin 8.5 (L) 10.0 - 20.0 ug/mL   Basic metabolic panel   Result Value Ref Range     Glucose 146 (H) 74 - 99 mg/dL     Sodium 139 136 - 145 mmol/L     Potassium 3.1 (L) 3.5 - 5.3 mmol/L     Chloride 104 98 - 107 mmol/L     Bicarbonate 26 21 - 32 mmol/L     Anion Gap 12 10 - 20 mmol/L     Urea Nitrogen 14 6 - 23 mg/dL     Creatinine 0.78 0.50 - 1.05 mg/dL     eGFR 82 >60 mL/min/1.73m*2     Calcium 8.8 8.6 - 10.3 mg/dL   CBC   Result Value Ref Range     WBC 4.5 4.4 - 11.3 x10*3/uL     nRBC 0.0 0.0 - 0.0 /100 WBCs     RBC 3.73 (L) 4.00 - 5.20 x10*6/uL     Hemoglobin 11.0 (L) 12.0 - 16.0 g/dL     Hematocrit 34.2 (L) 36.0 - 46.0 %     MCV 92 80 - 100 fL     MCH 29.5 26.0 - 34.0 pg     MCHC 32.2 32.0 - 36.0 g/dL     RDW 11.9 11.5 - 14.5 %     Platelets 124 (L) 150 - 450 x10*3/uL   Troponin I, High Sensitivity   Result Value Ref Range     Troponin I, High Sensitivity 17 (H) 0 - 13 ng/L      CT head wo IV contrast     Result Date: 1/16/2025  Interpreted By:  Fausto Flores, STUDY: CT HEAD WO IV CONTRAST;  1/16/2025 10:53 pm   INDICATION: Signs/Symptoms:weakness, ams.   COMPARISON: Noncontrast head CT 09/16/2024   ACCESSION NUMBER(S): [id number]   ORDERING CLINICIAN: SAI TATE   TECHNIQUE: Axial non-contrast CT images of the head. Coronal and sagittal images were reconstructed.   FINDINGS: BRAIN PARENCHYMA: Hypodensity in the left basal ganglia and corona radiata, likely sequelae of remote infarct, unchanged compared to prior.Sub-cortical and periventricular  hypoattenuating foci, likely sequelae of chronic ischemic microangiopathy. Gray-white matter differentiation is preserved. VENTRICLES:Within normal limits for brain volume. No midline shift. EXTRA-AXIAL SPACES: No collections. SOFT TISSUES: Unremarkable. SINUSES: Paranasal sinuses are clear. MASTOIDS: Right mastoid effusion, similar to prior. CALVARIUM: No depressed skull fractures. VESSELS: Calcification of carotid siphons. OTHER FINDINGS: None.        1. No acute intracranial abnormality. If clinical concern persists, consider further evaluation with MRI. 2. Other chronic findings including encephalomalacia in the left basal ganglia/corona radiata is unchanged compared to prior.   MACRO: None   Signed by: Fausto Flores 1/16/2025 11:25 PM Dictation workstation:   [id number]     XR chest 1 view     Result Date: 1/16/2025  STUDY: Chest Radiograph;  1/16/2025 7:27 PM INDICATION: Weakness. COMPARISON: 9/16/2024 XR Chest. ACCESSION NUMBER(S): [id number] ORDERING CLINICIAN: SERGE ADAMS TECHNIQUE:  Frontal chest was obtained at 19:27 hours. FINDINGS: CARDIOMEDIASTINAL SILHOUETTE: The cardiomediastinal silhouette is mildly enlarged unchanged from previous imaging..  LUNGS: Lungs are clear.  ABDOMEN: No remarkable upper abdominal findings.  BONES: No acute osseous changes.     1.  Stable cardiomegaly. 2.  No focal pulmonary consolidation pleural effusions.. Signed by Serge Moon MD        Assessment/Plan     Assessment & Plan  Generalized weakness     Weakness     69 year old female who presented to ED today with generalized weakness. No focal deficits. Group home unable to care for her due to ambulatory dysfunction. PT/OT/SW ordered. Patient will be admitted to the hospital for further medical management.     Generalized weakness  Ambulatory dysfunction  Hx of CVA  Admit to OBS/RMF to Dr. Edmondson  Defer consults to attending per request  See imaging results  Patient's last known well was well over 24 hours ago  UA  ordered  PT/OT/SW  Repeat labs in AM (Keppra and Dilantin levels ordered)     Elevated troponin (near baseline)  Troponin 16. Will trend.   Denies chest pain     Chronic conditions  #HTN, HLD, deafness, seizures, schizophrenia, GERD, constipation   Continue home meds as appropriate when nursing completes home med rec  Full code     DVT prophylaxis  Heparin SQ  SCD's as tolerated  Up in chair TID      I spent 60 minutes in the professional and overall care of this patient.     Patient fully evaluated on January 17.  Correct electrolytes today.  Physical and Occupational Therapy consultations with possible discharge to skilled nursing when approved  Juan Luis Edmondson MD                     Patient fully evaluated  01/19  for    Problem List Items Addressed This Visit         Weakness - Primary      Patient seen resting in bed with head of bed elevated, no s/s or c/o acute difficulties at this time.  Vital signs for last 24 hours Temp:  [36.2 °C (97.2 °F)-36.6 °C (97.9 °F)] 36.5 °C (97.7 °F)  Heart Rate:  [74-86] 75  Resp:  [18] 18  BP: (131-145)/(63-69) 131/63    I/O this shift:  In: -   Out: 500 [Urine:500]  Patient still requiring frequent cardiac and SPO2 monitoring. Continue aggressive pulmonary hygiene and oral hygiene. Off loading as tolerated for skin integrity. Medications and labs reviewed-         Results for orders placed or performed during the hospital encounter of 01/16/25 (from the past 24 hours)   CBC   Result Value Ref Range     WBC 4.3 (L) 4.4 - 11.3 x10*3/uL     nRBC 0.0 0.0 - 0.0 /100 WBCs     RBC 3.56 (L) 4.00 - 5.20 x10*6/uL     Hemoglobin 10.5 (L) 12.0 - 16.0 g/dL     Hematocrit 33.2 (L) 36.0 - 46.0 %     MCV 93 80 - 100 fL     MCH 29.5 26.0 - 34.0 pg     MCHC 31.6 (L) 32.0 - 36.0 g/dL     RDW 11.8 11.5 - 14.5 %     Platelets 134 (L) 150 - 450 x10*3/uL   Comprehensive Metabolic Panel   Result Value Ref Range     Glucose 304 (H) 74 - 99 mg/dL     Sodium 137 136 - 145 mmol/L     Potassium 3.3 (L)  3.5 - 5.3 mmol/L     Chloride 105 98 - 107 mmol/L     Bicarbonate 24 21 - 32 mmol/L     Anion Gap 11 10 - 20 mmol/L     Urea Nitrogen 10 6 - 23 mg/dL     Creatinine 0.63 0.50 - 1.05 mg/dL     eGFR >90 >60 mL/min/1.73m*2     Calcium 7.9 (L) 8.6 - 10.3 mg/dL     Albumin 3.0 (L) 3.4 - 5.0 g/dL     Alkaline Phosphatase 55 33 - 136 U/L     Total Protein 5.4 (L) 6.4 - 8.2 g/dL     AST 15 9 - 39 U/L     Bilirubin, Total 0.3 0.0 - 1.2 mg/dL     ALT 24 7 - 45 U/L      Patient recently received an antibiotic (last 12 hours)         None             Plan discussed with interdisciplinary team, electrolytes replaced, slow but progressive improvement, returning to baseline more animated today, anticipate discharge 24- 48 hours. Will continue current and repeat labs in the AM.      Discharge planning discussed with patient and care team. Therapy evaluations ordered. Anticipate HHC/SNF at discharge. Patient aware and agreeable to current plan, continue plan as above.      I spent a total of 50 minutes on the date of the service which included preparing to see the patient, face-to-face patient care, completing clinical documentation, obtaining and/or reviewing separately obtained history, performing a medically appropriate examination, counseling and educating the patient/family/caregiver, ordering medications, tests, or procedures, communicating with other HCPs (not separately reported), independently interpreting results (not separately reported), communicating results to the patient/family/caregiver, and care coordination (not separately reported).         Shelley Perdomo                      Revision History    Patient fully evaluated 01/21 for   Assessment & Plan  Generalized weakness    Weakness    Patient with significant clinical improvement noted, patient medically cleared for discharge today. Patient seen resting in bed with head of bed elevated, no s/s or c/o acute difficulties at this time. Vital signs for last 24  "hours:  Temp:  [36.4 °C (97.5 °F)-36.8 °C (98.2 °F)] 36.4 °C (97.5 °F)  Heart Rate:  [71-80] 71  Resp:  [18] 18  BP: (133-165)/(63-74) 165/73 Medications and labs reviewed-   No results found for this or any previous visit (from the past 24 hours).   Patient recently received an antibiotic (last 12 hours)       None           No results found for the last 90 days.       Continue aggressive pulmonary hygiene and oral hygiene. Off loading as tolerated for skin integrity.    Patient more animated and awake, gives \"thumbs up\" and nods to feeling better, medically cleared for discharge today. Plan discussed with interdisciplinary team, ok to discharge, will continue current and repeat labs in the AM if patient still hospitalized. Patient aware and agreeable to current plan, continue plan as above.     I spent 30 minutes on the date of the service which included preparing to see the patient, face-to-face patient care, completing clinical documentation, obtaining and/or reviewing separately obtained history, performing a medically appropriate examination, counseling and educating the patient/family/caregiver, ordering medications, tests, or procedures, communicating with other HCPs (not separately reported), independently interpreting results (not separately reported), communicating results to the patient/family/caregiver, and care coordination (not separately reported     Pertinent Physical Exam At Time of Discharge  Physical Exam  No acute events overnight, patient denies chest pain, worsening SOB at this time.  Outpatient Follow-Up  Future Appointments   Date Time Provider Department Center   1/27/2025  3:30 PM Bandar Wagner MD [id number] [facility]   3/10/2025  2:40 PM Merrill Shen APRN-CNP [id number] [facility]   3/14/2025 10:20 AM Jesi Ramsey MD [id number] [facility]         [facility]"

## 2025-01-21 NOTE — PROGRESS NOTES
01/21/25 1321   Discharge Planning   Home or Post Acute Services Post acute facilities (Rehab/SNF/etc)   Type of Post Acute Facility Services Skilled nursing   Expected Discharge Disposition SNF     Awaiting review from [facility] after onsite completed.  Care transitions to follow.      1422pm:  Updated patient's sister Katina(PoA) regarding acceptance to [facility].  Facility was asking if group home can bring ipad for communication purposes with patient.  Patient's sister said she win notify the group home.  INM reviewed with patient's sister, copy left at bedside and copy placed in patient's chart.    1543pm:  [facility] contacted for transport for patient, time scheduled for 1830pm.  This [facility] called and updated patient's sister Katina with time.  Per Katina stated that she will be able to see patient at the facility in the morning.  Update of transport time and  sent to bedside nurse via secure chat.         [place]

## 2025-01-21 NOTE — CARE PLAN
The patient's goals for the shift include ability to walk and free of pain    The clinical goals for the shift include full adherence to care plan    Over the shift, the patient did not make progress toward the following goals. Barriers to progression include N/A. Recommendations to address these barriers include N/A.

## 2025-01-21 NOTE — NURSING NOTE
Nurse to nurse report called and given to Nurse Rosa on the D unit. Pt scheduled for 630p  at this time.

## 2025-01-21 NOTE — CARE PLAN
The clinical goals for the shift include maintain safety and comfort      Problem: Pain - Adult  Goal: Verbalizes/displays adequate comfort level or baseline comfort level  Outcome: Progressing     Problem: Safety - Adult  Goal: Free from fall injury  Outcome: Progressing     Problem: Discharge Planning  Goal: Discharge to home or other facility with appropriate resources  Outcome: Progressing     Problem: Chronic Conditions and Co-morbidities  Goal: Patient's chronic conditions and co-morbidity symptoms are monitored and maintained or improved  Outcome: Progressing     Problem: Skin  Goal: Decreased wound size/increased tissue granulation at next dressing change  1/21/2025 1039 by Manuel Mcdonald RN  Flowsheets (Taken 1/21/2025 1039)  Decreased wound size/increased tissue granulation at next dressing change: Promote sleep for wound healing  1/21/2025 1039 by Manuel Mcdonald RN  Outcome: Progressing  Flowsheets (Taken 1/21/2025 1039)  Decreased wound size/increased tissue granulation at next dressing change: Promote sleep for wound healing  Goal: Participates in plan/prevention/treatment measures  1/21/2025 1039 by Manuel Mcdonald RN  Flowsheets (Taken 1/21/2025 1039)  Participates in plan/prevention/treatment measures: Elevate heels  1/21/2025 1039 by Manuel Mcdonald RN  Outcome: Progressing  Flowsheets (Taken 1/21/2025 1039)  Participates in plan/prevention/treatment measures: Elevate heels  Goal: Prevent/manage excess moisture  1/21/2025 1039 by Manuel Mcdonald RN  Flowsheets (Taken 1/21/2025 1039)  Prevent/manage excess moisture:   Cleanse incontinence/protect with barrier cream   Moisturize dry skin  1/21/2025 1039 by Manuel Mcdonald RN  Outcome: Progressing  Flowsheets (Taken 1/21/2025 1039)  Prevent/manage excess moisture:   Cleanse incontinence/protect with barrier cream   Moisturize dry skin  Goal: Prevent/minimize sheer/friction injuries  1/21/2025 1039 by Manuel Mcdonald RN  Flowsheets (Taken 1/21/2025 1039)  Prevent/minimize  sheer/friction injuries:   HOB 30 degrees or less   Turn/reposition every 2 hours/use positioning/transfer devices  1/21/2025 1039 by Manuel Mcdonald RN  Outcome: Progressing  Flowsheets (Taken 1/21/2025 1039)  Prevent/minimize sheer/friction injuries:   HOB 30 degrees or less   Turn/reposition every 2 hours/use positioning/transfer devices  Goal: Promote/optimize nutrition  1/21/2025 1039 by Manuel Mcdonald RN  Flowsheets (Taken 1/21/2025 1039)  Promote/optimize nutrition:   Offer water/supplements/favorite foods   Assist with feeding  1/21/2025 1039 by Manuel Mcdonald RN  Outcome: Progressing  Flowsheets (Taken 1/21/2025 1039)  Promote/optimize nutrition:   Offer water/supplements/favorite foods   Assist with feeding  Goal: Promote skin healing  1/21/2025 1039 by Manuel Mcdonald RN  Flowsheets (Taken 1/21/2025 1039)  Promote skin healing: Turn/reposition every 2 hours/use positioning/transfer devices  1/21/2025 1039 by Manuel Mcdonald RN  Outcome: Progressing  Flowsheets (Taken 1/21/2025 1039)  Promote skin healing: Turn/reposition every 2 hours/use positioning/transfer devices     Problem: Pain  Goal: Takes deep breaths with improved pain control throughout the shift  Outcome: Progressing  Goal: Turns in bed with improved pain control throughout the shift  Outcome: Progressing  Goal: Walks with improved pain control throughout the shift  Outcome: Progressing  Goal: Performs ADL's with improved pain control throughout shift  Outcome: Progressing  Goal: Participates in PT with improved pain control throughout the shift  Outcome: Progressing  Goal: Free from opioid side effects throughout the shift  Outcome: Progressing  Goal: Free from acute confusion related to pain meds throughout the shift  Outcome: Progressing     Problem: Nutrition  Goal: Oral intake greater 75%  Outcome: Progressing  Goal: Consume prescribed supplement  Outcome: Progressing  Goal: Adequate PO fluid intake  Outcome: Progressing  Goal: Lab values  WNL  Outcome: Progressing  Goal: Maintain stable weight  Outcome: Progressing

## 2025-01-22 NOTE — DISCHARGE SUMMARY
Discharge Diagnosis  Generalized weakness    Issues Requiring Follow-Up  Patient fully evaluated  01/20 for   Assessment & Plan  Generalized weakness    Weakness    Patient with significant clinical improvement noted, patient medically cleared for discharge today. Patient seen resting in bed with head of bed elevated, no s/s or c/o acute difficulties at this time. Vital signs for last 24 hours:  Temp:  [37.2 °C (99 °F)] 37.2 °C (99 °F)  Heart Rate:  [85] 85  BP: (164)/(78) 164/78 Medications and labs reviewed-   No results found for this or any previous visit (from the past 24 hours).     Patient recently received an antibiotic (last 12 hours)       None           No results found for the last 90 days.       Continue aggressive pulmonary hygiene and oral hygiene. Off loading as tolerated for skin integrity.    No acute events overnight, patient denies chest pain, worsening SOB at this time.  Plan discussed with interdisciplinary team, ok to discharge to SNF, will continue current and repeat labs in the AM if patient still hospitalized. Patient aware and agreeable to current plan, continue plan as above.     I spent 30 minutes on the date of the service which included preparing to see the patient, face-to-face patient care, completing clinical documentation, obtaining and/or reviewing separately obtained history, performing a medically appropriate examination, counseling and educating the patient/family/caregiver, ordering medications, tests, or procedures, communicating with other HCPs (not separately reported), independently interpreting results (not separately reported), communicating results to the patient/family/caregiver, and care coordination (not separately reported    Discharge Meds     Medication List      CHANGE how you take these medications     docusate sodium 100 mg capsule; Commonly known as: Colace; Take 1   capsule (100 mg) by mouth 2 times a day.; What changed: how much to take,   how to take this,  when to take this, additional instructions   melatonin 5 mg capsule; Take 1 capsule (5 mg) by mouth once daily.; What   changed: when to take this     CONTINUE taking these medications     acetaminophen 500 mg tablet; Commonly known as: Tylenol; TAKE 1 TABLET   BY MOUTH EVERY 4 HOURS AS NEEDED FOR MILD-MODERATE PAIN, CAN TAKE 2   TABLETS EVERY 8 HOURS AS NEEDED FOR MODERATE-SEVERE PAIN   albuterol 2.5 mg /3 mL (0.083 %) nebulizer solution; 1 VIAL VIA AEROSOL   EVERY 4 HOURS AS NEEDED FOR SHORTNESS OF BREATH / CHEST TIGHTNESS   atorvastatin 80 mg tablet; Commonly known as: Lipitor; Take 1 tablet (80   mg) by mouth once daily.   benztropine 0.5 mg tablet; Commonly known as: Cogentin; TAKE 1 TABLET BY   MOUTH IN THE MORNING AND AFTERNOON ~108Q2 TAKE 2 TABLETS BY MOUTH EVERY   NIGHT AT BEDTIME   cholecalciferol 50 MCG (2000 UT) tablet; Commonly known as: Vitamin D-3;   TAKE 1 TABLET BY MOUTH ONCE DAILY   clopidogrel 75 mg tablet; Commonly known as: Plavix; Take 1 tablet (75   mg) by mouth once daily.   * Creon 3,000-9,500- 15,000 unit capsule; Generic drug:   lipase-protease-amylase; Take 2 capsules by mouth 3 times a day before   meals.   * Creon 3,000-9,500- 15,000 unit capsule; Generic drug:   lipase-protease-amylase; Take 2 capsules by mouth 3 times a day before   meals.   famotidine 20 mg tablet; Commonly known as: Pepcid; Take 1 tablet (20   mg) by mouth once daily.   haloperidol decanoate 100 mg/mL injection; Commonly known as: Haldol   Decanoate   levETIRAcetam 1,000 mg tablet; Commonly known as: Keppra; Take 1 tablet   (1,000 mg) by mouth once daily.   lidocaine 5 % patch; Commonly known as: Lidoderm; APPLY 1 PATCH   TOPICALLY TO PAINFUL AREA ONCE DAILY *LEAVE IN PLACE FOR 12 HOURS, REMOVE   AFTER 12 HOURS. DO NOT REAPPLY UNTIL 12 HOURS HAVE LAPSED*   losartan 25 mg tablet; Commonly known as: Cozaar; Take 1 tablet (25 mg)   by mouth once daily.   phenytoin  mg capsule; Commonly known as: Dilantin; Take 3    capsules (300 mg) by mouth once daily at bedtime.   * QUEtiapine 200 mg tablet; Commonly known as: SEROquel   * QUEtiapine 25 mg tablet; Commonly known as: SEROquel   sertraline 100 mg tablet; Commonly known as: Zoloft; TAKE 1 TABLET BY   MOUTH ONCE DAILY   traZODone 50 mg tablet; Commonly known as: Desyrel; Take 1 tablet (50   mg) by mouth once daily at bedtime.   Ultra-Light Rollator misc; Generic drug: walker; Use as directed  * This list has 4 medication(s) that are the same as other medications   prescribed for you. Read the directions carefully, and ask your doctor or   other care provider to review them with you.       Test Results Pending At Discharge  Pending Labs       No current pending labs.            Hospital Course         Juan Luis Edmondson MD   Physician  Internal Medicine     Progress Notes     Signed     Date of Service: 1/19/2025  1:39 PM     Signed       Expand All Collapse All    Janine Jaime is a 69 y.o. female on day 2 of admission presenting with Generalized weakness.           Subjective  Patient fully evaluated  01/18  for    Problem List Items Addressed This Visit         Weakness - Primary      Patient seen resting in bed with head of bed elevated, no s/s or c/o acute difficulties at this time.  Vital signs for last 24 hours Temp:  [36.2 °C (97.2 °F)-36.6 °C (97.9 °F)] 36.5 °C (97.7 °F)  Heart Rate:  [74-86] 75  Resp:  [18] 18  BP: (131-145)/(63-69) 131/63    I/O this shift:  In: -   Out: 500 [Urine:500]  Patient still requiring frequent cardiac and SPO2 monitoring. Continue aggressive pulmonary hygiene and oral hygiene. Off loading as tolerated for skin integrity. Medications and labs reviewed-         Results for orders placed or performed during the hospital encounter of 01/16/25 (from the past 24 hours)   CBC   Result Value Ref Range     WBC 4.3 (L) 4.4 - 11.3 x10*3/uL     nRBC 0.0 0.0 - 0.0 /100 WBCs     RBC 3.56 (L) 4.00 - 5.20 x10*6/uL     Hemoglobin 10.5 (L) 12.0 - 16.0 g/dL      Hematocrit 33.2 (L) 36.0 - 46.0 %     MCV 93 80 - 100 fL     MCH 29.5 26.0 - 34.0 pg     MCHC 31.6 (L) 32.0 - 36.0 g/dL     RDW 11.8 11.5 - 14.5 %     Platelets 134 (L) 150 - 450 x10*3/uL   Comprehensive Metabolic Panel   Result Value Ref Range     Glucose 304 (H) 74 - 99 mg/dL     Sodium 137 136 - 145 mmol/L     Potassium 3.3 (L) 3.5 - 5.3 mmol/L     Chloride 105 98 - 107 mmol/L     Bicarbonate 24 21 - 32 mmol/L     Anion Gap 11 10 - 20 mmol/L     Urea Nitrogen 10 6 - 23 mg/dL     Creatinine 0.63 0.50 - 1.05 mg/dL     eGFR >90 >60 mL/min/1.73m*2     Calcium 7.9 (L) 8.6 - 10.3 mg/dL     Albumin 3.0 (L) 3.4 - 5.0 g/dL     Alkaline Phosphatase 55 33 - 136 U/L     Total Protein 5.4 (L) 6.4 - 8.2 g/dL     AST 15 9 - 39 U/L     Bilirubin, Total 0.3 0.0 - 1.2 mg/dL     ALT 24 7 - 45 U/L      Patient recently received an antibiotic (last 12 hours)         None             Plan discussed with interdisciplinary team, seen by dietician with - Nutrition Prescription: Individualized Nutrition Prescription Provided for : Individualized Nutrition Prescription Provided for :    [id number] kcal,    56-62g protein, and   1ml/kcals fluid via   Regular  diet. Will order Magic cup once daily until intakes established. Appreciate input and agree with plan. Will continue current and repeat labs in the AM. Potassium 3.3 today, replaced, will recheck in the AM.      Discharge planning discussed with patient and care team. Therapy evaluations ordered. [facility]. Per [facility]- After reviewing SNF list, patient's sister called, verbalized [facility] for [facility]. [facility] 2. [facility] 3. [place] 4. [place] 5. [facility] home; DCS tasked with building referrals; patient will not need precert. [facility]      Patient aware and agreeable to current plan, continue plan as above.      I spent a total of 50 minutes on the date of the service which included preparing to see the patient, face-to-face patient care, completing  clinical documentation, obtaining and/or reviewing separately obtained history, performing a medically appropriate examination, counseling and educating the patient/family/caregiver, ordering medications, tests, or procedures, communicating with other HCPs (not separately reported), independently interpreting results (not separately reported), communicating results to the patient/family/caregiver, and care coordination (not separately reported).               Objective  Last Recorded Vitals  /63 (BP Location: Left arm, Patient Position: Lying)   Pulse 75   Temp 36.5 °C (97.7 °F) (Temporal)   Resp 18   Wt 74.8 kg (164 lb 14.5 oz)   SpO2 96%   Intake/Output last 3 Shifts:     Intake/Output Summary (Last 24 hours) at 1/19/2025 1339  Last data filed at 1/19/2025 0700      Gross per 24 hour   Intake --   Output 2500 ml   Net -2500 ml         Admission Weight  Weight: 74.8 kg (165 lb) (01/16/25 1855)     Daily Weight  01/17/25 : 74.8 kg (164 lb 14.5 oz)     Image Results  Electrocardiogram, 12-lead  Normal sinus rhythm with sinus arrhythmia  Cannot rule out Anterior infarct , age undetermined  Abnormal ECG  When compared with ECG of 16-SEP-2024 01:36,  No significant change was found  See ED provider note for full interpretation and clinical correlation  Confirmed by Patricia Fay ([id number]) on 1/18/2025 5:04:39 PM        Physical Exam     Relevant Results                       Assessment/Plan                       Assessment & Plan  Generalized weakness     Weakness           Juan Luis Edmondson MD   Physician  Internal Medicine     H&P     Signed     Date of Service: 1/17/2025 10:24 AM      Signed        Expand All Collapse All    History Of Present Illness  Janine Sandoval is a 69 y.o. female with past medical history of HTN, HLD, deafness, CVA, seizures, schizophrenia, GERD, constipation, who [facility] today from group home with generalized weakness. Per the ED physician, the caregivers at  the group home are present and assist with the history. Patient speaks ALS.  They note that the patient has been too weak to walk since being discharged from the hospital yesterday.  They state that they have known her for years that she is able to get around okay with her rollator. They note that her legs will buckle underneath her when she tries to stand which is a change. They also state that she has been acting up more confused than normal and is signing differently than in the past and notes that she is using broken up language. States she was not like this before she was taken the hospital last week. Patient's last known well was well over 24 hours ago. They state they are unable to care for her at the facility as they do to not having the equipment necessary to carry her since she is nonmobile.  Patient was having diffuse weakness.  Also noted having blurry vision in both eyes (able to see  and answer questions appropriately) and numbness bilaterally. 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 Denies fever, chills, cough, chest pain, shortness of breath, nausea, vomiting, abdominal pain, urinary symptoms, diarrhea, or constipation. [facility]  service offered, but patient declined. Caregiver interpreting at bedside. Caregiver did state patient appears to be back to her normal self but still slightly confused.     ER course: Hemodynamically stable. Afebrile. 36.4C. /80. HR 82, RR 16, 97% RA. Labs show Glucose 155. Troponin 16. UA ordered. See imaging results below. Pt is being admitted to Dr. Edmondson who will continue to manage patient. I was asked to do the H&P and initial assessment.     Past Medical History  As above        Surgical History  Surgical History   No past surgical history on file.         Social History  She reports that she has never smoked. She has never been  exposed to tobacco smoke. She has never used smokeless tobacco. She reports that she does not drink alcohol and does not use drugs.     Family History  Family History               Family History   Problem Relation Name Age of Onset    Parkinsonism Mother        Other (cardiac disorder) Father        Glaucoma Father        Parkinsonism Father                Allergies  Levofloxacin     Review of Systems  10 Point review of systems was performed and is negative except for what is stated in the HPI above.   Physical Exam  Constitutional:       Comments: Sleeping but easily arousable with touch.   HENT:      Head: Normocephalic and atraumatic.      Nose: Nose normal.      Mouth/Throat:      Mouth: Mucous membranes are moist.      Pharynx: Oropharynx is clear.   Eyes:      Extraocular Movements: Extraocular movements intact.      Conjunctiva/sclera: Conjunctivae normal.      Pupils: Pupils are equal, round, and reactive to light.   Cardiovascular:      Rate and Rhythm: Normal rate and regular rhythm.      Pulses: Normal pulses.      Heart sounds: Normal heart sounds.   Pulmonary:      Effort: Pulmonary effort is normal.      Breath sounds: Normal breath sounds.   Abdominal:      General: Abdomen is flat. Bowel sounds are normal.      Palpations: Abdomen is soft.   Musculoskeletal:         General: Normal range of motion.      Cervical back: Normal range of motion and neck supple.      Comments: Weakness noted in bilateral lower extremities. NV intact.   Skin:     General: Skin is warm and dry.      Capillary Refill: Capillary refill takes less than 2 seconds.   Neurological:      General: No focal deficit present.      Comments: Answered questions appropriately with ASL with caregiver at bedside.    Psychiatric:         Behavior: Behavior normal.      Comments: Somnolent            Last Recorded Vitals  Blood pressure 147/72, pulse 76, temperature 36.2 °C (97.2 °F), temperature source Temporal, resp. rate 18, height  "1.651 m (5' 5\"), weight 74.8 kg (164 lb 14.5 oz), SpO2 97%.     Relevant Results            Results for orders placed or performed during the hospital encounter of 01/16/25 (from the past 24 hours)   CBC and Auto Differential   Result Value Ref Range     WBC 5.4 4.4 - 11.3 x10*3/uL     nRBC 0.0 0.0 - 0.0 /100 WBCs     RBC 4.32 4.00 - 5.20 x10*6/uL     Hemoglobin 12.9 12.0 - 16.0 g/dL     Hematocrit 40.8 36.0 - 46.0 %     MCV 94 80 - 100 fL     MCH 29.9 26.0 - 34.0 pg     MCHC 31.6 (L) 32.0 - 36.0 g/dL     RDW 11.7 11.5 - 14.5 %     Platelets 154 150 - 450 x10*3/uL     Neutrophils % 44.0 40.0 - 80.0 %     Immature Granulocytes %, Automated 0.2 0.0 - 0.9 %     Lymphocytes % 41.9 13.0 - 44.0 %     Monocytes % 9.1 2.0 - 10.0 %     Eosinophils % 4.1 0.0 - 6.0 %     Basophils % 0.7 0.0 - 2.0 %     Neutrophils Absolute 2.36 1.20 - 7.70 x10*3/uL     Immature Granulocytes Absolute, Automated 0.01 0.00 - 0.70 x10*3/uL     Lymphocytes Absolute 2.25 1.20 - 4.80 x10*3/uL     Monocytes Absolute 0.49 0.10 - 1.00 x10*3/uL     Eosinophils Absolute 0.22 0.00 - 0.70 x10*3/uL     Basophils Absolute 0.04 0.00 - 0.10 x10*3/uL   Comprehensive metabolic panel   Result Value Ref Range     Glucose 155 (H) 74 - 99 mg/dL     Sodium 138 136 - 145 mmol/L     Potassium 3.6 3.5 - 5.3 mmol/L     Chloride 103 98 - 107 mmol/L     Bicarbonate 26 21 - 32 mmol/L     Anion Gap 13 10 - 20 mmol/L     Urea Nitrogen 12 6 - 23 mg/dL     Creatinine 0.80 0.50 - 1.05 mg/dL     eGFR 80 >60 mL/min/1.73m*2     Calcium 9.5 8.6 - 10.3 mg/dL     Albumin 4.1 3.4 - 5.0 g/dL     Alkaline Phosphatase 81 33 - 136 U/L     Total Protein 7.2 6.4 - 8.2 g/dL     AST 31 9 - 39 U/L     Bilirubin, Total 0.3 0.0 - 1.2 mg/dL     ALT 33 7 - 45 U/L   Magnesium   Result Value Ref Range     Magnesium 1.79 1.60 - 2.40 mg/dL   Troponin I, High Sensitivity   Result Value Ref Range     Troponin I, High Sensitivity 16 (H) 0 - 13 ng/L   ECG 12 lead   Result Value Ref Range     Ventricular " Rate 81 BPM     Atrial Rate 81 BPM     PA Interval 210 ms     QRS Duration 102 ms     QT Interval 410 ms     QTC Calculation(Bazett) 476 ms     P Axis 31 degrees     R Axis 53 degrees     T Axis 39 degrees     QRS Count 13 beats     Q Onset 249 ms     T Offset 454 ms     QTC Fredericia 453 ms   Levetiracetam   Result Value Ref Range     Keppra 13 10 - 40 ug/mL   Phenytoin level, total   Result Value Ref Range     Phenytoin 8.5 (L) 10.0 - 20.0 ug/mL   Basic metabolic panel   Result Value Ref Range     Glucose 146 (H) 74 - 99 mg/dL     Sodium 139 136 - 145 mmol/L     Potassium 3.1 (L) 3.5 - 5.3 mmol/L     Chloride 104 98 - 107 mmol/L     Bicarbonate 26 21 - 32 mmol/L     Anion Gap 12 10 - 20 mmol/L     Urea Nitrogen 14 6 - 23 mg/dL     Creatinine 0.78 0.50 - 1.05 mg/dL     eGFR 82 >60 mL/min/1.73m*2     Calcium 8.8 8.6 - 10.3 mg/dL   CBC   Result Value Ref Range     WBC 4.5 4.4 - 11.3 x10*3/uL     nRBC 0.0 0.0 - 0.0 /100 WBCs     RBC 3.73 (L) 4.00 - 5.20 x10*6/uL     Hemoglobin 11.0 (L) 12.0 - 16.0 g/dL     Hematocrit 34.2 (L) 36.0 - 46.0 %     MCV 92 80 - 100 fL     MCH 29.5 26.0 - 34.0 pg     MCHC 32.2 32.0 - 36.0 g/dL     RDW 11.9 11.5 - 14.5 %     Platelets 124 (L) 150 - 450 x10*3/uL   Troponin I, High Sensitivity   Result Value Ref Range     Troponin I, High Sensitivity 17 (H) 0 - 13 ng/L      CT head wo IV contrast     Result Date: 1/16/2025  Interpreted By:  Fausto Flores, STUDY: CT HEAD WO IV CONTRAST;  1/16/2025 10:53 pm   INDICATION: Signs/Symptoms:weakness, ams.   COMPARISON: Noncontrast head CT 09/16/2024   ACCESSION NUMBER(S): [id number]   ORDERING CLINICIAN: SAI TATE   TECHNIQUE: Axial non-contrast CT images of the head. Coronal and sagittal images were reconstructed.   FINDINGS: BRAIN PARENCHYMA: Hypodensity in the left basal ganglia and corona radiata, likely sequelae of remote infarct, unchanged compared to prior.Sub-cortical and periventricular hypoattenuating foci, likely sequelae of chronic  ischemic microangiopathy. Gray-white matter differentiation is preserved. VENTRICLES:Within normal limits for brain volume. No midline shift. EXTRA-AXIAL SPACES: No collections. SOFT TISSUES: Unremarkable. SINUSES: Paranasal sinuses are clear. MASTOIDS: Right mastoid effusion, similar to prior. CALVARIUM: No depressed skull fractures. VESSELS: Calcification of carotid siphons. OTHER FINDINGS: None.        1. No acute intracranial abnormality. If clinical concern persists, consider further evaluation with MRI. 2. Other chronic findings including encephalomalacia in the left basal ganglia/corona radiata is unchanged compared to prior.   MACRO: None   Signed by: Fausto Flores 1/16/2025 11:25 PM Dictation workstation:   [id number]     XR chest 1 view     Result Date: 1/16/2025  STUDY: Chest Radiograph;  1/16/2025 7:27 PM INDICATION: Weakness. COMPARISON: 9/16/2024 XR Chest. ACCESSION NUMBER(S): [id number] ORDERING CLINICIAN: SERGE ADAMS TECHNIQUE:  Frontal chest was obtained at 19:27 hours. FINDINGS: CARDIOMEDIASTINAL SILHOUETTE: The cardiomediastinal silhouette is mildly enlarged unchanged from previous imaging..  LUNGS: Lungs are clear.  ABDOMEN: No remarkable upper abdominal findings.  BONES: No acute osseous changes.     1.  Stable cardiomegaly. 2.  No focal pulmonary consolidation pleural effusions.. Signed by Serge Moon MD        Assessment/Plan     Assessment & Plan  Generalized weakness     Weakness     69 year old female who presented to ED today with generalized weakness. No focal deficits. Group home unable to care for her due to ambulatory dysfunction. PT/OT/SW ordered. Patient will be admitted to the hospital for further medical management.     Generalized weakness  Ambulatory dysfunction  Hx of CVA  Admit to OBS/RMF to Dr. Edmondson  Defer consults to attending per request  See imaging results  Patient's last known well was well over 24 hours ago  UA ordered  PT/OT/SW  Repeat labs in AM (Keppra and  Dilantin levels ordered)     Elevated troponin (near baseline)  Troponin 16. Will trend.   Denies chest pain     Chronic conditions  #HTN, HLD, deafness, seizures, schizophrenia, GERD, constipation   Continue home meds as appropriate when nursing completes home med rec  Full code     DVT prophylaxis  Heparin SQ  SCD's as tolerated  Up in chair TID      I spent 60 minutes in the professional and overall care of this patient.     Patient fully evaluated on January 17.  Correct electrolytes today.  Physical and Occupational Therapy consultations with possible discharge to skilled nursing when approved  Juan Luis Edmondson MD                     Patient fully evaluated  01/19  for    Problem List Items Addressed This Visit         Weakness - Primary      Patient seen resting in bed with head of bed elevated, no s/s or c/o acute difficulties at this time.  Vital signs for last 24 hours Temp:  [36.2 °C (97.2 °F)-36.6 °C (97.9 °F)] 36.5 °C (97.7 °F)  Heart Rate:  [74-86] 75  Resp:  [18] 18  BP: (131-145)/(63-69) 131/63    I/O this shift:  In: -   Out: 500 [Urine:500]  Patient still requiring frequent cardiac and SPO2 monitoring. Continue aggressive pulmonary hygiene and oral hygiene. Off loading as tolerated for skin integrity. Medications and labs reviewed-         Results for orders placed or performed during the hospital encounter of 01/16/25 (from the past 24 hours)   CBC   Result Value Ref Range     WBC 4.3 (L) 4.4 - 11.3 x10*3/uL     nRBC 0.0 0.0 - 0.0 /100 WBCs     RBC 3.56 (L) 4.00 - 5.20 x10*6/uL     Hemoglobin 10.5 (L) 12.0 - 16.0 g/dL     Hematocrit 33.2 (L) 36.0 - 46.0 %     MCV 93 80 - 100 fL     MCH 29.5 26.0 - 34.0 pg     MCHC 31.6 (L) 32.0 - 36.0 g/dL     RDW 11.8 11.5 - 14.5 %     Platelets 134 (L) 150 - 450 x10*3/uL   Comprehensive Metabolic Panel   Result Value Ref Range     Glucose 304 (H) 74 - 99 mg/dL     Sodium 137 136 - 145 mmol/L     Potassium 3.3 (L) 3.5 - 5.3 mmol/L     Chloride 105 98 - 107 mmol/L      Bicarbonate 24 21 - 32 mmol/L     Anion Gap 11 10 - 20 mmol/L     Urea Nitrogen 10 6 - 23 mg/dL     Creatinine 0.63 0.50 - 1.05 mg/dL     eGFR >90 >60 mL/min/1.73m*2     Calcium 7.9 (L) 8.6 - 10.3 mg/dL     Albumin 3.0 (L) 3.4 - 5.0 g/dL     Alkaline Phosphatase 55 33 - 136 U/L     Total Protein 5.4 (L) 6.4 - 8.2 g/dL     AST 15 9 - 39 U/L     Bilirubin, Total 0.3 0.0 - 1.2 mg/dL     ALT 24 7 - 45 U/L      Patient recently received an antibiotic (last 12 hours)         None             Plan discussed with interdisciplinary team, electrolytes replaced, slow but progressive improvement, returning to baseline more animated today, anticipate discharge 24- 48 hours. Will continue current and repeat labs in the AM.      Discharge planning discussed with patient and care team. Therapy evaluations ordered. Anticipate HHC/SNF at discharge. Patient aware and agreeable to current plan, continue plan as above.      I spent a total of 50 minutes on the date of the service which included preparing to see the patient, face-to-face patient care, completing clinical documentation, obtaining and/or reviewing separately obtained history, performing a medically appropriate examination, counseling and educating the patient/family/caregiver, ordering medications, tests, or procedures, communicating with other HCPs (not separately reported), independently interpreting results (not separately reported), communicating results to the patient/family/caregiver, and care coordination (not separately reported).         Shelley Perdomo                      Revision History    Patient fully evaluated 01/21 for   Assessment & Plan  Generalized weakness    Weakness    Patient with significant clinical improvement noted, patient medically cleared for discharge today. Patient seen resting in bed with head of bed elevated, no s/s or c/o acute difficulties at this time. Vital signs for last 24 hours:  Temp:  [37.2 °C (99 °F)] 37.2 °C (99 °F)  Heart  "Rate:  [85] 85  BP: (164)/(78) 164/78 Medications and labs reviewed-   No results found for this or any previous visit (from the past 24 hours).   Patient recently received an antibiotic (last 12 hours)       None           No results found for the last 90 days.       Continue aggressive pulmonary hygiene and oral hygiene. Off loading as tolerated for skin integrity.    Patient more animated and awake, gives \"thumbs up\" and nods to feeling better, medically cleared for discharge today. Plan discussed with interdisciplinary team, ok to discharge, will continue current and repeat labs in the AM if patient still hospitalized. Patient aware and agreeable to current plan, continue plan as above.     I spent 30 minutes on the date of the service which included preparing to see the patient, face-to-face patient care, completing clinical documentation, obtaining and/or reviewing separately obtained history, performing a medically appropriate examination, counseling and educating the patient/family/caregiver, ordering medications, tests, or procedures, communicating with other HCPs (not separately reported), independently interpreting results (not separately reported), communicating results to the patient/family/caregiver, and care coordination (not separately reported       Patient fully evaluated  01/22  for   Assessment & Plan  Generalized weakness    Weakness     Patient with significant clinical improvement noted, patient medically cleared for discharge today. Patient seen resting in bed with head of bed elevated, no s/s or c/o acute difficulties at this time. Vital signs for last 24 hours:  Temp:  [37.2 °C (99 °F)] 37.2 °C (99 °F)  Heart Rate:  [85] 85  BP: (164)/(78) 164/78 Medications and labs reviewed-   No results found for this or any previous visit (from the past 24 hours).   Patient recently received an antibiotic (last 12 hours)       None           No results found for the last 90 days.       Continue " aggressive pulmonary hygiene and oral hygiene. Off loading as tolerated for skin integrity.  Plan discussed with interdisciplinary team, ok to discharge, will continue current and repeat labs in the AM if patient still hospitalized. Patient aware and agreeable to current plan, continue plan as above.     I spent 30 minutes on the date of the service which included preparing to see the patient, face-to-face patient care, completing clinical documentation, obtaining and/or reviewing separately obtained history, performing a medically appropriate examination, counseling and educating the patient/family/caregiver, ordering medications, tests, or procedures, communicating with other HCPs (not separately reported), independently interpreting results (not separately reported), communicating results to the patient/family/caregiver, and care coordination (not separately reported  Pertinent Physical Exam At Time of Discharge  Physical Exam  No acute events overnight, patient denies chest pain, worsening SOB at this time.  Outpatient Follow-Up  Future Appointments   Date Time Provider Department Center   3/10/2025  2:40 PM DOMINGO Amos-CNP [id number] [facility]   3/14/2025 10:20 AM Jesi Ramsey MD [id number] [facility]   3/20/2025  3:30 PM Bandar Wagner MD [id number] [facility]         Shelley Perdomo

## 2025-01-22 NOTE — CARE PLAN
The patient's goals for the shift include ability to walk and free of pain    The clinical goals for the shift include complete adherence to care plan    Over the shift, the patient did not make progress toward the following goals. Barriers to progression include N/A. Recommendations to address these barriers include N/A.

## 2025-01-26 LAB
ATRIAL RATE: 91 BPM
P AXIS: 64 DEGREES
P OFFSET: 191 MS
P ONSET: 139 MS
PR INTERVAL: 168 MS
Q ONSET: 223 MS
QRS COUNT: 15 BEATS
QRS DURATION: 96 MS
QT INTERVAL: 400 MS
QTC CALCULATION(BAZETT): 492 MS
QTC FREDERICIA: 460 MS
R AXIS: 58 DEGREES
T AXIS: 9 DEGREES
T OFFSET: 423 MS
VENTRICULAR RATE: 91 BPM

## 2025-01-27 ENCOUNTER — APPOINTMENT (OUTPATIENT)
Dept: NEUROLOGY | Facility: HOSPITAL | Age: 70
End: 2025-01-27
Payer: COMMERCIAL

## 2025-01-27 DIAGNOSIS — I63.9 CEREBRAL INFARCTION, UNSPECIFIED MECHANISM (MULTI): ICD-10-CM

## 2025-01-27 RX ORDER — CLOPIDOGREL BISULFATE 75 MG/1
75 TABLET ORAL DAILY
Qty: 90 TABLET | Refills: 0 | Status: SHIPPED | OUTPATIENT
Start: 2025-01-27

## 2025-01-30 ENCOUNTER — APPOINTMENT (OUTPATIENT)
Dept: NEUROLOGY | Facility: HOSPITAL | Age: 70
End: 2025-01-30
Payer: COMMERCIAL

## 2025-02-04 ENCOUNTER — NURSING HOME VISIT (OUTPATIENT)
Dept: POST ACUTE CARE | Facility: EXTERNAL LOCATION | Age: 70
End: 2025-02-04
Payer: COMMERCIAL

## 2025-02-04 DIAGNOSIS — J45.909 ASTHMA, UNSPECIFIED ASTHMA SEVERITY, UNSPECIFIED WHETHER COMPLICATED, UNSPECIFIED WHETHER PERSISTENT (HHS-HCC): ICD-10-CM

## 2025-02-04 DIAGNOSIS — K59.00 CONSTIPATION, UNSPECIFIED CONSTIPATION TYPE: ICD-10-CM

## 2025-02-04 DIAGNOSIS — H91.93 BILATERAL DEAFNESS: ICD-10-CM

## 2025-02-04 DIAGNOSIS — K86.1 CHRONIC PANCREATITIS, UNSPECIFIED PANCREATITIS TYPE (MULTI): Primary | ICD-10-CM

## 2025-02-04 DIAGNOSIS — R26.2 AMBULATORY DYSFUNCTION: ICD-10-CM

## 2025-02-04 DIAGNOSIS — F20.9 SCHIZOPHRENIA, UNSPECIFIED TYPE: ICD-10-CM

## 2025-02-04 DIAGNOSIS — F32.9 MAJOR DEPRESSIVE DISORDER, REMISSION STATUS UNSPECIFIED, UNSPECIFIED WHETHER RECURRENT: ICD-10-CM

## 2025-02-04 DIAGNOSIS — K21.9 GASTROESOPHAGEAL REFLUX DISEASE, UNSPECIFIED WHETHER ESOPHAGITIS PRESENT: ICD-10-CM

## 2025-02-04 DIAGNOSIS — Z86.73 HISTORY OF CEREBROVASCULAR ACCIDENT: ICD-10-CM

## 2025-02-04 DIAGNOSIS — Z79.899 ON DEEP VEIN THROMBOSIS (DVT) PROPHYLAXIS: ICD-10-CM

## 2025-02-04 DIAGNOSIS — M62.81 MUSCLE WEAKNESS (GENERALIZED): ICD-10-CM

## 2025-02-04 DIAGNOSIS — I10 BENIGN ESSENTIAL HTN: ICD-10-CM

## 2025-02-04 DIAGNOSIS — R56.9 SEIZURES (MULTI): ICD-10-CM

## 2025-02-04 DIAGNOSIS — E78.5 HYPERLIPIDEMIA, UNSPECIFIED HYPERLIPIDEMIA TYPE: ICD-10-CM

## 2025-02-04 DIAGNOSIS — R79.89 ELEVATED TROPONIN: ICD-10-CM

## 2025-02-04 PROCEDURE — 99310 SBSQ NF CARE HIGH MDM 45: CPT | Performed by: NURSE PRACTITIONER

## 2025-02-04 NOTE — LETTER
Patient: Janine Sandoval  : 1955    Encounter Date: 2025    Name: Janine Sandoval    YOB: 1955    Code Status: FULL CODE    Chief Complaint:  Initial visit; new patient:  Chronic pancreatitis; etc....         HPI     69 y.o. female with medical history of HTN, HLD, deafness, CVA, seizures, schizophrenia, GERD, constipation.    HOSPITAL COURSE:  Patient presented to [facility] ED from group home with generalized weakness.   Per the ED physician, the caregivers at the group home were present and they with the history.   Patient speaks ALS.   They note that the patient has been too weak to walk since being discharged from the hospital the day before.  They state that they have known her for years that she is able to get around okay with her rollator.   They note that her legs will buckle underneath her when she tries to stand which is a change.   They also state that she has been acting up more confused than normal and is signing differently   than in the past and notes that she is using broken up language.   States she was not like this before she was taken the hospital last week.   Patient's last known well was well over 24 hours ago.   They state they are unable to care for her at the group home.   They do to not having the equipment necessary to care for her since she is nonmobile.   Patient was having diffuse weakness.   Also noted having blurry vision in both eyes (able to see  and answer questions appropriately) and numbness bilaterally.   Patient was also recently admitted to [facility] from -1/15/25 for main duct IPMN vs   obstructing pancreatic head mass, seizures, and rule out stroke.   Evaluated by GI.  Had MRCP pancreas on 25.  Patient has atrophic pancreas dilated PD (pancreatic duct) and possible mass versus IPMN (intraductal papillary mucinous neoplasm) likely chronic pancreatitis, dilated PD (1.2 cm)  Currently on creon , case was reviewed by  GI Dr. Thompson with no endoscopic intervention recommended.  Also has dysphagia, modified barium swallow and esophagram recommended.  Had colonoscopy on 12/21/23  Denied fever, chills, cough, chest pain, shortness of breath, nausea, vomiting, abdominal pain, urinary symptoms, diarrhea, or constipation.       Hospital and chronic diagnoses as follows:      Chronic pancreatitis:  Evaluated by GI in the hospital.  Had MRCP pancreas on 1/12/25.  Patient has atrophic pancreas dilated PD (pancreatic duct) and possible mass versus IPMN (intraductal papillary mucinous neoplasm) likely chronic pancreatitis, dilated PD (1.2 cm)  Currently on creon , case was reviewed by GI Dr. Thompson with no endoscopic intervention recommended.  No complaints of abdominal pain or N/V.  Needs follow up with GI.  Patient seen today.  She is sitting up in a wheelchair in her room.  Calm, cooperative.  No complaints.  No fevers reported.  No chest pain.    Elevated troponin level:  Troponin 16.  No complaints of chest pain.    HTN:   On Losartan.  Recent BP: 128/70  No complaints of headaches or dizziness.    Seizures:  On Keppra and Depakote.  No seizures reported.    Asthma:  On albuterol nebs PRN.  On RA.  No cough or wheezing reported.   No oxygen desats reported.     Schizophrenia:  On Seroquel and Haloperidol decanoate IM every 28 days.  On Benztropine for extrapyramidal symptoms.  No agitation reported.      Major depressive disorder:   On Trazodone and sertraline.    Generalized weakness; ambulatory dysfunction; hx of CVA:  Patient was evaluated in the hospital.  PT/OT/SW were consulted.  Patient at [facility] for skilled services.     HLD:  On atorvastatin.    Deafness:  Cannot hear at all.  Need to communicate by writing.    GERD:   On Pepcid.  No reflux complaints.    Constipation:  On Colace.  No constipation reported.    DVT prophylaxis:  Was on Heparin subcutaneous in the hospital.    Hx CVA:  On  "plavix.                    Reviewed  hospital records from recent hospitalization.  Reviewed  EMR  Reviewed medical, social, surgical and family history.  Reviewed all current medications and performed medication reconciliation.  Performed prescription drug management.  Reviewed vital signs AND lab results  Reviewed [facility] Documentation  Discussed patient with nursing.   Spent greater than 50 minutes on patient visit.           ROS: 10 point ROS performed; Negative unless noted in HPI.        MEDICAL HISTORY:  HTN  HLD  Deafness  CVA  Seizures  Schizophrenia  GERD  Constipation    SOCIAL HISTORY:  Never smoker.  No alcohol use.  No drug use.    SURGICAL HISTORY:  No surgeries on file    FAMILY HISTORY:  Parkinsonism--Mother  Cardiac disorder--Father  Glaucoma--Father  Parkinsonism--Father    ALLERGIES:  Levofloxacin        LABS:  BMP: Date: 1/23/2025 Na 136 K not available Cr 0.59 BUN 11 glucose 177 Ca 9.4 GFR 98  CBC: Date: 1/23/2025 WBC 6.30 Hgb 12.8 hematocrit 39.1 platelets 59  Liver function: Date: 1/23/2025 ALT not available AST not available alkaline phos.  89 bilirubin 0.2 albumin 3.6 protein 6.8    CBC: Date: 1/28/2025 WBC 6.29 Hgb 12 hematocrit 36.2 platelets 227    2/4/2025 lipid panel cholesterol 112; triglycerides 53; HDL 58; VLDL 11; LDL 43    2/4/2025 vitamin D hydroxy level 44.6         Lab Results   Component Value Date    WBC 4.7 01/20/2025    HGB 10.9 (L) 01/20/2025    HCT 33.5 (L) 01/20/2025     (L) 01/20/2025    CHOL 122 11/04/2024    TRIG 64 11/04/2024    HDL 57.0 11/04/2024    ALT 29 01/20/2025    AST 18 01/20/2025     01/20/2025    K 3.6 01/20/2025     01/20/2025    CREATININE 0.64 01/20/2025    BUN 13 01/20/2025    CO2 25 01/20/2025    TSH 2.46 11/27/2023    HGBA1C 5.4 11/04/2024             /70   Pulse 70   Temp 35.9 °C (96.6 °F)   Resp 18   Ht 1.651 m (5' 5\")   Wt 75.8 kg (167 lb 1.6 oz)   SpO2 97%   BMI 27.81 kg/m²      Physical Exam  Vitals " and nursing note reviewed.   Constitutional:       Appearance: She is ill-appearing.   HENT:      Head: Normocephalic.      Right Ear: External ear normal. Decreased hearing noted.      Left Ear: External ear normal. Decreased hearing noted.      Nose: Nose normal.      Mouth/Throat:      Mouth: Mucous membranes are moist.      Pharynx: Oropharynx is clear.   Eyes:      Extraocular Movements: Extraocular movements intact.      Conjunctiva/sclera: Conjunctivae normal.      Pupils: Pupils are equal, round, and reactive to light.   Cardiovascular:      Rate and Rhythm: Normal rate and regular rhythm.      Pulses: Normal pulses.      Heart sounds: Normal heart sounds.   Pulmonary:      Effort: Pulmonary effort is normal.      Breath sounds: Normal breath sounds.   Abdominal:      General: Bowel sounds are normal.      Palpations: Abdomen is soft.   Musculoskeletal:         General: Normal range of motion.      Cervical back: Normal range of motion and neck supple.   Skin:     General: Skin is warm and dry.   Neurological:      General: No focal deficit present.      Mental Status: She is alert. Mental status is at baseline. She is confused.      Sensory: Sensation is intact.      Motor: Weakness present.      Coordination: Coordination abnormal.      Gait: Gait abnormal.   Psychiatric:         Mood and Affect: Affect is inappropriate.         Speech: Speech is delayed.         Behavior: Behavior normal. Behavior is cooperative.         Cognition and Memory: Cognition is impaired.         Judgment: Judgment is inappropriate.          Assessment/Plan   CMP and CBC Q week    Chronic pancreatitis:  Evaluated by GI in the hospital.  Had MRCP pancreas on 1/12/25.  Patient has atrophic pancreas dilated PD (pancreatic duct) and possible mass versus IPMN   (intraductal papillary mucinous neoplasm) likely chronic pancreatitis, dilated PD (1.2 cm)  Continue Creon   GI Dr. Thompson did not recommend endoscopic intervention.  Monitor  for abdominal pain or N/V.  Follow up with GI.    Elevated troponin level:  Monitored in the hospital.  Monitor for chest pain.    HTN:   Continue Losartan.  Monitor Bps.     Seizures:  Continue Keppra and Depakote.  Monitor for seizures.  Follow up with neurology.    Asthma:  Continue albuterol nebs PRN.  Monitor for cough or wheezing.  Monitor for oxygen sats.    Schizophrenia:  Continue Seroquel and Haloperidol decanoate IM every 28 days.  Continue Benztropine for extrapyramidal symptoms.  Monitor for agitation .    Major depressive disorder:   Continue Trazodone and sertraline.    Generalized weakness; ambulatory dysfunction; hx of CVA:  Patient was evaluated in the hospital.  PT/OT/SW were consulted in the hospital.  Continue at [facility] for skilled services.    HLD:  Continue atorvastatin.    Deafness:  Cannot hear at all.  Need to communicate by writing.    GERD:   Continue Pepcid.  Monitor for reflux.  Sit upright for 2-3 hours after meals.    Constipation:  Continue Colace.  Monitor for constipation.    DVT prophylaxis:  Was on Heparin subcutaneous in the hospital.  Continue plavix.    Hx CVA:  Continue plavix.        Problem List Items Addressed This Visit       Asthma    Hyperlipemia     Other Visit Diagnoses       Chronic pancreatitis, unspecified pancreatitis type (Multi)    -  Primary    Elevated troponin        Benign essential HTN        Seizures (Multi)        Schizophrenia, unspecified type        Major depressive disorder, remission status unspecified, unspecified whether recurrent        Muscle weakness (generalized)        Ambulatory dysfunction        Bilateral deafness        Gastroesophageal reflux disease, unspecified whether esophagitis present        Constipation, unspecified constipation type        On deep vein thrombosis (DVT) prophylaxis        History of cerebrovascular accident                Vani Carrillo, DOMINGO-CNP       Electronically Signed By: Vani Carrillo,  DOMINGO-CNP   2/9/25  6:44 PM

## 2025-02-09 VITALS
DIASTOLIC BLOOD PRESSURE: 70 MMHG | BODY MASS INDEX: 27.84 KG/M2 | SYSTOLIC BLOOD PRESSURE: 128 MMHG | HEART RATE: 70 BPM | HEIGHT: 65 IN | OXYGEN SATURATION: 97 % | RESPIRATION RATE: 18 BRPM | WEIGHT: 167.1 LBS | TEMPERATURE: 96.6 F

## 2025-02-09 LAB
ATRIAL RATE: 81 BPM
P AXIS: 31 DEGREES
PR INTERVAL: 210 MS
Q ONSET: 249 MS
QRS COUNT: 13 BEATS
QRS DURATION: 102 MS
QT INTERVAL: 410 MS
QTC CALCULATION(BAZETT): 476 MS
QTC FREDERICIA: 453 MS
R AXIS: 53 DEGREES
T AXIS: 39 DEGREES
T OFFSET: 454 MS
VENTRICULAR RATE: 81 BPM

## 2025-02-09 NOTE — PROGRESS NOTES
Name: Janine Sandoval    YOB: 1955    Code Status: FULL CODE    Chief Complaint:  Initial visit; new patient:  Chronic pancreatitis; etc....         HPI     69 y.o. female with medical history of HTN, HLD, deafness, CVA, seizures, schizophrenia, GERD, constipation.    HOSPITAL COURSE:  Patient presented to [facility] ED from group home with generalized weakness.   Per the ED physician, the caregivers at the group home were present and they with the history.   Patient speaks ALS.   They note that the patient has been too weak to walk since being discharged from the hospital the day before.  They state that they have known her for years that she is able to get around okay with her rollator.   They note that her legs will buckle underneath her when she tries to stand which is a change.   They also state that she has been acting up more confused than normal and is signing differently   than in the past and notes that she is using broken up language.   States she was not like this before she was taken the hospital last week.   Patient's last known well was well over 24 hours ago.   They state they are unable to care for her at the group home.   They do to not having the equipment necessary to care for her since she is nonmobile.   Patient was having diffuse weakness.   Also noted having blurry vision in both eyes (able to see  and answer questions appropriately) and numbness bilaterally.   Patient was also recently admitted to [facility] from 1/7-1/15/25 for main duct IPMN vs   obstructing pancreatic head mass, seizures, and rule out stroke.   Evaluated by GI.  Had MRCP pancreas on 1/12/25.  Patient has atrophic pancreas dilated PD (pancreatic duct) and possible mass versus IPMN (intraductal papillary mucinous neoplasm) likely chronic pancreatitis, dilated PD (1.2 cm)  Currently on creon , case was reviewed by GI Dr. Thompson with no endoscopic intervention recommended.  Also has  dysphagia, modified barium swallow and esophagram recommended.  Had colonoscopy on 12/21/23  Denied fever, chills, cough, chest pain, shortness of breath, nausea, vomiting, abdominal pain, urinary symptoms, diarrhea, or constipation.       Hospital and chronic diagnoses as follows:      Chronic pancreatitis:  Evaluated by GI in the hospital.  Had MRCP pancreas on 1/12/25.  Patient has atrophic pancreas dilated PD (pancreatic duct) and possible mass versus IPMN (intraductal papillary mucinous neoplasm) likely chronic pancreatitis, dilated PD (1.2 cm)  Currently on creon , case was reviewed by GI Dr. Thompson with no endoscopic intervention recommended.  No complaints of abdominal pain or N/V.  Needs follow up with GI.  Patient seen today.  She is sitting up in a wheelchair in her room.  Calm, cooperative.  No complaints.  No fevers reported.  No chest pain.    Elevated troponin level:  Troponin 16.  No complaints of chest pain.    HTN:   On Losartan.  Recent BP: 128/70  No complaints of headaches or dizziness.    Seizures:  On Keppra and Depakote.  No seizures reported.    Asthma:  On albuterol nebs PRN.  On RA.  No cough or wheezing reported.   No oxygen desats reported.     Schizophrenia:  On Seroquel and Haloperidol decanoate IM every 28 days.  On Benztropine for extrapyramidal symptoms.  No agitation reported.      Major depressive disorder:   On Trazodone and sertraline.    Generalized weakness; ambulatory dysfunction; hx of CVA:  Patient was evaluated in the hospital.  PT/OT/SW were consulted.  Patient at [facility] for skilled services.     HLD:  On atorvastatin.    Deafness:  Cannot hear at all.  Need to communicate by writing.    GERD:   On Pepcid.  No reflux complaints.    Constipation:  On Colace.  No constipation reported.    DVT prophylaxis:  Was on Heparin subcutaneous in the hospital.    Hx CVA:  On plavix.                    Reviewed  hospital records from recent  "hospitalization.  Reviewed  EMR  Reviewed medical, social, surgical and family history.  Reviewed all current medications and performed medication reconciliation.  Performed prescription drug management.  Reviewed vital signs AND lab results  Reviewed [facility] Documentation  Discussed patient with nursing.   Spent greater than 50 minutes on patient visit.           ROS: 10 point ROS performed; Negative unless noted in HPI.        MEDICAL HISTORY:  HTN  HLD  Deafness  CVA  Seizures  Schizophrenia  GERD  Constipation    SOCIAL HISTORY:  Never smoker.  No alcohol use.  No drug use.    SURGICAL HISTORY:  No surgeries on file    FAMILY HISTORY:  Parkinsonism--Mother  Cardiac disorder--Father  Glaucoma--Father  Parkinsonism--Father    ALLERGIES:  Levofloxacin        LABS:  BMP: Date: 1/23/2025 Na 136 K not available Cr 0.59 BUN 11 glucose 177 Ca 9.4 GFR 98  CBC: Date: 1/23/2025 WBC 6.30 Hgb 12.8 hematocrit 39.1 platelets 59  Liver function: Date: 1/23/2025 ALT not available AST not available alkaline phos.  89 bilirubin 0.2 albumin 3.6 protein 6.8    CBC: Date: 1/28/2025 WBC 6.29 Hgb 12 hematocrit 36.2 platelets 227    2/4/2025 lipid panel cholesterol 112; triglycerides 53; HDL 58; VLDL 11; LDL 43    2/4/2025 vitamin D hydroxy level 44.6         Lab Results   Component Value Date    WBC 4.7 01/20/2025    HGB 10.9 (L) 01/20/2025    HCT 33.5 (L) 01/20/2025     (L) 01/20/2025    CHOL 122 11/04/2024    TRIG 64 11/04/2024    HDL 57.0 11/04/2024    ALT 29 01/20/2025    AST 18 01/20/2025     01/20/2025    K 3.6 01/20/2025     01/20/2025    CREATININE 0.64 01/20/2025    BUN 13 01/20/2025    CO2 25 01/20/2025    TSH 2.46 11/27/2023    HGBA1C 5.4 11/04/2024             /70   Pulse 70   Temp 35.9 °C (96.6 °F)   Resp 18   Ht 1.651 m (5' 5\")   Wt 75.8 kg (167 lb 1.6 oz)   SpO2 97%   BMI 27.81 kg/m²      Physical Exam  Vitals and nursing note reviewed.   Constitutional:       Appearance: She " is ill-appearing.   HENT:      Head: Normocephalic.      Right Ear: External ear normal. Decreased hearing noted.      Left Ear: External ear normal. Decreased hearing noted.      Nose: Nose normal.      Mouth/Throat:      Mouth: Mucous membranes are moist.      Pharynx: Oropharynx is clear.   Eyes:      Extraocular Movements: Extraocular movements intact.      Conjunctiva/sclera: Conjunctivae normal.      Pupils: Pupils are equal, round, and reactive to light.   Cardiovascular:      Rate and Rhythm: Normal rate and regular rhythm.      Pulses: Normal pulses.      Heart sounds: Normal heart sounds.   Pulmonary:      Effort: Pulmonary effort is normal.      Breath sounds: Normal breath sounds.   Abdominal:      General: Bowel sounds are normal.      Palpations: Abdomen is soft.   Musculoskeletal:         General: Normal range of motion.      Cervical back: Normal range of motion and neck supple.   Skin:     General: Skin is warm and dry.   Neurological:      General: No focal deficit present.      Mental Status: She is alert. Mental status is at baseline. She is confused.      Sensory: Sensation is intact.      Motor: Weakness present.      Coordination: Coordination abnormal.      Gait: Gait abnormal.   Psychiatric:         Mood and Affect: Affect is inappropriate.         Speech: Speech is delayed.         Behavior: Behavior normal. Behavior is cooperative.         Cognition and Memory: Cognition is impaired.         Judgment: Judgment is inappropriate.          Assessment/Plan    CMP and CBC Q week    Chronic pancreatitis:  Evaluated by GI in the hospital.  Had MRCP pancreas on 1/12/25.  Patient has atrophic pancreas dilated PD (pancreatic duct) and possible mass versus IPMN   (intraductal papillary mucinous neoplasm) likely chronic pancreatitis, dilated PD (1.2 cm)  Continue Creon   GI Dr. Thompson did not recommend endoscopic intervention.  Monitor for abdominal pain or N/V.  Follow up with GI.    Elevated  troponin level:  Monitored in the hospital.  Monitor for chest pain.    HTN:   Continue Losartan.  Monitor Bps.     Seizures:  Continue Keppra and Depakote.  Monitor for seizures.  Follow up with neurology.    Asthma:  Continue albuterol nebs PRN.  Monitor for cough or wheezing.  Monitor for oxygen sats.    Schizophrenia:  Continue Seroquel and Haloperidol decanoate IM every 28 days.  Continue Benztropine for extrapyramidal symptoms.  Monitor for agitation .    Major depressive disorder:   Continue Trazodone and sertraline.    Generalized weakness; ambulatory dysfunction; hx of CVA:  Patient was evaluated in the hospital.  PT/OT/SW were consulted in the hospital.  Continue at [facility] for skilled services.    HLD:  Continue atorvastatin.    Deafness:  Cannot hear at all.  Need to communicate by writing.    GERD:   Continue Pepcid.  Monitor for reflux.  Sit upright for 2-3 hours after meals.    Constipation:  Continue Colace.  Monitor for constipation.    DVT prophylaxis:  Was on Heparin subcutaneous in the hospital.  Continue plavix.    Hx CVA:  Continue plavix.        Problem List Items Addressed This Visit       Asthma    Hyperlipemia     Other Visit Diagnoses       Chronic pancreatitis, unspecified pancreatitis type (Multi)    -  Primary    Elevated troponin        Benign essential HTN        Seizures (Multi)        Schizophrenia, unspecified type        Major depressive disorder, remission status unspecified, unspecified whether recurrent        Muscle weakness (generalized)        Ambulatory dysfunction        Bilateral deafness        Gastroesophageal reflux disease, unspecified whether esophagitis present        Constipation, unspecified constipation type        On deep vein thrombosis (DVT) prophylaxis        History of cerebrovascular accident                Vani Carrillo, APRN-CNP

## 2025-03-10 ENCOUNTER — TELEPHONE (OUTPATIENT)
Dept: GASTROENTEROLOGY | Facility: HOSPITAL | Age: 70
End: 2025-03-10
Payer: MEDICARE

## 2025-03-10 NOTE — TELEPHONE ENCOUNTER
I spoke with patients caregiver, per Merrill Shen patient needs to be seen in pancreas clinic with Alba Herron.

## 2025-03-13 NOTE — PROGRESS NOTES
"Subjective   Ms. Sandoval is a 69-year-old female who is referred by Gastroenterology for a dilated main pancreatic duct.     She has been hospitalized twice recently. Her initial admission was for seizures. As part of that work-up, she had a CT A/P on 1/08/25 that showed severe atrophy of the pancreas with ductal dilatation measuring up to 1.3 cm with prominent soft tissue in the region of the pancreatic head.     She had a MRCP (without contrast) on 1/13/25 that was limited to a few sequences. This showed a diffusely dilated MPD up to 1.2 cm with caliber change at the level of the pancreatic head with a focus of soft tissue thickening measuring 2.2 cm.     She denies a personal history of acute pancreatitis.    She does endorse left-sided abdominal pain. This does not radiate to her back. Her appetite is good and she is eating well though she admits that she does not feel like eating sometimes. She estimates that she has lost 20 pounds recently though in what time frame is unknown.    Medical and surgical history: Deafness, schizophrenia, GERD, HTN, HLD, seizures, CVA (on clopidogrel), constipation.  Family history: Unknown. She is adopted.   Social history: Former smoker. No alcohol use. No illicits. She lives at [facility] in [place]; she is here with an aide. Uses American sign language to communicate. She makes her own medical decisions.    Objective     /75   Pulse 98   Ht 1.575 m (5' 2\")   Wt 71.8 kg (158 lb 3.2 oz)   SpO2 96%   BMI 28.94 kg/m²      Physical Exam  General: in no acute distress, comfortable   Eyes: no pallor or scleral icterus  Respiratory: even, unlabored  Gastrointestinal: non-distended, abdomen soft, non-tender, no masses   Musculoskeletal: normal gate, no deformities   Integumentary: no concerning lesions, no jaundice  Neurologic: no gross deficits  Psychiatric: cognition intact, mood appropriate    Assessment/Plan   Ms. Sandoval is a 69-year-old female who is referred " by Gastroenterology for a dilated main pancreatic duct.     PLAN: She has what appears to be a mass in the head of the pancreas. The upstream duct is severely dilated. This is a cancer until proven otherwise.    Review of her hospital notes attributed this abnormality to chronic pancreatitis, however, she has essentially no risk factors such as recurrent acute pancreatitis. There is also no evidence of chronic pancreatitis (intraductal or parenchymal stones) on her imaging.    I reviewed her CT and MRI at our multidisciplinary conference earlier in the week to relay my concerns and the group agreed. Therefore, will move forward with an EUS-guided biopsy. She is on Plavix which she will need to hold for 5 days prior.     I also ordered a .     I did have a conversation with her about treatment, should this prove to be a pancreas cancer. She wishes to pursue treatment.       Alba Herron PA-C

## 2025-03-14 ENCOUNTER — DOCUMENTATION (OUTPATIENT)
Dept: HOME HEALTH SERVICES | Facility: HOME HEALTH | Age: 70
End: 2025-03-14

## 2025-03-14 ENCOUNTER — TELEPHONE (OUTPATIENT)
Dept: HOME HEALTH SERVICES | Facility: HOME HEALTH | Age: 70
End: 2025-03-14

## 2025-03-14 ENCOUNTER — APPOINTMENT (OUTPATIENT)
Dept: PRIMARY CARE | Facility: CLINIC | Age: 70
End: 2025-03-14
Payer: MEDICARE

## 2025-03-14 ENCOUNTER — OFFICE VISIT (OUTPATIENT)
Dept: SURGICAL ONCOLOGY | Facility: CLINIC | Age: 70
End: 2025-03-14
Payer: MEDICARE

## 2025-03-14 ENCOUNTER — LAB (OUTPATIENT)
Dept: LAB | Facility: HOSPITAL | Age: 70
End: 2025-03-14
Payer: MEDICARE

## 2025-03-14 VITALS
HEART RATE: 98 BPM | OXYGEN SATURATION: 96 % | DIASTOLIC BLOOD PRESSURE: 75 MMHG | WEIGHT: 158.2 LBS | HEIGHT: 62 IN | BODY MASS INDEX: 29.11 KG/M2 | SYSTOLIC BLOOD PRESSURE: 133 MMHG

## 2025-03-14 VITALS
DIASTOLIC BLOOD PRESSURE: 76 MMHG | WEIGHT: 158.6 LBS | BODY MASS INDEX: 26.39 KG/M2 | SYSTOLIC BLOOD PRESSURE: 128 MMHG | HEART RATE: 88 BPM

## 2025-03-14 DIAGNOSIS — D37.8 NEOPLASM OF UNCERTAIN BEHAVIOR OF OTHER SPECIFIED DIGESTIVE ORGANS: ICD-10-CM

## 2025-03-14 DIAGNOSIS — I10 HYPERTENSION, UNSPECIFIED TYPE: ICD-10-CM

## 2025-03-14 DIAGNOSIS — E78.5 HYPERLIPIDEMIA, UNSPECIFIED HYPERLIPIDEMIA TYPE: ICD-10-CM

## 2025-03-14 DIAGNOSIS — R29.6 RECURRENT FALLS: ICD-10-CM

## 2025-03-14 DIAGNOSIS — K86.89 PANCREATIC MASS (HHS-HCC): Primary | ICD-10-CM

## 2025-03-14 DIAGNOSIS — G40.209 PARTIAL EPILEPSY WITH IMPAIRMENT OF CONSCIOUSNESS (MULTI): ICD-10-CM

## 2025-03-14 DIAGNOSIS — T43.505A NEUROLEPTIC INDUCED PARKINSONISM: ICD-10-CM

## 2025-03-14 DIAGNOSIS — Z12.11 ENCOUNTER FOR SCREENING COLONOSCOPY: ICD-10-CM

## 2025-03-14 DIAGNOSIS — Z13.9 SCREENING DUE: ICD-10-CM

## 2025-03-14 DIAGNOSIS — R29.6 RECURRENT FALLS WHILE WALKING: ICD-10-CM

## 2025-03-14 DIAGNOSIS — M81.0 POSTMENOPAUSAL BONE LOSS: ICD-10-CM

## 2025-03-14 DIAGNOSIS — G40.919 BREAKTHROUGH SEIZURE (MULTI): ICD-10-CM

## 2025-03-14 DIAGNOSIS — E55.9 VITAMIN D DEFICIENCY: ICD-10-CM

## 2025-03-14 DIAGNOSIS — J45.909 ASTHMA, UNSPECIFIED ASTHMA SEVERITY, UNSPECIFIED WHETHER COMPLICATED, UNSPECIFIED WHETHER PERSISTENT (HHS-HCC): ICD-10-CM

## 2025-03-14 DIAGNOSIS — T50.905A: Primary | ICD-10-CM

## 2025-03-14 DIAGNOSIS — K86.89 OTHER SPECIFIED DISEASES OF PANCREAS: Primary | ICD-10-CM

## 2025-03-14 DIAGNOSIS — D37.8 NEOPLASM OF UNCERTAIN BEHAVIOR OF HEAD OF PANCREAS: ICD-10-CM

## 2025-03-14 DIAGNOSIS — Z12.31 SCREENING MAMMOGRAM FOR BREAST CANCER: ICD-10-CM

## 2025-03-14 DIAGNOSIS — K86.89 PANCREATIC DUCT DILATED (HHS-HCC): ICD-10-CM

## 2025-03-14 DIAGNOSIS — E80.20: Primary | ICD-10-CM

## 2025-03-14 DIAGNOSIS — I63.9 CEREBRAL INFARCTION, UNSPECIFIED MECHANISM (MULTI): ICD-10-CM

## 2025-03-14 DIAGNOSIS — K21.9 GASTROESOPHAGEAL REFLUX DISEASE WITHOUT ESOPHAGITIS: ICD-10-CM

## 2025-03-14 DIAGNOSIS — G40.909 SEIZURE DISORDER (MULTI): ICD-10-CM

## 2025-03-14 DIAGNOSIS — F32.A DEPRESSION, UNSPECIFIED DEPRESSION TYPE: ICD-10-CM

## 2025-03-14 DIAGNOSIS — F20.9 SCHIZOPHRENIA, UNSPECIFIED TYPE: ICD-10-CM

## 2025-03-14 DIAGNOSIS — G21.11 NEUROLEPTIC INDUCED PARKINSONISM: ICD-10-CM

## 2025-03-14 PROBLEM — R11.2 NAUSEA AND VOMITING: Status: RESOLVED | Noted: 2025-03-14 | Resolved: 2025-03-14

## 2025-03-14 PROBLEM — Z86.73 HISTORY OF CEREBROVASCULAR ACCIDENT: Status: ACTIVE | Noted: 2025-03-14

## 2025-03-14 PROBLEM — R53.83 FATIGUE: Status: RESOLVED | Noted: 2025-03-14 | Resolved: 2025-03-14

## 2025-03-14 PROBLEM — H61.20 IMPACTED CERUMEN: Status: RESOLVED | Noted: 2025-03-14 | Resolved: 2025-03-14

## 2025-03-14 PROBLEM — F70 MILD INTELLECTUAL DISABILITY: Status: ACTIVE | Noted: 2024-05-23

## 2025-03-14 PROBLEM — H90.3 SENSORINEURAL HEARING LOSS (SNHL) OF BOTH EARS: Status: ACTIVE | Noted: 2025-03-14

## 2025-03-14 PROBLEM — M25.519 SHOULDER PAIN: Status: RESOLVED | Noted: 2025-03-14 | Resolved: 2025-03-14

## 2025-03-14 PROBLEM — F41.9 ANXIETY: Status: ACTIVE | Noted: 2024-12-17

## 2025-03-14 PROBLEM — M19.90 CHRONIC OSTEOARTHRITIS: Status: ACTIVE | Noted: 2025-03-14

## 2025-03-14 PROBLEM — R56.9 SEIZURE (MULTI): Status: ACTIVE | Noted: 2025-03-14

## 2025-03-14 LAB — CANCER AG19-9 SERPL-ACNC: <4 U/ML

## 2025-03-14 PROCEDURE — 1036F TOBACCO NON-USER: CPT | Performed by: PHYSICIAN ASSISTANT

## 2025-03-14 PROCEDURE — 99215 OFFICE O/P EST HI 40 MIN: CPT | Performed by: INTERNAL MEDICINE

## 2025-03-14 PROCEDURE — 1159F MED LIST DOCD IN RCRD: CPT | Performed by: PHYSICIAN ASSISTANT

## 2025-03-14 PROCEDURE — 1170F FXNL STATUS ASSESSED: CPT | Performed by: INTERNAL MEDICINE

## 2025-03-14 PROCEDURE — 3008F BODY MASS INDEX DOCD: CPT | Performed by: PHYSICIAN ASSISTANT

## 2025-03-14 PROCEDURE — G2211 COMPLEX E/M VISIT ADD ON: HCPCS | Performed by: INTERNAL MEDICINE

## 2025-03-14 PROCEDURE — 3074F SYST BP LT 130 MM HG: CPT | Performed by: INTERNAL MEDICINE

## 2025-03-14 PROCEDURE — 99215 OFFICE O/P EST HI 40 MIN: CPT | Performed by: PHYSICIAN ASSISTANT

## 2025-03-14 PROCEDURE — 1157F ADVNC CARE PLAN IN RCRD: CPT | Performed by: INTERNAL MEDICINE

## 2025-03-14 PROCEDURE — 99205 OFFICE O/P NEW HI 60 MIN: CPT | Performed by: PHYSICIAN ASSISTANT

## 2025-03-14 PROCEDURE — 1157F ADVNC CARE PLAN IN RCRD: CPT | Performed by: PHYSICIAN ASSISTANT

## 2025-03-14 PROCEDURE — 1126F AMNT PAIN NOTED NONE PRSNT: CPT | Performed by: PHYSICIAN ASSISTANT

## 2025-03-14 PROCEDURE — 1159F MED LIST DOCD IN RCRD: CPT | Performed by: INTERNAL MEDICINE

## 2025-03-14 PROCEDURE — 3075F SYST BP GE 130 - 139MM HG: CPT | Performed by: PHYSICIAN ASSISTANT

## 2025-03-14 PROCEDURE — 1036F TOBACCO NON-USER: CPT | Performed by: INTERNAL MEDICINE

## 2025-03-14 PROCEDURE — 3078F DIAST BP <80 MM HG: CPT | Performed by: PHYSICIAN ASSISTANT

## 2025-03-14 PROCEDURE — 86301 IMMUNOASSAY TUMOR CA 19-9: CPT

## 2025-03-14 PROCEDURE — 3078F DIAST BP <80 MM HG: CPT | Performed by: INTERNAL MEDICINE

## 2025-03-14 RX ORDER — OMEPRAZOLE 20 MG/1
20 CAPSULE, DELAYED RELEASE ORAL NIGHTLY
Qty: 90 CAPSULE | Refills: 1 | Status: SHIPPED | OUTPATIENT
Start: 2025-03-14 | End: 2025-09-10

## 2025-03-14 RX ORDER — BENZTROPINE MESYLATE 0.5 MG/1
.5-1 TABLET ORAL 2 TIMES DAILY
Qty: 135 TABLET | Refills: 3 | Status: SHIPPED | OUTPATIENT
Start: 2025-03-14

## 2025-03-14 RX ORDER — ALBUTEROL SULFATE 0.83 MG/ML
2.5 SOLUTION RESPIRATORY (INHALATION) EVERY 4 HOURS PRN
Qty: 360 ML | Refills: 5 | Status: SHIPPED | OUTPATIENT
Start: 2025-03-14

## 2025-03-14 RX ORDER — CLOPIDOGREL BISULFATE 75 MG/1
75 TABLET ORAL DAILY
Qty: 90 TABLET | Refills: 1 | Status: SHIPPED | OUTPATIENT
Start: 2025-03-14

## 2025-03-14 RX ORDER — QUETIAPINE FUMARATE 200 MG/1
200 TABLET, FILM COATED ORAL NIGHTLY
Qty: 90 TABLET | Refills: 1 | Status: SHIPPED | OUTPATIENT
Start: 2025-03-14

## 2025-03-14 RX ORDER — SERTRALINE HYDROCHLORIDE 100 MG/1
100 TABLET, FILM COATED ORAL DAILY
Qty: 90 TABLET | Refills: 1 | Status: SHIPPED | OUTPATIENT
Start: 2025-03-14

## 2025-03-14 RX ORDER — LOSARTAN POTASSIUM 25 MG/1
25 TABLET ORAL DAILY
Qty: 90 TABLET | Refills: 1 | Status: SHIPPED | OUTPATIENT
Start: 2025-03-14

## 2025-03-14 RX ORDER — PANCRELIPASE 15000; 3000; 9500 [USP'U]/1; [USP'U]/1; [USP'U]/1
2 CAPSULE, DELAYED RELEASE ORAL
Qty: 360 CAPSULE | Refills: 1 | Status: SHIPPED | OUTPATIENT
Start: 2025-03-14

## 2025-03-14 RX ORDER — CHOLECALCIFEROL (VITAMIN D3) 50 MCG
2000 TABLET ORAL DAILY
Qty: 90 TABLET | Refills: 1 | Status: SHIPPED | OUTPATIENT
Start: 2025-03-14

## 2025-03-14 RX ORDER — FAMOTIDINE 20 MG/1
20 TABLET, FILM COATED ORAL EVERY MORNING
Qty: 90 TABLET | Refills: 1 | Status: SHIPPED | OUTPATIENT
Start: 2025-03-14

## 2025-03-14 RX ORDER — ATORVASTATIN CALCIUM 80 MG/1
80 TABLET, FILM COATED ORAL DAILY
Qty: 90 TABLET | Refills: 1 | Status: SHIPPED | OUTPATIENT
Start: 2025-03-14

## 2025-03-14 RX ORDER — PHENYTOIN SODIUM 100 MG/1
300 CAPSULE, EXTENDED RELEASE ORAL NIGHTLY
Qty: 270 CAPSULE | Refills: 1 | Status: SHIPPED | OUTPATIENT
Start: 2025-03-14

## 2025-03-14 RX ORDER — QUETIAPINE FUMARATE 25 MG/1
25-50 TABLET, FILM COATED ORAL 2 TIMES DAILY
Qty: 360 TABLET | Refills: 1 | Status: SHIPPED | OUTPATIENT
Start: 2025-03-14

## 2025-03-14 RX ORDER — LEVETIRACETAM 1000 MG/1
1000 TABLET ORAL DAILY
Qty: 90 TABLET | Refills: 1 | Status: SHIPPED | OUTPATIENT
Start: 2025-03-14

## 2025-03-14 ASSESSMENT — PAIN SCALES - GENERAL: PAINLEVEL_OUTOF10: 0-NO PAIN

## 2025-03-14 ASSESSMENT — ACTIVITIES OF DAILY LIVING (ADL)
MANAGING_FINANCES: TOTAL CARE
DRESSING: NEEDS ASSISTANCE
TAKING_MEDICATION: TOTAL CARE
DOING_HOUSEWORK: TOTAL CARE
BATHING: NEEDS ASSISTANCE
GROCERY_SHOPPING: TOTAL CARE

## 2025-03-14 ASSESSMENT — ENCOUNTER SYMPTOMS
OCCASIONAL FEELINGS OF UNSTEADINESS: 1
DEPRESSION: 0
LOSS OF SENSATION IN FEET: 1

## 2025-03-14 NOTE — PROGRESS NOTES
I reviewed the resident/fellow's documentation and discussed the patient with the resident/fellow. I agree with the resident/fellow's medical decision making as documented in the note.  As the attending physician, I certify that I personally reviewed the patient's history and personally examined the patient to confirm the physical findings described above, and that I reviewed the relevant imaging studies and available reports. I also discussed the differential diagnosis and all of the proposed management plans with the patient and individuals accompanying the patient to this visit. They had the opportunity to ask questions about the proposed management plans and to have those questions answered.   Hospital follow up visit  Hospital records reviewed  Notes reviewed  Labs and imaging reviewed  Med rec completed  To the extent possible, my findings/opinions, differential diagnosis, proposed workup and available results, treatment options and rationale were discussed in sufficient length to satisfy the patient's (and family's when so noted) interest. I've solicited questions in order to complete the patient's/family's understanding and offered further clarification and support should the need arise.      Jesi Ramsey MD

## 2025-03-14 NOTE — PROGRESS NOTES
[facility]  Primary Care Visit Note    Patient: Janine Sandoval, Age: 69 y.o., SEX: female , MRN:[id number],   PCP: Juan Luis Edmondson MD        Resident:  Neo Torres MD   Preferred Pharmacy:   [facility] [place] - [address] [place]  [address] [facility] [address]  Phone: [phone] Fax: [phone]    [id number] [address] & [address] [address]  Phone: [phone] Fax: [phone]    [facility] - [facility] AT [facility]  [facility]  [place] [address]  Phone: [phone] Fax: [phone]     [facility]  [address] , [facility] [address]  [place] [id number]  Phone: [phone] Fax: [phone]        Chief Complaint     Patient: Janine Sandoval is a 69 y.o. female who presented to the clinic for   Chief Complaint   Patient presents with    Follow-up        Subjective    Subjective      HPI    Janine Sandoval is a 69 y.o. year old female patient with a PMH significant for  HTN, HLD, Bilateral sensorineural hearing loss, vitamin D deficiency, glaucoma, paranoid schizophrenia, arthritis, cryptogenic stroke with hemiparesis, epilepsy, neuroleptic induced parkinsonism, recurrent falls, colonic polyposis, GERD with dysphagia who lives in group home  presents to the clinic for medicare annual exam    Patient was admitted to the hospital twice in January.  First time admitted to [facility] on 01/09 for breakthrough seizures, schizophrenia with intermittent agitation.  At that  she was found to have dilated pancreatic duct and started on Creon.  During that visit a stroke alert was also called for her left-sided facial droop and stroke team at [facility] recommended against TNK.  She was discharged on 01/15 and got readmitted on 01/16 to [facility]  center secondary to being too weak to walk, and being more confused since her discharge.  It was recommended to stay patient continue aggressive pulmonary hygiene.  There was concern for dysphagia but there was no indication of PEG tube.  Patient was subsequently discharged to her group home on 01/22/2025.    Today patient is accompanied by nursing aide from the group facility.  Given patient has hearing loss, VIVIAN device was used.  She states that she has had significant lifestyle modifications to prevent her recurrent falls.  The nursing staff that accompanied the patient requested order for physical therapy and Occupational Therapy to come visit the patient at the group home.  She also requested referral for geriatric site.  We agree this is an appropriate request given that patient is on extensive psych medications and subsequently has movement disorder secondary to the medications.  Would ideally like for her to not be on the benztropine with her history of recurrent falls.  Currently she has been transitioned from Haldol injections to Haldol oral medication twice a day.  Psych has also gone down on the Seroquel.  Recurrent falls have reduced and patient has currently returned to her baseline mentation.  At baseline she does have hallucinations and delusions and personality changes.    ROS   Review of Systems   12 point review of system is negative unless indicated above    Past History     PMHx:    has a past medical history of Anemia, Depression, Gastro-esophageal reflux disease without esophagitis (12/21/2022), HL (hearing loss), Hyperlipidemia, unspecified (09/25/2019), Hypertension, Personal history of transient ischemic attack (TIA), and cerebral infarction without residual deficits (12/21/2022), Shoulder pain (03/14/2025), and Stroke (Multi).     Allergies:   Allergies   Allergen Reactions    Levofloxacin Dizziness     Question seizure        Surgical Hx:   History reviewed. No pertinent surgical  history.     Social HX:   Social History     Tobacco Use    Smoking status: Never     Passive exposure: Never    Smokeless tobacco: Never   Vaping Use    Vaping status: Unknown   Substance Use Topics    Alcohol use: Never    Drug use: Never      Meds:   Current Outpatient Medications   Medication Instructions    acetaminophen (Tylenol) 500 mg tablet TAKE 1 TABLET BY MOUTH EVERY 4 HOURS AS NEEDED FOR MILD-MODERATE PAIN, CAN TAKE 2 TABLETS EVERY 8 HOURS AS NEEDED FOR MODERATE-SEVERE PAIN    albuterol 2.5 mg, nebulization, Every 4 hours PRN, 1 VIAL VIA AEROSOL EVERY 4 HOURS AS NEEDED FOR SHORTNESS OF BREATH / CHEST TIGHTNESS    atorvastatin (LIPITOR) 80 mg, oral, Daily    benztropine (COGENTIN) 0.5-1 mg, oral, 2 times daily, TAKE 1 TABLET BY MOUTH IN THE MORNING AND AFTERNOON  AND TAKE 2 TABLETS BY MOUTH EVERY NIGHT AT BEDTIME    cholecalciferol (VITAMIN D-3) 2,000 Units, oral, Daily    clopidogrel (PLAVIX) 75 mg, oral, Daily    famotidine (PEPCID) 20 mg, oral, Every morning    haloperidol decanoate (Haldol Decanoate) 100 mg/mL injection Inject 2 mL ( 200 MG) into the muscle every 28 days.    levETIRAcetam (KEPPRA) 1,000 mg, oral, Daily    lidocaine (Lidoderm) 5 % patch APPLY 1 PATCH TOPICALLY TO PAINFUL AREA ONCE DAILY *LEAVE IN PLACE FOR 12 HOURS, REMOVE AFTER 12 HOURS. DO NOT REAPPLY UNTIL 12 HOURS HAVE LAPSED*    lipase-protease-amylase (Creon) 3,000-9,500- 15,000 unit capsule 2 capsules, oral, 3 times daily before meals    lipase-protease-amylase (Creon) 3,000-9,500- 15,000 unit capsule 2 capsules, oral, 3 times daily before meals    losartan (COZAAR) 25 mg, oral, Daily    melatonin 5 mg, oral, Daily    omeprazole (PRILOSEC) 20 mg, oral, Nightly, Do not crush or chew.    phenytoin ER (DILANTIN) 300 mg, oral, Nightly    QUEtiapine (SEROQUEL) 25-50 mg, oral, 2 times daily    QUEtiapine (SEROQUEL) 200 mg, oral, Nightly    sertraline (ZOLOFT) 100 mg, oral, Daily    walker (Ultra-Light Rollator) misc Use as directed         Objective    Objective      Visit Vitals  /76 (BP Location: Right arm, Patient Position: Sitting, BP Cuff Size: Adult)   Pulse 88      BMI Readings from Last 15 Encounters:   03/14/25 28.94 kg/m²   03/14/25 26.39 kg/m²   02/04/25 27.81 kg/m²   01/17/25 27.44 kg/m²   01/07/25 27.46 kg/m²   12/11/24 27.46 kg/m²   10/22/24 26.63 kg/m²   09/16/24 25.29 kg/m²   05/17/24 24.55 kg/m²   02/26/24 24.71 kg/m²   01/26/24 24.69 kg/m²   12/06/23 24.53 kg/m²   11/27/23 24.53 kg/m²   10/06/23 24.37 kg/m²   02/20/23 27.92 kg/m²      Lab Results   Component Value Date    HGBA1C 5.4 11/04/2024      Lab Results   Component Value Date    HGBA1C 5.4 11/04/2024    HGBA1C 5.7 (H) 11/27/2023    HGBA1C 6.0 (H) 02/04/2021     Lab Results   Component Value Date    LDLCALC 52 11/04/2024    CREATININE 0.64 01/20/2025      Lab Results   Component Value Date    WBC 4.7 01/20/2025    HGB 10.9 (L) 01/20/2025    HCT 33.5 (L) 01/20/2025    MCV 93 01/20/2025     (L) 01/20/2025        Chemistry    Lab Results   Component Value Date/Time     01/20/2025 0604    K 3.6 01/20/2025 0604     01/20/2025 0604    CO2 25 01/20/2025 0604    BUN 13 01/20/2025 0604    CREATININE 0.64 01/20/2025 0604    Lab Results   Component Value Date/Time    CALCIUM 8.6 01/20/2025 0604    ALKPHOS 65 01/20/2025 0604    AST 18 01/20/2025 0604    ALT 29 01/20/2025 0604    BILITOT 0.2 01/20/2025 0604        Physical Exam  Physical Exam  Cardiovascular:      Rate and Rhythm: Normal rate and regular rhythm.      Heart sounds: No murmur heard.     No friction rub. No gallop.   Pulmonary:      Breath sounds: Normal breath sounds. No stridor. No wheezing or rhonchi.   Abdominal:      General: Abdomen is flat. Bowel sounds are normal.      Palpations: Abdomen is soft. There is no mass.      Tenderness: There is no abdominal tenderness. There is no guarding.   Musculoskeletal:         General: No swelling or tenderness. Normal range of motion.   Neurological:       Mental Status: She is alert.   Psychiatric:      Comments: Patient is hard of hearing.        Assessment    Assessment and Plan     The following medical issues were discussed during this encounter  : Janine was seen today for follow-up.    .Medicare Wellness Billing Compliance Satisfied    *This is a visual tool to show completion of required items on the day of the visit. Green checks will only appear on the date of visit.    Review all medications by prescribing practitioner or clinical pharmacist (such as prescriptions, OTCs, herbal therapies and supplements) documented in the medical record    Past Medical, Surgical, and Family History reviewed and updated in chart    Tobacco Use Reviewed    Alcohol Use Reviewed    Illicit Drug Use Reviewed    PHQ2/9    Falls in Last Year Reviewed    Home Safety Risk Factors Reviewed    Cognitive Impairment Reviewed    Patient Self Assessment and Health Status    Current Diet Reviewed    Exercise Frequency    ADL - Hearing Impairment    ADL - Bathing    ADL - Dressing    ADL - Walks in Home    IADL - Managing Finances    IADL - Grocery Shopping    IADL - Taking Medications    IADL - Doing Housework      Diagnoses and all orders for this visit:    # Vitamin D deficiency  -     Reviewed vitamin D levels from 11/27/2023: 42  -     cholecalciferol (Vitamin D-3) 50 MCG (2000 UT) tablet; Take 1 tablet (2,000 Units) by mouth once daily.  -     Ordered Vitamin D 25-Hydroxy,Total (for eval of Vitamin D levels); Future    # Hyperlipidemia, unspecified hyperlipidemia type  -     Referral to Home Health; Future  -     Reviewed lipid panel from 11/04/2024: Cholesterol 122, HDL 57, LDL 52, VLDL 13, triglycerides 64, non-HDL cholesterol 65 (WNL)  -     Refilled atorvastatin (Lipitor) 80 mg tablet; Take 1 tablet (80 mg) by mouth once daily.  -     losartan (Cozaar) 25 mg tablet; Take 1 tablet (25 mg) by mouth once daily.  -     Ordered Lipid panel; Future    #  Hypertension, unspecified type  -     Referral to Home Health; Future  -     Blood pressure appropriately managed on current regimen of antihypertensive medication.  Blood pressure at this visit 128/76 mmHg  -     Refilled losartan (Cozaar) 25 mg tablet; Take 1 tablet (25 mg) by mouth once daily.    # Recurrent falls while walking  -     Referral to Home Health; Future  -     Recent admission to [facility] for encephalopathy and recurrent falls  - Patient states that she has done lifestyle modifications where she takes her time to get up and ambulates with care    # Gastroesophageal reflux disease without esophagitis  -     Patient has chronic GERD with concern for dysphagia.  Patient did endorse waking up in the morning with burning sensation on her tongue  -     Patient likely has silent GERD with reflux throughout the night.  The home health care aide did endorse that patient sleeps upright  -     Had discussion with patient.  Will start her on a PPI that she is to take at night and continue her famotidine in the morning  -     Ordered omeprazole (PriLOSEC) 20 mg DR capsule; Take 1 capsule (20 mg) by mouth once daily at bedtime. Do not crush or chew.  -     Refilled famotidine (Pepcid) 20 mg tablet; Take 1 tablet (20 mg) by mouth once daily in the morning.    # Asthma, unspecified asthma severity, unspecified whether complicated, unspecified whether persistent ([facility])  -     Refilled albuterol 2.5 mg /3 mL (0.083 %) nebulizer solution; Take 3 mL (2.5 mg) by nebulization every 4 hours if needed for wheezing or shortness of breath. 1 VIAL VIA AEROSOL EVERY 4 HOURS AS NEEDED FOR SHORTNESS OF BREATH / CHEST TIGHTNESS    # Cerebral infarction, unspecified mechanism (Multi)  -     Patient has a remote history of stroke with left sided hemiparesis  Admitted to [facility] and the stroke alert was called 01/09  due to new left-sided facial droop. Pt indicated that she was not able to see  intermittently (when trying to communicate with VIVIAN) the VIVIAN screen, but then when the screen was brought closer, she indicated improvement. CT brain attack ordered which showed remote left cerebral infarct. MR brain showed No MR evidence of acute intracranial infarct, hemorrhage, mass, or mass effect.  Given provider was unsure if this was acute or chronic, stroke team at [facility] was contacted.  They recommended against TNK administration.  -     Refilled atorvastatin (Lipitor) 80 mg tablet; Take 1 tablet (80 mg) by mouth once daily.  -     Refilled clopidogrel (Plavix) 75 mg tablet; Take 1 tablet (75 mg) by mouth once daily.  -     Ordered CBC and Auto Differential  -     Ordered Comprehensive metabolic panel    # Pancreatic duct dilated ([facility])  -     Chart review shows that she went to ER on 01/07/2025 with lower abdominal pain.  CT scan abdomen and pelvis done on 01/07 showed cyst pancreatic ductal dilatation.   -     Patient subsequently had MRCP pancreas without IV contrast 01/11 which showed diffusely dilated pancreatic duct measuring up to 1.2 cm with evidence of soft tissue fullness in the region of pancreatic head.  -     Gastroenterology was consulted in the hospital.  It was deemed that patient likely has chronic pancreatitis.  No endoscopic intervention was recommended.  Patient was started on Creon  -     Refilled lipase-protease-amylase (Creon) 3,000-9,500- 15,000 unit capsule; Take 2 capsules by mouth 3 times a day before meals.  -     CBC and Auto Differential  -     Comprehensive metabolic panel    # Partial epilepsy with impairment of consciousness (Multi)  # Seizure disorder (Multi)  -     Refilled levETIRAcetam (Keppra) 1,000 mg tablet; Take 1 tablet (1,000 mg) by mouth once daily.  -     Refilled phenytoin ER (Dilantin) 100 mg capsule; Take 3 capsules (300 mg) by mouth once daily at bedtime.  -     Ordered CBC and Auto Differential  -     Ordered comprehensive metabolic panel  -      Ordered blood levetiracetam levels  -     Referral to Home Health; Future    # Schizophrenia, unspecified type  -     Referral to Home Health; Future  -     Referral to Geriatrics; Future  -     Referral to Psychiatry; Future  -     CBC and Auto Differential; Future  -     Comprehensive metabolic panel; Future  -     Refilled QUEtiapine (SEROquel) 25 mg tablet; Take 1-2 tablets (25-50 mg) by mouth 2 times a day.  -     Refilled QUEtiapine (SEROquel) 200 mg tablet; Take 1 tablet (200 mg) by mouth once daily at bedtime.    # Neuroleptic induced parkinsonism  -     Placed referral for geriatric psych to optimize the medications.  Patient is on extensive antipsychotic medications and subsequently has movement disorder.  Patient is also on benztropine.  Would ideally like for her to be off benztropine and be covered with other antiparkinson medication  -     Referral to Geriatrics; Future  -     Referral to Psychiatry; Future  -     Referral to Home Health; Future  -     Refilled benztropine (Cogentin) 0.5 mg tablet; Take 1-2 tablets (0.5-1 mg) by mouth 2 times a day.     # Depression, unspecified depression type  -     Refilled sertraline (Zoloft) 100 mg tablet; Take 1 tablet (100 mg) by mouth once daily.  -     CBC and Auto Differential  -     Comprehensive metabolic panel    # Screening mammogram for breast cancer  -     Ordered BI mammo bilateral screening tomosynthesis; Future    Drug-induced metabolic disease (Multi) (Primary)  -     Referral to Home Health; Future  -     CBC and Auto Differential; Future  -     Comprehensive metabolic panel; Future  -     Hemoglobin A1c; Future  -     Lipid panel; Future    Health maintenance  The following screening tests were ordered:  -     CBC and Auto Differential; Future  -     Comprehensive metabolic panel; Future  -     Tsh With Reflex To Free T4 If Abnormal; Future  -     Vitamin D 25-Hydroxy,Total (for eval of Vitamin D levels); Future  -     Hemoglobin A1c; Future  -      Lipid panel; Future  -     Albumin-Creatinine Ratio, Urine Random; Future  Reviewed colonoscopy from 12/21/2023: 6 mm polyp was found in the transverse colon.  Removed with a cold snare.  Pathology significant for adenoma.  Repeat colonoscopy due in 5 years. Colonoscopy due in December 2028  Reviewed mammogram from 02/26/2024: No mammographic evidence of malignancy.  Repeat due 02/2025 reviewed DEXA bone scan from  Bone density scan ordered    Immunizations:   Due for RSV and shingles vaccine    Please return on May 5 3:20 PM or if symptoms worsen     Neo Torres MD  Internal Medicine, PGY- 2  03/14/25 at 2:38 PM

## 2025-03-14 NOTE — TELEPHONE ENCOUNTER
Hello,        Thank you for your referral to  Home Care. At this time, we are unable to accommodate your patient's needs in a safe and timely manner. In order to help your patient receive quality home care, we have included certified agencies that service this area:         [facility] - P: [phone]; F: [phone]            [facility] - P: [phone]; F:[phone]         You may contact one of these agencies, or an alternate of your choice, to see if they are able to accept your patient.    Thank you,  [facility]

## 2025-03-14 NOTE — HH CARE COORDINATION
Home Care received a referral for Physical Therapy and Occupational Therapy. Unfortunately, we are unable to accept and process the referral at this time.    Reason:  Inability to accommodate the patient's needs in a safe and timely manner    Patients, please reach out to the referring provider or your PCP to assist in obtaining an alternative home care agency and/or guidance to meet your needs.    Providers, please reach out to  Home Care at [phone] with any questions regarding the declined referral.

## 2025-03-15 LAB

## 2025-03-18 DIAGNOSIS — K86.89 PANCREATIC DUCT DILATED (HHS-HCC): ICD-10-CM

## 2025-03-18 DIAGNOSIS — K21.9 GASTROESOPHAGEAL REFLUX DISEASE WITHOUT ESOPHAGITIS: ICD-10-CM

## 2025-03-18 DIAGNOSIS — E55.9 VITAMIN D DEFICIENCY: ICD-10-CM

## 2025-03-18 DIAGNOSIS — G21.11 NEUROLEPTIC INDUCED PARKINSONISM: ICD-10-CM

## 2025-03-18 DIAGNOSIS — I63.9 CEREBRAL INFARCTION, UNSPECIFIED MECHANISM (MULTI): ICD-10-CM

## 2025-03-18 DIAGNOSIS — G40.209 PARTIAL EPILEPSY WITH IMPAIRMENT OF CONSCIOUSNESS (MULTI): ICD-10-CM

## 2025-03-18 DIAGNOSIS — F20.9 SCHIZOPHRENIA, UNSPECIFIED TYPE: ICD-10-CM

## 2025-03-18 DIAGNOSIS — T43.505A NEUROLEPTIC INDUCED PARKINSONISM: ICD-10-CM

## 2025-03-18 DIAGNOSIS — I10 HYPERTENSION, UNSPECIFIED TYPE: ICD-10-CM

## 2025-03-18 DIAGNOSIS — G40.909 SEIZURE DISORDER (MULTI): ICD-10-CM

## 2025-03-18 DIAGNOSIS — J45.909 ASTHMA, UNSPECIFIED ASTHMA SEVERITY, UNSPECIFIED WHETHER COMPLICATED, UNSPECIFIED WHETHER PERSISTENT (HHS-HCC): ICD-10-CM

## 2025-03-18 DIAGNOSIS — E78.5 HYPERLIPIDEMIA, UNSPECIFIED HYPERLIPIDEMIA TYPE: ICD-10-CM

## 2025-03-18 DIAGNOSIS — F32.A DEPRESSION, UNSPECIFIED DEPRESSION TYPE: ICD-10-CM

## 2025-03-18 RX ORDER — BENZTROPINE MESYLATE 0.5 MG/1
.5-1 TABLET ORAL 2 TIMES DAILY
Qty: 135 TABLET | Refills: 3 | Status: SHIPPED | OUTPATIENT
Start: 2025-03-18

## 2025-03-18 RX ORDER — QUETIAPINE FUMARATE 25 MG/1
25-50 TABLET, FILM COATED ORAL 2 TIMES DAILY
Qty: 360 TABLET | Refills: 1 | Status: SHIPPED | OUTPATIENT
Start: 2025-03-18

## 2025-03-18 RX ORDER — CHOLECALCIFEROL (VITAMIN D3) 50 MCG
2000 TABLET ORAL DAILY
Qty: 90 TABLET | Refills: 1 | Status: SHIPPED | OUTPATIENT
Start: 2025-03-18

## 2025-03-18 RX ORDER — ATORVASTATIN CALCIUM 80 MG/1
80 TABLET, FILM COATED ORAL DAILY
Qty: 90 TABLET | Refills: 1 | Status: SHIPPED | OUTPATIENT
Start: 2025-03-18

## 2025-03-18 RX ORDER — OMEPRAZOLE 20 MG/1
20 CAPSULE, DELAYED RELEASE ORAL NIGHTLY
Qty: 90 CAPSULE | Refills: 1 | Status: SHIPPED | OUTPATIENT
Start: 2025-03-18 | End: 2025-09-14

## 2025-03-18 RX ORDER — LEVETIRACETAM 1000 MG/1
1000 TABLET ORAL DAILY
Qty: 90 TABLET | Refills: 1 | Status: SHIPPED | OUTPATIENT
Start: 2025-03-18

## 2025-03-18 RX ORDER — PHENYTOIN SODIUM 100 MG/1
300 CAPSULE, EXTENDED RELEASE ORAL NIGHTLY
Qty: 270 CAPSULE | Refills: 1 | Status: SHIPPED | OUTPATIENT
Start: 2025-03-18

## 2025-03-18 RX ORDER — LOSARTAN POTASSIUM 25 MG/1
25 TABLET ORAL DAILY
Qty: 90 TABLET | Refills: 1 | Status: SHIPPED | OUTPATIENT
Start: 2025-03-18

## 2025-03-18 RX ORDER — QUETIAPINE FUMARATE 200 MG/1
200 TABLET, FILM COATED ORAL NIGHTLY
Qty: 90 TABLET | Refills: 1 | Status: SHIPPED | OUTPATIENT
Start: 2025-03-18

## 2025-03-18 RX ORDER — CLOPIDOGREL BISULFATE 75 MG/1
75 TABLET ORAL DAILY
Qty: 90 TABLET | Refills: 1 | Status: SHIPPED | OUTPATIENT
Start: 2025-03-18

## 2025-03-18 RX ORDER — ALBUTEROL SULFATE 0.83 MG/ML
2.5 SOLUTION RESPIRATORY (INHALATION) EVERY 4 HOURS PRN
Qty: 360 ML | Refills: 5 | Status: SHIPPED | OUTPATIENT
Start: 2025-03-18

## 2025-03-18 RX ORDER — FAMOTIDINE 20 MG/1
20 TABLET, FILM COATED ORAL EVERY MORNING
Qty: 90 TABLET | Refills: 1 | Status: SHIPPED | OUTPATIENT
Start: 2025-03-18

## 2025-03-18 RX ORDER — PANCRELIPASE 15000; 3000; 9500 [USP'U]/1; [USP'U]/1; [USP'U]/1
2 CAPSULE, DELAYED RELEASE ORAL
Qty: 360 CAPSULE | Refills: 1 | Status: SHIPPED | OUTPATIENT
Start: 2025-03-18

## 2025-03-18 RX ORDER — SERTRALINE HYDROCHLORIDE 100 MG/1
100 TABLET, FILM COATED ORAL DAILY
Qty: 90 TABLET | Refills: 1 | Status: SHIPPED | OUTPATIENT
Start: 2025-03-18

## 2025-03-19 LAB
25(OH)D3+25(OH)D2 SERPL-MCNC: 48 NG/ML (ref 30–100)
ALBUMIN SERPL-MCNC: 4.4 G/DL (ref 3.6–5.1)
ALBUMIN/CREAT UR: 9 MG/G CREAT
ALP SERPL-CCNC: 83 U/L (ref 37–153)
ALT SERPL-CCNC: 28 U/L (ref 6–29)
ANION GAP SERPL CALCULATED.4IONS-SCNC: 11 MMOL/L (CALC) (ref 7–17)
AST SERPL-CCNC: 18 U/L (ref 10–35)
BASOPHILS # BLD AUTO: 22 CELLS/UL (ref 0–200)
BASOPHILS NFR BLD AUTO: 0.4 %
BILIRUB SERPL-MCNC: 0.4 MG/DL (ref 0.2–1.2)
BUN SERPL-MCNC: 10 MG/DL (ref 7–25)
CALCIUM SERPL-MCNC: 9 MG/DL (ref 8.6–10.4)
CHLORIDE SERPL-SCNC: 106 MMOL/L (ref 98–110)
CHOLEST SERPL-MCNC: 117 MG/DL
CHOLEST/HDLC SERPL: 2.2 (CALC)
CO2 SERPL-SCNC: 23 MMOL/L (ref 20–32)
CREAT SERPL-MCNC: 0.59 MG/DL (ref 0.5–1.05)
CREAT UR-MCNC: 32 MG/DL (ref 20–275)
EGFRCR SERPLBLD CKD-EPI 2021: 97 ML/MIN/1.73M2
EOSINOPHIL # BLD AUTO: 213 CELLS/UL (ref 15–500)
EOSINOPHIL NFR BLD AUTO: 3.8 %
ERYTHROCYTE [DISTWIDTH] IN BLOOD BY AUTOMATED COUNT: 12.2 % (ref 11–15)
EST. AVERAGE GLUCOSE BLD GHB EST-MCNC: 123 MG/DL
EST. AVERAGE GLUCOSE BLD GHB EST-SCNC: 6.8 MMOL/L
GLUCOSE SERPL-MCNC: 110 MG/DL (ref 65–99)
HBA1C MFR BLD: 5.9 % OF TOTAL HGB
HCT VFR BLD AUTO: 35.2 % (ref 35–45)
HDLC SERPL-MCNC: 54 MG/DL
HGB BLD-MCNC: 11.7 G/DL (ref 11.7–15.5)
LDLC SERPL CALC-MCNC: 49 MG/DL (CALC)
LEVETIRACETAM SERPL-MCNC: 28.6 MCG/ML
LYMPHOCYTES # BLD AUTO: 1574 CELLS/UL (ref 850–3900)
LYMPHOCYTES NFR BLD AUTO: 28.1 %
MCH RBC QN AUTO: 30 PG (ref 27–33)
MCHC RBC AUTO-ENTMCNC: 33.2 G/DL (ref 32–36)
MCV RBC AUTO: 90.3 FL (ref 80–100)
MICROALBUMIN UR-MCNC: 0.3 MG/DL
MONOCYTES # BLD AUTO: 571 CELLS/UL (ref 200–950)
MONOCYTES NFR BLD AUTO: 10.2 %
NEUTROPHILS # BLD AUTO: 3220 CELLS/UL (ref 1500–7800)
NEUTROPHILS NFR BLD AUTO: 57.5 %
NONHDLC SERPL-MCNC: 63 MG/DL (CALC)
PLATELET # BLD AUTO: 187 THOUSAND/UL (ref 140–400)
PMV BLD REES-ECKER: 10.1 FL (ref 7.5–12.5)
POTASSIUM SERPL-SCNC: 3.9 MMOL/L (ref 3.5–5.3)
PROT SERPL-MCNC: 6.8 G/DL (ref 6.1–8.1)
RBC # BLD AUTO: 3.9 MILLION/UL (ref 3.8–5.1)
SODIUM SERPL-SCNC: 140 MMOL/L (ref 135–146)
TRIGL SERPL-MCNC: 60 MG/DL
TSH SERPL-ACNC: 1.05 MIU/L (ref 0.4–4.5)
WBC # BLD AUTO: 5.6 THOUSAND/UL (ref 3.8–10.8)

## 2025-03-20 ENCOUNTER — OFFICE VISIT (OUTPATIENT)
Dept: NEUROLOGY | Facility: HOSPITAL | Age: 70
End: 2025-03-20
Payer: MEDICARE

## 2025-03-20 VITALS
SYSTOLIC BLOOD PRESSURE: 167 MMHG | RESPIRATION RATE: 18 BRPM | HEIGHT: 62 IN | HEART RATE: 84 BPM | DIASTOLIC BLOOD PRESSURE: 91 MMHG | BODY MASS INDEX: 29.08 KG/M2 | WEIGHT: 158 LBS

## 2025-03-20 DIAGNOSIS — T43.505A NEUROLEPTIC INDUCED PARKINSONISM: ICD-10-CM

## 2025-03-20 DIAGNOSIS — G21.11 NEUROLEPTIC INDUCED PARKINSONISM: ICD-10-CM

## 2025-03-20 PROCEDURE — 99215 OFFICE O/P EST HI 40 MIN: CPT | Performed by: PSYCHIATRY & NEUROLOGY

## 2025-03-20 ASSESSMENT — PAIN SCALES - GENERAL: PAINLEVEL_OUTOF10: 0-NO PAIN

## 2025-03-20 NOTE — PATIENT INSTRUCTIONS
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     Seroquel is the safest antipsychotic from this standpoint as it usually does not cause tremor or parkinsonism.     However, if you are to go back on haldol tablets, it will likely cause worsening of tremor, stiffness, and slowness.     If the motor symptoms (tremor, stiffness, and slowness) get worse in the future, and you cannot come off haldol, one suggestion is to wean down benztropine gradually as it can worsen what we call tardive parkinsonism.     I recommend seeing Dr. Ariel Sosa from psychiatry to inquire about the possibility of dementia in the setting of a long-standing history of schizophrenia.     Please continue to follow up with epilepsy.     Follow up as needed in movement clinic.     Thank you for visiting the clinic today    Bandar Wagner MD

## 2025-03-20 NOTE — PROGRESS NOTES
Merced Sandoval is a right handed  69 y.o. 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 Patient is accompanied by: Other two  from her group home that helped in obtaining the hx. The visit was also facilitated with [id number].   Visit type: provider referral: PCP        While seeing her PCP in October, she was noted to have tremor, slowness, and decreased mobility. She was taking injectable haldol at that time along with cogentin and seroquel. Concerns about drug-induced parkinsonism arose and she was referred to the movement disorders clinic. Per , she has since stopped injectable haldol and has actually improved quite a bit after that becoming more mobile and less shaky. However, her psychiatrist just recently prescribed haldol tablets given ongoing delusions and hallucinations. She has not taken the pills yet.       Patient Active Problem List   Diagnosis   • Screening for colon cancer   • Essential hypertension   • Stroke (Multi)   • Osteoarthritis, knee   • Asthma   • Anemia   • Vitamin D deficiency   • Bilateral sensorineural hearing loss   • Cerebral infarction   • Chest pain   • Chronic cough   • Colon polyp   • Congenital deafness   • Cryptogenic stroke (Multi)   • Deaf-mutism   • Other specified depressive episodes   • Diverticulosis of colon   • Epiretinal membrane (ERM) of both eyes   • Gastroesophageal reflux disease without esophagitis   • Glaucoma suspect of both eyes   • Hallucinations   • Hemiparesis affecting dominant side as late effect of stroke (Multi)   • Impacted cerumen of right ear   • Internal hemorrhoids   • Neuroleptic induced parkinsonism   • Nuclear sclerotic cataract   • Partial epilepsy with impairment of  consciousness (Multi)   • PVD (posterior vitreous detachment), both eyes   • Acute exacerbation of chronic paranoid schizophrenia (Multi)   • Urinary incontinence   • Arthritis, degenerative   • Hyperlipemia   • Epilepsy   • Bruise   • Murmur, cardiac   • Breast cancer screening by mammogram   • Benign neoplasm of transverse colon   • Anemia due to chronic kidney disease   • Chronic back pain   • Recurrent falls   • Frequent falls   • Morbid (severe) obesity due to excess calories (Multi)   • Breakthrough seizure (Multi)   • Generalized weakness   • Weakness   • Anxiety   • Mild intellectual disability   • Chronic osteoarthritis   • Sensorineural hearing loss (SNHL) of both ears   • Seizure disorder (Multi)   • Seizure (Multi)   • Hypertension   • History of cerebrovascular accident   • Hyperlipidemia   • Depressive disorder      Past Medical History:   Diagnosis Date   • Anemia    • Depression    • Gastro-esophageal reflux disease without esophagitis 12/21/2022    Gastroesophageal reflux disease without esophagitis   • HL (hearing loss)    • Hyperlipidemia, unspecified 09/25/2019    Hyperlipemia   • Hypertension    • Personal history of transient ischemic attack (TIA), and cerebral infarction without residual deficits 12/21/2022    History of stroke   • Shoulder pain 03/14/2025   • Stroke (Multi)       No past surgical history on file.   Social History     Socioeconomic History   • Marital status: Single     Spouse name: Not on file   • Number of children: Not on file   • Years of education: Not on file   • Highest education level: Not on file   Occupational History   • Not on file   Tobacco Use   • Smoking status: Never     Passive exposure: Never   • Smokeless tobacco: Never   Vaping Use   • Vaping status: Unknown   Substance and Sexual Activity   • Alcohol use: Never   • Drug use: Never   • Sexual activity: Not on file   Other Topics Concern   • Not on file   Social History Narrative   • Not on file     Social  Drivers of Health     Financial Resource Strain: Patient Unable To Answer (1/17/2025)    Overall Financial Resource Strain (CARDIA)    • Difficulty of Paying Living Expenses: Patient unable to answer   Food Insecurity: Patient Unable To Answer (1/17/2025)    Hunger Vital Sign    • Worried About Running Out of Food in the Last Year: Patient unable to answer    • Ran Out of Food in the Last Year: Patient unable to answer   Transportation Needs: Patient Unable To Answer (1/17/2025)    PRAPARE - Transportation    • Lack of Transportation (Medical): Patient unable to answer    • Lack of Transportation (Non-Medical): Patient unable to answer   Physical Activity: Patient Unable To Answer (1/17/2025)    Exercise Vital Sign    • Days of Exercise per Week: Patient unable to answer    • Minutes of Exercise per Session: Patient unable to answer   Stress: Patient Unable To Answer (1/17/2025)    Citizen of the [place] [employer] - Occupational Stress Questionnaire    • Feeling of Stress : Patient unable to answer   Social Connections: Patient Unable To Answer (1/17/2025)    Social Connection and Isolation Panel [NHANES]    • Frequency of Communication with Friends and Family: Patient unable to answer    • Frequency of Social Gatherings with Friends and Family: Patient unable to answer    • Attends Bahai Services: Patient unable to answer    • Active Member of Clubs or Organizations: Patient unable to answer    • Attends Club or Organization Meetings: Patient unable to answer    • Marital Status: Patient unable to answer   Intimate Partner Violence: Patient Unable To Answer (1/17/2025)    Humiliation, Afraid, Rape, and Kick questionnaire    • Fear of Current or Ex-Partner: Patient unable to answer    • Emotionally Abused: Patient unable to answer    • Physically Abused: Patient unable to answer    • Sexually Abused: Patient unable to answer   Housing Stability: Patient Unable To Answer (1/17/2025)    Housing Stability  "Vital Sign    • Unable to Pay for Housing in the Last Year: Patient unable to answer    • Number of Times Moved in the Last Year: 1    • Homeless in the Last Year: Patient unable to answer      Family History   Problem Relation Name Age of Onset   • Parkinsonism Mother     • Other (cardiac disorder) Father     • Glaucoma Father     • Parkinsonism Father                   Review of Systems  All other system have been reviewed and are negative for complaint.    Vitals:    03/20/25 1545   BP: (!) 167/91   Pulse: 84   Resp: 18   Weight: 71.7 kg (158 lb)   Height: 1.575 m (5' 2\")     Objective   Neurological Exam  Mental Status  Awake and alert.  Mental exam was limited as patient is deaf mute and has longstanding hx of scheizophrenia.    Cranial Nerves  Mild hypomimia.    Motor  Normal muscle bulk throughout. Normal muscle tone. The following abnormal movements were seen:  Mild LUE rest tremor noted on exam. Right hemiparesis 4/5 with mild spasticity on the right. .    Coordination    Mild bradykinesia on the left with rapid repetitive movements. Unable to perform on the right weak side.    Gait    Walks with a standing walker with right circumduction slow gait. .           HEMOGLOBIN A1c   Date Value Ref Range Status   03/14/2025 5.9 (H) <5.7 % of total Hgb Final     Comment:     For someone without known diabetes, a hemoglobin   A1c value between 5.7% and 6.4% is consistent with  prediabetes and should be confirmed with a   follow-up test.     For someone with known diabetes, a value <7%  indicates that their diabetes is well controlled. A1c  targets should be individualized based on duration of  diabetes, age, comorbid conditions, and other  considerations.     This assay result is consistent with an increased risk  of diabetes.     Currently, no consensus exists regarding use of  hemoglobin A1c for diagnosis of diabetes for children.          eAG (mg/dL)   Date Value Ref Range Status   03/14/2025 123 mg/dL Final "     TSH W/REFLEX TO FT4   Date Value Ref Range Status   03/14/2025 1.05 0.40 - 4.50 mIU/L Final           Assessment/Plan       Janine Sandoval is a 69 y.o. 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 Symptoms improved partially after injectable haldol was held but she's been prescribed oral haldol and supposed to start it soon for worsening delusions/hallucinations. She is also on cogentin and high dose seroquel. On exam, she has mild parkinsonism and known mild spastic right hemiparesis. 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 Symptoms will likely worsen if she is to go on oral haldol but the benefits and risks of this treatment needs to be weighed by the patient's psychiatrist and PCP. If symptoms worsen after getting on oral haldol, holding cogentin may improve the tardive component of her parkinsonism since anti-cholinergics are known to worsen tardive parkinsonism as opposed to their beneficial effects on acute extrapyramidal symptoms. We can also consider a trial of sinemet in the future if parkinsonism worsens significantly and she's unable to stop haldol. There was a concern for dementia but she has been recently referred to establish care with a  psychiatrist so I recommended she sees Dr. Ariel Sosa to help evaluate for dementia in the setting of a long-standing hx of shcizophrenia and congential deaf-mutism.     PLAN:  You have mild drug-related parkinsonism, that is likely due to prior exposure to haldol and other antipsychotics in the past.     Seroquel is the safest antipsychotic from this standpoint as it usually does not cause tremor or parkinsonism.      However, if you are to go back on haldol tablets, it will likely cause worsening of tremor, stiffness, and slowness.     If the motor symptoms (tremor, stiffness, and slowness) get worse in the future, and you cannot come off haldol, one suggestion is to wean down benztropine gradually as it can worsen what we call tardive parkinsonism.     I recommend seeing Dr. Ariel Sosa from psychiatry to inquire about the possibility of dementia in the setting of a long-standing history of schizophrenia.     Please continue to follow up with epilepsy.     Follow up as needed in movement clinic.     The impression and plan were discussed with the patient and family (if present) in details and all questions answered.        Will share the note with the referring physician via EMR    Bandar Wagner MD

## 2025-03-25 ENCOUNTER — ANESTHESIA EVENT (OUTPATIENT)
Dept: GASTROENTEROLOGY | Facility: HOSPITAL | Age: 70
End: 2025-03-25
Payer: MEDICARE

## 2025-03-25 ENCOUNTER — HOSPITAL ENCOUNTER (OUTPATIENT)
Dept: GASTROENTEROLOGY | Facility: HOSPITAL | Age: 70
Discharge: HOME | End: 2025-03-25
Payer: MEDICARE

## 2025-03-25 ENCOUNTER — ANESTHESIA (OUTPATIENT)
Dept: GASTROENTEROLOGY | Facility: HOSPITAL | Age: 70
End: 2025-03-25
Payer: MEDICARE

## 2025-03-25 VITALS
RESPIRATION RATE: 23 BRPM | BODY MASS INDEX: 31.02 KG/M2 | SYSTOLIC BLOOD PRESSURE: 143 MMHG | DIASTOLIC BLOOD PRESSURE: 75 MMHG | HEIGHT: 60 IN | HEART RATE: 87 BPM | OXYGEN SATURATION: 97 % | WEIGHT: 158 LBS | TEMPERATURE: 98.1 F

## 2025-03-25 DIAGNOSIS — K86.89 PANCREATIC MASS (HHS-HCC): ICD-10-CM

## 2025-03-25 DIAGNOSIS — K86.89 PANCREATIC DUCT DILATED (HHS-HCC): ICD-10-CM

## 2025-03-25 PROCEDURE — 7100000009 HC PHASE TWO TIME - INITIAL BASE CHARGE

## 2025-03-25 PROCEDURE — 7100000010 HC PHASE TWO TIME - EACH INCREMENTAL 1 MINUTE

## 2025-03-25 PROCEDURE — 3700000001 HC GENERAL ANESTHESIA TIME - INITIAL BASE CHARGE

## 2025-03-25 PROCEDURE — 3700000002 HC GENERAL ANESTHESIA TIME - EACH INCREMENTAL 1 MINUTE

## 2025-03-25 PROCEDURE — A43237 PR ESOPHAGOGASTRODUODENOSCOPY US SCOPE W/ADJ STRXRS: Performed by: STUDENT IN AN ORGANIZED HEALTH CARE EDUCATION/TRAINING PROGRAM

## 2025-03-25 PROCEDURE — 2500000004 HC RX 250 GENERAL PHARMACY W/ HCPCS (ALT 636 FOR OP/ED)

## 2025-03-25 PROCEDURE — A43237 PR ESOPHAGOGASTRODUODENOSCOPY US SCOPE W/ADJ STRXRS

## 2025-03-25 RX ORDER — PROPOFOL 10 MG/ML
INJECTION, EMULSION INTRAVENOUS CONTINUOUS PRN
Status: DISCONTINUED | OUTPATIENT
Start: 2025-03-25 | End: 2025-03-25

## 2025-03-25 RX ORDER — MIDAZOLAM HYDROCHLORIDE 1 MG/ML
INJECTION INTRAMUSCULAR; INTRAVENOUS CONTINUOUS PRN
Status: DISCONTINUED | OUTPATIENT
Start: 2025-03-25 | End: 2025-03-25

## 2025-03-25 RX ORDER — ONDANSETRON HYDROCHLORIDE 2 MG/ML
4 INJECTION, SOLUTION INTRAVENOUS ONCE AS NEEDED
OUTPATIENT
Start: 2025-03-25

## 2025-03-25 RX ORDER — LIDOCAINE HCL/PF 100 MG/5ML
SYRINGE (ML) INTRAVENOUS AS NEEDED
Status: DISCONTINUED | OUTPATIENT
Start: 2025-03-25 | End: 2025-03-25

## 2025-03-25 RX ADMIN — PROPOFOL 30 MG: 10 INJECTION, EMULSION INTRAVENOUS at 08:43

## 2025-03-25 RX ADMIN — LIDOCAINE HYDROCHLORIDE 60 MG: 20 INJECTION INTRAVENOUS at 08:43

## 2025-03-25 RX ADMIN — PROPOFOL 30 MG: 10 INJECTION, EMULSION INTRAVENOUS at 08:46

## 2025-03-25 RX ADMIN — SODIUM CHLORIDE, SODIUM LACTATE, POTASSIUM CHLORIDE, AND CALCIUM CHLORIDE: 600; 310; 30; 20 INJECTION, SOLUTION INTRAVENOUS at 08:37

## 2025-03-25 RX ADMIN — PROPOFOL 150 MCG/KG/MIN: 10 INJECTION, EMULSION INTRAVENOUS at 08:44

## 2025-03-25 ASSESSMENT — PAIN SCALES - GENERAL
PAINLEVEL_OUTOF10: 0 - NO PAIN
PAIN_LEVEL: 0
PAINLEVEL_OUTOF10: 0 - NO PAIN
PAINLEVEL_OUTOF10: 0 - NO PAIN

## 2025-03-25 ASSESSMENT — PAIN - FUNCTIONAL ASSESSMENT
PAIN_FUNCTIONAL_ASSESSMENT: 0-10

## 2025-03-25 NOTE — H&P
Subjective     History of Present Illness:   Janine Sandoval is a 69 y.o. female who presents to endoscopy    Physical Exam  General: not in acute distress  CV: regular rate and rhythm  Resp: non-labored breathing

## 2025-03-25 NOTE — ANESTHESIA POSTPROCEDURE EVALUATION
Patient: Janine Sandoval    Procedure Summary       Date: 03/25/25 Room / Location: [facility]    Anesthesia Start: 0837 Anesthesia Stop: 0915    Procedure: ENDOSCOPIC ULTRASOUND (UPPER) Diagnosis:       Pancreatic duct dilated ([facility])      Pancreatic mass ([facility])    Scheduled Providers: Stiven Solorzano MD Responsible Provider: Rajesh Medellin DO    Anesthesia Type: MAC ASA Status: 3            Anesthesia Type: MAC    Vitals Value Taken Time   /59 03/25/25 1005   Temp 36.7 °C (98.1 °F) 03/25/25 0910   Pulse 87 03/25/25 0937   Resp 23 03/25/25 0937   SpO2 97 % 03/25/25 0937       Anesthesia Post Evaluation    Patient location during evaluation: PACU  Patient participation: complete - patient participated  Level of consciousness: awake  Pain score: 0  Pain management: adequate  Multimodal analgesia pain management approach  Airway patency: patent  Two or more strategies used to mitigate risk of obstructive sleep apnea  Cardiovascular status: acceptable  Respiratory status: face mask  Hydration status: acceptable  Postoperative Nausea and Vomiting: none        Encounter Notable Events   Notable Event Outcome Phase Comment   Unplanned ventilation Resolved in Room Intraprocedure Airway obstruction during procedure with desaturation to the 80s. Endoscopy abortede, excessive salivation with laryngospasm, supported with jaw thrust and passive oxygenation. Sats maintained > 85. Suctioned and fully recovered before leaving room.

## 2025-03-25 NOTE — ANESTHESIA PREPROCEDURE EVALUATION
Patient: Janine Sandoval    Procedure Information       Anesthesia Start Date/Time: 03/25/25 0837    Scheduled providers: Stiven Solorzano MD    Procedure: ENDOSCOPIC ULTRASOUND (UPPER)    Location: [facility]            Relevant Problems   Anesthesia (within normal limits)      Cardiac   (+) Chest pain   (+) Essential hypertension   (+) Hyperlipemia   (+) Hyperlipidemia   (+) Hypertension   (+) Murmur, cardiac      Pulmonary   (+) Asthma      Neuro   (+) Acute exacerbation of chronic paranoid schizophrenia (Multi)   (+) Anxiety   (+) Breakthrough seizure (Multi)   (+) Cryptogenic stroke (Multi)   (+) Depressive disorder   (+) Epilepsy   (+) Other specified depressive episodes   (+) Partial epilepsy with impairment of consciousness (Multi)   (+) Seizure (Multi)   (+) Seizure disorder (Multi)   (+) Stroke (Multi)      GI   (+) Gastroesophageal reflux disease without esophagitis      Endocrine   (+) Morbid (severe) obesity due to excess calories (Multi)      Hematology   (+) Anemia   (+) Anemia due to chronic kidney disease      Musculoskeletal   (+) Arthritis, degenerative   (+) Chronic osteoarthritis   (+) Osteoarthritis, knee      HEENT   (+) Bilateral sensorineural hearing loss   (+) Congenital deafness   (+) Deaf-mutism   (+) Sensorineural hearing loss (SNHL) of both ears       Clinical information reviewed:   Tobacco  Allergies  Meds   Med Hx  Surg Hx   Fam Hx  Soc Hx        NPO Detail:  NPO/Void Status  Carbohydrate Drink Given Prior to Surgery? : N  Date of Last Liquid: 03/24/25  Time of Last Liquid: 1945  Date of Last Solid: 03/24/25  Time of Last Solid: 1945  Last Intake Type: Food  Time of Last Void: 0754         Physical Exam    Airway  Mallampati: III  TM distance: >3 FB     Cardiovascular - normal exam     Dental - normal exam  (+) upper dentures, lower dentures     Pulmonary - normal exam     Abdominal - normal exam             Anesthesia Plan    History of general anesthesia?:  yes  History of complications of general anesthesia?: no    ASA 3     MAC     intravenous induction   Anesthetic plan and risks discussed with patient.    Plan discussed with CAA.

## 2025-03-26 ENCOUNTER — TELEPHONE (OUTPATIENT)
Dept: SURGICAL ONCOLOGY | Facility: CLINIC | Age: 70
End: 2025-03-26
Payer: MEDICARE

## 2025-03-26 DIAGNOSIS — K86.89 PANCREATIC MASS (HHS-HCC): Primary | ICD-10-CM

## 2025-03-26 NOTE — TELEPHONE ENCOUNTER
Called the number listed for her caregiver.    EUS was terminated early as she desaturated and had to be manually ventilated.     Per Dr. Solorzano, no mass was noted but likely an IPMN with duct dilatation.     I have ordered a pancreas protocol CT and would like this scheduled in the next 2 weeks. She will then likely need to speak with a surgeon.     I left a voicemail with my contact information.     Alba Herron PA-C  Surgical Oncology

## 2025-03-28 ENCOUNTER — APPOINTMENT (OUTPATIENT)
Dept: RADIOLOGY | Facility: CLINIC | Age: 70
End: 2025-03-28
Payer: MEDICARE

## 2025-03-28 ENCOUNTER — HOSPITAL ENCOUNTER (OUTPATIENT)
Dept: RADIOLOGY | Facility: CLINIC | Age: 70
Discharge: HOME | End: 2025-03-28
Payer: MEDICARE

## 2025-03-28 DIAGNOSIS — K86.89 PANCREATIC MASS (HHS-HCC): ICD-10-CM

## 2025-03-31 ENCOUNTER — APPOINTMENT (OUTPATIENT)
Dept: RADIOLOGY | Facility: HOSPITAL | Age: 70
End: 2025-03-31
Payer: MEDICARE

## 2025-03-31 ENCOUNTER — APPOINTMENT (OUTPATIENT)
Dept: CARDIOLOGY | Facility: HOSPITAL | Age: 70
End: 2025-03-31
Payer: MEDICARE

## 2025-03-31 ENCOUNTER — HOSPITAL ENCOUNTER (EMERGENCY)
Facility: HOSPITAL | Age: 70
Discharge: HOME | End: 2025-03-31
Attending: EMERGENCY MEDICINE
Payer: MEDICARE

## 2025-03-31 ENCOUNTER — HOSPITAL ENCOUNTER (OUTPATIENT)
Dept: RADIOLOGY | Facility: HOSPITAL | Age: 70
Discharge: HOME | End: 2025-03-31
Payer: MEDICARE

## 2025-03-31 VITALS
OXYGEN SATURATION: 98 % | HEIGHT: 63 IN | TEMPERATURE: 98.4 F | RESPIRATION RATE: 18 BRPM | SYSTOLIC BLOOD PRESSURE: 146 MMHG | DIASTOLIC BLOOD PRESSURE: 84 MMHG | BODY MASS INDEX: 28.35 KG/M2 | WEIGHT: 160 LBS | HEART RATE: 92 BPM

## 2025-03-31 DIAGNOSIS — S01.81XA FACIAL LACERATION, INITIAL ENCOUNTER: ICD-10-CM
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
## 2025-03-31 LAB
ALBUMIN SERPL BCP-MCNC: 4.1 G/DL (ref 3.4–5)
ALP SERPL-CCNC: 87 U/L (ref 33–136)
ALT SERPL W P-5'-P-CCNC: 29 U/L (ref 7–45)
ANION GAP SERPL CALC-SCNC: 9 MMOL/L (ref 10–20)
AST SERPL W P-5'-P-CCNC: 20 U/L (ref 9–39)
ATRIAL RATE: 90 BPM
BASOPHILS # BLD AUTO: 0.02 X10*3/UL (ref 0–0.1)
BASOPHILS NFR BLD AUTO: 0.4 %
BILIRUB SERPL-MCNC: 0.3 MG/DL (ref 0–1.2)
BUN SERPL-MCNC: 13 MG/DL (ref 6–23)
CALCIUM SERPL-MCNC: 9 MG/DL (ref 8.6–10.3)
CHLORIDE SERPL-SCNC: 106 MMOL/L (ref 98–107)
CO2 SERPL-SCNC: 30 MMOL/L (ref 21–32)
CREAT SERPL-MCNC: 0.63 MG/DL (ref 0.5–1.05)
EGFRCR SERPLBLD CKD-EPI 2021: >90 ML/MIN/1.73M*2
EOSINOPHIL # BLD AUTO: 0.15 X10*3/UL (ref 0–0.7)
EOSINOPHIL NFR BLD AUTO: 2.7 %
ERYTHROCYTE [DISTWIDTH] IN BLOOD BY AUTOMATED COUNT: 11.9 % (ref 11.5–14.5)
GLUCOSE BLD MANUAL STRIP-MCNC: 89 MG/DL (ref 74–99)
GLUCOSE SERPL-MCNC: 79 MG/DL (ref 74–99)
HCT VFR BLD AUTO: 35.8 % (ref 36–46)
HGB BLD-MCNC: 11.7 G/DL (ref 12–16)
IMM GRANULOCYTES # BLD AUTO: 0.01 X10*3/UL (ref 0–0.7)
IMM GRANULOCYTES NFR BLD AUTO: 0.2 % (ref 0–0.9)
LYMPHOCYTES # BLD AUTO: 2.37 X10*3/UL (ref 1.2–4.8)
LYMPHOCYTES NFR BLD AUTO: 43.1 %
MCH RBC QN AUTO: 29.8 PG (ref 26–34)
MCHC RBC AUTO-ENTMCNC: 32.7 G/DL (ref 32–36)
MCV RBC AUTO: 91 FL (ref 80–100)
MONOCYTES # BLD AUTO: 0.54 X10*3/UL (ref 0.1–1)
MONOCYTES NFR BLD AUTO: 9.8 %
NEUTROPHILS # BLD AUTO: 2.41 X10*3/UL (ref 1.2–7.7)
NEUTROPHILS NFR BLD AUTO: 43.8 %
NRBC BLD-RTO: 0 /100 WBCS (ref 0–0)
P AXIS: -11 DEGREES
P OFFSET: 185 MS
P ONSET: 126 MS
PLATELET # BLD AUTO: 191 X10*3/UL (ref 150–450)
POTASSIUM SERPL-SCNC: 3.8 MMOL/L (ref 3.5–5.3)
PR INTERVAL: 192 MS
PROT SERPL-MCNC: 6.6 G/DL (ref 6.4–8.2)
Q ONSET: 222 MS
QRS COUNT: 15 BEATS
QRS DURATION: 94 MS
QT INTERVAL: 370 MS
QTC CALCULATION(BAZETT): 452 MS
QTC FREDERICIA: 423 MS
R AXIS: -9 DEGREES
RBC # BLD AUTO: 3.92 X10*6/UL (ref 4–5.2)
SODIUM SERPL-SCNC: 141 MMOL/L (ref 136–145)
T AXIS: -14 DEGREES
T OFFSET: 407 MS
VENTRICULAR RATE: 90 BPM
WBC # BLD AUTO: 5.5 X10*3/UL (ref 4.4–11.3)

## 2025-03-31 PROCEDURE — 99285 EMERGENCY DEPT VISIT HI MDM: CPT | Mod: 25 | Performed by: EMERGENCY MEDICINE

## 2025-03-31 PROCEDURE — 93005 ELECTROCARDIOGRAM TRACING: CPT | Mod: 59

## 2025-03-31 PROCEDURE — 82947 ASSAY GLUCOSE BLOOD QUANT: CPT | Mod: 59

## 2025-03-31 PROCEDURE — 73564 X-RAY EXAM KNEE 4 OR MORE: CPT | Mod: RIGHT SIDE | Performed by: RADIOLOGY

## 2025-03-31 PROCEDURE — 36415 COLL VENOUS BLD VENIPUNCTURE: CPT | Performed by: EMERGENCY MEDICINE

## 2025-03-31 PROCEDURE — 12011 RPR F/E/E/N/L/M 2.5 CM/<: CPT | Performed by: EMERGENCY MEDICINE

## 2025-03-31 PROCEDURE — 70450 CT HEAD/BRAIN W/O DYE: CPT | Performed by: RADIOLOGY

## 2025-03-31 PROCEDURE — 93005 ELECTROCARDIOGRAM TRACING: CPT

## 2025-03-31 PROCEDURE — 74177 CT ABD & PELVIS W/CONTRAST: CPT | Performed by: RADIOLOGY

## 2025-03-31 PROCEDURE — 2550000001 HC RX 255 CONTRASTS: Performed by: PHYSICIAN ASSISTANT

## 2025-03-31 PROCEDURE — 80053 COMPREHEN METABOLIC PANEL: CPT | Performed by: EMERGENCY MEDICINE

## 2025-03-31 PROCEDURE — 73552 X-RAY EXAM OF FEMUR 2/>: CPT | Mod: RT

## 2025-03-31 PROCEDURE — 70450 CT HEAD/BRAIN W/O DYE: CPT

## 2025-03-31 PROCEDURE — 73552 X-RAY EXAM OF FEMUR 2/>: CPT | Mod: RIGHT SIDE | Performed by: RADIOLOGY

## 2025-03-31 PROCEDURE — 73564 X-RAY EXAM KNEE 4 OR MORE: CPT | Mod: RT

## 2025-03-31 PROCEDURE — 85025 COMPLETE CBC W/AUTO DIFF WBC: CPT | Performed by: EMERGENCY MEDICINE

## 2025-03-31 PROCEDURE — 74177 CT ABD & PELVIS W/CONTRAST: CPT

## 2025-03-31 RX ADMIN — IOHEXOL 90 ML: 350 INJECTION, SOLUTION INTRAVENOUS at 11:12

## 2025-03-31 ASSESSMENT — PAIN SCALES - GENERAL
PAINLEVEL_OUTOF10: 5 - MODERATE PAIN
PAINLEVEL_OUTOF10: 0 - NO PAIN

## 2025-03-31 ASSESSMENT — PAIN - FUNCTIONAL ASSESSMENT: PAIN_FUNCTIONAL_ASSESSMENT: 0-10

## 2025-03-31 ASSESSMENT — PAIN DESCRIPTION - LOCATION: LOCATION: FACE

## 2025-03-31 ASSESSMENT — PAIN DESCRIPTION - PROGRESSION: CLINICAL_PROGRESSION: NOT CHANGED

## 2025-03-31 ASSESSMENT — PAIN DESCRIPTION - PAIN TYPE: TYPE: ACUTE PAIN

## 2025-03-31 NOTE — ED PROVIDER NOTES
HPI   Chief Complaint   Patient presents with    Fall    Dizziness       This is a 69-year-old female who presents to emergency department after a fall.  History is from the patient through the  and her aide at the bedside.  The patient was at the hospital for an outpatient CT scan.  She went into the bathroom with her aide.  She was not using her walker and fell.  She struck the right side of her face.  She did not lose consciousness.  She does report some dizziness after the fall.  She denies headache but reports pain around her right temple laceration.  She denies chest pain or palpitations.    Past medical history: Deaf, schizophrenia, intellectual delay              Patient History   Past Medical History:   Diagnosis Date    Anemia     Depression     Gastro-esophageal reflux disease without esophagitis 12/21/2022    Gastroesophageal reflux disease without esophagitis    HL (hearing loss)     Hyperlipidemia, unspecified 09/25/2019    Hyperlipemia    Hypertension     Personal history of transient ischemic attack (TIA), and cerebral infarction without residual deficits 12/21/2022    History of stroke    Shoulder pain 03/14/2025    Stroke (Multi)      No past surgical history on file.  Family History   Problem Relation Name Age of Onset    Parkinsonism Mother      Other (cardiac disorder) Father      Glaucoma Father      Parkinsonism Father       Social History     Tobacco Use    Smoking status: Never     Passive exposure: Never    Smokeless tobacco: Never   Vaping Use    Vaping status: Unknown   Substance Use Topics    Alcohol use: Never    Drug use: Never       Physical Exam   ED Triage Vitals [03/31/25 1009]   Temperature Heart Rate Respirations BP   37 °C (98.6 °F) 92 18 (!) 157/117      Pulse Ox Temp Source Heart Rate Source Patient Position   96 % Temporal Monitor --      BP Location FiO2 (%)     -- --       Physical Exam  Vitals and nursing note reviewed.   HENT:      Head:  Normocephalic and atraumatic.      Nose: Nose normal.   Eyes:      Conjunctiva/sclera: Conjunctivae normal.   Cardiovascular:      Rate and Rhythm: Normal rate and regular rhythm.      Pulses: Normal pulses.      Heart sounds: Normal heart sounds.   Pulmonary:      Effort: Pulmonary effort is normal.      Breath sounds: Normal breath sounds.   Abdominal:      General: Bowel sounds are normal.      Palpations: Abdomen is soft.   Musculoskeletal:         General: Normal range of motion.      Cervical back: Normal range of motion and neck supple.      Comments: Right knee tenderness.  Small effusion.  No deformity   Skin:     Findings: No rash.      Comments: 1 cm laceration on the right temple with bleeding controlled   Neurological:      General: No focal deficit present.      Mental Status: She is alert and oriented to person, place, and time.   Psychiatric:         Mood and Affect: Mood normal.           ED Course & MDM   Diagnoses as of 03/31/25 1233   Fall, initial encounter   Facial laceration, initial encounter                 No data recorded     [place] Coma Scale Score: 15 (03/31/25 1012 : Hoda Mcarthur RN)                           Medical Decision Making  Differential diagnosis considered: Intracranial hemorrhage, cranial fracture, femur fracture, knee fracture, strain, sprain, contusion    This is a 69-year-old female who presents to the emergency department after a mechanical fall.  She injured the right side of her head.  She also had pain in her right knee.  Imaging was negative for acute traumatic injuries.  Labs were performed which showed anemia that was at her baseline.  Her laceration was repaired with Dermabond.  She is appropriate for discharge and outpatient follow-up.        Procedure  Laceration Repair    Performed by: Scout Jefferson MD  Authorized by: Scout Jefferson MD    Laceration details:     Location:  Face    Length (cm):  1  Treatment:     Area cleansed with:  Povidone-iodine     Amount of cleaning:  Standard    Irrigation solution:  Sterile saline    Irrigation method:  Syringe    Visualized foreign bodies/material removed: no      Debridement:  None    Scar revision: no    Skin repair:     Repair method:  Tissue adhesive  Approximation:     Approximation:  Close  Repair type:     Repair type:  Simple  Post-procedure details:     Dressing:  Open (no dressing)    Procedure completion:  Tolerated       Scout Jefferson MD  03/31/25 [id number]

## 2025-03-31 NOTE — ED TRIAGE NOTES
Pt was a rapid response for a fall in bathroom. Pt hit her head, no loc. Pt uses sign language to communicate, caesar in use. Pt states that she feels dizzy

## 2025-04-02 ENCOUNTER — DOCUMENTATION (OUTPATIENT)
Dept: SURGICAL ONCOLOGY | Facility: CLINIC | Age: 70
End: 2025-04-02
Payer: MEDICARE

## 2025-04-02 DIAGNOSIS — K86.89 PANCREATIC MASS (HHS-HCC): Primary | ICD-10-CM

## 2025-04-02 NOTE — PROGRESS NOTES
Her pancreas protocol CT was reviewed at our multidisciplinary pancreas conference this morning.     There is almost no pancreatic parenchyma remaining. The MPD is diffusely dilated. There does appear to be cut off of the MPD in the head of the pancreas. It is unclear if there is a mass there as well but imaging remains highly concerning for a cancer. The cyst noted during her incomplete EUS is thought to be a segment of dilated duct.     She is unlikely a surgical candidate due to her performance status but will arrange a visit with Dr. Frankie Burden to determine this. Due to the high likelihood of this being a cancer, she does need a tissue diagnosis in order to be referred to Oncology for chemotherapy or radiation therapy. The group there recommends repeat EUS under general anesthesia.    I called and updated Sirena Hoffmann () of the above plan. She had questions regarding her blood sugars which I answered (HA1c is 5.9% which I instructed her is good) + her bowel movements while on Creon (recent imaging suggests high stool burden suggestive of constipation with overflow). I recommended fluids, ambulating when possible and increased fiber intake (consider supplement). If no change, then Miralax.     Alba Herron PA-C  Surgical Oncology

## 2025-04-05 ENCOUNTER — HOSPITAL ENCOUNTER (EMERGENCY)
Facility: HOSPITAL | Age: 70
Discharge: HOME | End: 2025-04-05
Payer: MEDICARE

## 2025-04-05 ENCOUNTER — APPOINTMENT (OUTPATIENT)
Dept: RADIOLOGY | Facility: HOSPITAL | Age: 70
End: 2025-04-05
Payer: MEDICARE

## 2025-04-05 VITALS
HEIGHT: 63 IN | RESPIRATION RATE: 16 BRPM | WEIGHT: 160 LBS | DIASTOLIC BLOOD PRESSURE: 76 MMHG | OXYGEN SATURATION: 98 % | SYSTOLIC BLOOD PRESSURE: 145 MMHG | BODY MASS INDEX: 28.35 KG/M2 | TEMPERATURE: 98 F | HEART RATE: 82 BPM

## 2025-04-05 DIAGNOSIS — M19.049 ARTHRITIS OF FINGER: Primary | ICD-10-CM

## 2025-04-05 PROCEDURE — 99283 EMERGENCY DEPT VISIT LOW MDM: CPT

## 2025-04-05 PROCEDURE — 73130 X-RAY EXAM OF HAND: CPT | Mod: RIGHT SIDE | Performed by: RADIOLOGY

## 2025-04-05 PROCEDURE — 73130 X-RAY EXAM OF HAND: CPT | Mod: RT

## 2025-04-05 RX ORDER — IBUPROFEN 600 MG/1
600 TABLET ORAL EVERY 6 HOURS PRN
Qty: 20 TABLET | Refills: 0 | Status: SHIPPED | OUTPATIENT
Start: 2025-04-05 | End: 2025-04-10

## 2025-04-05 RX ORDER — ACETAMINOPHEN 500 MG
1000 TABLET ORAL EVERY 8 HOURS PRN
Qty: 42 TABLET | Refills: 0 | Status: SHIPPED | OUTPATIENT
Start: 2025-04-05 | End: 2025-04-12

## 2025-04-05 NOTE — ED PROVIDER NOTES
HPI     CC: Hand Pain (Right hand 2nd digit)     HPI: Janine Sandoval is a 69 y.o. female with past medical history of deafness, developmental delay, schizophrenia, depression, GERD, hypertension, TIA who lives at a group home presents with aide with concern for right second digit pain.  Aide said that when she came on at about 3 PM today, they reported that the patient was showing signs that she was having discomfort in her right hand.  She is right-hand dominant.  They recall no recent falls that could have contributed.  Patient has never had anything like this before they did give her Tylenol but it did not seem to help.  No recent instrumentation to the fingers.  Unknown if numbness or tingling occurred.  She is able to communicate quite well with aide present at bedside.    ROS: 10-point review of systems was performed and is otherwise negative except as noted in HPI.      Past Medical History: Noncontributory except per HPI     Past Surgical History: Noncontributory except per HPI     Family History: Reviewed and noncontributory     Social History:  Noncontributory except per HPI       Allergies   Allergen Reactions    Levofloxacin Dizziness     Question seizure       Past Medical History:   Diagnosis Date    Anemia     Depression     Gastro-esophageal reflux disease without esophagitis 12/21/2022    Gastroesophageal reflux disease without esophagitis    HL (hearing loss)     Hyperlipidemia, unspecified 09/25/2019    Hyperlipemia    Hypertension     Personal history of transient ischemic attack (TIA), and cerebral infarction without residual deficits 12/21/2022    History of stroke    Shoulder pain 03/14/2025    Stroke (Multi)        Home Meds:   Current Outpatient Medications   Medication Instructions    acetaminophen (Tylenol) 500 mg tablet TAKE 1 TABLET BY MOUTH EVERY 4 HOURS AS NEEDED FOR MILD-MODERATE PAIN, CAN TAKE 2 TABLETS EVERY 8 HOURS AS NEEDED FOR MODERATE-SEVERE PAIN    acetaminophen (TYLENOL)  "1,000 mg, oral, Every 8 hours PRN    albuterol 2.5 mg, nebulization, Every 4 hours PRN, 1 VIAL VIA AEROSOL EVERY 4 HOURS AS NEEDED FOR SHORTNESS OF BREATH / CHEST TIGHTNESS    atorvastatin (LIPITOR) 80 mg, oral, Daily    benztropine (COGENTIN) 0.5-1 mg, oral, 2 times daily, TAKE 1 TABLET BY MOUTH IN THE MORNING AND AFTERNOON  AND TAKE 2 TABLETS BY MOUTH EVERY NIGHT AT BEDTIME    cholecalciferol (VITAMIN D-3) 2,000 Units, oral, Daily    clopidogrel (PLAVIX) 75 mg, oral, Daily    famotidine (PEPCID) 20 mg, oral, Every morning    haloperidol decanoate (Haldol Decanoate) 100 mg/mL injection Inject 2 mL ( 200 MG) into the muscle every 28 days.    ibuprofen 600 mg, oral, Every 6 hours PRN    levETIRAcetam (KEPPRA) 1,000 mg, oral, Daily    lidocaine (Lidoderm) 5 % patch APPLY 1 PATCH TOPICALLY TO PAINFUL AREA ONCE DAILY *LEAVE IN PLACE FOR 12 HOURS, REMOVE AFTER 12 HOURS. DO NOT REAPPLY UNTIL 12 HOURS HAVE LAPSED*    lipase-protease-amylase (Creon) 3,000-9,500- 15,000 unit capsule 2 capsules, oral, 3 times daily before meals    lipase-protease-amylase (Creon) 3,000-9,500- 15,000 unit capsule 2 capsules, oral, 3 times daily before meals    losartan (COZAAR) 25 mg, oral, Daily    melatonin 5 mg, oral, Daily    omeprazole (PRILOSEC) 20 mg, oral, Nightly, Do not crush or chew.    phenytoin ER (DILANTIN) 300 mg, oral, Nightly    QUEtiapine (SEROQUEL) 200 mg, oral, Nightly    QUEtiapine (SEROQUEL) 25-50 mg, oral, 2 times daily    sertraline (ZOLOFT) 100 mg, oral, Daily    walker (Ultra-Light Rollator) misc Use as directed        ED Triage Vitals [04/05/25 1611]   Temperature Heart Rate Respirations BP   36.7 °C (98 °F) 82 16 145/76      Pulse Ox Temp src Heart Rate Source Patient Position   98 % -- -- --      BP Location FiO2 (%)     -- --         Heart Rate:  [82]   Temperature:  [36.7 °C (98 °F)]   Respirations:  [16]   BP: (145)/(76)   Height:  [160 cm (5' 2.99\")]   Weight:  [72.6 kg (160 lb)]   Pulse Ox:  [98 %]  "     Physical Exam:  Physical Exam  Constitutional:       General: She is not in acute distress.     Appearance: Normal appearance. She is not ill-appearing.   HENT:      Head: Normocephalic and atraumatic.      Right Ear: External ear normal.      Left Ear: External ear normal.      Nose: Nose normal.      Mouth/Throat:      Mouth: Mucous membranes are moist.   Eyes:      Extraocular Movements: Extraocular movements intact.      Conjunctiva/sclera: Conjunctivae normal.      Pupils: Pupils are equal, round, and reactive to light.   Cardiovascular:      Rate and Rhythm: Normal rate and regular rhythm.      Pulses: Normal pulses.   Pulmonary:      Effort: Pulmonary effort is normal. No respiratory distress.      Breath sounds: Normal breath sounds.   Abdominal:      General: Abdomen is flat.      Palpations: Abdomen is soft.      Tenderness: There is no abdominal tenderness.   Musculoskeletal:      Cervical back: Normal range of motion and neck supple.      Comments: Right hand: Tenderness over the second PIP joint as evidenced by patient groaning.  No redness or warmth.  Range of motion is intact with passive range of motion.  Patient does not understand all that well active range of motion instruction.  No significant swelling other than directly over the PIP joint.  Patient moans when I touch the palm of her hand but she also moans when I touched the left palm of her hand.  There are no obvious deformities to the hand and no redness or warmth over the palm of the hand.  No skin changes.  No obvious snuffbox tenderness.   Skin:     General: Skin is warm and dry.      Capillary Refill: Capillary refill takes less than 2 seconds.   Neurological:      General: No focal deficit present.      Mental Status: She is alert.      Comments: At baseline per aide   Psychiatric:         Mood and Affect: Mood normal.          Diagnostic Results        Labs Reviewed - No data to display      XR hand right 3+ views   Final Result    No acute abnormality in the right hand   Mild degenerative changes        MACRO:   None        Signed by: David Blanca 4/5/2025 4:38 PM   Dictation workstation:   [id number]                    No data recorded                Procedure  Procedures    ED Course & MDM   Assessment/Plan:     Medications - No data to display     Diagnoses as of 04/05/25 1828   Arthritis of finger       Medical Decision Making    Janine Sandoval is a 69 y.o. female with past medical history of deafness, developmental delay, schizophrenia, depression, GERD, hypertension, TIA who lives at a group home presents with aide with concern for right second digit pain.  Patient is nontoxic-appearing and vital signs are normal.  Based on symptoms presentation, differential diagnosis includes right second finger fracture, dislocation, strain, arthritis, infection, rheumatoid arthritis, flexor tenosynovitis.  Three-view right hand x-rays were obtained and show degenerative changes specifically at the joints noted in physical exam.  Patient has no redness, warmth, or significantly decreased range of motion.  No significant risk factors for infection.  Appears well-kept.  They have not tried any anti-inflammatory medications.  Given that she is overall well-appearing with findings suggestive of arthritis on x-ray, and that she will be closely monitored at home, feel comfortable with discharge.    Arthritis and finger pain: Patient and aide were educated about the findings.  We discussed that she should try a course of anti-inflammatories which were sent to the pharmacy to be very specific for the group home.  Recommended this no more than 5 days and recommended alternating with Tylenol.  We discussed following up with primary care physician for repeat evaluation within 1 week.  If the patient develops redness, warmth, decreased range of motion, numbness or tingling, fevers, or increasing swelling, they should return immediately to the nearest  emergency department for repeat evaluation.  Aide and patient are agreeable to this plan of care.    Disposition: Home    ED Prescriptions       Medication Sig Dispense Start Date End Date Auth. Provider    ibuprofen 600 mg tablet Take 1 tablet (600 mg) by mouth every 6 hours if needed for mild pain (1 - 3), moderate pain (4 - 6), fever (temp greater than 38.0 C) or headaches (any pain related to hands or fingers) for up to 5 days. 20 tablet 4/5/2025 4/10/2025 Nicole Broderick PA-C    acetaminophen (Tylenol) 500 mg tablet Take 2 tablets (1,000 mg) by mouth every 8 hours if needed for mild pain (1 - 3) for up to 7 days. 42 tablet 4/5/2025 4/12/2025 Nicole Broderick PA-C            Social Determinants Affecting Care: none     Nicole Broderick PA-C    This note was dictated by speech recognition. Minor errors in transcription may be present.     Nicole Broderick PA-C  04/05/25 7497

## 2025-04-07 ENCOUNTER — APPOINTMENT (OUTPATIENT)
Dept: PRIMARY CARE | Facility: CLINIC | Age: 70
End: 2025-04-07
Payer: MEDICARE

## 2025-04-07 VITALS
BODY MASS INDEX: 28 KG/M2 | WEIGHT: 158 LBS | SYSTOLIC BLOOD PRESSURE: 148 MMHG | HEART RATE: 90 BPM | DIASTOLIC BLOOD PRESSURE: 78 MMHG | HEIGHT: 63 IN

## 2025-04-07 DIAGNOSIS — I10 ESSENTIAL HYPERTENSION: Primary | ICD-10-CM

## 2025-04-07 DIAGNOSIS — K21.9 GASTROESOPHAGEAL REFLUX DISEASE WITHOUT ESOPHAGITIS: ICD-10-CM

## 2025-04-07 DIAGNOSIS — R29.6 RECURRENT FALLS: ICD-10-CM

## 2025-04-07 DIAGNOSIS — H90.5 CONGENITAL DEAFNESS: ICD-10-CM

## 2025-04-07 PROCEDURE — 1157F ADVNC CARE PLAN IN RCRD: CPT | Performed by: INTERNAL MEDICINE

## 2025-04-07 PROCEDURE — 3008F BODY MASS INDEX DOCD: CPT | Performed by: INTERNAL MEDICINE

## 2025-04-07 PROCEDURE — G2211 COMPLEX E/M VISIT ADD ON: HCPCS | Performed by: INTERNAL MEDICINE

## 2025-04-07 PROCEDURE — 1036F TOBACCO NON-USER: CPT | Performed by: INTERNAL MEDICINE

## 2025-04-07 PROCEDURE — 99214 OFFICE O/P EST MOD 30 MIN: CPT | Performed by: INTERNAL MEDICINE

## 2025-04-07 PROCEDURE — 3078F DIAST BP <80 MM HG: CPT | Performed by: INTERNAL MEDICINE

## 2025-04-07 PROCEDURE — 3077F SYST BP >= 140 MM HG: CPT | Performed by: INTERNAL MEDICINE

## 2025-04-07 RX ORDER — FAMOTIDINE 20 MG/1
40 TABLET, FILM COATED ORAL EVERY MORNING
Qty: 90 TABLET | Refills: 1 | Status: ON HOLD | OUTPATIENT
Start: 2025-04-07

## 2025-04-07 RX ORDER — OMEPRAZOLE 20 MG/1
40 CAPSULE, DELAYED RELEASE ORAL NIGHTLY
Qty: 90 CAPSULE | Refills: 1 | Status: ON HOLD | OUTPATIENT
Start: 2025-04-07 | End: 2025-10-04

## 2025-04-07 ASSESSMENT — LIFESTYLE VARIABLES
HOW OFTEN DO YOU HAVE SIX OR MORE DRINKS ON ONE OCCASION: NEVER
HOW MANY STANDARD DRINKS CONTAINING ALCOHOL DO YOU HAVE ON A TYPICAL DAY: PATIENT DOES NOT DRINK
HOW OFTEN DO YOU HAVE A DRINK CONTAINING ALCOHOL: NEVER
SKIP TO QUESTIONS 9-10: 1
AUDIT-C TOTAL SCORE: 0

## 2025-04-07 NOTE — PROGRESS NOTES
I reviewed the resident/fellow's documentation and discussed the patient with the resident/fellow. I agree with the resident/fellow's medical decision making as documented in the note.  As the attending physician, I certify that I personally reviewed the patient's history and personally examined the patient to confirm the physical findings described above, and that I reviewed the relevant imaging studies and available reports. I also discussed the differential diagnosis and all of the proposed management plans with the patient and individuals accompanying the patient to this visit. They had the opportunity to ask questions about the proposed management plans and to have those questions answered.   Recurrent falls after the beginning of Haldol 10 mg BID  Used to be on Haldol injections, which was thought to have cause extrapyramidal symptoms and Parkinsonism.  Injections were discontinued and she was placed on tablets 10 mg BID.  She had 2 falls since she was started on oral tablets.  We had a long discussion with the patient via [employer] services about the concern with meds.   The caregiver will reach out to her psychiatrist about possibly reducing the dose to 5 mg BID vs 10 daily.  The patient is in agreement with the plan.    Jesi Ramsey MD

## 2025-04-07 NOTE — PATIENT INSTRUCTIONS
Please call your psychiatrist to discuss the appropriate dose of your haloperidol.  Please make sure to attend your appointment with the surgical oncologist to discuss your pancreatic mass.  Your finger is likely inflammed from minor trauma suffered when you fell. We believe it will improve with warm compresses, tylenol, and time.  We have increased two of your medications to help with your acid reflux. In the mean time, you can try taking calcium carbonate to help with the pain.

## 2025-04-08 DIAGNOSIS — G47.00 INSOMNIA, UNSPECIFIED TYPE: ICD-10-CM

## 2025-04-08 RX ORDER — MELATONIN 5 MG
5 CAPSULE ORAL NIGHTLY
Qty: 30 CAPSULE | Refills: 2 | Status: ON HOLD | OUTPATIENT
Start: 2025-04-08 | End: 2025-07-07

## 2025-04-12 ENCOUNTER — APPOINTMENT (OUTPATIENT)
Dept: RADIOLOGY | Facility: HOSPITAL | Age: 70
DRG: 101 | End: 2025-04-12
Payer: MEDICARE

## 2025-04-12 ENCOUNTER — APPOINTMENT (OUTPATIENT)
Dept: CARDIOLOGY | Facility: HOSPITAL | Age: 70
DRG: 101 | End: 2025-04-12
Payer: MEDICARE

## 2025-04-12 ENCOUNTER — HOSPITAL ENCOUNTER (INPATIENT)
Facility: HOSPITAL | Age: 70
DRG: 101 | End: 2025-04-12
Attending: EMERGENCY MEDICINE | Admitting: INTERNAL MEDICINE
Payer: MEDICARE

## 2025-04-12 DIAGNOSIS — R56.9 SEIZURE (MULTI): ICD-10-CM

## 2025-04-12 DIAGNOSIS — R79.89 ELEVATED TROPONIN: Primary | ICD-10-CM
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
## 2025-04-12 DIAGNOSIS — I10 ESSENTIAL HYPERTENSION: ICD-10-CM

## 2025-04-12 DIAGNOSIS — S40.012A CONTUSION OF LEFT SHOULDER, INITIAL ENCOUNTER: ICD-10-CM

## 2025-04-12 LAB
ABO GROUP (TYPE) IN BLOOD: NORMAL
ALBUMIN SERPL BCP-MCNC: 3.4 G/DL (ref 3.4–5)
ALP SERPL-CCNC: 80 U/L (ref 33–136)
ALT SERPL W P-5'-P-CCNC: 24 U/L (ref 7–45)
ANION GAP BLDV CALCULATED.4IONS-SCNC: 12 MMOL/L (ref 10–25)
ANION GAP SERPL CALC-SCNC: 12 MMOL/L (ref 10–20)
ANTIBODY SCREEN: NORMAL
APPEARANCE UR: CLEAR
APTT PPP: 24 SECONDS (ref 26–36)
AST SERPL W P-5'-P-CCNC: 28 U/L (ref 9–39)
BASE EXCESS BLDV CALC-SCNC: -0.6 MMOL/L (ref -2–3)
BASOPHILS # BLD AUTO: 0.04 X10*3/UL (ref 0–0.1)
BASOPHILS NFR BLD AUTO: 0.3 %
BILIRUB SERPL-MCNC: 0.3 MG/DL (ref 0–1.2)
BILIRUB UR STRIP.AUTO-MCNC: NEGATIVE MG/DL
BODY TEMPERATURE: 37 DEGREES CELSIUS
BUN SERPL-MCNC: 14 MG/DL (ref 6–23)
CA-I BLDV-SCNC: 1.18 MMOL/L (ref 1.1–1.33)
CALCIUM SERPL-MCNC: 8.1 MG/DL (ref 8.6–10.3)
CARDIAC TROPONIN I PNL SERPL HS: 113 NG/L (ref 0–13)
CARDIAC TROPONIN I PNL SERPL HS: 198 NG/L (ref 0–13)
CARDIAC TROPONIN I PNL SERPL HS: 222 NG/L (ref 0–13)
CARDIAC TROPONIN I PNL SERPL HS: 263 NG/L (ref 0–13)
CHLORIDE BLDV-SCNC: 101 MMOL/L (ref 98–107)
CHLORIDE SERPL-SCNC: 104 MMOL/L (ref 98–107)
CO2 SERPL-SCNC: 24 MMOL/L (ref 21–32)
COLOR UR: COLORLESS
CREAT SERPL-MCNC: 0.73 MG/DL (ref 0.5–1.05)
EGFRCR SERPLBLD CKD-EPI 2021: 89 ML/MIN/1.73M*2
EOSINOPHIL # BLD AUTO: 0 X10*3/UL (ref 0–0.7)
EOSINOPHIL NFR BLD AUTO: 0 %
ERYTHROCYTE [DISTWIDTH] IN BLOOD BY AUTOMATED COUNT: 11.9 % (ref 11.5–14.5)
GLUCOSE BLD MANUAL STRIP-MCNC: 241 MG/DL (ref 74–99)
GLUCOSE BLDV-MCNC: 273 MG/DL (ref 74–99)
GLUCOSE SERPL-MCNC: 275 MG/DL (ref 74–99)
GLUCOSE UR STRIP.AUTO-MCNC: ABNORMAL MG/DL
HCO3 BLDV-SCNC: 25.4 MMOL/L (ref 22–26)
HCT VFR BLD AUTO: 34.7 % (ref 36–46)
HCT VFR BLD EST: 37 % (ref 36–46)
HGB BLD-MCNC: 10.6 G/DL (ref 12–16)
HGB BLDV-MCNC: 12.3 G/DL (ref 12–16)
IMM GRANULOCYTES # BLD AUTO: 0.08 X10*3/UL (ref 0–0.7)
IMM GRANULOCYTES NFR BLD AUTO: 0.6 % (ref 0–0.9)
INHALED O2 CONCENTRATION: 21 %
INR PPP: 1.1 (ref 0.9–1.1)
KETONES UR STRIP.AUTO-MCNC: NEGATIVE MG/DL
LACTATE BLDV-SCNC: 3.7 MMOL/L (ref 0.4–2)
LACTATE SERPL-SCNC: 1 MMOL/L (ref 0.4–2)
LACTATE SERPL-SCNC: 2.9 MMOL/L (ref 0.4–2)
LACTATE SERPL-SCNC: 3 MMOL/L (ref 0.4–2)
LACTATE SERPL-SCNC: 3.8 MMOL/L (ref 0.4–2)
LEUKOCYTE ESTERASE UR QL STRIP.AUTO: NEGATIVE
LEVETIRACETAM SERPL-MCNC: 5 UG/ML (ref 10–40)
LYMPHOCYTES # BLD AUTO: 0.62 X10*3/UL (ref 1.2–4.8)
LYMPHOCYTES NFR BLD AUTO: 4.8 %
MCH RBC QN AUTO: 30 PG (ref 26–34)
MCHC RBC AUTO-ENTMCNC: 30.5 G/DL (ref 32–36)
MCV RBC AUTO: 98 FL (ref 80–100)
MONOCYTES # BLD AUTO: 0.65 X10*3/UL (ref 0.1–1)
MONOCYTES NFR BLD AUTO: 5 %
NEUTROPHILS # BLD AUTO: 11.64 X10*3/UL (ref 1.2–7.7)
NEUTROPHILS NFR BLD AUTO: 89.3 %
NITRITE UR QL STRIP.AUTO: NEGATIVE
NRBC BLD-RTO: 0 /100 WBCS (ref 0–0)
OXYHGB MFR BLDV: 72.4 % (ref 45–75)
PCO2 BLDV: 46 MM HG (ref 41–51)
PH BLDV: 7.35 PH (ref 7.33–7.43)
PH UR STRIP.AUTO: 6.5 [PH]
PHENYTOIN SERPL-MCNC: 16.5 UG/ML (ref 10–20)
PLATELET # BLD AUTO: 175 X10*3/UL (ref 150–450)
PO2 BLDV: 41 MM HG (ref 35–45)
POTASSIUM BLDV-SCNC: 3.4 MMOL/L (ref 3.5–5.3)
POTASSIUM SERPL-SCNC: 3.4 MMOL/L (ref 3.5–5.3)
PROT SERPL-MCNC: 5.5 G/DL (ref 6.4–8.2)
PROT UR STRIP.AUTO-MCNC: ABNORMAL MG/DL
PROTHROMBIN TIME: 12.3 SECONDS (ref 9.8–12.4)
RBC # BLD AUTO: 3.53 X10*6/UL (ref 4–5.2)
RBC # UR STRIP.AUTO: ABNORMAL MG/DL
RBC #/AREA URNS AUTO: NORMAL /HPF
RH FACTOR (ANTIGEN D): NORMAL
SAO2 % BLDV: 74 % (ref 45–75)
SODIUM BLDV-SCNC: 135 MMOL/L (ref 136–145)
SODIUM SERPL-SCNC: 137 MMOL/L (ref 136–145)
SP GR UR STRIP.AUTO: 1.03
UROBILINOGEN UR STRIP.AUTO-MCNC: NORMAL MG/DL
WBC # BLD AUTO: 13 X10*3/UL (ref 4.4–11.3)
WBC #/AREA URNS AUTO: NORMAL /HPF

## 2025-04-12 PROCEDURE — 99223 1ST HOSP IP/OBS HIGH 75: CPT | Performed by: INTERNAL MEDICINE

## 2025-04-12 PROCEDURE — 72125 CT NECK SPINE W/O DYE: CPT

## 2025-04-12 PROCEDURE — 2500000004 HC RX 250 GENERAL PHARMACY W/ HCPCS (ALT 636 FOR OP/ED): Performed by: INTERNAL MEDICINE

## 2025-04-12 PROCEDURE — 86901 BLOOD TYPING SEROLOGIC RH(D): CPT | Performed by: EMERGENCY MEDICINE

## 2025-04-12 PROCEDURE — 70450 CT HEAD/BRAIN W/O DYE: CPT | Performed by: RADIOLOGY

## 2025-04-12 PROCEDURE — G0427 INPT/ED TELECONSULT70: HCPCS | Performed by: STUDENT IN AN ORGANIZED HEALTH CARE EDUCATION/TRAINING PROGRAM

## 2025-04-12 PROCEDURE — 2500000002 HC RX 250 W HCPCS SELF ADMINISTERED DRUGS (ALT 637 FOR MEDICARE OP, ALT 636 FOR OP/ED): Performed by: INTERNAL MEDICINE

## 2025-04-12 PROCEDURE — 74177 CT ABD & PELVIS W/CONTRAST: CPT | Performed by: RADIOLOGY

## 2025-04-12 PROCEDURE — 85730 THROMBOPLASTIN TIME PARTIAL: CPT | Performed by: EMERGENCY MEDICINE

## 2025-04-12 PROCEDURE — 72170 X-RAY EXAM OF PELVIS: CPT

## 2025-04-12 PROCEDURE — 70498 CT ANGIOGRAPHY NECK: CPT | Performed by: RADIOLOGY

## 2025-04-12 PROCEDURE — 72170 X-RAY EXAM OF PELVIS: CPT | Performed by: RADIOLOGY

## 2025-04-12 PROCEDURE — 72128 CT CHEST SPINE W/O DYE: CPT | Performed by: RADIOLOGY

## 2025-04-12 PROCEDURE — 84132 ASSAY OF SERUM POTASSIUM: CPT | Performed by: EMERGENCY MEDICINE

## 2025-04-12 PROCEDURE — 80177 DRUG SCRN QUAN LEVETIRACETAM: CPT | Mod: AHULAB | Performed by: EMERGENCY MEDICINE

## 2025-04-12 PROCEDURE — 93005 ELECTROCARDIOGRAM TRACING: CPT

## 2025-04-12 PROCEDURE — 72131 CT LUMBAR SPINE W/O DYE: CPT | Performed by: RADIOLOGY

## 2025-04-12 PROCEDURE — 83605 ASSAY OF LACTIC ACID: CPT | Performed by: EMERGENCY MEDICINE

## 2025-04-12 PROCEDURE — 2550000001 HC RX 255 CONTRASTS: Performed by: EMERGENCY MEDICINE

## 2025-04-12 PROCEDURE — 70450 CT HEAD/BRAIN W/O DYE: CPT

## 2025-04-12 PROCEDURE — 71260 CT THORAX DX C+: CPT | Performed by: RADIOLOGY

## 2025-04-12 PROCEDURE — 73030 X-RAY EXAM OF SHOULDER: CPT | Mod: LT

## 2025-04-12 PROCEDURE — 82947 ASSAY GLUCOSE BLOOD QUANT: CPT

## 2025-04-12 PROCEDURE — 71045 X-RAY EXAM CHEST 1 VIEW: CPT

## 2025-04-12 PROCEDURE — 2500000005 HC RX 250 GENERAL PHARMACY W/O HCPCS: Performed by: INTERNAL MEDICINE

## 2025-04-12 PROCEDURE — 74177 CT ABD & PELVIS W/CONTRAST: CPT

## 2025-04-12 PROCEDURE — 80185 ASSAY OF PHENYTOIN TOTAL: CPT | Performed by: EMERGENCY MEDICINE

## 2025-04-12 PROCEDURE — 70496 CT ANGIOGRAPHY HEAD: CPT | Performed by: RADIOLOGY

## 2025-04-12 PROCEDURE — 2500000001 HC RX 250 WO HCPCS SELF ADMINISTERED DRUGS (ALT 637 FOR MEDICARE OP): Performed by: INTERNAL MEDICINE

## 2025-04-12 PROCEDURE — 1200000002 HC GENERAL ROOM WITH TELEMETRY DAILY

## 2025-04-12 PROCEDURE — 81001 URINALYSIS AUTO W/SCOPE: CPT | Performed by: EMERGENCY MEDICINE

## 2025-04-12 PROCEDURE — 84484 ASSAY OF TROPONIN QUANT: CPT | Performed by: EMERGENCY MEDICINE

## 2025-04-12 PROCEDURE — 72125 CT NECK SPINE W/O DYE: CPT | Performed by: RADIOLOGY

## 2025-04-12 PROCEDURE — 71045 X-RAY EXAM CHEST 1 VIEW: CPT | Performed by: RADIOLOGY

## 2025-04-12 PROCEDURE — 85025 COMPLETE CBC W/AUTO DIFF WBC: CPT | Performed by: EMERGENCY MEDICINE

## 2025-04-12 PROCEDURE — 2500000001 HC RX 250 WO HCPCS SELF ADMINISTERED DRUGS (ALT 637 FOR MEDICARE OP): Performed by: EMERGENCY MEDICINE

## 2025-04-12 PROCEDURE — 36415 COLL VENOUS BLD VENIPUNCTURE: CPT | Performed by: EMERGENCY MEDICINE

## 2025-04-12 PROCEDURE — 70498 CT ANGIOGRAPHY NECK: CPT

## 2025-04-12 PROCEDURE — 85610 PROTHROMBIN TIME: CPT | Performed by: EMERGENCY MEDICINE

## 2025-04-12 PROCEDURE — 73030 X-RAY EXAM OF SHOULDER: CPT | Mod: LEFT SIDE | Performed by: RADIOLOGY

## 2025-04-12 PROCEDURE — 99291 CRITICAL CARE FIRST HOUR: CPT | Performed by: EMERGENCY MEDICINE

## 2025-04-12 PROCEDURE — 2500000004 HC RX 250 GENERAL PHARMACY W/ HCPCS (ALT 636 FOR OP/ED): Performed by: EMERGENCY MEDICINE

## 2025-04-12 PROCEDURE — 96374 THER/PROPH/DIAG INJ IV PUSH: CPT

## 2025-04-12 RX ORDER — NAPROXEN SODIUM 220 MG/1
324 TABLET, FILM COATED ORAL ONCE
Status: COMPLETED | OUTPATIENT
Start: 2025-04-12 | End: 2025-04-12

## 2025-04-12 RX ORDER — ACETAMINOPHEN 325 MG/1
1000 TABLET ORAL EVERY 8 HOURS PRN
Status: DISCONTINUED | OUTPATIENT
Start: 2025-04-12 | End: 2025-04-16 | Stop reason: HOSPADM

## 2025-04-12 RX ORDER — BENZTROPINE MESYLATE 1 MG/1
1 TABLET ORAL 2 TIMES DAILY
Status: CANCELLED | OUTPATIENT
Start: 2025-04-12

## 2025-04-12 RX ORDER — ALBUTEROL SULFATE 0.83 MG/ML
2.5 SOLUTION RESPIRATORY (INHALATION) EVERY 2 HOUR PRN
Status: DISCONTINUED | OUTPATIENT
Start: 2025-04-12 | End: 2025-04-16 | Stop reason: HOSPADM

## 2025-04-12 RX ORDER — ALBUTEROL SULFATE 0.83 MG/ML
2.5 SOLUTION RESPIRATORY (INHALATION) EVERY 4 HOURS PRN
Status: DISCONTINUED | OUTPATIENT
Start: 2025-04-12 | End: 2025-04-12

## 2025-04-12 RX ORDER — FAMOTIDINE 20 MG/1
40 TABLET, FILM COATED ORAL EVERY MORNING
Status: DISCONTINUED | OUTPATIENT
Start: 2025-04-13 | End: 2025-04-16 | Stop reason: HOSPADM

## 2025-04-12 RX ORDER — NAPROXEN SODIUM 220 MG/1
TABLET, FILM COATED ORAL
Status: DISPENSED
Start: 2025-04-12 | End: 2025-04-13

## 2025-04-12 RX ORDER — QUETIAPINE FUMARATE 100 MG/1
200 TABLET, FILM COATED ORAL NIGHTLY
Status: DISCONTINUED | OUTPATIENT
Start: 2025-04-12 | End: 2025-04-16 | Stop reason: HOSPADM

## 2025-04-12 RX ORDER — LEVETIRACETAM 500 MG/1
1000 TABLET ORAL DAILY
Status: DISCONTINUED | OUTPATIENT
Start: 2025-04-13 | End: 2025-04-13

## 2025-04-12 RX ORDER — LOSARTAN POTASSIUM 25 MG/1
25 TABLET ORAL DAILY
Status: DISCONTINUED | OUTPATIENT
Start: 2025-04-13 | End: 2025-04-13

## 2025-04-12 RX ORDER — SERTRALINE HYDROCHLORIDE 50 MG/1
100 TABLET, FILM COATED ORAL DAILY
Status: DISCONTINUED | OUTPATIENT
Start: 2025-04-12 | End: 2025-04-16 | Stop reason: HOSPADM

## 2025-04-12 RX ORDER — POTASSIUM CHLORIDE 14.9 MG/ML
20 INJECTION INTRAVENOUS ONCE
Status: DISCONTINUED | OUTPATIENT
Start: 2025-04-12 | End: 2025-04-12

## 2025-04-12 RX ORDER — TALC
6 POWDER (GRAM) TOPICAL NIGHTLY
Status: DISCONTINUED | OUTPATIENT
Start: 2025-04-12 | End: 2025-04-16 | Stop reason: HOSPADM

## 2025-04-12 RX ORDER — PHENYTOIN SODIUM 100 MG/1
300 CAPSULE, EXTENDED RELEASE ORAL NIGHTLY
Status: DISCONTINUED | OUTPATIENT
Start: 2025-04-12 | End: 2025-04-16 | Stop reason: HOSPADM

## 2025-04-12 RX ORDER — ATORVASTATIN CALCIUM 80 MG/1
80 TABLET, FILM COATED ORAL NIGHTLY
Status: DISCONTINUED | OUTPATIENT
Start: 2025-04-12 | End: 2025-04-16 | Stop reason: HOSPADM

## 2025-04-12 RX ORDER — ENOXAPARIN SODIUM 100 MG/ML
40 INJECTION SUBCUTANEOUS EVERY 24 HOURS
Status: DISCONTINUED | OUTPATIENT
Start: 2025-04-12 | End: 2025-04-16 | Stop reason: HOSPADM

## 2025-04-12 RX ORDER — LEVETIRACETAM 10 MG/ML
1000 INJECTION INTRAVASCULAR ONCE
Status: COMPLETED | OUTPATIENT
Start: 2025-04-12 | End: 2025-04-12

## 2025-04-12 RX ORDER — CLOPIDOGREL BISULFATE 75 MG/1
75 TABLET ORAL DAILY
Status: DISCONTINUED | OUTPATIENT
Start: 2025-04-13 | End: 2025-04-16 | Stop reason: HOSPADM

## 2025-04-12 RX ORDER — POTASSIUM CHLORIDE 1.5 G/1.58G
20 POWDER, FOR SOLUTION ORAL ONCE
Status: COMPLETED | OUTPATIENT
Start: 2025-04-12 | End: 2025-04-12

## 2025-04-12 RX ORDER — QUETIAPINE FUMARATE 25 MG/1
25 TABLET, FILM COATED ORAL 2 TIMES DAILY
Status: DISCONTINUED | OUTPATIENT
Start: 2025-04-12 | End: 2025-04-16 | Stop reason: HOSPADM

## 2025-04-12 RX ORDER — POLYETHYLENE GLYCOL 3350 17 G/17G
17 POWDER, FOR SOLUTION ORAL DAILY
Status: DISCONTINUED | OUTPATIENT
Start: 2025-04-12 | End: 2025-04-16 | Stop reason: HOSPADM

## 2025-04-12 RX ORDER — CHOLECALCIFEROL (VITAMIN D3) 25 MCG
50 TABLET ORAL DAILY
Status: DISCONTINUED | OUTPATIENT
Start: 2025-04-13 | End: 2025-04-16 | Stop reason: HOSPADM

## 2025-04-12 RX ADMIN — IOHEXOL 75 ML: 350 INJECTION, SOLUTION INTRAVENOUS at 08:07

## 2025-04-12 RX ADMIN — Medication 6 MG: at 20:47

## 2025-04-12 RX ADMIN — ATORVASTATIN CALCIUM 80 MG: 80 TABLET, FILM COATED ORAL at 20:48

## 2025-04-12 RX ADMIN — ENOXAPARIN SODIUM 40 MG: 40 INJECTION SUBCUTANEOUS at 20:48

## 2025-04-12 RX ADMIN — LEVETIRACETAM 1000 MG: 10 INJECTION INTRAVENOUS at 08:50

## 2025-04-12 RX ADMIN — ASPIRIN 81 MG CHEWABLE TABLET 324 MG: 81 TABLET CHEWABLE at 15:29

## 2025-04-12 RX ADMIN — PHENYTOIN SODIUM 300 MG: 100 CAPSULE, EXTENDED RELEASE ORAL at 20:47

## 2025-04-12 RX ADMIN — IOHEXOL 75 ML: 350 INJECTION, SOLUTION INTRAVENOUS at 07:55

## 2025-04-12 RX ADMIN — POTASSIUM CHLORIDE 20 MEQ: 1.5 POWDER, FOR SOLUTION ORAL at 20:48

## 2025-04-12 RX ADMIN — ACETAMINOPHEN 975 MG: 325 TABLET, FILM COATED ORAL at 20:47

## 2025-04-12 RX ADMIN — LEVETIRACETAM 1000 MG: 10 INJECTION INTRAVENOUS at 08:43

## 2025-04-12 RX ADMIN — QUETIAPINE FUMARATE 200 MG: 100 TABLET ORAL at 20:53

## 2025-04-12 SDOH — SOCIAL STABILITY: SOCIAL NETWORK: HOW OFTEN DO YOU ATTEND CHURCH OR RELIGIOUS SERVICES?: PATIENT UNABLE TO ANSWER

## 2025-04-12 SDOH — SOCIAL STABILITY: SOCIAL INSECURITY: ARE YOU OR HAVE YOU BEEN THREATENED OR ABUSED PHYSICALLY, EMOTIONALLY, OR SEXUALLY BY ANYONE?: UNABLE TO ASSESS

## 2025-04-12 SDOH — HEALTH STABILITY: MENTAL HEALTH: HOW OFTEN DO YOU HAVE SIX OR MORE DRINKS ON ONE OCCASION?: PATIENT UNABLE TO ANSWER

## 2025-04-12 SDOH — SOCIAL STABILITY: SOCIAL INSECURITY: ARE YOU MARRIED, WIDOWED, DIVORCED, SEPARATED, NEVER MARRIED, OR LIVING WITH A PARTNER?: PATIENT UNABLE TO ANSWER

## 2025-04-12 SDOH — SOCIAL STABILITY: SOCIAL NETWORK: HOW OFTEN DO YOU GET TOGETHER WITH FRIENDS OR RELATIVES?: PATIENT UNABLE TO ANSWER

## 2025-04-12 SDOH — HEALTH STABILITY: PHYSICAL HEALTH
ON AVERAGE, HOW MANY DAYS PER WEEK DO YOU ENGAGE IN MODERATE TO STRENUOUS EXERCISE (LIKE A BRISK WALK)?: PATIENT DECLINED

## 2025-04-12 SDOH — SOCIAL STABILITY: SOCIAL INSECURITY: WERE YOU ABLE TO COMPLETE ALL THE BEHAVIORAL HEALTH SCREENINGS?: YES

## 2025-04-12 SDOH — SOCIAL STABILITY: SOCIAL INSECURITY: ABUSE: ADULT

## 2025-04-12 SDOH — SOCIAL STABILITY: SOCIAL NETWORK: IN A TYPICAL WEEK, HOW MANY TIMES DO YOU TALK ON THE PHONE WITH FAMILY, FRIENDS, OR NEIGHBORS?: PATIENT UNABLE TO ANSWER

## 2025-04-12 SDOH — SOCIAL STABILITY: SOCIAL INSECURITY: ARE THERE ANY APPARENT SIGNS OF INJURIES/BEHAVIORS THAT COULD BE RELATED TO ABUSE/NEGLECT?: UNABLE TO ASSESS

## 2025-04-12 SDOH — ECONOMIC STABILITY: HOUSING INSECURITY: AT ANY TIME IN THE PAST 12 MONTHS, WERE YOU HOMELESS OR LIVING IN A SHELTER (INCLUDING NOW)?: PATIENT UNABLE TO ANSWER

## 2025-04-12 SDOH — HEALTH STABILITY: PHYSICAL HEALTH: ON AVERAGE, HOW MANY MINUTES DO YOU ENGAGE IN EXERCISE AT THIS LEVEL?: PATIENT DECLINED

## 2025-04-12 SDOH — ECONOMIC STABILITY: HOUSING INSECURITY: IN THE PAST 12 MONTHS, HOW MANY TIMES HAVE YOU MOVED WHERE YOU WERE LIVING?: 0

## 2025-04-12 SDOH — SOCIAL STABILITY: SOCIAL INSECURITY: HAVE YOU HAD THOUGHTS OF HARMING ANYONE ELSE?: YES

## 2025-04-12 SDOH — SOCIAL STABILITY: SOCIAL INSECURITY: WITHIN THE LAST YEAR, HAVE YOU BEEN AFRAID OF YOUR PARTNER OR EX-PARTNER?: PATIENT UNABLE TO ANSWER

## 2025-04-12 SDOH — SOCIAL STABILITY: SOCIAL INSECURITY: HAS ANYONE EVER THREATENED TO HURT YOUR FAMILY OR YOUR PETS?: UNABLE TO ASSESS

## 2025-04-12 SDOH — SOCIAL STABILITY: SOCIAL INSECURITY: DO YOU FEEL UNSAFE GOING BACK TO THE PLACE WHERE YOU ARE LIVING?: UNABLE TO ASSESS

## 2025-04-12 SDOH — HEALTH STABILITY: MENTAL HEALTH: HOW OFTEN DO YOU HAVE A DRINK CONTAINING ALCOHOL?: PATIENT UNABLE TO ANSWER

## 2025-04-12 SDOH — HEALTH STABILITY: MENTAL HEALTH: HOW MANY DRINKS CONTAINING ALCOHOL DO YOU HAVE ON A TYPICAL DAY WHEN YOU ARE DRINKING?: PATIENT UNABLE TO ANSWER

## 2025-04-12 SDOH — SOCIAL STABILITY: SOCIAL INSECURITY: DOES ANYONE TRY TO KEEP YOU FROM HAVING/CONTACTING OTHER FRIENDS OR DOING THINGS OUTSIDE YOUR HOME?: UNABLE TO ASSESS

## 2025-04-12 SDOH — SOCIAL STABILITY: SOCIAL NETWORK: HOW OFTEN DO YOU ATTEND MEETINGS OF THE CLUBS OR ORGANIZATIONS YOU BELONG TO?: PATIENT UNABLE TO ANSWER

## 2025-04-12 SDOH — SOCIAL STABILITY: SOCIAL INSECURITY: HAVE YOU HAD ANY THOUGHTS OF HARMING ANYONE ELSE?: UNABLE TO ASSESS

## 2025-04-12 ASSESSMENT — ACTIVITIES OF DAILY LIVING (ADL)
BATHING: DEPENDENT
HEARING - RIGHT EAR: UNABLE TO ASSESS
PATIENT'S MEMORY ADEQUATE TO SAFELY COMPLETE DAILY ACTIVITIES?: UNABLE TO ASSESS
GROOMING: DEPENDENT
TOILETING: UNABLE TO ASSESS
PATIENT'S MEMORY ADEQUATE TO SAFELY COMPLETE DAILY ACTIVITIES?: UNABLE TO ASSESS
ADEQUATE_TO_COMPLETE_ADL: UNABLE TO ASSESS
TOILETING: DEPENDENT
FEEDING YOURSELF: INDEPENDENT
HEARING - LEFT EAR: UNABLE TO ASSESS
JUDGMENT_ADEQUATE_SAFELY_COMPLETE_DAILY_ACTIVITIES: UNABLE TO ASSESS
ADEQUATE_TO_COMPLETE_ADL: YES
HEARING - LEFT EAR: DEAF
WALKS IN HOME: DEPENDENT
FEEDING YOURSELF: UNABLE TO ASSESS
HEARING - RIGHT EAR: DEAF
WALKS IN HOME: UNABLE TO ASSESS
DRESSING YOURSELF: UNABLE TO ASSESS
GROOMING: UNABLE TO ASSESS
LACK_OF_TRANSPORTATION: PATIENT UNABLE TO ANSWER
DRESSING YOURSELF: DEPENDENT
JUDGMENT_ADEQUATE_SAFELY_COMPLETE_DAILY_ACTIVITIES: UNABLE TO ASSESS
BATHING: UNABLE TO ASSESS

## 2025-04-12 ASSESSMENT — COGNITIVE AND FUNCTIONAL STATUS - GENERAL
DAILY ACTIVITIY SCORE: 12
MOVING TO AND FROM BED TO CHAIR: A LOT
TOILETING: A LITTLE
MOBILITY SCORE: 12
DRESSING REGULAR LOWER BODY CLOTHING: A LOT
WALKING IN HOSPITAL ROOM: A LOT
DRESSING REGULAR LOWER BODY CLOTHING: A LITTLE
TURNING FROM BACK TO SIDE WHILE IN FLAT BAD: A LOT
MOBILITY SCORE: 14
CLIMB 3 TO 5 STEPS WITH RAILING: A LOT
MOVING FROM LYING ON BACK TO SITTING ON SIDE OF FLAT BED WITH BEDRAILS: A LITTLE
PERSONAL GROOMING: A LOT
STANDING UP FROM CHAIR USING ARMS: A LOT
DAILY ACTIVITIY SCORE: 18
HELP NEEDED FOR BATHING: A LOT
MOVING FROM LYING ON BACK TO SITTING ON SIDE OF FLAT BED WITH BEDRAILS: A LOT
PATIENT BASELINE BEDBOUND: NO
PERSONAL GROOMING: A LOT
DRESSING REGULAR UPPER BODY CLOTHING: A LITTLE
DRESSING REGULAR UPPER BODY CLOTHING: A LOT
TOILETING: A LOT
EATING MEALS: A LOT
TURNING FROM BACK TO SIDE WHILE IN FLAT BAD: A LITTLE
WALKING IN HOSPITAL ROOM: A LOT
MOVING TO AND FROM BED TO CHAIR: A LOT
CLIMB 3 TO 5 STEPS WITH RAILING: A LOT
HELP NEEDED FOR BATHING: A LITTLE
STANDING UP FROM CHAIR USING ARMS: A LOT

## 2025-04-12 ASSESSMENT — PAIN SCALES - PAIN ASSESSMENT IN ADVANCED DEMENTIA (PAINAD)
CONSOLABILITY: NO NEED TO CONSOLE
BREATHING: NORMAL
BODYLANGUAGE: RELAXED
NEGVOCALIZATION: OCCASIONAL MOAN/GROAN, LOW SPEECH, NEGATIVE/DISAPPROVING QUALITY
FACIALEXPRESSION: FACIAL GRIMACING
TOTALSCORE: 3

## 2025-04-12 ASSESSMENT — PAIN SCALES - GENERAL
PAINLEVEL_OUTOF10: 1
PAINLEVEL_OUTOF10: 6

## 2025-04-12 ASSESSMENT — PAIN - FUNCTIONAL ASSESSMENT: PAIN_FUNCTIONAL_ASSESSMENT: PAINAD (PAIN ASSESSMENT IN ADVANCED DEMENTIA SCALE)

## 2025-04-12 ASSESSMENT — LIFESTYLE VARIABLES
AUDIT-C TOTAL SCORE: -1
HOW OFTEN DO YOU HAVE 6 OR MORE DRINKS ON ONE OCCASION: PATIENT UNABLE TO ANSWER
HOW OFTEN DO YOU HAVE A DRINK CONTAINING ALCOHOL: PATIENT UNABLE TO ANSWER
SKIP TO QUESTIONS 9-10: 0
AUDIT-C TOTAL SCORE: -1
SKIP TO QUESTIONS 9-10: 0
HOW MANY STANDARD DRINKS CONTAINING ALCOHOL DO YOU HAVE ON A TYPICAL DAY: PATIENT UNABLE TO ANSWER
AUDIT-C TOTAL SCORE: -1

## 2025-04-12 ASSESSMENT — COLUMBIA-SUICIDE SEVERITY RATING SCALE - C-SSRS
6. HAVE YOU EVER DONE ANYTHING, STARTED TO DO ANYTHING, OR PREPARED TO DO ANYTHING TO END YOUR LIFE?: NO
1. IN THE PAST MONTH, HAVE YOU WISHED YOU WERE DEAD OR WISHED YOU COULD GO TO SLEEP AND NOT WAKE UP?: NO
2. HAVE YOU ACTUALLY HAD ANY THOUGHTS OF KILLING YOURSELF?: NO

## 2025-04-12 ASSESSMENT — PATIENT HEALTH QUESTIONNAIRE - PHQ9
SUM OF ALL RESPONSES TO PHQ9 QUESTIONS 1 & 2: 0
2. FEELING DOWN, DEPRESSED OR HOPELESS: NOT AT ALL
1. LITTLE INTEREST OR PLEASURE IN DOING THINGS: NOT AT ALL

## 2025-04-12 ASSESSMENT — PAIN DESCRIPTION - LOCATION: LOCATION: SHOULDER

## 2025-04-12 NOTE — ED PROVIDER NOTES
HPI   No chief complaint on file.      The patient is a 69-year-old female past medical history of prior ischemic CVA, epilepsy/seizure history on phenytoin/Keppra, hyperlipidemia, deaf at baseline presenting as a stroke alert.  Per EMS/director patient's assisted living facility patient was last observed well at approximately 6:30 AM when she went to the bathroom, when staff returned to check on her around 730 they found her on the floor, unsure how she arrived there.  Patient was notably altered at that time.  Per discussion with facility staff/EMR review, patient with prior ischemic CVA with right-sided deficits, patient with difficulty moving the left upper extremity for EMS.  Per caregivers, patient is normally not this confused.  Per EMS, patient was initially completely unresponsive, by time of my evaluation patient is waking and interacting somewhat but appears to be off her baseline.  No recent medication changes per facility staff, patient not on systemic anticoagulation per facility staff (on Plavix).  Patient currently denies any chest pain, abdominal pain (history obtained with assistance of /facility staff).       used: Yes (ASL)            Patient History   Past Medical History:   Diagnosis Date    Anemia     Depression     Gastro-esophageal reflux disease without esophagitis 12/21/2022    Gastroesophageal reflux disease without esophagitis    HL (hearing loss)     Hyperlipidemia, unspecified 09/25/2019    Hyperlipemia    Hypertension     Personal history of transient ischemic attack (TIA), and cerebral infarction without residual deficits 12/21/2022    History of stroke    Shoulder pain 03/14/2025    Stroke (Multi)      No past surgical history on file.  Family History   Problem Relation Name Age of Onset    Parkinsonism Mother      Other (cardiac disorder) Father      Glaucoma Father      Parkinsonism Father       Social History     Tobacco Use    Smoking  status: Never     Passive exposure: Never    Smokeless tobacco: Never   Vaping Use    Vaping status: Unknown   Substance Use Topics    Alcohol use: Never    Drug use: Never       Physical Exam   ED Triage Vitals   Temp Pulse Resp BP   -- -- -- --      SpO2 Temp src Heart Rate Source Patient Position   -- -- -- --      BP Location FiO2 (%)     -- --       Physical Exam  Constitutional:       General: She is not in acute distress.     Appearance: She is ill-appearing (chronically ill appearing).   HENT:      Head: Normocephalic and atraumatic.   Eyes:      Extraocular Movements: Extraocular movements intact.      Right eye: Normal extraocular motion and no nystagmus.      Left eye: Normal extraocular motion and no nystagmus.      Pupils: Pupils are equal, round, and reactive to light. Pupils are equal.   Cardiovascular:      Rate and Rhythm: Normal rate and regular rhythm.      Pulses:           Radial pulses are 2+ on the right side and 2+ on the left side.      Heart sounds: Normal heart sounds, S1 normal and S2 normal.      No systolic murmur is present.      No diastolic murmur is present.   Pulmonary:      Effort: Pulmonary effort is normal.      Breath sounds: Normal breath sounds and air entry. No decreased breath sounds, wheezing, rhonchi or rales.   Abdominal:      General: Abdomen is flat. Bowel sounds are normal.      Palpations: Abdomen is soft.      Tenderness: There is no abdominal tenderness.   Musculoskeletal:      Right shoulder: Normal.      Left shoulder: Tenderness present. No bony tenderness.      Right upper arm: Normal.      Left upper arm: Normal.      Right elbow: Normal.      Left elbow: Normal.      Right forearm: Normal.      Left forearm: Normal.      Right wrist: Normal.      Left wrist: Normal.      Cervical back: Full passive range of motion without pain, normal range of motion and neck supple.      Right hip: Normal.      Left hip: Normal.      Right upper leg: Normal.      Left upper  leg: Normal.      Right knee: Normal.      Left knee: Normal.      Right lower leg: Normal.      Left lower leg: Normal.      Right ankle: Normal.      Left ankle: Normal.      Right foot: Normal.      Left foot: Normal.      Comments: Bruising over left shoulder   Skin:     General: Skin is warm.      Capillary Refill: Capillary refill takes less than 2 seconds.   Neurological:      Mental Status: She is confused.      Comments: 1a  Level of consciousness: 1=not alert but arousable by minor stimulation to obey, answer or respond  1b. LOC questions:  1=Performs one task correctly  1c. LOC commands: 2=Performs neither task correctly  2.  Best Gaze: 0=normal  3. Visual: 0=No visual loss  4. Facial Palsy: 0=Normal symmetric movement  5a. Motor left arm: 1=Drift, limb holds 90 (or 45) degrees but drifts down before full 10 seconds: does not hit bed  5b.  Motor right arm: 1=Drift, limb holds 90 (or 45) degrees but drifts down before full 10 seconds: does not hit bed  6a. motor left le=No drift, limb holds 90 (or 45) degrees for full 10 seconds  6b  Motor right le=No drift, limb holds 90 (or 45) degrees for full 10 seconds  7. Limb Ataxia: 0=Absent  8.  Sensory: 0=Normal; no sensory loss  9. Best Language:  3=Mute, global aphasia; no usable (noncomunicative at baseline)    10. Dysarthria: Unable to assess  11. Extinction and Inattention: 0=No abnormality    Total:   9        VAN: VAN: Negative (aphasia unable to be assessed)       Psychiatric:         Behavior: Behavior is cooperative.           ED Course & MDM   Diagnoses as of 25 1622   Elevated troponin   Fall, initial encounter   Contusion of left shoulder, initial encounter   Seizure (Multi)                 No data recorded                                 Medical Decision Making  ECG interpreted by me: Sinus rhythm, rate 106, , QTc 462.  No ST changes meeting STEMI criteria.    Patient presenting after being found down altered at her nursing  facility, exam/history severely limited by patient's baseline ability to communicate (sign language only).  On examination patient does seem to have left upper extremity weakness, no lower extremity weakness, does have a contracted right upper extremity that appears chronic.  Does have tenderness palpation of the left shoulder raise concern for associated traumatic injury.  Concern for ICH, CVA, breakthrough seizure, ACS/AMI, syncopal event, given extremely limited examination will pursue broad trauma workup as well, will obtain CT head/CT angiography to assess for ICH, acute intracranial thrombus, will also obtain C-spine, T/L-spine imaging, CT chest abdomen pelvis, will also obtain left shoulder x-ray.  Will obtain troponins, ECG, CMP, lactate, urinalysis to assess for inciting infectious etiology as well as levels of phenytoin/Keppra.    Patient's CT of the head and neck shows no intracranial hemorrhage, mass, no obvious obstruction of intracranial vasculature.  Patient was evaluated by stroke neurology via telestroke, patient's neurological exam appears to be improving but with her baseline, I feel this is likely secondary to a breakthrough seizure episode (supported by patient's lactate of 3.7 initially).  Lactate improving with IV fluids, patient without subsequent seizure episode in the ED, did receive 2 g of IV Keppra per discussion with stroke neurology.  Remainder of patient CT imaging shows questionable left ninth rib fracture although appears nondisplaced, on reassessment patient does not appear to have significant tenderness over this area.  X-ray of the shoulder unremarkable, no acute C/T/L-spine injury and no injury of the abdomen or pelvis.  Initial troponin 113, trended to 198, repeat ECG shows no dynamic changes to suggest STEMI.  On reassessment per discussion with the patient's bedside aide patient appears to be returning to her baseline neurological status lowering my suspicion for nonconvulsive  status epilepticus, patient now appears to have full range of motion of her left upper extremity lowering my suspicion for ischemic CVA.  Plan for admission for further evaluation/management as well as further cardiac workup given elevated troponins (although suspect this is secondary to demand mediated troponin elevation secondary to likely breakthrough seizure episode).  Discussed patient with hospitalist who agrees admit the patient to their service.        Procedure  Critical Care    Performed by: Joel Cloud MD  Authorized by: Joel Cloud MD    Critical care provider statement:     Critical care time (minutes):  45    Critical care time was exclusive of:  Separately billable procedures and treating other patients and teaching time    Critical care was necessary to treat or prevent imminent or life-threatening deterioration of the following conditions:  CNS failure or compromise and metabolic crisis    Critical care was time spent personally by me on the following activities:  Blood draw for specimens, development of treatment plan with patient or surrogate, discussions with consultants, discussions with primary provider, evaluation of patient's response to treatment, obtaining history from patient or surrogate, ordering and performing treatments and interventions, ordering and review of laboratory studies, ordering and review of radiographic studies, pulse oximetry and re-evaluation of patient's condition    Care discussed with: admitting provider         Joel Cloud MD  04/12/25 1245       Joel Cloud MD  04/12/25 [id number]

## 2025-04-12 NOTE — PROGRESS NOTES
Pharmacy Medication History     Source of Information: Patient not a good historian tried calling patient caregiver no answer either time, the person that's in the room with the patient doesn't have knowledge of the patient meds , so I was no able to verify patient meds or the last time taken.    Additional concerns with the patient's PTA list.   N/A  Notified Provider via Haiku : No    The following updates were made to the Prior to Admission medication list:     Medications ADDED:   N/A  Medications CHANGED:  N/A  Medications REMOVED:   N/A  Medications NOT TAKING:   N/A    Allergy reviewed : No    Meds 2 Beds : No    Outpatient pharmacy confirmed and updated in chart : No    Pharmacy name: N/A    The list below reflectives the updated PTA list. Please review each medication in order reconciliation for additional clarification and justification.    Prior to Admission Medications   Prescriptions Last Dose   QUEtiapine (SEROquel) 200 mg tablet    Sig: Take 1 tablet (200 mg) by mouth once daily at bedtime.   QUEtiapine (SEROquel) 25 mg tablet    Sig: Take 1-2 tablets (25-50 mg) by mouth 2 times a day.   acetaminophen (Tylenol) 500 mg tablet    Sig: TAKE 1 TABLET BY MOUTH EVERY 4 HOURS AS NEEDED FOR MILD-MODERATE PAIN, CAN TAKE 2 TABLETS EVERY 8 HOURS AS NEEDED FOR MODERATE-SEVERE PAIN   Patient taking differently: Take 1 tablet (500 mg) by mouth every 4 hours if needed for mild pain (1 - 3). TAKE 1 TABLET BY MOUTH EVERY 4 HOURS AS NEEDED FOR MILD-MODERATE PAIN, CAN TAKE 2 TABLETS EVERY 8 HOURS AS NEEDED FOR MODERATE-SEVERE PAIN   acetaminophen (Tylenol) 500 mg tablet    Sig: Take 2 tablets (1,000 mg) by mouth every 8 hours if needed for mild pain (1 - 3) for up to 7 days.   albuterol 2.5 mg /3 mL (0.083 %) nebulizer solution    Sig: Take 3 mL (2.5 mg) by nebulization every 4 hours if needed for wheezing or shortness of breath. 1 VIAL VIA AEROSOL EVERY 4 HOURS AS NEEDED FOR SHORTNESS OF BREATH / CHEST TIGHTNESS    atorvastatin (Lipitor) 80 mg tablet    Sig: Take 1 tablet (80 mg) by mouth once daily.   benztropine (Cogentin) 0.5 mg tablet    Sig: Take 1-2 tablets (0.5-1 mg) by mouth 2 times a day. TAKE 1 TABLET BY MOUTH IN THE MORNING AND AFTERNOON  AND TAKE 2 TABLETS BY MOUTH EVERY NIGHT AT BEDTIME   cholecalciferol (Vitamin D-3) 50 MCG (2000 UT) tablet    Sig: Take 1 tablet (2,000 Units) by mouth once daily.   clopidogrel (Plavix) 75 mg tablet    Sig: Take 1 tablet (75 mg) by mouth once daily.   famotidine (Pepcid) 20 mg tablet    Sig: Take 2 tablets (40 mg) by mouth once daily in the morning.   haloperidol decanoate (Haldol Decanoate) 100 mg/mL injection    Sig: Inject 2 mL ( 200 MG) into the muscle every 28 days.   ibuprofen 600 mg tablet    Sig: Take 1 tablet (600 mg) by mouth every 6 hours if needed for mild pain (1 - 3), moderate pain (4 - 6), fever (temp greater than 38.0 C) or headaches (any pain related to hands or fingers) for up to 5 days.   levETIRAcetam (Keppra) 1,000 mg tablet    Sig: Take 1 tablet (1,000 mg) by mouth once daily.   lidocaine (Lidoderm) 5 % patch    Sig: APPLY 1 PATCH TOPICALLY TO PAINFUL AREA ONCE DAILY *LEAVE IN PLACE FOR 12 HOURS, REMOVE AFTER 12 HOURS. DO NOT REAPPLY UNTIL 12 HOURS HAVE LAPSED*   Patient taking differently: Place 1 patch on the skin once daily. APPLY 1 PATCH TOPICALLY TO PAINFUL AREA ONCE DAILY *LEAVE IN PLACE FOR 12 HOURS, REMOVE AFTER 12 HOURS. DO NOT REAPPLY UNTIL 12 HOURS HAVE LAPSED*   lipase-protease-amylase (Creon) 3,000-9,500- 15,000 unit capsule    Sig: Take 2 capsules by mouth 3 times a day before meals.   lipase-protease-amylase (Creon) 3,000-9,500- 15,000 unit capsule    Sig: Take 2 capsules by mouth 3 times a day before meals.   losartan (Cozaar) 25 mg tablet    Sig: Take 1 tablet (25 mg) by mouth once daily.   melatonin 5 mg capsule    Sig: Take 1 capsule (5 mg) by mouth once daily at bedtime.   omeprazole (PriLOSEC) 20 mg DR capsule    Sig: Take 2 capsules  (40 mg) by mouth once daily at bedtime. Do not crush or chew.   phenytoin ER (Dilantin) 100 mg capsule    Sig: Take 3 capsules (300 mg) by mouth once daily at bedtime.   sertraline (Zoloft) 100 mg tablet    Sig: Take 1 tablet (100 mg) by mouth once daily.   walker (Ultra-Light Rollator) misc    Sig: Use as directed      Facility-Administered Medications: None       The list below reflectives the updated allergy list. Please review each documented allergy for additional clarification and justification.    Allergies   Allergen Reactions    Levofloxacin Dizziness     Question seizure          04/12/25 at 11:33 AM - Yamilet Altamirano

## 2025-04-12 NOTE — H&P
History Of Present Illness  Janine Sandoval is a 69 y.o. female pmhx L CVA with R residual weakness, nonverbal, able to participate in rudimentary conversation via sign language, epilepsy, behavioral d/o, htn, resides in a group home with 24 hour care, uses walker baseline. She is being worked up for pancreatic issues including possible cancer. They also describe achalasia after a recent endoscope for which she was recommended to have small bites of regular food.     P/w after being round on the floor and unresponsive this morning about 7am. Night staff helped her to her bedroom during shift change and she was found a few minutes later on the floor. No one witnessed nor heard a fall, pt is unaware of the episode. EMS was called and she woke up a short time later.     Neuro was consulted who suspected breakthrough seizure with todds paralysis. Staff bedside say she's compliant with all meds except for this morning due to the incident.     Pt was started on haldol late march which was followed by 2 falls after which her psych doc stopped haldol and benztropine.     Pt is able to communicate she has no pain, no chest discomfort or sob, no changes in bowels, no fever/chills. Staff reports she is not quite back to her baseline as she seems still less active and isn't signing with her L hand as well as normally.      Past Medical History  She has a past medical history of Anemia, Depression, Gastro-esophageal reflux disease without esophagitis (12/21/2022), HL (hearing loss), Hyperlipidemia, unspecified (09/25/2019), Hypertension, Personal history of transient ischemic attack (TIA), and cerebral infarction without residual deficits (12/21/2022), Shoulder pain (03/14/2025), and Stroke (Multi).    Surgical History  She has no past surgical history on file.     Social History  She reports that she has never smoked. She has never been exposed to tobacco smoke. She has never used smokeless tobacco. She reports that she does  not drink alcohol and does not use drugs.    Family History  Family History   Problem Relation Name Age of Onset    Parkinsonism Mother      Other (cardiac disorder) Father      Glaucoma Father      Parkinsonism Father          Allergies  Levofloxacin    Review of Systems     Physical Exam  Constitutional:       Appearance: Normal appearance.      Comments: Non verbal. Able to sign basic things with her left hand.    Cardiovascular:      Rate and Rhythm: Normal rate and regular rhythm.   Pulmonary:      Effort: Pulmonary effort is normal.      Breath sounds: Normal breath sounds.   Abdominal:      General: Abdomen is flat. Bowel sounds are normal.      Palpations: Abdomen is soft.   Musculoskeletal:      Cervical back: Normal range of motion.   Skin:     General: Skin is warm.   Neurological:      Mental Status: She is alert. Mental status is at baseline.          Last Recorded Vitals  /88   Pulse 93   Temp 36.1 °C (97 °F) (Temporal)   Resp 18   Wt 78.9 kg (173 lb 15.1 oz)   SpO2 98%     Relevant Results    Assessment/Plan   Assessment & Plan  Elevated troponin      4/12:  Suspect breakthrough seizure... s/p iv keppra in the ED... cont home AEDs, f/up neuro eval.    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 plavix, statin, f/up neuro.     Elevated trops likely secondary to htn urgency... restart home bp meds, monitor trops. Cardio consult if continues to rise.     HypoK... replace.    Bladder distention on CT.... per ED team pt has voided with purwick and no sharma placed.     Pancreatic atrophy on CT... being worked up OP... home creon.   Dysphagia diet... bite size pieces, soft, s/s eval.     ? 9th rib fx... no pain, if genuine it may be related to this or her other recent falls.     Home regimen for chronic conditions  Pt/ot    Dc back to group home tomorrow pending neuro clearance and she ambulatory at baseline with walker.            Nadeem Miller MD

## 2025-04-12 NOTE — CONSULTS
"Consults  Virtual Visit start time: 758 am, cart connected at     History Of Present Illness:  Historian: ED Provider   Janine Sandoval is a 69 y.o. female presenting from nursing home after an unwitnessed fall - staff noted her eyes are rolled up and she was unresponsive. 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 H/O stroke with spastic R hemiparesis, epilepsy (focus in L hemisphere), drug induced parkinsonism. On Dilantin and Keppra.     During my encounter, pt was awake, alert, following simple commands but not able to communicate with sign  virtually. Tremulous in L hand.   Last known well: 6 am   Had stroke symptoms resolved at time of presentation: No   Current antiplatelet/anticoagulant use: Clopidogrel    Prior Functional Status (Modified Redwood Scale):  4 The patient has moderately severe disability; unable to walk or attend to bodily functions without assistance of another individual.     Stroke Risk Factors:  Hypertension    Last Recorded Vitals:  Blood pressure (!) 199/100, pulse (!) 109, temperature 36.1 °C (97 °F), temperature source Temporal, resp. rate 18, height 1.6 m (5' 3\"), weight 78.9 kg (173 lb 15.1 oz), SpO2 99%.     NIHSS:   NIH Stroke Scale:     1A. Level of Consciousness:  Arouses to minor stimulation (+1)    1B. Ask Month and Age:  0 questions right (+2)    1C. Blink Eyes & Squeeze Hands:  Performs both tasks (0)    2. Best Gaze:  Normal (0)    3. Visual:  No visual loss (0)    4. Facial Palsy:  Normal symmetry (0)    5A. Motor - Left Arm:  Some effort against gravity (+2)    5B. Motor - Right Arm:  Some effort against gravity (+2)    6A. Motor - Left Leg:  Some effort against gravity (+2)    6B. Motor - Right Leg:  Some effort against gravity (+2)    7. Limb Ataxia:  No ataxia (0)    8. Sensory Loss:  Normal (no sensory loss) (0)    9. Best Language:  Severe " "aphasia (+2)    10. Dysarthia:  Intubated or other barrier (UN)    11. Extinction and Inattention:  No abnormality (0)    NIH Stroke Scale:  13           Relevant Results:  LABS:  No results found for: \"GLUCOSE\", \"INR\"   Results for orders placed or performed during the hospital encounter of 04/12/25 (from the past 24 hours)   CBC and Auto Differential   Result Value Ref Range    WBC 13.0 (H) 4.4 - 11.3 x10*3/uL    nRBC 0.0 0.0 - 0.0 /100 WBCs    RBC 3.53 (L) 4.00 - 5.20 x10*6/uL    Hemoglobin 10.6 (L) 12.0 - 16.0 g/dL    Hematocrit 34.7 (L) 36.0 - 46.0 %    MCV 98 80 - 100 fL    MCH 30.0 26.0 - 34.0 pg    MCHC 30.5 (L) 32.0 - 36.0 g/dL    RDW 11.9 11.5 - 14.5 %    Platelets 175 150 - 450 x10*3/uL    Neutrophils % 89.3 40.0 - 80.0 %    Immature Granulocytes %, Automated 0.6 0.0 - 0.9 %    Lymphocytes % 4.8 13.0 - 44.0 %    Monocytes % 5.0 2.0 - 10.0 %    Eosinophils % 0.0 0.0 - 6.0 %    Basophils % 0.3 0.0 - 2.0 %    Neutrophils Absolute 11.64 (H) 1.20 - 7.70 x10*3/uL    Immature Granulocytes Absolute, Automated 0.08 0.00 - 0.70 x10*3/uL    Lymphocytes Absolute 0.62 (L) 1.20 - 4.80 x10*3/uL    Monocytes Absolute 0.65 0.10 - 1.00 x10*3/uL    Eosinophils Absolute 0.00 0.00 - 0.70 x10*3/uL    Basophils Absolute 0.04 0.00 - 0.10 x10*3/uL   POCT GLUCOSE   Result Value Ref Range    POCT Glucose 241 (H) 74 - 99 mg/dL   BLOOD GAS VENOUS FULL PANEL   Result Value Ref Range    POCT pH, Venous 7.35 7.33 - 7.43 pH    POCT pCO2, Venous 46 41 - 51 mm Hg    POCT pO2, Venous 41 35 - 45 mm Hg    POCT SO2, Venous 74 45 - 75 %    POCT Oxy Hemoglobin, Venous 72.4 45.0 - 75.0 %    POCT Hematocrit Calculated, Venous 37.0 36.0 - 46.0 %    POCT Sodium, Venous 135 (L) 136 - 145 mmol/L    POCT Potassium, Venous 3.4 (L) 3.5 - 5.3 mmol/L    POCT Chloride, Venous 101 98 - 107 mmol/L    POCT Ionized Calicum, Venous 1.18 1.10 - 1.33 mmol/L    POCT Glucose, Venous 273 (H) 74 - 99 mg/dL    POCT Lactate, Venous 3.7 (H) 0.4 - 2.0 mmol/L    POCT Base " Excess, Venous -0.6 -2.0 - 3.0 mmol/L    POCT HCO3 Calculated, Venous 25.4 22.0 - 26.0 mmol/L    POCT Hemoglobin, Venous 12.3 12.0 - 16.0 g/dL    POCT Anion Gap, Venous 12.0 10.0 - 25.0 mmol/L    Patient Temperature 37.0 degrees Celsius    FiO2 21 %        CT Head Imaging:  CTH imaging personally reviewed, showed no acute ischemic / hemorrhagic changes     CTA Head and Neck Imaging:  CTA head and neck imaging personally reviewed, no large vessel occlusion or severe stenosis seen        Assessment:  Stroke not suspected, alternative etiology likely   Suspect pt had breakthrough seizure with post ictal state. Increased lactate. Improved mentation but not back to baseline yet.     IV Thrombolysis IV Thrombolysis Checklist        IV Thrombolysis Given: No; Thrombolysis contraindication reason: Working diagnosis is NOT a suspected ischemic stroke          Patient is a candidate for thrombectomy:  yes/no: No; contraindication reason: Significant pre-stroke disability (pre-stroke mRS >1) and No evidence of proximal occlusion    Additional Recommendations:  Draw Keppra, phenytoin levels and advise giving Keppra 2g. Resume maintenance AED s - seems to be on dilantin 300 mg ER and Keppra [id number] based on last Neurology note.    Disposition:  Patient will remain at referring facility for further evaluation and management.    Virtual or Telephone Consent    An interactive audio and video telecommunication system which permits real time communications between the patient (at the originating site) and provider (at the distant site) was utilized to provide this telehealth service.   The patient was unable to consent and there was no next of kin available to provide consent. Due to the emergent nature of the patient's medical problem the provider team deemed it medically necessary to proceed with the telehealth visit.     Telestroke is covered in shift work. If there are further Neurological questions or concerns please contact  your regional neurologist on call during daytime hours or contact the transfer center with an ADT20 order.    Mu Alex MD

## 2025-04-12 NOTE — ED TRIAGE NOTES
Pt coming in today for stroke like symptoms. She has a hx of stroke in the past and was placed on Plavix. Last well known roughly an hour ago and was found prone and unresponsive on the ground. Pt withdraws from pain at this time.

## 2025-04-13 VITALS
DIASTOLIC BLOOD PRESSURE: 67 MMHG | SYSTOLIC BLOOD PRESSURE: 144 MMHG | HEIGHT: 63 IN | OXYGEN SATURATION: 95 % | WEIGHT: 173.94 LBS | RESPIRATION RATE: 23 BRPM | TEMPERATURE: 97.3 F | BODY MASS INDEX: 30.82 KG/M2 | HEART RATE: 95 BPM

## 2025-04-13 LAB
ANION GAP SERPL CALC-SCNC: 13 MMOL/L (ref 10–20)
BUN SERPL-MCNC: 16 MG/DL (ref 6–23)
CALCIUM SERPL-MCNC: 9.1 MG/DL (ref 8.6–10.3)
CARDIAC TROPONIN I PNL SERPL HS: 175 NG/L (ref 0–13)
CHLORIDE SERPL-SCNC: 104 MMOL/L (ref 98–107)
CO2 SERPL-SCNC: 26 MMOL/L (ref 21–32)
CREAT SERPL-MCNC: 0.76 MG/DL (ref 0.5–1.05)
EGFRCR SERPLBLD CKD-EPI 2021: 85 ML/MIN/1.73M*2
ERYTHROCYTE [DISTWIDTH] IN BLOOD BY AUTOMATED COUNT: 12 % (ref 11.5–14.5)
GLUCOSE SERPL-MCNC: 133 MG/DL (ref 74–99)
HCT VFR BLD AUTO: 39.1 % (ref 36–46)
HGB BLD-MCNC: 12.6 G/DL (ref 12–16)
MCH RBC QN AUTO: 29.6 PG (ref 26–34)
MCHC RBC AUTO-ENTMCNC: 32.2 G/DL (ref 32–36)
MCV RBC AUTO: 92 FL (ref 80–100)
NRBC BLD-RTO: 0 /100 WBCS (ref 0–0)
PLATELET # BLD AUTO: 175 X10*3/UL (ref 150–450)
POTASSIUM SERPL-SCNC: 3.6 MMOL/L (ref 3.5–5.3)
RBC # BLD AUTO: 4.26 X10*6/UL (ref 4–5.2)
SODIUM SERPL-SCNC: 139 MMOL/L (ref 136–145)
WBC # BLD AUTO: 7.1 X10*3/UL (ref 4.4–11.3)

## 2025-04-13 PROCEDURE — 2500000002 HC RX 250 W HCPCS SELF ADMINISTERED DRUGS (ALT 637 FOR MEDICARE OP, ALT 636 FOR OP/ED): Performed by: INTERNAL MEDICINE

## 2025-04-13 PROCEDURE — 85027 COMPLETE CBC AUTOMATED: CPT | Performed by: INTERNAL MEDICINE

## 2025-04-13 PROCEDURE — 2500000001 HC RX 250 WO HCPCS SELF ADMINISTERED DRUGS (ALT 637 FOR MEDICARE OP): Performed by: INTERNAL MEDICINE

## 2025-04-13 PROCEDURE — 82374 ASSAY BLOOD CARBON DIOXIDE: CPT | Performed by: INTERNAL MEDICINE

## 2025-04-13 PROCEDURE — 2500000005 HC RX 250 GENERAL PHARMACY W/O HCPCS: Performed by: INTERNAL MEDICINE

## 2025-04-13 PROCEDURE — 84484 ASSAY OF TROPONIN QUANT: CPT | Performed by: INTERNAL MEDICINE

## 2025-04-13 PROCEDURE — 97161 PT EVAL LOW COMPLEX 20 MIN: CPT | Mod: GP

## 2025-04-13 PROCEDURE — 97165 OT EVAL LOW COMPLEX 30 MIN: CPT | Mod: GO

## 2025-04-13 PROCEDURE — 36415 COLL VENOUS BLD VENIPUNCTURE: CPT | Performed by: INTERNAL MEDICINE

## 2025-04-13 PROCEDURE — 2500000004 HC RX 250 GENERAL PHARMACY W/ HCPCS (ALT 636 FOR OP/ED): Performed by: INTERNAL MEDICINE

## 2025-04-13 PROCEDURE — 99233 SBSQ HOSP IP/OBS HIGH 50: CPT | Performed by: INTERNAL MEDICINE

## 2025-04-13 PROCEDURE — 1200000002 HC GENERAL ROOM WITH TELEMETRY DAILY

## 2025-04-13 RX ORDER — LOSARTAN POTASSIUM 50 MG/1
50 TABLET ORAL DAILY
Status: DISCONTINUED | OUTPATIENT
Start: 2025-04-14 | End: 2025-04-16 | Stop reason: HOSPADM

## 2025-04-13 RX ADMIN — ATORVASTATIN CALCIUM 80 MG: 80 TABLET, FILM COATED ORAL at 20:47

## 2025-04-13 RX ADMIN — CLOPIDOGREL 75 MG: 75 TABLET ORAL at 09:39

## 2025-04-13 RX ADMIN — LEVETIRACETAM 1000 MG: 500 TABLET, FILM COATED ORAL at 09:38

## 2025-04-13 RX ADMIN — SERTRALINE 100 MG: 50 TABLET, FILM COATED ORAL at 09:38

## 2025-04-13 RX ADMIN — FAMOTIDINE 40 MG: 20 TABLET, FILM COATED ORAL at 09:39

## 2025-04-13 RX ADMIN — PHENYTOIN SODIUM 300 MG: 100 CAPSULE, EXTENDED RELEASE ORAL at 21:01

## 2025-04-13 RX ADMIN — Medication 6 MG: at 20:47

## 2025-04-13 RX ADMIN — PANCRELIPASE 2 CAPSULE: 15000; 3000; 9500 CAPSULE, DELAYED RELEASE ORAL at 09:38

## 2025-04-13 RX ADMIN — LEVETIRACETAM 750 MG: 250 TABLET, FILM COATED ORAL at 20:47

## 2025-04-13 RX ADMIN — LOSARTAN POTASSIUM 25 MG: 25 TABLET, FILM COATED ORAL at 09:39

## 2025-04-13 RX ADMIN — QUETIAPINE FUMARATE 25 MG: 25 TABLET ORAL at 17:01

## 2025-04-13 RX ADMIN — QUETIAPINE FUMARATE 25 MG: 25 TABLET ORAL at 09:39

## 2025-04-13 RX ADMIN — PANCRELIPASE 2 CAPSULE: 15000; 3000; 9500 CAPSULE, DELAYED RELEASE ORAL at 17:02

## 2025-04-13 RX ADMIN — QUETIAPINE FUMARATE 200 MG: 100 TABLET ORAL at 20:47

## 2025-04-13 RX ADMIN — Medication 50 MCG: at 09:39

## 2025-04-13 RX ADMIN — ENOXAPARIN SODIUM 40 MG: 40 INJECTION SUBCUTANEOUS at 20:47

## 2025-04-13 RX ADMIN — POLYETHYLENE GLYCOL 3350 17 G: 17 POWDER, FOR SOLUTION ORAL at 09:38

## 2025-04-13 RX ADMIN — PANCRELIPASE 2 CAPSULE: 15000; 3000; 9500 CAPSULE, DELAYED RELEASE ORAL at 11:22

## 2025-04-13 ASSESSMENT — PAIN - FUNCTIONAL ASSESSMENT
PAIN_FUNCTIONAL_ASSESSMENT: 0-10

## 2025-04-13 ASSESSMENT — COGNITIVE AND FUNCTIONAL STATUS - GENERAL
DAILY ACTIVITIY SCORE: 13
TOILETING: A LOT
MOBILITY SCORE: 17
TOILETING: A LITTLE
WALKING IN HOSPITAL ROOM: A LITTLE
STANDING UP FROM CHAIR USING ARMS: A LITTLE
MOVING TO AND FROM BED TO CHAIR: A LITTLE
DRESSING REGULAR LOWER BODY CLOTHING: A LOT
EATING MEALS: A LITTLE
CLIMB 3 TO 5 STEPS WITH RAILING: A LOT
HELP NEEDED FOR BATHING: A LITTLE
MOBILITY SCORE: 17
HELP NEEDED FOR BATHING: A LOT
PERSONAL GROOMING: A LOT
STANDING UP FROM CHAIR USING ARMS: TOTAL
DRESSING REGULAR LOWER BODY CLOTHING: A LITTLE
DRESSING REGULAR LOWER BODY CLOTHING: A LITTLE
TURNING FROM BACK TO SIDE WHILE IN FLAT BAD: A LOT
HELP NEEDED FOR BATHING: A LITTLE
DAILY ACTIVITIY SCORE: 18
WALKING IN HOSPITAL ROOM: TOTAL
DRESSING REGULAR UPPER BODY CLOTHING: A LOT
MOVING TO AND FROM BED TO CHAIR: A LITTLE
WALKING IN HOSPITAL ROOM: A LITTLE
MOVING FROM LYING ON BACK TO SITTING ON SIDE OF FLAT BED WITH BEDRAILS: A LOT
CLIMB 3 TO 5 STEPS WITH RAILING: A LOT
MOVING FROM LYING ON BACK TO SITTING ON SIDE OF FLAT BED WITH BEDRAILS: A LITTLE
TURNING FROM BACK TO SIDE WHILE IN FLAT BAD: A LITTLE
MOVING FROM LYING ON BACK TO SITTING ON SIDE OF FLAT BED WITH BEDRAILS: A LITTLE
PERSONAL GROOMING: A LOT
MOVING TO AND FROM BED TO CHAIR: TOTAL
DAILY ACTIVITIY SCORE: 18
TURNING FROM BACK TO SIDE WHILE IN FLAT BAD: A LITTLE
PERSONAL GROOMING: A LOT
DRESSING REGULAR UPPER BODY CLOTHING: A LITTLE
DRESSING REGULAR UPPER BODY CLOTHING: A LITTLE
TOILETING: A LITTLE
STANDING UP FROM CHAIR USING ARMS: A LITTLE
MOBILITY SCORE: 8
CLIMB 3 TO 5 STEPS WITH RAILING: TOTAL

## 2025-04-13 ASSESSMENT — PAIN SCALES - GENERAL
PAINLEVEL_OUTOF10: 4
PAINLEVEL_OUTOF10: 0 - NO PAIN

## 2025-04-13 ASSESSMENT — ACTIVITIES OF DAILY LIVING (ADL): ADL_ASSISTANCE: NEEDS ASSISTANCE

## 2025-04-13 NOTE — PROGRESS NOTES
Janine Sandoval is a 69 y.o. female on day 1 of admission presenting with Elevated troponin.      Subjective   She's awake, on room air, eating breakfast with her R hand, not moving L arm much. Afebrile. No overnight events reported.       Objective     Last Recorded Vitals  /89 (BP Location: Right arm, Patient Position: Lying)   Pulse 97   Temp 36.3 °C (97.4 °F) (Temporal)   Resp 23   Wt 78.9 kg (173 lb 15.1 oz)   SpO2 95%   Intake/Output last 3 Shifts:    Intake/Output Summary (Last 24 hours) at 4/13/2025 1046  Last data filed at 4/13/2025 0900  Gross per 24 hour   Intake 480 ml   Output --   Net 480 ml       Admission Weight  Weight: 78.9 kg (173 lb 15.1 oz) (04/12/25 0824)    Daily Weight  04/12/25 : 78.9 kg (173 lb 15.1 oz)    Image Results  XR shoulder left 2+ views  Narrative: Interpreted By:  Jony Cortés,   STUDY:  XR SHOULDER LEFT 2+ VIEWS;  4/12/2025 9:51 am      INDICATION:  Signs/Symptoms:fall, shoulder pain.      COMPARISON:  01/11/2025      ACCESSION NUMBER(S):  [id number]      ORDERING CLINICIAN:  JASMINE VERONICA      FINDINGS:  Bony structures:  Intact      Joint spaces:  There is mild-to-moderate osteophytosis at the  glenohumeral joint inferiorly indicating osteoarthritis, similar to  the prior exam. There is also mild osteophytosis at the  acromioclavicular joint.      Soft tissues:  Unremarkable without significant edema or radiodense  foreign body      Other:  None significant      Impression: Mild-to-moderate osteoarthritis similar to the prior exam. Otherwise  no acute findings.      MACRO:  None      Signed by: Jony Cortés 4/12/2025 10:29 AM  Dictation workstation:   [id number]  XR pelvis 1-2 views  Narrative: Interpreted By:  Ba Dowd,   STUDY:  XR PELVIS 1-2 VIEWS 4/12/2025 9:51 am      INDICATION:  Signs/Symptoms:Fall      COMPARISON:  01/11/2025      ACCESSION NUMBER(S):  [id number]      ORDERING CLINICIAN:  JASMINE VERONICA      TECHNIQUE:  Single-view pelvis       FINDINGS:  Osseous pelvis demonstrates intact iliopectineal and ilioischial  lines. Included portions visualized iliac wings are unremarkable. The  SI joints unremarkable. Portions of the sacrum visualized with the  lower sacrum obscured given moderate stool ball in the rectum. There  is moderate right and mild left hip joint osteoarthritis. No definite  fracture demonstrated.      Impression: 1. Unremarkable visualized pelvis within limits of this exam.      Signed by: Ba Dowd 4/12/2025 10:27 AM  Dictation workstation:   [id number]  XR chest 1 view  Narrative: Interpreted By:  Ba Dowd,   STUDY:  XR CHEST 1 VIEW 4/12/2025 9:51 am      INDICATION:  Signs/Symptoms:Fall      COMPARISON:  01/16/2025      ACCESSION NUMBER(S):  [id number]      ORDERING CLINICIAN:  JASMINE VERONICA      TECHNIQUE:  Single AP view chest      FINDINGS:  Cardiac silhouette is upper limits of normal. Examination performed  with lordotic view accentuating the cardiac silhouette. There is a  asymmetric elevation of the right hemidiaphragm. No focal infiltrate  or effusion identified.                  Impression: 1. No acute process.      Signed by: Ba Dowd 4/12/2025 10:26 AM  Dictation workstation:   [id number]  CT chest abdomen pelvis w IV contrast  Narrative: Interpreted By:  Edward Graf,   STUDY:  CT CHEST ABDOMEN PELVIS W IV CONTRAST; ;  4/12/2025 8:30 am      INDICATION:  Signs/Symptoms:Trauma.      COMPARISON:  None.      ACCESSION NUMBER(S):  [id number]      ORDERING CLINICIAN:  JASMINE VERONICA      TECHNIQUE:  Contiguous axial CT sections are performed from the thoracic inlet to  the lesser trochanters following the bolus administration of 75 cc of  intravenous Omnipaque 350. Study is supplemented with coronal and  sagittal reformatted images.      FINDINGS:  The lungs are clear aside from minimal dependent ground-glass density  in the lower lobes greater on the left and some linear atelectasis in  the  left lower lobe.      There is subtle deformity of the lateral left 9th rib for which  nondisplaced fracture can not be excluded. There is no bone  destruction or aggressive periosteal reaction. No lytic or blastic  lesions are detected.      The thoracic aorta is of normal caliber and enhancement. There are  scattered atherosclerotic arterial calcifications of the coronary  arteries. There is no pericardial or pleural effusion. There is no  mediastinal fluid collection or pneumothorax.      The liver, gallbladder, spleen, and adrenal glands are of normal CT  appearance.      The kidneys are symmetric in size and enhance symmetrically. The  kidneys uniformly enhance. The perinephric fat is preserved.      The pancreas is diffusely atrophic with diffuse lobular dilatation of  the pancreatic duct. This appearance has been described in detail on  a previous MRCP from 01/12/2025. The appearance is stable from a  previous CT of 01/08/2025. The surrounding fat planes are preserved.      The abdominal aorta is of normal and uniform caliber. There is no  periaortic mass or fluid collection. The IVC is unremarkable.      There are multiple diverticula in the distal colon. There is  increased stool throughout the colon which is more pronounced in the  rectum. There is otherwise no bowel distension or infiltration of the  bowel mesentery. There is no free air free fluid collection in the  abdomen or pelvis.      There is distention of the urinary bladder which is partially  opacified. There is no bladder wall thickening or surrounding  infiltration.      Impression: Subtle deformity of the lateral left 9th rib the related to motion  though nondisplaced fracture is to be excluded. Correlate to point  tenderness.      Severe pancreatic atrophy and diffuse lobular dilatation of the  pancreatic duct unchanged from the previous CT of 01/08/2025.      Colonic diverticulosis and constipation.      Diffuse distention of the urinary  bladder with partial opacification.  There is no wall thickening.      Atherosclerotic arterial calcifications of the coronary arteries.      Otherwise no acute abnormality of the chest, abdomen, or pelvis.          MACRO:  None      Signed by: Edward Graf 4/12/2025 9:39 AM  Dictation workstation:   [id number]  CT thoracic spine retrospective reconstruction protocol  Narrative: Interpreted By:  Edward Graf,   STUDY:  CT THORACIC SPINE RETROSPECTIVE RECONSTRUCTION PROTOCOL; ;  4/12/2025  8:30 am      INDICATION:  Signs/Symptoms:trauma.      COMPARISON:  None.      ACCESSION NUMBER(S):  [id number]      ORDERING CLINICIAN:  JASMINE VERONICA      TECHNIQUE:  Contiguous axial CT sections are performed through the thoracic spine  is supplemented with coronal and sagittal reformatted images.      FINDINGS:  There is dextroconvexity of the thoracic spine. The thoracic  vertebral body AP alignment is within normal limits. The facet joints  align normally.      The thoracic vertebral body heights are maintained. There is no  evidence of thoracic vertebral fracture. There is no bone destruction  or aggressive periosteal reaction. No lytic or blastic lesion is  detected. The visualized surrounding osseous structures are intact.      There are mild to moderate multilevel discogenic changes of the  thoracic spine with disc space narrowing and small marginal  osteophytes projecting anteriorly. There is no sign of extruded disc.      There is no CT evidence of significant central canal stenosis.      Impression: No acute fracture or subluxation.      Mild to moderate multilevel discogenic degenerative changes of the  thoracic spine with dextro convexity.      No CT evidence of significant central canal stenosis.          MACRO:  None      Signed by: Edward Graf 4/12/2025 9:29 AM  Dictation workstation:   [id number]  CT lumbar spine retrospective reconstruction protocol  Narrative: Interpreted By:   Edward Graf,   STUDY:  CT LUMBAR SPINE RETROSPECTIVE RECONSTRUCTION PROTOCOL; ;  4/12/2025  8:30 am      INDICATION:  Signs/Symptoms:trauma.      COMPARISON:  None.      ACCESSION NUMBER(S):  [id number]      ORDERING CLINICIAN:  JASMINE VERONICA      TECHNIQUE:  Contiguous axial CT sections are performed through the lumbar spine  and supplemented with coronal and sagittal reformatted images.      FINDINGS:  There is levo convexity of the lumbar spine. The lumbar AP alignment  is within normal limits.      There is mild to moderate multilevel discogenic degenerative changes  lumbar spine with disc space narrowing and small marginal  osteophytes. There is multilevel Schmorl's node deformities. There is  no evidence of lumbar vertebral fracture. There is no bone  destruction or abnormal periosteal reaction. No lytic or blastic  lesion is identified.      There is multilevel hypertrophic facet arthrosis which is severe at  the lower lumbar levels with superimposed bulging disc and ligamentum  flavum hypertrophy. There is mild to moderate multilevel central  canal narrowing. There is mild to moderate multilevel neural  foraminal narrowing secondary to disc and osteophyte encroachment.      There is bilateral sacroiliac osteoarthritis. The sacroiliac joints  are patent and symmetric.      Impression: Mild-to-moderate multilevel discogenic degenerative changes in the  lumbar spine with multilevel hypertrophic facet arthrosis, severe in  the lower lumbar levels.      Mild-to-moderate multilevel central canal and neural foraminal  narrowing.      No CT evidence of acute fracture or subluxation.          MACRO:  None      Signed by: Edward Graf 4/12/2025 9:27 AM  Dictation workstation:   [id number]  CT cervical spine wo IV contrast  Narrative: Interpreted By:  Edward Graf,   STUDY:  CT CERVICAL SPINE WO IV CONTRAST; ;  4/12/2025 8:30 am      INDICATION:  Signs/Symptoms:fall.       COMPARISON:  09/16/2024      ACCESSION NUMBER(S):  [id number]      ORDERING CLINICIAN:  JASMINE VERONICA      TECHNIQUE:  Contiguous axial CT sections are performed from the skullbase to the  upper thoracic spine and supplemented with coronal and sagittal  reformatted images.      FINDINGS:  There is dextro convexity of the cervical spine. There is some  reversal of the cervical lordosis. The cervical alignment is  otherwise within normal limits. The facet joints align normally.      There is severe multilevel cervical spondylosis with disc space  narrowing greater at C4-5 and C5-6.      The cervical vertebral body heights are maintained. The C1 ring is  intact. Chronic appearing ossific foci are identified along the  inferior margin of the lateral mass of C1 on the left, stable from  09/16/2024. There is no sign of cervical vertebral fracture. There is  no bone destruction or aggressive periosteal reaction. No lytic or  blastic lesion is detected. The visualized surrounding osseous  structures are intact.      The surrounding soft tissue structures are unremarkable. There is no  prevertebral soft tissue swelling or retropharyngeal air.      There is bulging disc and marginal osteophyte with posterior  ossification at C4-5 and C5-6 at the midline contributing to mild to  moderate central canal narrowing at these levels. There is multilevel  facet and uncovertebral arthrosis with bilateral multilevel neural  foraminal narrowing which is unchanged from the previous study.          Impression: Severe multilevel cervical spondylosis with hypertrophic facet and  uncovertebral arthrosis. There is bulging disc with posterior  ossification at C4-5 and C5-6 at the midline contributing to mild to  moderate central canal narrowing at these levels. This overall  appearance is stable from 09/16/2024.      No CT evidence of acute fracture or subluxation.          MACRO:  None      Signed by: Edward Graf 4/12/2025 9:23  AM  Dictation workstation:   [id number]  CT brain attack angio head and neck W and WO IV contrast  Narrative: Interpreted By:  Eunice Mccabe,   STUDY:  CT BRAIN ATTACK ANGIO HEAD AND NECK W AND WO IV CONTRAST;  4/12/2025  8:04 am      INDICATION:  Signs/Symptoms:Altered mental status, aphasic.          COMPARISON:  Noncontrast CT head from 04/12/2025      ACCESSION NUMBER(S):  [id number]      ORDERING CLINICIAN:  JASMINE VERONICA      TECHNIQUE:  Unenhanced CT images of the head were obtained. Subsequently, 75 ML  of Omnipaque 350 was administered intravenously and axial images of  the head and neck were acquired.  Coronal, sagittal, and 3-D  reconstructions were provided for review.      FINDINGS:          CTA HEAD FINDINGS:      Anterior circulation: Moderate atherosclerotic narrowing of bilateral  carotid siphons is noted with mild narrowing of the cavernous  internal carotid arteries and moderate stenosis of bilateral  ophthalmic and clinoid segments. Bilateral carotid terminus are  unremarkable. Bilateral anterior cerebral arteries are patent with  slightly diminutive caliber of left anterior cerebral artery as  compared to right. Left middle cerebral artery is slightly smaller  and irregular in caliber as compared to right without focal stenosis.  There is mild paucity of left MCA cortical branches as compared to  right which may be a sequela of remote infarct there is fetal origin  of right posterior cerebral artery. Slight irregularity of bilateral  posterior cerebral arteries is noted without focal cut off.      Posterior circulation: Bilateral intradural vertebral arteries are  diminutive in caliber with mild atherosclerotic calcifications and  segmental areas of narrowing. Vertebrobasilar junction and basilar  artery are unremarkable.      CTA NECK FINDINGS: The aortic arch demonstrates 3 vessel branching  pattern. Bilateral common carotid arteries are normal in course and  caliber. There is focal  short-segment stenosis at the right internal  carotid artery origin best seen on sagittal image 61/150 with less  than 50% stenosis. The distal right internal carotid artery in the  neck is diffusely and mildly small in caliber without focal stenosis.      The left internal carotid artery in the neck is also diffusely  smaller in caliber without focal stenosis. The left carotid  bifurcation and internal carotid artery origin are unremarkable.      Mild smooth narrowing of the right vertebral artery V3 segment is  noted. Otherwise bilateral vertebral arteries demonstrate expected  course and caliber with left-sided dominance.      Visualized upper lungs are clear.      Mild diffuse enlargement of bilateral thyroid lobes is noted without  focal abnormality.      There is opacification of right middle ear cavity and mastoid air  cells.      Impression: No evidence of proximal major vessel cut off on CT angiogram of head.  Mildly diminished caliber of left MCA and its branches with paucity  of distal branching may be a sequela of remote infarct.      Focal short-segment narrowing at the left internal carotid artery  origin with less than 50% stenosis as per the NASCET criteria.  Bilateral internal carotid arteries in the neck are mildly narrow in  caliber diffusely throughout the neck without focal superimposed  stenosis. This may be within the range of anatomical variation or may  reflect diffuse mild atherosclerotic narrowing versus diminished flow.      MACRO:  None      Signed by: Eunice Mccabe 4/12/2025 8:28 AM  Dictation workstation:   [id number]  CT brain attack head wo IV contrast  Narrative: Interpreted By:  Cecy Courtney,   STUDY:  CT BRAIN ATTACK HEAD WO IV CONTRAST;  4/12/2025 7:43 am      INDICATION:  Signs/Symptoms:Stroke Evaluation.      COMPARISON:  03/31/2025.      ACCESSION NUMBER(S):  [id number]      ORDERING CLINICIAN:  JASMINE VERONICA      TECHNIQUE:  CT axial images through the Brain were obtained  without contrast.  Sagittal, coronal, and oblique images reconstructed.      All CT exams are performed with 1 or more of the following dose  reduction techniques: Automatic exposure control, adjustment of mA  and/or kv according to patient size, or use of iterative  reconstruction techniques.      FINDINGS:  There is a remote infarct of the left basal ganglia extending into  the left corona radiata/centrum semiovale with encephalomalacia.  There are nonspecific changes of the white matter. There is no acute  intracranial hemorrhage. There is no mass effect. Atherosclerotic  calcifications are noted in the intracranial internal carotid  arteries.      A right mastoid effusion completely opacifies the right mastoid air  cells. There is a right middle ear effusion or inflammatory change.  There is diffuse calvarial thickening with hyperostosis frontalis  interna.. The visualized portions of the orbits are unremarkable.      Impression: 1. No acute mass effect or acute intracranial hemorrhage  2. Remote infarct with encephalomalacia of the left basal ganglia and  left corona radiata/centrum semiovale  3. Diffuse calvarial thickening with hyperostosis frontalis interna.  4. Right mastoid effusion and fluid in the right middle ear cavity  similar to the prior exam.  5. Nonspecific changes of the white matter most likely due to chronic  microvascular disease      MACRO:  Cecy Courtney discussed the significance and urgency of this critical  finding epic secure chat with  JASMINE VERONICA on 4/12/2025 at 8:07 am.  (**-F-**) Findings:  See findings.          Signed by: Cecy Courtney 4/12/2025 8:08 AM  Dictation workstation:   [id number]      Physical Exam  Constitutional:       Appearance: Normal appearance.   Cardiovascular:      Rate and Rhythm: Normal rate and regular rhythm.   Pulmonary:      Effort: Pulmonary effort is normal.      Breath sounds: Normal breath sounds.   Abdominal:      General: Abdomen is flat. Bowel sounds  are normal.      Palpations: Abdomen is soft.   Musculoskeletal:      Cervical back: Normal range of motion.   Skin:     General: Skin is warm.   Neurological:      Mental Status: She is alert.      Comments: Awake, non verbal. Using her R hand to eat and not moving her L much. Difficult to get her to follow commands which appears to be baseline.          Relevant Results        Assessment & Plan  Elevated troponin      4/13:  Discussed with neuro... increase home keppra from 1g daily to 750mg BID. Will f/up any further recs.     She seems to be favoring her RUE even though that is her hemiplegic side. Normally she signs with her LUE but seems to be weak overall... will get MRI brain for further eval. Cont home plavix and statin and f/up any additional neuro recs.     HTN urgency... better... increase home losartan 25 --> 50.     Trops trending down.   Lactate normalized.     Pt/ot    Dc is back to group home assuming she can mobilize independently.           4/12:  Suspect breakthrough seizure... s/p iv keppra in the ED... cont home AEDs, f/up neuro eval.     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 plavix, statin, f/up neuro.      Elevated trops likely secondary to htn urgency... restart home bp meds, monitor trops. Cardio consult if continues to rise.      HypoK... replace.     Bladder distention on CT.... per ED team pt has voided with purwick and no sharma placed.      Pancreatic atrophy on CT... being worked up OP... home creon.   Dysphagia diet... bite size pieces, soft, s/s eval.      ? 9th rib fx... no pain, if genuine it may be related to this or her other recent falls.      Home regimen for chronic conditions  Pt/ot     Dc back to group home tomorrow pending neuro clearance and she ambulatory at baseline with walker.               Nadeem Miller MD

## 2025-04-13 NOTE — SIGNIFICANT EVENT
Discussed with . Patient is at baseline. Adjusting antiseizure medication dose;  Keeping dilantin at previous dose that is Dilantin  mg daily  Keppra 750mg BID (this is an increase, she had been on 1gm once a day).  If dose ok then no additional interventions. Please call if you have questions.  Reviewed notes and neurolgoy note on chart.

## 2025-04-13 NOTE — CARE PLAN
The patient's goals for the shift include gio rest    The clinical goals for the shift include seizure free      Problem: Pain - Adult  Goal: Verbalizes/displays adequate comfort level or baseline comfort level  Outcome: Progressing     Problem: Safety - Adult  Goal: Free from fall injury  Outcome: Progressing     Problem: Discharge Planning  Goal: Discharge to home or other facility with appropriate resources  Outcome: Progressing     Problem: Chronic Conditions and Co-morbidities  Goal: Patient's chronic conditions and co-morbidity symptoms are monitored and maintained or improved  Outcome: Progressing     Problem: Nutrition  Goal: Nutrient intake appropriate for maintaining nutritional needs  Outcome: Progressing     Problem: Skin  Goal: Decreased wound size/increased tissue granulation at next dressing change  Outcome: Progressing

## 2025-04-13 NOTE — PROGRESS NOTES
Occupational Therapy    Evaluation    Patient Name: Janine Sandoval  MRN: [id number]  Department: [facility] A 5  Room: [address] [place] [address]  Today's Date: 4/13/2025  Time Calculation  Start Time: 1118  Stop Time: 1135  Time Calculation (min): 17 min        Assessment:  OT Assessment: Pt presents to occupational therapy evaluation with recent unwitnessed fall. Pt is globally weak, decreased standing balance, and diminished functional status in regards to ADLs and functional mobility. Pt is not at functional baseline and requires skilled OT at MOD Intensity to return group home.  Prognosis: Good  Barriers to Discharge Home: Cognition needs, Caregiver assistance  Caregiver Assistance: Caregiver assistance needed per identified barriers - however, level of patient's required assistance exceeds assistance available at home  Cognition Needs: 24hr supervision for safety awareness needed, Insight of patient limited regarding functional ability/needs, Cognition-related high falls risk  Evaluation/Treatment Tolerance: Patient limited by fatigue  Medical Staff Made Aware: Yes  End of Session Communication: Bedside nurse  End of Session Patient Position: Up in chair, Alarm on  OT Assessment Results: Decreased ADL status, Decreased upper extremity strength, Decreased cognition, Decreased endurance, Decreased functional mobility  Prognosis: Good  Barriers to Discharge: None  Evaluation/Treatment Tolerance: Patient limited by fatigue  Medical Staff Made Aware: Yes  Strengths: Premorbid level of function, Support of Caregivers  Barriers to Participation: Ability to acquire knowledge, Comorbidities  Plan:  Treatment Interventions: ADL retraining, Functional transfer training, UE strengthening/ROM  OT Frequency: 3 times per week  OT Discharge Recommendations: Moderate intensity level of continued care  Equipment Recommended upon Discharge: Wheeled walker  OT Recommended Transfer Status: Maximum assist, Assist of 2  OT - OK to Discharge: Yes (Per  POC)  Treatment Interventions: ADL retraining, Functional transfer training, UE strengthening/ROM    Subjective   Current Problem:  1. Elevated troponin        2. Fall, initial encounter        3. Contusion of left shoulder, initial encounter        4. Seizure (Multi)          General:  General  Reason for Referral: Pt found on floor at [facility] after using bathroom.  Patient with prior ischemic CVA with right-sided deficits, patient with difficulty moving the left upper extremity for EMS.  Per caregivers, patient is normally not this confused.  Per EMS, patient was initially completely unresponsive, by time of my evaluation patient is waking and interacting somewhat but appears to be off her baseline.  Referred By: Dr. Miller  Past Medical History Relevant to Rehab: HTN, stroke, asthma, anemia, congential deafness, cryptogenic stroke, deaf-mutism, hallucinations, hemiparesis affecting dominant side,HLD, epilepsy, frequent falls, mild intellectual disability  Family/Caregiver Present: No  Prior to Session Communication: Bedside nurse  Patient Position Received: Bed, 3 rail up, Alarm on  Preferred Learning Style: verbal, visual  General Comment: Pt uses simple gestures and appears to lip read for communication. Pt agreeable to OOB activities with OT.  Precautions:  Hearing/Visual Limitations: Congential deafness  Medical Precautions: Fall precautions     Date/Time Vitals Session Patient Position Pulse Resp SpO2 BP MAP (mmHg)    04/13/25 1136 --  --  91  19  96 %  146/93  111                 Pain:  Pain Assessment  Pain Assessment: 0-10  0-10 (Numeric) Pain Score: 4  Pain Type: Acute pain  Pain Location:  (R shoulder; observable grimacing)    Objective   Cognition:  Overall Cognitive Status: Unable to assess  Orientation Level: Unable to assess  Attention: Within Functional Limits  Safety/Judgement: Within Functional Limits  Processing Speed: Within funtional limits (Appears to respond appropriately to simple gestures and  lip reading commands)           Home Living:  Type of Home: Group home  Home Adaptive Equipment: Walker rolling or standard  Home Access: No concerns  Home Living Comments: Unable to gather detailed home information as patient has mild intellectual disability and congenital deafness  Prior Function:  Level of [place]: Needs assistance with ADLs, Needs assistance with homemaking, Needs assistance with functional transfers (Amb with rollator and supervision. Assist for ADLs/IADLs.)  ADL Assistance: Needs assistance  Homemaking Assistance: Needs assistance  Ambulatory Assistance:  (Supervision with use of rollator)  IADL History:  Homemaking Responsibilities: No  ADL:  Grooming Assistance: Moderate  UE Dressing Assistance: Maximal  UE Dressing Deficit: Thread RUE, Thread LUE, Pull around back  LE Dressing Assistance: Maximal  LE Dressing Deficit: Don/doff R sock, Don/doff L sock  Activity Tolerance:     Bed Mobility/Transfers: Bed Mobility  Bed Mobility: Yes (Pt performs supine > sit with Mod A. Assist for BLE positioning at EOB.)    Transfers  Transfer: Yes  Transfer 1  Transfer From 1: Bed to  Transfer to 1: Chair with arms  Technique 1: Stand pivot  Transfer Device 1: Walker, Gait belt  Transfer Level of Assistance 1: Maximum assistance  Trials/Comments 1: Retropulsive      Functional Mobility:  Functional Mobility  Functional Mobility Performed: No  Sitting Balance:  Static Sitting Balance  Static Sitting-Balance Support: Bilateral upper extremity supported, Feet supported  Static Sitting-Level of Assistance: Contact guard  Dynamic Sitting Balance  Dynamic Sitting-Balance Support: Bilateral upper extremity supported  Dynamic Sitting-Level of Assistance: Contact guard  Standing Balance:  Static Standing Balance  Static Standing-Balance Support: Bilateral upper extremity supported  Static Standing-Level of Assistance: Maximum assistance   Modalities:     Vision:Vision - Basic Assessment  Current Vision: Wears  glasses all the time  Sensation:  Light Touch: No apparent deficits  Strength:  Strength Comments: Pt appears to have global weakness  Perception:     Coordination:  Movements are Fluid and Coordinated: Yes   Hand Function:         Outcome Measures:[facility] Daily Activity  Putting on and taking off regular lower body clothing: A lot  Bathing (including washing, rinsing, drying): A lot  Putting on and taking off regular upper body clothing: A lot  Toileting, which includes using toilet, bedpan or urinal: A lot  Taking care of personal grooming such as brushing teeth: A lot  Eating Meals: A little  Daily Activity - Total Score: 13        Education Documentation  Precautions, taught by Geovanna Lofton OT at 4/13/2025 12:37 PM.  Learner: Patient  Readiness: Acceptance  Method: Explanation  Response: Verbalizes Understanding    ADL Training, taught by Geovanna Lofton OT at 4/13/2025 12:37 PM.  Learner: Patient  Readiness: Acceptance  Method: Explanation  Response: Verbalizes Understanding    Education Comments  No comments found.        OP EDUCATION:  Education  Individual(s) Educated: Patient  Risk and Benefits Discussed with Patient/Caregiver/Other: yes  Patient/Caregiver Demonstrated Understanding: yes  Plan of Care Discussed and Agreed Upon: yes  Patient Response to Education: Patient/Caregiver Verbalized Understanding of Information    Goals:  Encounter Problems       Encounter Problems (Active)       ADLs       Patient will perform UB and LB bathing with minimal assist  level of assistance and raised toilet seat and grab bars.       Start:  04/13/25    Expected End:  04/27/25            Patient with complete upper body dressing with stand by assist level of assistance donning and doffing all UE clothes with no adaptive equipment while edge of bed        Start:  04/13/25    Expected End:  04/27/25            Patient with complete lower body dressing with minimal assist  level of assistance donning and  doffing all LE clothes  with PRN adaptive equipment while edge of bed        Start:  04/13/25    Expected End:  04/27/25            Patient will complete daily grooming tasks brushing teeth and washing face/hair with stand by assist level of assistance and PRN adaptive equipment while edge of bed .       Start:  04/13/25    Expected End:  04/27/25            Patient will complete toileting including hygiene clothing management/hygiene with minimal assist  level of assistance and raised toilet seat and grab bars.       Start:  04/13/25    Expected End:  04/27/25               MOBILITY       Patient will perform Functional mobility  with minimal assist  level of assistance and least restrictive device in order to improve safety and functional mobility.       Start:  04/13/25    Expected End:  04/27/25               TRANSFERS       Patient will perform bed mobility contact guard assist level of assistance and bed rails in order to improve safety and independence with mobility       Start:  04/13/25    Expected End:  04/27/25            Patient will complete functional transfer to BSC/chair with least restrictive device with minimal assist  level of assistance.       Start:  04/13/25    Expected End:  04/27/25            Patient will complete sit to stand transfer with minimal assist  level of assistance and least restrictive device in order to improve safety and prepare for out of bed mobility.       Start:  04/13/25    Expected End:  04/27/25

## 2025-04-13 NOTE — CARE PLAN
The patient's goals for the shift include  gio rest    The clinical goals for the shift include seizure free      Problem: Pain - Adult  Goal: Verbalizes/displays adequate comfort level or baseline comfort level  Outcome: Progressing

## 2025-04-13 NOTE — PROGRESS NOTES
Physical Therapy    Physical Therapy Evaluation    Patient Name: Janine Sandoval  MRN: [id number]  Department: [place] [address]  Room: [address] [place] [address]  Today's Date: 4/13/2025        Assessment/Plan   PT Assessment  PT Assessment Results: Decreased strength, Decreased endurance, Impaired balance, Decreased mobility, Impaired judgement  Rehab Prognosis: Fair  Barriers to Discharge Home: Caregiver assistance, Cognition needs, Physical needs  Caregiver Assistance: Caregiver assistance needed per identified barriers - however, level of patient's required assistance exceeds assistance available at home  Cognition Needs: 24hr supervision for safety awareness needed  Physical Needs: 24hr mobility assistance needed, High falls risk due to function or environment  Evaluation/Treatment Tolerance: Patient tolerated treatment well  Medical Staff Made Aware: Yes  Strengths: Premorbid level of function  Barriers to Participation: Comorbidities, Ability to acquire knowledge  End of Session Communication: Bedside nurse (present)  End of Session Patient Position: Bed, 3 rail up (RN present)  IP OR SWING BED PT PLAN  Inpatient or Swing Bed: Inpatient  PT Plan  Treatment/Interventions: Bed mobility, Transfer training, Gait training, Balance training, Strengthening, Endurance training, Range of motion, Therapeutic exercise, Therapeutic activity  PT Plan: Ongoing PT  PT Frequency: 3 times per week  PT Discharge Recommendations: Moderate intensity level of continued care  Equipment Recommended upon Discharge: Wheeled walker  PT Recommended Transfer Status: Assist x2  PT - OK to Discharge: Yes (per PT POC)    Subjective   General Visit Information:  General  Reason for Referral: Pt found on floor at [facility] after using bathroom.  Patient with prior ischemic CVA with right-sided deficits, patient with difficulty moving the left upper extremity for EMS.  Per caregivers, patient is normally not this confused.  Per EMS, patient was initially completely  unresponsive, by time of my evaluation patient is waking and interacting somewhat but appears to be off her baseline.  Referred By: Dr. Miller  Past Medical History Relevant to Rehab: HTN, stroke, asthma, anemia, congential deafness, cryptogenic stroke, deaf-mutism, hallucinations, hemiparesis affecting dominant side,HLD, epilepsy, frequent falls, mild intellectual disability  Family/Caregiver Present: Yes (Nurse present to assist pt to bed to clean up after using restroom via bed pan on chair)  Prior to Session Communication: Bedside nurse  Patient Position Received: Bed, 3 rail up (RN present to assist pt with catheter)  Preferred Learning Style: verbal, visual  General Comment: Pt uses simple gestures and appears to lip read for communication. Pt agreeable to activities with PT.  Home Living:  Home Living  Type of Home: Group home  Home Adaptive Equipment: Walker rolling or standard  Home Access: No concerns  Home Living Comments: Unable to gather detailed home information as patient has mild intellectual disability and congenital deafness  Prior Level of Function:  Prior Function Per Pt/Caregiver Report  Level of [place]: Needs assistance with ADLs, Needs assistance with homemaking, Needs assistance with functional transfers  Precautions:  Precautions  Medical Precautions: Fall precautions      Date/Time Vitals Session Patient Position Pulse Resp SpO2 BP MAP (mmHg)    04/13/25 1504 --  --  92  23  97 %  142/86  105           Objective   Pain:  Pain Assessment  Pain Assessment: 0-10  0-10 (Numeric) Pain Score: 0 - No pain  Cognition:  Cognition  Overall Cognitive Status: Unable to assess  Orientation Level: Unable to assess  Attention: Within Functional Limits  Safety/Judgement: Within Functional Limits    General Assessments:  General Observation  General Observation: pt required mod to max assistance to perform functional mobility. pt willing to assist, but demonstrates generalized weakness and pain      Activity Tolerance  Endurance: Tolerates less than 10 min exercise, no significant change in vital signs    Sensation  Light Touch: No apparent deficits    Strength  Strength Comments: pt appears to demonstrate poor to fair gross strength to perform functional mobility  Strength  Strength Comments: pt appears to demonstrate poor to fair gross strength to perform functional mobility           Coordination  Movements are Fluid and Coordinated: Yes    Postural Control  Postural Control: Within Functional Limits    Static Sitting Balance  Static Sitting-Balance Support: No upper extremity supported, Feet supported  Static Sitting-Level of Assistance: Independent  Dynamic Sitting Balance  Dynamic Sitting-Balance Support: No upper extremity supported, Feet supported  Dynamic Sitting-Level of Assistance: Independent    Static Standing Balance  Static Standing-Balance Support: Bilateral upper extremity supported  Static Standing-Level of Assistance: Moderate assistance  Dynamic Standing Balance  Dynamic Standing-Balance Support: Bilateral upper extremity supported  Dynamic Standing-Level of Assistance: Moderate assistance  Functional Assessments:  Bed Mobility  Bed Mobility: Yes  Bed Mobility 1  Bed Mobility 1: Sitting to supine, Rolling left  Level of Assistance 1: Moderate verbal cues, Moderate tactile cues, Moderate assistance, +2  Bed Mobility Comments 1: pt required modAx1 to help guide other leg up onto bed; pt able to slowly descend trunk to HOB; pt required modAx2 to roll over and boost while helping bathe from using restroom    Transfers  Transfer: Yes  Transfer 1  Transfer From 1: Chair with arms to  Transfer to 1: Bed  Technique 1: Stand pivot  Transfer Device 1:  (hands and arms under armpits)  Transfer Level of Assistance 1: Maximum assistance (pt attempted to help step to EOB but was weak; Max Ax1 still utilized)  Trials/Comments 1: pt was willing to have therapy help back to EOB; no retropulsion  reported    Ambulation/Gait Training  Ambulation/Gait Training Performed: No    Stairs  Stairs: No  Extremity/Trunk Assessments:  RUE   RUE :  (Appears hypotonic; 2/5)  LUE   LUE:  (Grossly 3/5)  RLE   RLE :  (Grossly 3-/5)  LLE   LLE :  (Grossly 3-/5)  Outcome Measures:  [facility] Basic Mobility  Turning from your back to your side while in a flat bed without using bedrails: A lot  Moving from lying on your back to sitting on the side of a flat bed without using bedrails: A lot  Moving to and from bed to chair (including a wheelchair): Total  Standing up from a chair using your arms (e.g. wheelchair or bedside chair): Total  To walk in hospital room: Total  Climbing 3-5 steps with railing: Total  Basic Mobility - Total Score: 8    Encounter Problems       Encounter Problems (Active)       Balance       STG - Maintains static standing balance with FWW and ModAx2       Start:  04/13/25    Expected End:  04/27/25               PT Transfers       STG - Patient to transfer to and from sit to supine with ModAx1       Start:  04/13/25    Expected End:  04/27/25            STG - Patient will perform bed mobility with ModAx1       Start:  04/13/25    Expected End:  04/27/25               Pain - Adult              Education Documentation  Body Mechanics, taught by Tamika Hall PT at 4/13/2025  4:36 PM.  Learner: Patient  Readiness: Acceptance  Method: Explanation  Response: Verbalizes Understanding    Mobility Training, taught by Tamika Hall PT at 4/13/2025  4:36 PM.  Learner: Patient  Readiness: Acceptance  Method: Explanation  Response: Verbalizes Understanding    Education Comments  No comments found.

## 2025-04-13 NOTE — PROGRESS NOTES
4/13/2025 2:17 PM I spoke with Audrey Ravi who owns the [facility] Audrey said the patient has multiple personality disorder. She has a psychiatrist at [facility]. Patient does not have a legal guardian.  Group home can provide one to one care. Patient's sister is medical power of . Kristin Hoffmann will need to be updated on PT and OT notes to determine if patient can return to the group home. Jessi Mackey [facility]

## 2025-04-14 ENCOUNTER — APPOINTMENT (OUTPATIENT)
Dept: RADIOLOGY | Facility: HOSPITAL | Age: 70
DRG: 101 | End: 2025-04-14
Payer: MEDICARE

## 2025-04-14 LAB
ANION GAP SERPL CALC-SCNC: 10 MMOL/L (ref 10–20)
ATRIAL RATE: 101 BPM
ATRIAL RATE: 106 BPM
BUN SERPL-MCNC: 16 MG/DL (ref 6–23)
CALCIUM SERPL-MCNC: 8.8 MG/DL (ref 8.6–10.3)
CHLORIDE SERPL-SCNC: 104 MMOL/L (ref 98–107)
CO2 SERPL-SCNC: 27 MMOL/L (ref 21–32)
CREAT SERPL-MCNC: 0.78 MG/DL (ref 0.5–1.05)
EGFRCR SERPLBLD CKD-EPI 2021: 82 ML/MIN/1.73M*2
ERYTHROCYTE [DISTWIDTH] IN BLOOD BY AUTOMATED COUNT: 11.9 % (ref 11.5–14.5)
GLUCOSE SERPL-MCNC: 148 MG/DL (ref 74–99)
HCT VFR BLD AUTO: 34.7 % (ref 36–46)
HGB BLD-MCNC: 11.3 G/DL (ref 12–16)
MCH RBC QN AUTO: 30.2 PG (ref 26–34)
MCHC RBC AUTO-ENTMCNC: 32.6 G/DL (ref 32–36)
MCV RBC AUTO: 93 FL (ref 80–100)
NRBC BLD-RTO: 0 /100 WBCS (ref 0–0)
P AXIS: 56 DEGREES
P AXIS: 60 DEGREES
P OFFSET: 186 MS
P OFFSET: 191 MS
P ONSET: 125 MS
P ONSET: 132 MS
PLATELET # BLD AUTO: 174 X10*3/UL (ref 150–450)
POTASSIUM SERPL-SCNC: 3.7 MMOL/L (ref 3.5–5.3)
PR INTERVAL: 178 MS
PR INTERVAL: 190 MS
Q ONSET: 220 MS
Q ONSET: 221 MS
QRS COUNT: 16 BEATS
QRS COUNT: 18 BEATS
QRS DURATION: 102 MS
QRS DURATION: 104 MS
QT INTERVAL: 348 MS
QT INTERVAL: 380 MS
QTC CALCULATION(BAZETT): 462 MS
QTC CALCULATION(BAZETT): 492 MS
QTC FREDERICIA: 420 MS
QTC FREDERICIA: 452 MS
R AXIS: 22 DEGREES
R AXIS: 60 DEGREES
RBC # BLD AUTO: 3.74 X10*6/UL (ref 4–5.2)
SODIUM SERPL-SCNC: 137 MMOL/L (ref 136–145)
T AXIS: 24 DEGREES
T AXIS: 46 DEGREES
T OFFSET: 395 MS
T OFFSET: 410 MS
VENTRICULAR RATE: 101 BPM
VENTRICULAR RATE: 106 BPM
WBC # BLD AUTO: 6.4 X10*3/UL (ref 4.4–11.3)

## 2025-04-14 PROCEDURE — 2500000004 HC RX 250 GENERAL PHARMACY W/ HCPCS (ALT 636 FOR OP/ED): Performed by: INTERNAL MEDICINE

## 2025-04-14 PROCEDURE — 1200000002 HC GENERAL ROOM WITH TELEMETRY DAILY

## 2025-04-14 PROCEDURE — 2500000001 HC RX 250 WO HCPCS SELF ADMINISTERED DRUGS (ALT 637 FOR MEDICARE OP): Performed by: INTERNAL MEDICINE

## 2025-04-14 PROCEDURE — 97530 THERAPEUTIC ACTIVITIES: CPT | Mod: GP,CQ

## 2025-04-14 PROCEDURE — 70551 MRI BRAIN STEM W/O DYE: CPT

## 2025-04-14 PROCEDURE — 97116 GAIT TRAINING THERAPY: CPT | Mod: GP,CQ

## 2025-04-14 PROCEDURE — 80048 BASIC METABOLIC PNL TOTAL CA: CPT | Performed by: INTERNAL MEDICINE

## 2025-04-14 PROCEDURE — 2500000005 HC RX 250 GENERAL PHARMACY W/O HCPCS: Performed by: INTERNAL MEDICINE

## 2025-04-14 PROCEDURE — 99232 SBSQ HOSP IP/OBS MODERATE 35: CPT | Performed by: INTERNAL MEDICINE

## 2025-04-14 PROCEDURE — 85027 COMPLETE CBC AUTOMATED: CPT | Performed by: INTERNAL MEDICINE

## 2025-04-14 PROCEDURE — 92610 EVALUATE SWALLOWING FUNCTION: CPT | Mod: GN

## 2025-04-14 PROCEDURE — 2500000002 HC RX 250 W HCPCS SELF ADMINISTERED DRUGS (ALT 637 FOR MEDICARE OP, ALT 636 FOR OP/ED): Performed by: INTERNAL MEDICINE

## 2025-04-14 PROCEDURE — 36415 COLL VENOUS BLD VENIPUNCTURE: CPT | Performed by: INTERNAL MEDICINE

## 2025-04-14 RX ORDER — HALOPERIDOL LACTATE 5 MG/ML
5 INJECTION, SOLUTION INTRAMUSCULAR ONCE
Status: COMPLETED | OUTPATIENT
Start: 2025-04-14 | End: 2025-04-14

## 2025-04-14 RX ADMIN — FAMOTIDINE 40 MG: 20 TABLET, FILM COATED ORAL at 08:31

## 2025-04-14 RX ADMIN — ACETAMINOPHEN 975 MG: 325 TABLET, FILM COATED ORAL at 13:26

## 2025-04-14 RX ADMIN — POLYETHYLENE GLYCOL 3350 17 G: 17 POWDER, FOR SOLUTION ORAL at 08:31

## 2025-04-14 RX ADMIN — PHENYTOIN SODIUM 300 MG: 100 CAPSULE, EXTENDED RELEASE ORAL at 22:00

## 2025-04-14 RX ADMIN — PANCRELIPASE 2 CAPSULE: 15000; 3000; 9500 CAPSULE, DELAYED RELEASE ORAL at 06:15

## 2025-04-14 RX ADMIN — LOSARTAN POTASSIUM 50 MG: 50 TABLET, FILM COATED ORAL at 08:31

## 2025-04-14 RX ADMIN — QUETIAPINE FUMARATE 25 MG: 25 TABLET ORAL at 08:31

## 2025-04-14 RX ADMIN — SERTRALINE 100 MG: 50 TABLET, FILM COATED ORAL at 08:31

## 2025-04-14 RX ADMIN — QUETIAPINE FUMARATE 25 MG: 25 TABLET ORAL at 16:03

## 2025-04-14 RX ADMIN — HALOPERIDOL LACTATE 5 MG: 5 INJECTION, SOLUTION INTRAMUSCULAR at 20:00

## 2025-04-14 RX ADMIN — Medication 6 MG: at 21:59

## 2025-04-14 RX ADMIN — PANCRELIPASE 2 CAPSULE: 15000; 3000; 9500 CAPSULE, DELAYED RELEASE ORAL at 10:57

## 2025-04-14 RX ADMIN — LEVETIRACETAM 750 MG: 250 TABLET, FILM COATED ORAL at 08:30

## 2025-04-14 RX ADMIN — LEVETIRACETAM 750 MG: 250 TABLET, FILM COATED ORAL at 21:59

## 2025-04-14 RX ADMIN — PANCRELIPASE 2 CAPSULE: 15000; 3000; 9500 CAPSULE, DELAYED RELEASE ORAL at 16:03

## 2025-04-14 RX ADMIN — CLOPIDOGREL 75 MG: 75 TABLET ORAL at 08:30

## 2025-04-14 RX ADMIN — QUETIAPINE FUMARATE 200 MG: 100 TABLET ORAL at 21:59

## 2025-04-14 RX ADMIN — ENOXAPARIN SODIUM 40 MG: 40 INJECTION SUBCUTANEOUS at 22:01

## 2025-04-14 RX ADMIN — ATORVASTATIN CALCIUM 80 MG: 80 TABLET, FILM COATED ORAL at 22:13

## 2025-04-14 RX ADMIN — Medication 50 MCG: at 08:31

## 2025-04-14 ASSESSMENT — COGNITIVE AND FUNCTIONAL STATUS - GENERAL
MOBILITY SCORE: 13
MOBILITY SCORE: 17
HELP NEEDED FOR BATHING: A LITTLE
DRESSING REGULAR LOWER BODY CLOTHING: A LITTLE
TOILETING: A LITTLE
MOVING FROM LYING ON BACK TO SITTING ON SIDE OF FLAT BED WITH BEDRAILS: A LITTLE
TOILETING: A LITTLE
PERSONAL GROOMING: A LOT
WALKING IN HOSPITAL ROOM: A LITTLE
CLIMB 3 TO 5 STEPS WITH RAILING: A LOT
DRESSING REGULAR LOWER BODY CLOTHING: A LITTLE
MOVING FROM LYING ON BACK TO SITTING ON SIDE OF FLAT BED WITH BEDRAILS: A LITTLE
PERSONAL GROOMING: A LOT
TURNING FROM BACK TO SIDE WHILE IN FLAT BAD: A LITTLE
STANDING UP FROM CHAIR USING ARMS: A LITTLE
MOVING TO AND FROM BED TO CHAIR: A LITTLE
STANDING UP FROM CHAIR USING ARMS: A LOT
MOVING FROM LYING ON BACK TO SITTING ON SIDE OF FLAT BED WITH BEDRAILS: A LOT
TURNING FROM BACK TO SIDE WHILE IN FLAT BAD: A LOT
DRESSING REGULAR UPPER BODY CLOTHING: A LITTLE
CLIMB 3 TO 5 STEPS WITH RAILING: A LOT
WALKING IN HOSPITAL ROOM: A LITTLE
TURNING FROM BACK TO SIDE WHILE IN FLAT BAD: A LITTLE
DAILY ACTIVITIY SCORE: 18
MOVING TO AND FROM BED TO CHAIR: A LITTLE
MOVING TO AND FROM BED TO CHAIR: A LITTLE
WALKING IN HOSPITAL ROOM: A LITTLE
CLIMB 3 TO 5 STEPS WITH RAILING: TOTAL
STANDING UP FROM CHAIR USING ARMS: A LITTLE
HELP NEEDED FOR BATHING: A LITTLE
DAILY ACTIVITIY SCORE: 18
DRESSING REGULAR UPPER BODY CLOTHING: A LITTLE
MOBILITY SCORE: 17

## 2025-04-14 ASSESSMENT — PAIN - FUNCTIONAL ASSESSMENT
PAIN_FUNCTIONAL_ASSESSMENT: 0-10
PAIN_FUNCTIONAL_ASSESSMENT: 0-10
PAIN_FUNCTIONAL_ASSESSMENT: WONG-BAKER FACES
PAIN_FUNCTIONAL_ASSESSMENT: 0-10

## 2025-04-14 ASSESSMENT — PAIN DESCRIPTION - DESCRIPTORS: DESCRIPTORS: ACHING

## 2025-04-14 ASSESSMENT — ACTIVITIES OF DAILY LIVING (ADL): LACK_OF_TRANSPORTATION: PATIENT UNABLE TO ANSWER

## 2025-04-14 ASSESSMENT — PAIN SCALES - PAIN ASSESSMENT IN ADVANCED DEMENTIA (PAINAD)
BODYLANGUAGE: RELAXED
TOTALSCORE: 0
BREATHING: NORMAL
CONSOLABILITY: NO NEED TO CONSOLE
FACIALEXPRESSION: SMILING OR INEXPRESSIVE

## 2025-04-14 ASSESSMENT — PAIN DESCRIPTION - ORIENTATION: ORIENTATION: RIGHT

## 2025-04-14 ASSESSMENT — PAIN DESCRIPTION - LOCATION: LOCATION: SHOULDER

## 2025-04-14 ASSESSMENT — PAIN SCALES - GENERAL
PAINLEVEL_OUTOF10: 5 - MODERATE PAIN
PAINLEVEL_OUTOF10: 0 - NO PAIN
PAINLEVEL_OUTOF10: 5 - MODERATE PAIN
PAINLEVEL_OUTOF10: 0 - NO PAIN

## 2025-04-14 ASSESSMENT — PAIN SCALES - WONG BAKER: WONGBAKER_NUMERICALRESPONSE: HURTS LITTLE MORE

## 2025-04-14 NOTE — PROGRESS NOTES
"Janine Sandoval is a 69 y.o. female on day 2 of admission presenting with Elevated troponin.    Subjective   No acute events overnight. Per sister at bedside she is not her usual self though she also notes that there have been some changes to her Psych meds recently which may be causing this. Awaiting MRI        Objective     VITALS  Blood pressure 138/83, pulse 94, temperature 36.6 °C (97.9 °F), temperature source Temporal, resp. rate 19, height 1.6 m (5' 3\"), weight 78.9 kg (173 lb 15.1 oz), SpO2 97%.  Physical Exam  Constitutional:       Appearance: Normal appearance.   Cardiovascular:      Rate and Rhythm: Normal rate and regular rhythm.   Pulmonary:      Effort: Pulmonary effort is normal.      Breath sounds: Normal breath sounds.   Abdominal:      General: Abdomen is flat. Bowel sounds are normal.      Palpations: Abdomen is soft.   Musculoskeletal:      Cervical back: Normal range of motion.   Skin:     General: Skin is warm.   Neurological:      Mental Status: She is alert.      Comments: Awake, non verbal. Using her R hand to eat and not moving her L much. Difficult to get her to follow commands which appears to be baseline.            Intake/Output last 3 Shifts:  I/O last 3 completed shifts:  In: 1778 (22.5 mL/kg) [P.O.:1778]  Out: 1900 (24.1 mL/kg) [Urine:1900 (0.7 mL/kg/hr)]  Weight: 78.9 kg     Relevant Results  Results for orders placed or performed during the hospital encounter of 04/12/25 (from the past 24 hours)   Basic Metabolic Panel   Result Value Ref Range    Glucose 148 (H) 74 - 99 mg/dL    Sodium 137 136 - 145 mmol/L    Potassium 3.7 3.5 - 5.3 mmol/L    Chloride 104 98 - 107 mmol/L    Bicarbonate 27 21 - 32 mmol/L    Anion Gap 10 10 - 20 mmol/L    Urea Nitrogen 16 6 - 23 mg/dL    Creatinine 0.78 0.50 - 1.05 mg/dL    eGFR 82 >60 mL/min/1.73m*2    Calcium 8.8 8.6 - 10.3 mg/dL   CBC   Result Value Ref Range    WBC 6.4 4.4 - 11.3 x10*3/uL    nRBC 0.0 0.0 - 0.0 /100 WBCs    RBC 3.74 (L) 4.00 - " 5.20 x10*6/uL    Hemoglobin 11.3 (L) 12.0 - 16.0 g/dL    Hematocrit 34.7 (L) 36.0 - 46.0 %    MCV 93 80 - 100 fL    MCH 30.2 26.0 - 34.0 pg    MCHC 32.6 32.0 - 36.0 g/dL    RDW 11.9 11.5 - 14.5 %    Platelets 174 150 - 450 x10*3/uL       Imaging Results  XR pelvis 1-2 views   Final Result   1. Unremarkable visualized pelvis within limits of this exam.        Signed by: Ba Dowd 4/12/2025 10:27 AM   Dictation workstation:   [id number]      XR chest 1 view   Final Result   1. No acute process.        Signed by: Ba Dowd 4/12/2025 10:26 AM   Dictation workstation:   [id number]      XR shoulder left 2+ views   Final Result   Mild-to-moderate osteoarthritis similar to the prior exam. Otherwise   no acute findings.        MACRO:   None        Signed by: Jony Cortés 4/12/2025 10:29 AM   Dictation workstation:   [id number]      CT cervical spine wo IV contrast   Final Result   Severe multilevel cervical spondylosis with hypertrophic facet and   uncovertebral arthrosis. There is bulging disc with posterior   ossification at C4-5 and C5-6 at the midline contributing to mild to   moderate central canal narrowing at these levels. This overall   appearance is stable from 09/16/2024.        No CT evidence of acute fracture or subluxation.             MACRO:   None        Signed by: Edward Graf 4/12/2025 9:23 AM   Dictation workstation:   [id number]      CT chest abdomen pelvis w IV contrast   Final Result   Subtle deformity of the lateral left 9th rib the related to motion   though nondisplaced fracture is to be excluded. Correlate to point   tenderness.        Severe pancreatic atrophy and diffuse lobular dilatation of the   pancreatic duct unchanged from the previous CT of 01/08/2025.        Colonic diverticulosis and constipation.        Diffuse distention of the urinary bladder with partial opacification.   There is no wall thickening.        Atherosclerotic arterial calcifications of the  coronary arteries.        Otherwise no acute abnormality of the chest, abdomen, or pelvis.             MACRO:   None        Signed by: Edward Graf 4/12/2025 9:39 AM   Dictation workstation:   [id number]      CT lumbar spine retrospective reconstruction protocol   Final Result   Mild-to-moderate multilevel discogenic degenerative changes in the   lumbar spine with multilevel hypertrophic facet arthrosis, severe in   the lower lumbar levels.        Mild-to-moderate multilevel central canal and neural foraminal   narrowing.        No CT evidence of acute fracture or subluxation.             MACRO:   None        Signed by: Edward Graf 4/12/2025 9:27 AM   Dictation workstation:   [id number]      CT thoracic spine retrospective reconstruction protocol   Final Result   No acute fracture or subluxation.        Mild to moderate multilevel discogenic degenerative changes of the   thoracic spine with dextro convexity.        No CT evidence of significant central canal stenosis.             MACRO:   None        Signed by: Edward Graf 4/12/2025 9:29 AM   Dictation workstation:   [id number]      CT brain attack angio head and neck W and WO IV contrast   Final Result   No evidence of proximal major vessel cut off on CT angiogram of head.   Mildly diminished caliber of left MCA and its branches with paucity   of distal branching may be a sequela of remote infarct.        Focal short-segment narrowing at the left internal carotid artery   origin with less than 50% stenosis as per the NASCET criteria.   Bilateral internal carotid arteries in the neck are mildly narrow in   caliber diffusely throughout the neck without focal superimposed   stenosis. This may be within the range of anatomical variation or may   reflect diffuse mild atherosclerotic narrowing versus diminished flow.        MACRO:   None        Signed by: Eunice Mccabe 4/12/2025 8:28 AM   Dictation workstation:   [id number]      CT brain  attack head wo IV contrast   Final Result   1. No acute mass effect or acute intracranial hemorrhage   2. Remote infarct with encephalomalacia of the left basal ganglia and   left corona radiata/centrum semiovale   3. Diffuse calvarial thickening with hyperostosis frontalis interna.   4. Right mastoid effusion and fluid in the right middle ear cavity   similar to the prior exam.   5. Nonspecific changes of the white matter most likely due to chronic   microvascular disease        MACRO:   Cecy Courtney discussed the significance and urgency of this critical   finding epic secure chat with  JASMINE VERONICA on 4/12/2025 at 8:07 am.   (**-RCF-**) Findings:  See findings.             Signed by: Cecy Courtney 4/12/2025 8:08 AM   Dictation workstation:   [id number]      MR brain wo IV contrast    (Results Pending)       Medications:  atorvastatin, 80 mg, oral, Nightly  cholecalciferol, 50 mcg, oral, Daily  clopidogrel, 75 mg, oral, Daily  enoxaparin, 40 mg, subcutaneous, q24h  famotidine, 40 mg, oral, q AM  levETIRAcetam, 750 mg, oral, BID  lipase-protease-amylase, 2 capsule, oral, TID AC  losartan, 50 mg, oral, Daily  melatonin, 6 mg, oral, Nightly  phenytoin ER, 300 mg, oral, Nightly  polyethylene glycol, 17 g, oral, Daily  QUEtiapine, 200 mg, oral, Nightly  QUEtiapine, 25 mg, oral, BID  sertraline, 100 mg, oral, Daily       PRN medications: acetaminophen, albuterol        Assessment/Plan          Assessment & Plan  Elevated troponin  Suspect breakthrough seizure  -s/p iv keppra in the ED  -cont home AEDs, increase Keppra dose   -appreciate neuro recs      Hx L CVA, 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   -Awaiting MRI brain to rule out any new stroke in the setting of not being at baseline per sister      Elevated trops likely secondary to htn urgency  -trended down  -BP appropriate      HypoK... replace.     Bladder distention on CT  -per ED team pt has voided with purwick and no sharma  placed.      Pancreatic atrophy on CT  -being worked up OP  -home creon.     Dysphagia diet  -bite size pieces, soft, s/s eval.      ? 9th rib fx  -no pain, if genuine it may be related to this or her other recent falls.      Home regimen for chronic conditions  Pt/ot         DVT Prophylaxis:  Lovenox subq    Disposition:  Anticipate family would like her at a facility with a bit higher acuity. Discussed with TCC    Jose Barone, [facility]

## 2025-04-14 NOTE — CARE PLAN
The clinical goals for the shift include patient's safety and comfort will be maintained throughout this shift.      Problem: Pain - Adult  Goal: Verbalizes/displays adequate comfort level or baseline comfort level  Outcome: Progressing     Problem: Safety - Adult  Goal: Free from fall injury  Outcome: Progressing     Problem: Discharge Planning  Goal: Discharge to home or other facility with appropriate resources  Outcome: Progressing     Problem: Chronic Conditions and Co-morbidities  Goal: Patient's chronic conditions and co-morbidity symptoms are monitored and maintained or improved  Outcome: Progressing     Problem: Nutrition  Goal: Nutrient intake appropriate for maintaining nutritional needs  Outcome: Progressing     Problem: Skin  Goal: Decreased wound size/increased tissue granulation at next dressing change  Outcome: Progressing  Goal: Participates in plan/prevention/treatment measures  Outcome: Progressing  Goal: Prevent/manage excess moisture  Outcome: Progressing  Flowsheets (Taken 4/13/2025 2222)  Prevent/manage excess moisture:   Cleanse incontinence/protect with barrier cream   Follow provider orders for dressing changes   Moisturize dry skin   Monitor for/manage infection if present  Goal: Prevent/minimize sheer/friction injuries  Outcome: Progressing  Goal: Promote/optimize nutrition  Outcome: Progressing  Goal: Promote skin healing  Outcome: Progressing

## 2025-04-14 NOTE — CARE PLAN
The patient's goals for the shift include pt remain safe and free of falls    The clinical goals for the shift include pt remain free of respiratory distress    Problem: Pain - Adult  Goal: Verbalizes/displays adequate comfort level or baseline comfort level  Outcome: Progressing     Problem: Safety - Adult  Goal: Free from fall injury  Outcome: Progressing     Problem: Discharge Planning  Goal: Discharge to home or other facility with appropriate resources  Outcome: Progressing     Problem: Chronic Conditions and Co-morbidities  Goal: Patient's chronic conditions and co-morbidity symptoms are monitored and maintained or improved  Outcome: Progressing     Problem: Nutrition  Goal: Nutrient intake appropriate for maintaining nutritional needs  Outcome: Progressing     Problem: Skin  Goal: Decreased wound size/increased tissue granulation at next dressing change  Outcome: Progressing  Flowsheets (Taken 4/14/2025 1001)  Decreased wound size/increased tissue granulation at next dressing change: Protective dressings over bony prominences  Goal: Participates in plan/prevention/treatment measures  Outcome: Progressing  Flowsheets (Taken 4/14/2025 1001)  Participates in plan/prevention/treatment measures: Elevate heels  Goal: Prevent/manage excess moisture  Outcome: Progressing  Flowsheets (Taken 4/14/2025 1001)  Prevent/manage excess moisture: Moisturize dry skin  Goal: Prevent/minimize sheer/friction injuries  Outcome: Progressing  Goal: Promote/optimize nutrition  Outcome: Progressing  Goal: Promote skin healing  Outcome: Progressing

## 2025-04-14 NOTE — PROGRESS NOTES
04/14/25 1003   Discharge Planning   Living Arrangements Other (Comment)  (Group Home)   Support Systems /;Family members   Assistance Needed ADLs   Type of Residence long-term   Care Facility Name [facility]   Expected Discharge Disposition SNF  ([facility])   Does the patient need discharge transport arranged? Yes   RoundTrip coordination needed? Yes   Has discharge transport been arranged? No   Financial Resource Strain   How hard is it for you to pay for the very basics like food, housing, medical care, and heating? Pt Unable   Housing Stability   In the last 12 months, was there a time when you were not able to pay the mortgage or rent on time? Pt Unable   At any time in the past 12 months, were you homeless or living in a shelter (including now)? Pt Unable   Transportation Needs   In the past 12 months, has lack of transportation kept you from medical appointments or from getting medications? Pt Unable   In the past 12 months, has lack of transportation kept you from meetings, work, or from getting things needed for daily living? Pt Unable   Intensity of Service   Intensity of Service 0-30 min     TCC met with pt at bedside. Explained my role as discharge planner. POA wants placement. Foc is [facility].

## 2025-04-14 NOTE — PROGRESS NOTES
Speech-Language Pathology    SLP Adult Inpatient Speech-Language Pathology Clinical Swallow Evaluation    Patient Name: Janine Sandoval  MRN: [id number]  Today's Date: 4/14/2025   Time Calculation  Start Time: 1152  Stop Time: 1211  Time Calculation (min): 19 min         Current Problem:   1. Elevated troponin        2. Fall, initial encounter        3. Contusion of left shoulder, initial encounter        4. Seizure (Multi)              Recommendations:  Risk for Aspiration: No  Solid Diet Recommendations : Regular (IDDSI Level 7)  Liquid Diet Recommendations: Thin (IDDSI Level 0)  Compensatory Swallowing Strategies: Upright 90 degrees as possible for all oral intake, Decrease distractions during eating/feeding, Small bites/sips, Eat/feed slowly  Medication Administration Recommendations: Whole, With Liquid      Assessment:  Treatment Provided: No      Plan:  SLP Plan: No skilled SLP  No Skilled SLP: At baseline function  Diet Recommendations: Solid, Liquid  Solid Consistency: Regular (IDDSI Level 7)  Liquid Consistency: Thin (IDDSI Level 0)  SLP - OK to Discharge: Yes      Subjective   Current Problem:  Patient was admitted with a dx of elevated troponin. The swallow evaluation was conducted to identify deficits in swallowing function, as the patient is not acting at baseline. She has a history of CVA, is non verbal, and signs.       General Visit Information:  Patient Class: Inpatient  Living Environment: Group home  Ordering Physician: Lizabeth  Reason for Referral: dysphagia assessment  Past Medical History Relevant to Rehab: CVA, hearing loss, COPD, non-vebal, GERD, epilepsy, behavior disorder  Prior Level of Function: Decreased function  Patient Seen During This Visit: Yes  Date of Onset: 04/12/25  Date of Order: 04/12/25  [employer] Diet: regular/thin  Current Diet : regular/thin      Objective   Patient awake, alert, able to follow some commands with SLP modeling. Minimal attempts to sign/communicate with this  SLP.   Lingual and labial skills appear to be WFL. Patient with own dentition, some missing.   Patient could not understand to drink three ounces continuously but completed multiple straw sips without overt signs of penetration or aspiration.  Oral and pharyngeal stages of swallowing also appear to be WFL for regular consistency foods.   SLP recommends continuation of current diet, use of swallow strategies.  No further ST intervention is warranted at this time.  Nursing to re consult ST if patient demonstrates changes in swallowing function.    Pain:  Pain Assessment  Pain Assessment: 0-10  0-10 (Numeric) Pain Score: 0 - No pain    Oral/Motor Assessment:  Oral Hygiene: clean and clear  Dentition: Adequate/Natural, Some Missing Teeth  Oral Motor: Within Functional Limits      Consistencies Trialed:  Consistencies Trialed: Yes  Consistencies Trialed: Thin (IDDSI Level 0) - Straw, Pureed/extremely thick (IDDSI Level 4), Regular (IDDSI Level 7)      Clinical Observations:  Patient Positioning: Upright in Bed  Management of Oral Secretions: Adequate  Was The 3 oz Swallow Protocol Completed: No

## 2025-04-14 NOTE — PROGRESS NOTES
04/14/25 0959   Discharge Planning   Expected Discharge Disposition SNF     TCC met with pt and POA at beside. [facility] is foc and can accept. Needs another midnight. Adod tomorrow.   [FreeTextEntry1] : 44 year old right handed female patient w/pmh of syncope and hx of covid presents for a follow up visit.\par \par Patient says her right side still giving her hard time; right leg sometimes gets painful (not as often) and right arm as well. Sometimes she has days that she can open things or not. \par \par She went back to work in August- part time to keep her memory sharp since she says she still has fogginess. Memory is improved she reports. \par \par Patient is sleeping well- stopped mirtazapine in August. She says after a while she had noticed it made her feel disturbed. She takes clonidipine which helps her sleep; says not all the time she takes it. \par \par She is still doing PT- says it helped her with her balance; she says however, she is still veering to the right sometimes but can catch herself. She says her walking has improved; and denies dizzy spells. \par \par She recently went to ENT- they noted she had inflammation of throat ; they referred her to sleep study and they noted she has sleep apnea. She is told to sleep on her stomach and they say this is COVID related.  \par \par She says her mood is fine and describes [place].

## 2025-04-14 NOTE — PROGRESS NOTES
Physical Therapy    Physical Therapy Treatment    Patient Name: Janine Sandoval  MRN: [id number]  Department: [place] [address]  Room: [address] [place] [address]  Today's Date: 4/14/2025  Time Calculation  Start Time: 1454  Stop Time: 1534  Time Calculation (min): 40 min         Assessment/Plan   PT Assessment  Rehab Prognosis: Fair  Barriers to Discharge Home: Caregiver assistance, Cognition needs, Physical needs  Caregiver Assistance: Caregiver assistance needed per identified barriers - however, level of patient's required assistance exceeds assistance available at home  Cognition Needs: 24hr supervision for safety awareness needed  Physical Needs: 24hr mobility assistance needed, High falls risk due to function or environment  End of Session Communication: Bedside nurse, PCT/NA/CTA  Assessment Comment: Pt requires physical assist for all mobility, showing progress with each transfer and ambuation trial with decreased physical assist required. Pt requires increased time for all tasks.  End of Session Patient Position: Bed, 3 rail up, Alarm on (transport coming for MRI)  PT Plan  Inpatient/Swing Bed or Outpatient: Inpatient  PT Plan  Treatment/Interventions: Bed mobility, Transfer training, Gait training  PT Plan: Ongoing PT  PT Frequency: 3 times per week  PT Discharge Recommendations: Moderate intensity level of continued care  Equipment Recommended upon Discharge: Wheeled walker  PT Recommended Transfer Status: Assist x1  PT - OK to Discharge: Yes (per POC)      General Visit Information:   PT  Visit  PT Received On: 04/14/25  General  Reason for Referral: Pt found on floor at [facility] after using bathroom.  Patient with prior ischemic CVA with right-sided deficits, patient with difficulty moving the left upper extremity for EMS.  Per caregivers, patient is normally not this confused.  Per EMS, patient was initially completely unresponsive, by time of my evaluation patient is waking and interacting somewhat but appears to be off her  baseline.  Referred By: Dr. Miller  Past Medical History Relevant to Rehab: CVA, hearing loss, COPD, non-vebal, GERD, epilepsy, behavior disorder  Family/Caregiver Present: Yes (admin from pt's group home present, assisting with communication)  Prior to Session Communication: Bedside nurse  Patient Position Received: Bed, 4 rail up, Alarm on (seizure precautions)    Subjective   Precautions:  Precautions  Hearing/Visual Limitations: Congential deafness  Medical Precautions: Fall precautions     Date/Time Vitals Session Patient Position Pulse Resp SpO2 BP MAP (mmHg)    04/14/25 1534 --  --  96  22  95 %  143/81  102                 Objective   Pain:  Pain Assessment  Pain Assessment: Green-Baker FACES  0-10 (Numeric) Pain Score: 5 - Moderate pain  Pain Type: Acute pain  Pain Location: Head  Pain Interventions: Repositioned  Cognition:  Cognition  Orientation Level: Unable to assess       Postural Control:  Static Sitting Balance  Static Sitting-Balance Support: Feet supported, No upper extremity supported  Static Sitting-Level of Assistance: Close supervision  Static Standing Balance  Static Standing-Balance Support: No upper extremity supported  Static Standing-Level of Assistance: Minimum assistance, Contact guard  Static Standing-Comment/Number of Minutes: Pt initially Saw due to retropulsion, progressing to contact guard       Treatments:            Bed Mobility  Bed Mobility: Yes  Bed Mobility 1  Bed Mobility 1: Rolling right, Rolling left  Level of Assistance 1: Moderate assistance  Bed Mobility Comments 1: ModA x 1 for trunk and hips control. Pt required max VC/TC for correct hand placement and sequencing  Bed Mobility 2  Bed Mobility  2: Supine to sitting  Level of Assistance 2: Moderate assistance  Bed Mobility Comments 2: HOB elevated. ModA for trunk elevation and BLE assist towards EOB.    Ambulation/Gait Training  Ambulation/Gait Training Performed: Yes  Ambulation/Gait Training 1  Surface 1: Level  tile  Device 1: Rolling walker  Gait Support Devices: Gait belt  Assistance 1: Minimum assistance  Quality of Gait 1: Forward flexed posture, Diminished heel strike, Inconsistent stride length, Decreased step length, Shuffling gait, Narrow base of support  Comments/Distance (ft) 1: 4-6 side steps (Max VC/TC for correct sequencing, WW management required. Small steps taken, increased effort and difficulty to advance RLE)  Ambulation/Gait Training 2  Surface 2: Level tile  Device 2: Rolling walker  Gait Support Devices: Gait belt  Assistance 2: Minimum assistance, Contact guard  Quality of Gait 2: Diminished heel strike, Inconsistent stride length, Decreased step length, Narrow base of support, Shuffling gait, Forward flexed posture  Comments/Distance (ft) 2: 5, 15 (Very slow darius noted, step to gait pattern performed. Pt requires WW management and safety VC. Pt demonstrates difficulty advancing RLE. One extended seated rest break required due to fatigue.)  Transfers  Transfer: Yes  Transfer 1  Transfer From 1: Bed to  Transfer to 1: Stand  Technique 1: Sit to stand, Stand to sit  Transfer Device 1: Walker, Gait belt  Transfer Level of Assistance 1: Moderate assistance, Minimum assistance  Trials/Comments 1: x2 trials performed. Pt required modA for 1st trial, progressing to Saw for 2nd trial. Max VC for correct hand placement.  Transfers 2  Transfer From 2: Chair with arms to  Transfer to 2: Stand  Technique 2: Sit to stand  Transfer Device 2: Walker, Gait belt  Transfer Level of Assistance 2: Contact guard, Minimum assistance  Trials/Comments 2: Intermittent Saw to contact guard for anterior weight shift and trunk elevation. Pt required max VC for correct hand placement         Outcome Measures:  [facility] Basic Mobility  Turning from your back to your side while in a flat bed without using bedrails: A lot  Moving from lying on your back to sitting on the side of a flat bed without using bedrails: A lot  Moving to  and from bed to chair (including a wheelchair): A little  Standing up from a chair using your arms (e.g. wheelchair or bedside chair): A lot  To walk in hospital room: A little  Climbing 3-5 steps with railing: Total  Basic Mobility - Total Score: 13    Education Documentation  Body Mechanics, taught by Diann Amaya PTA at 4/14/2025  4:34 PM.  Learner: Patient  Readiness: Acceptance  Method: Explanation  Response: Verbalizes Understanding    Mobility Training, taught by Diann Amaya PTA at 4/14/2025  4:34 PM.  Learner: Patient  Readiness: Acceptance  Method: Explanation  Response: Verbalizes Understanding    Education Comments  No comments found.           Encounter Problems       Encounter Problems (Active)       Balance       STG - Maintains static standing balance with FWW and ModAx2 (Progressing)       Start:  04/13/25    Expected End:  04/27/25               PT Transfers       STG - Patient to transfer to and from sit to supine with ModAx1 (Progressing)       Start:  04/13/25    Expected End:  04/27/25            STG - Patient will perform bed mobility with ModAx1 (Progressing)       Start:  04/13/25    Expected End:  04/27/25               Pain - Adult

## 2025-04-14 NOTE — ASSESSMENT & PLAN NOTE
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   -Awaiting MRI brain to rule out any new stroke in the setting of not being at baseline per sister      Elevated trops likely secondary to htn urgency  -trended down  -BP appropriate      HypoK... replace.     Bladder distention on CT  -per ED team pt has voided with purwick and no sharma placed.      Pancreatic atrophy on CT  -being worked up OP  -home creon.     Dysphagia diet  -bite size pieces, soft, s/s eval.      ? 9th rib fx  -no pain, if genuine it may be related to this or her other recent falls.      Home regimen for chronic conditions  Pt/ot

## 2025-04-15 PROCEDURE — 2500000004 HC RX 250 GENERAL PHARMACY W/ HCPCS (ALT 636 FOR OP/ED): Performed by: INTERNAL MEDICINE

## 2025-04-15 PROCEDURE — 2500000002 HC RX 250 W HCPCS SELF ADMINISTERED DRUGS (ALT 637 FOR MEDICARE OP, ALT 636 FOR OP/ED): Performed by: INTERNAL MEDICINE

## 2025-04-15 PROCEDURE — 97535 SELF CARE MNGMENT TRAINING: CPT | Mod: GO

## 2025-04-15 PROCEDURE — 2500000005 HC RX 250 GENERAL PHARMACY W/O HCPCS: Performed by: INTERNAL MEDICINE

## 2025-04-15 PROCEDURE — 2500000001 HC RX 250 WO HCPCS SELF ADMINISTERED DRUGS (ALT 637 FOR MEDICARE OP): Performed by: INTERNAL MEDICINE

## 2025-04-15 PROCEDURE — 97530 THERAPEUTIC ACTIVITIES: CPT | Mod: GO

## 2025-04-15 PROCEDURE — 1200000002 HC GENERAL ROOM WITH TELEMETRY DAILY

## 2025-04-15 PROCEDURE — 99232 SBSQ HOSP IP/OBS MODERATE 35: CPT | Performed by: INTERNAL MEDICINE

## 2025-04-15 RX ORDER — LOSARTAN POTASSIUM 50 MG/1
50 TABLET ORAL DAILY
Start: 2025-04-16

## 2025-04-15 RX ORDER — LEVETIRACETAM 750 MG/1
750 TABLET ORAL 2 TIMES DAILY
Start: 2025-04-15

## 2025-04-15 RX ADMIN — Medication 6 MG: at 21:18

## 2025-04-15 RX ADMIN — PANCRELIPASE 2 CAPSULE: 15000; 3000; 9500 CAPSULE, DELAYED RELEASE ORAL at 12:31

## 2025-04-15 RX ADMIN — FAMOTIDINE 40 MG: 20 TABLET, FILM COATED ORAL at 09:09

## 2025-04-15 RX ADMIN — SERTRALINE 100 MG: 50 TABLET, FILM COATED ORAL at 09:09

## 2025-04-15 RX ADMIN — POLYETHYLENE GLYCOL 3350 17 G: 17 POWDER, FOR SOLUTION ORAL at 09:09

## 2025-04-15 RX ADMIN — QUETIAPINE FUMARATE 25 MG: 25 TABLET ORAL at 16:19

## 2025-04-15 RX ADMIN — QUETIAPINE FUMARATE 25 MG: 25 TABLET ORAL at 09:10

## 2025-04-15 RX ADMIN — LEVETIRACETAM 750 MG: 250 TABLET, FILM COATED ORAL at 21:18

## 2025-04-15 RX ADMIN — LEVETIRACETAM 750 MG: 250 TABLET, FILM COATED ORAL at 09:09

## 2025-04-15 RX ADMIN — QUETIAPINE FUMARATE 200 MG: 100 TABLET ORAL at 21:18

## 2025-04-15 RX ADMIN — PHENYTOIN SODIUM 300 MG: 100 CAPSULE, EXTENDED RELEASE ORAL at 21:18

## 2025-04-15 RX ADMIN — Medication 50 MCG: at 09:09

## 2025-04-15 RX ADMIN — PANCRELIPASE 2 CAPSULE: 15000; 3000; 9500 CAPSULE, DELAYED RELEASE ORAL at 06:15

## 2025-04-15 RX ADMIN — ATORVASTATIN CALCIUM 80 MG: 80 TABLET, FILM COATED ORAL at 21:18

## 2025-04-15 RX ADMIN — PANCRELIPASE 2 CAPSULE: 15000; 3000; 9500 CAPSULE, DELAYED RELEASE ORAL at 16:17

## 2025-04-15 RX ADMIN — LOSARTAN POTASSIUM 50 MG: 50 TABLET, FILM COATED ORAL at 09:10

## 2025-04-15 RX ADMIN — CLOPIDOGREL 75 MG: 75 TABLET ORAL at 09:09

## 2025-04-15 RX ADMIN — ENOXAPARIN SODIUM 40 MG: 40 INJECTION SUBCUTANEOUS at 21:18

## 2025-04-15 ASSESSMENT — COGNITIVE AND FUNCTIONAL STATUS - GENERAL
MOVING FROM LYING ON BACK TO SITTING ON SIDE OF FLAT BED WITH BEDRAILS: A LITTLE
CLIMB 3 TO 5 STEPS WITH RAILING: A LOT
MOVING FROM LYING ON BACK TO SITTING ON SIDE OF FLAT BED WITH BEDRAILS: A LITTLE
STANDING UP FROM CHAIR USING ARMS: A LOT
EATING MEALS: A LITTLE
DRESSING REGULAR UPPER BODY CLOTHING: A LOT
HELP NEEDED FOR BATHING: A LOT
MOBILITY SCORE: 17
DRESSING REGULAR LOWER BODY CLOTHING: A LITTLE
DRESSING REGULAR UPPER BODY CLOTHING: A LITTLE
CLIMB 3 TO 5 STEPS WITH RAILING: TOTAL
WALKING IN HOSPITAL ROOM: A LITTLE
PERSONAL GROOMING: A LITTLE
TURNING FROM BACK TO SIDE WHILE IN FLAT BAD: A LITTLE
DRESSING REGULAR LOWER BODY CLOTHING: A LOT
MOVING TO AND FROM BED TO CHAIR: A LOT
MOBILITY SCORE: 13
HELP NEEDED FOR BATHING: A LITTLE
DRESSING REGULAR UPPER BODY CLOTHING: A LITTLE
TOILETING: A LITTLE
STANDING UP FROM CHAIR USING ARMS: A LITTLE
HELP NEEDED FOR BATHING: A LOT
TOILETING: A LOT
TURNING FROM BACK TO SIDE WHILE IN FLAT BAD: A LITTLE
DAILY ACTIVITIY SCORE: 13
DAILY ACTIVITIY SCORE: 18
DRESSING REGULAR LOWER BODY CLOTHING: A LOT
EATING MEALS: A LITTLE
MOVING TO AND FROM BED TO CHAIR: A LITTLE
WALKING IN HOSPITAL ROOM: A LOT
PERSONAL GROOMING: A LOT
PERSONAL GROOMING: A LOT
DAILY ACTIVITIY SCORE: 14
TOILETING: TOTAL

## 2025-04-15 ASSESSMENT — PAIN SCALES - GENERAL
PAINLEVEL_OUTOF10: 0 - NO PAIN
PAINLEVEL_OUTOF10: 0 - NO PAIN
PAINLEVEL_OUTOF10: 3

## 2025-04-15 ASSESSMENT — PAIN SCALES - WONG BAKER: WONGBAKER_NUMERICALRESPONSE: NO HURT

## 2025-04-15 ASSESSMENT — ACTIVITIES OF DAILY LIVING (ADL): HOME_MANAGEMENT_TIME_ENTRY: 10

## 2025-04-15 ASSESSMENT — PAIN DESCRIPTION - DESCRIPTORS: DESCRIPTORS: DISCOMFORT

## 2025-04-15 ASSESSMENT — PAIN - FUNCTIONAL ASSESSMENT
PAIN_FUNCTIONAL_ASSESSMENT: 0-10
PAIN_FUNCTIONAL_ASSESSMENT: WONG-BAKER FACES

## 2025-04-15 NOTE — ASSESSMENT & PLAN NOTE
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   -MRI without acute changes     Elevated trops likely secondary to htn urgency  -trended down  -BP appropriate      HypoK... replace.     Bladder distention on CT  -per ED team pt has voided with purwick and no sharma placed.      Pancreatic atrophy on CT  -being worked up OP  -home creon.     Dysphagia diet  -bite size pieces, soft, s/s eval.      ? 9th rib fx  -no pain, if genuine it may be related to this or her other recent falls.      Home regimen for chronic conditions  Pt/ot

## 2025-04-15 NOTE — NURSING NOTE
Attempted to call report to [facility] twice, both times placed on hold for over ten min, no voicemail to leave report and call back number

## 2025-04-15 NOTE — PROGRESS NOTES
"   04/15/25 1326   Discharge Planning   Home or Post Acute Services Post acute facilities (Rehab/SNF/etc)   Type of Post Acute Facility Services Skilled nursing   Expected Discharge Disposition SNF  ([facility])   Does the patient need discharge transport arranged? Yes   RoundTrip coordination needed? Yes   Has discharge transport been arranged? Yes   What day is the transport expected? 04/15/25   What time is the transport expected? 1400  (Bedside RN provided with report #: [phone]. Facility, care team and sister aware of dc. DSC to send dc paperwork to facility.)     Spoke with patient's sister via phone. She expressed concerns about patient returning to group home after SNF. Sister reported that she is hoping that patient can stay at [facility]. SW explained that patient has Medicaid coverage, therefore most of stay would be covered, sister aware that patient would be responsible for social security income minus $50 (typically)- this is a similar set up to group home. Sister and SW discussed that board of dd would have to approve this. Sister advised to discuss next steps with SW at [facility]. SW will follow.    Addendum: Caregiver at bedside concerned that patient was not notified of dc to [facility]. SW used Stacey for translation. Patient expressed that she did not want to go to \"the [facility] facility\". She is refusing to go to facility today. Patient agreeable to new facility. Transport cancelled. SW called sister/ POA, she is agreeable to new facility- referrals sent, patient will not need auth. Potential dc tomorrow.  "

## 2025-04-15 NOTE — CARE PLAN
The patient's goals for the shift include pt remain safe and free of falls    The clinical goals for the shift include monitor for s/sx of stroke or seizures    Problem: Pain - Adult  Goal: Verbalizes/displays adequate comfort level or baseline comfort level  Outcome: Met     Problem: Safety - Adult  Goal: Free from fall injury  Outcome: Met     Problem: Chronic Conditions and Co-morbidities  Goal: Patient's chronic conditions and co-morbidity symptoms are monitored and maintained or improved  Outcome: Progressing     Problem: Discharge Planning  Goal: Discharge to home or other facility with appropriate resources  Outcome: Progressing

## 2025-04-15 NOTE — PROGRESS NOTES
"Janine Sandoval is a 69 y.o. female on day 3 of admission presenting with Elevated troponin.    Subjective   No acute events overnight. MRI neg. Patient appears more alert today.        Objective     VITALS  Blood pressure 155/84, pulse 92, temperature 36.5 °C (97.7 °F), temperature source Temporal, resp. rate 20, height 1.6 m (5' 3\"), weight 78.9 kg (173 lb 15.1 oz), SpO2 95%.  Physical Exam  Constitutional:       Appearance: Normal appearance.   Cardiovascular:      Rate and Rhythm: Normal rate and regular rhythm.   Pulmonary:      Effort: Pulmonary effort is normal.      Breath sounds: Normal breath sounds.   Abdominal:      General: Abdomen is flat. Bowel sounds are normal.      Palpations: Abdomen is soft.   Musculoskeletal:      Cervical back: Normal range of motion.   Skin:     General: Skin is warm.   Neurological:      Mental Status: She is alert.      Comments: Awake, non verbal. Using her R hand to eat and not moving her L much. Difficult to get her to follow commands which appears to be baseline.            Intake/Output last 3 Shifts:  I/O last 3 completed shifts:  In: 938 (11.9 mL/kg) [P.O.:938]  Out: 1900 (24.1 mL/kg) [Urine:1900 (0.7 mL/kg/hr)]  Weight: 78.9 kg     Relevant Results  No results found for this or any previous visit (from the past 24 hours).      Imaging Results  MR brain wo IV contrast   Final Result   No acute ischemic infarct, acute hemorrhage, or intracranial mass   effect.        Moderate burden supratentorial chronic small vessel ischemic white   matter disease and chronic infarct in the left basal ganglia/corona   radiata.        MACRO:   None.        Signed by: Mark Flannery 4/14/2025 11:23 PM   Dictation workstation:   [id number]      XR pelvis 1-2 views   Final Result   1. Unremarkable visualized pelvis within limits of this exam.        Signed by: Ba Dowd 4/12/2025 10:27 AM   Dictation workstation:   [id number]      XR chest 1 view   Final Result   1. No " acute process.        Signed by: Ba Dowd 4/12/2025 10:26 AM   Dictation workstation:   [id number]      XR shoulder left 2+ views   Final Result   Mild-to-moderate osteoarthritis similar to the prior exam. Otherwise   no acute findings.        MACRO:   None        Signed by: Jony Cortés 4/12/2025 10:29 AM   Dictation workstation:   [id number]      CT cervical spine wo IV contrast   Final Result   Severe multilevel cervical spondylosis with hypertrophic facet and   uncovertebral arthrosis. There is bulging disc with posterior   ossification at C4-5 and C5-6 at the midline contributing to mild to   moderate central canal narrowing at these levels. This overall   appearance is stable from 09/16/2024.        No CT evidence of acute fracture or subluxation.             MACRO:   None        Signed by: Edward Graf 4/12/2025 9:23 AM   Dictation workstation:   [id number]      CT chest abdomen pelvis w IV contrast   Final Result   Subtle deformity of the lateral left 9th rib the related to motion   though nondisplaced fracture is to be excluded. Correlate to point   tenderness.        Severe pancreatic atrophy and diffuse lobular dilatation of the   pancreatic duct unchanged from the previous CT of 01/08/2025.        Colonic diverticulosis and constipation.        Diffuse distention of the urinary bladder with partial opacification.   There is no wall thickening.        Atherosclerotic arterial calcifications of the coronary arteries.        Otherwise no acute abnormality of the chest, abdomen, or pelvis.             MACRO:   None        Signed by: Edward Graf 4/12/2025 9:39 AM   Dictation workstation:   [id number]      CT lumbar spine retrospective reconstruction protocol   Final Result   Mild-to-moderate multilevel discogenic degenerative changes in the   lumbar spine with multilevel hypertrophic facet arthrosis, severe in   the lower lumbar levels.        Mild-to-moderate multilevel central  canal and neural foraminal   narrowing.        No CT evidence of acute fracture or subluxation.             MACRO:   None        Signed by: Edward Graf 4/12/2025 9:27 AM   Dictation workstation:   [id number]      CT thoracic spine retrospective reconstruction protocol   Final Result   No acute fracture or subluxation.        Mild to moderate multilevel discogenic degenerative changes of the   thoracic spine with dextro convexity.        No CT evidence of significant central canal stenosis.             MACRO:   None        Signed by: Edward Graf 4/12/2025 9:29 AM   Dictation workstation:   [id number]      CT brain attack angio head and neck W and WO IV contrast   Final Result   No evidence of proximal major vessel cut off on CT angiogram of head.   Mildly diminished caliber of left MCA and its branches with paucity   of distal branching may be a sequela of remote infarct.        Focal short-segment narrowing at the left internal carotid artery   origin with less than 50% stenosis as per the NASCET criteria.   Bilateral internal carotid arteries in the neck are mildly narrow in   caliber diffusely throughout the neck without focal superimposed   stenosis. This may be within the range of anatomical variation or may   reflect diffuse mild atherosclerotic narrowing versus diminished flow.        MACRO:   None        Signed by: Eunice Mccabe 4/12/2025 8:28 AM   Dictation workstation:   [id number]      CT brain attack head wo IV contrast   Final Result   1. No acute mass effect or acute intracranial hemorrhage   2. Remote infarct with encephalomalacia of the left basal ganglia and   left corona radiata/centrum semiovale   3. Diffuse calvarial thickening with hyperostosis frontalis interna.   4. Right mastoid effusion and fluid in the right middle ear cavity   similar to the prior exam.   5. Nonspecific changes of the white matter most likely due to chronic   microvascular disease        MACRO:   Cecy  Roz discussed the significance and urgency of this critical   finding epic secure chat with  JASMINE VERONICA on 4/12/2025 at 8:07 am.   (**-RCF-**) Findings:  See findings.             Signed by: Cecy Courtney 4/12/2025 8:08 AM   Dictation workstation:   [id number]          Medications:  atorvastatin, 80 mg, oral, Nightly  cholecalciferol, 50 mcg, oral, Daily  clopidogrel, 75 mg, oral, Daily  enoxaparin, 40 mg, subcutaneous, q24h  famotidine, 40 mg, oral, q AM  levETIRAcetam, 750 mg, oral, BID  lipase-protease-amylase, 2 capsule, oral, TID AC  losartan, 50 mg, oral, Daily  melatonin, 6 mg, oral, Nightly  phenytoin ER, 300 mg, oral, Nightly  polyethylene glycol, 17 g, oral, Daily  QUEtiapine, 200 mg, oral, Nightly  QUEtiapine, 25 mg, oral, BID  sertraline, 100 mg, oral, Daily       PRN medications: acetaminophen, albuterol        Assessment/Plan          Assessment & Plan  Elevated troponin  Suspect breakthrough seizure  -s/p iv keppra in the ED  -cont home AEDs, increase Keppra dose   -appreciate neuro recs      Hx L CVA, 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   -MRI without acute changes     Elevated trops likely secondary to htn urgency  -trended down  -BP appropriate      HypoK... replace.     Bladder distention on CT  -per ED team pt has voided with purwick and no sharma placed.      Pancreatic atrophy on CT  -being worked up OP  -home creon.     Dysphagia diet  -bite size pieces, soft, s/s eval.      ? 9th rib fx  -no pain, if genuine it may be related to this or her other recent falls.      Home regimen for chronic conditions  Pt/ot         DVT Prophylaxis:  Lovenox subq    Disposition:  Medically ready for DC. Was to be discharged but patient decided at the last minute she did not like the place her sister picked out for her. Hopefully a new place can be found and DC tomorrow     Jose Barone, DO [facility]

## 2025-04-15 NOTE — PROGRESS NOTES
Occupational Therapy    OT Treatment    Patient Name: Janine Sandoval  MRN: [id number]  Department: [place] [id number]  Room: [address] [place] [address]  Today's Date: 4/15/2025  Time Calculation  Start Time: 1043  Stop Time: 1108  Time Calculation (min): 25 min        Assessment:  OT Assessment: Pt presents to occupational therapy evaluation with recent unwitnessed fall. Pt is globally weak, decreased standing balance, and diminished functional status in regards to ADLs and functional mobility. Pt is not at functional baseline and requires skilled OT at MOD Intensity to return group home.  Prognosis: Good  Barriers to Discharge Home: Cognition needs, Caregiver assistance  Caregiver Assistance: Caregiver assistance needed per identified barriers - however, level of patient's required assistance exceeds assistance available at home  Cognition Needs: 24hr supervision for safety awareness needed, Insight of patient limited regarding functional ability/needs, Cognition-related high falls risk  Evaluation/Treatment Tolerance: Patient limited by fatigue, Patient limited by pain  Medical Staff Made Aware: Yes  End of Session Communication: Bedside nurse, PCT/NA/CTA  End of Session Patient Position: Up in chair, Alarm on  OT Assessment Results: Decreased ADL status, Decreased upper extremity strength, Decreased cognition, Decreased endurance, Decreased functional mobility  Prognosis: Good  Barriers to Discharge: None  Evaluation/Treatment Tolerance: Patient limited by fatigue, Patient limited by pain  Medical Staff Made Aware: Yes  Strengths: Attitude of self, Premorbid level of function, Support of Caregivers  Barriers to Participation: Comorbidities, Ability to acquire knowledge  Plan:  Treatment Interventions: ADL retraining, Functional transfer training, Endurance training  OT Frequency: 3 times per week  OT Discharge Recommendations: Moderate intensity level of continued care  Equipment Recommended upon Discharge: Wheeled walker  OT Recommended  Transfer Status: Moderate assist  OT - OK to Discharge: Yes (per POC)  Treatment Interventions: ADL retraining, Functional transfer training, Endurance training    Subjective   Previous Visit Info:  OT Last Visit  OT Received On: 04/15/25  General:  General  Reason for Referral: Pt found on floor at [facility] after using bathroom.  Patient with prior ischemic CVA with right-sided deficits, patient with difficulty moving the left upper extremity for EMS.  Per caregivers, patient is normally not this confused.  Per EMS, patient was initially completely unresponsive, by time of my evaluation patient is waking and interacting somewhat but appears to be off her baseline.  Referred By: Dr. Miller  Past Medical History Relevant to Rehab: CVA, hearing loss, COPD, non-vebal, GERD, epilepsy, behavior disorder  Family/Caregiver Present: No  Prior to Session Communication: Bedside nurse  Patient Position Received: Bed, 4 rail up, Alarm on (seizure precautions)  Preferred Learning Style: verbal, visual  General Comment: Pt  uses simple gestures and appears to lip read for communication. Pt agreeable to activities with OT at this time. Hep locked, purewick.  Precautions:  Hearing/Visual Limitations: Congential deafness  Medical Precautions: Fall precautions     Date/Time Vitals Session Patient Position Pulse Resp SpO2 BP MAP (mmHg)    04/15/25 1217 --  --  --  --  98 %  143/80  101                 Pain:  Pain Assessment  Pain Assessment: Green-Baker FACES  0-10 (Numeric) Pain Score: 3  Pain Type: Acute pain  Pain Location: Shoulder  Pain Orientation: Left  Pain Descriptors: Discomfort (grimacing)  Pain Frequency: Intermittent  Pain Onset: Gradual  Pain Interventions: Repositioned, Cold applied (pt. did not like heat)  Response to Interventions: Content/relaxed    Objective    Cognition:  Cognition  Overall Cognitive Status: Unable to assess  Orientation Level: Unable to assess  Attention: Within Functional Limits  Safety/Judgement:  Within Functional Limits  Processing Speed:  (Pt. responded well to gestures and simple commands while lip reading)  Coordination:  Movements are Fluid and Coordinated: Yes  Activities of Daily Living: Grooming  Grooming Level of Assistance: Setup  Grooming Where Assessed: Chair  Grooming Comments: Pt. completed oral care with Mod A to manage tooth brush wrapper and toothpaste covering. Pt. demonstrated difficulty with Bimanual tasks d/t R hand contracture.    Bed Mobility/Transfers: Bed Mobility  Bed Mobility: Yes  Bed Mobility 1  Bed Mobility 1: Rolling right  Level of Assistance 1: Moderate assistance  Bed Mobility Comments 1: Pt. requried Mod A with gesturing to safely sit EOB with HOB elevated    Transfers  Transfer: Yes  Transfer 1  Technique 1: Sit to stand, Stand to sit  Transfer Device 1: Walker  Transfer Level of Assistance 1: Moderate assistance, Minimum assistance  Trials/Comments 1: Pt. requried Mod A to safely complete sit<>stand transfer, tactile cues for appropriate hand placement .  Transfers 2  Transfer From 2: Bed to  Transfer to 2: Stand, Chair with arms  Technique 2: Sit to stand, Stand to sit  Transfer Device 2: Walker  Transfer Level of Assistance 2: Minimum assistance  Trials/Comments 2: Pt. requried Min A to trasnfer from bed to chair with use of FWW and sequencing during trasnfer.        Outcome Measures:[facility] Daily Activity  Putting on and taking off regular lower body clothing: A lot  Bathing (including washing, rinsing, drying): A lot  Putting on and taking off regular upper body clothing: A lot  Toileting, which includes using toilet, bedpan or urinal: A lot  Taking care of personal grooming such as brushing teeth: A lot  Eating Meals: A little  Daily Activity - Total Score: 13        Education Documentation  Precautions, taught by Viviane Pulido OT at 4/15/2025  2:00 PM.  Learner: Patient  Readiness: Acceptance  Method: Explanation, Demonstration  Response: Verbalizes Understanding,  Demonstrated Understanding  Comment: pt. able to lip read; gesturing to assist with education    ADL Training, taught by Viviane Pulido OT at 4/15/2025  2:00 PM.  Learner: Patient  Readiness: Acceptance  Method: Explanation, Demonstration  Response: Verbalizes Understanding, Demonstrated Understanding  Comment: pt. able to lip read; gesturing to assist with education    Education Comments  No comments found.        OP EDUCATION:  Education  Individual(s) Educated: Patient  Education Provided: Fall precautons, Risk and benefits of OT discussed with patient or other, POC discussed and agreed upon  Risk and Benefits Discussed with Patient/Caregiver/Other: yes  Patient/Caregiver Demonstrated Understanding: yes  Plan of Care Discussed and Agreed Upon: yes  Patient Response to Education: Patient/Caregiver Performed Return Demonstration of Exercises/Activities    Goals:  Encounter Problems       Encounter Problems (Active)       ADLs       Patient will perform UB and LB bathing with minimal assist  level of assistance and raised toilet seat and grab bars.       Start:  04/13/25    Expected End:  04/27/25            Patient with complete upper body dressing with stand by assist level of assistance donning and doffing all UE clothes with no adaptive equipment while edge of bed        Start:  04/13/25    Expected End:  04/27/25            Patient with complete lower body dressing with minimal assist  level of assistance donning and doffing all LE clothes  with PRN adaptive equipment while edge of bed        Start:  04/13/25    Expected End:  04/27/25            Patient will complete daily grooming tasks brushing teeth and washing face/hair with stand by assist level of assistance and PRN adaptive equipment while edge of bed . (Progressing)       Start:  04/13/25    Expected End:  04/27/25            Patient will complete toileting including hygiene clothing management/hygiene with minimal assist  level of assistance and  raised toilet seat and grab bars.       Start:  04/13/25    Expected End:  04/27/25               MOBILITY       Patient will perform Functional mobility  with minimal assist  level of assistance and least restrictive device in order to improve safety and functional mobility. (Progressing)       Start:  04/13/25    Expected End:  04/27/25               TRANSFERS       Patient will perform bed mobility contact guard assist level of assistance and bed rails in order to improve safety and independence with mobility (Progressing)       Start:  04/13/25    Expected End:  04/27/25            Patient will complete functional transfer to BSC/chair with least restrictive device with minimal assist  level of assistance. (Progressing)       Start:  04/13/25    Expected End:  04/27/25            Patient will complete sit to stand transfer with minimal assist  level of assistance and least restrictive device in order to improve safety and prepare for out of bed mobility. (Progressing)       Start:  04/13/25    Expected End:  04/27/25

## 2025-04-15 NOTE — CARE PLAN
The patient's goals for the shift include pt remain safe and free of falls    The clinical goals for the shift include monitor for s/sx of stroke or seizures    Problem: Discharge Planning  Goal: Discharge to home or other facility with appropriate resources  Outcome: Progressing     Problem: Chronic Conditions and Co-morbidities  Goal: Patient's chronic conditions and co-morbidity symptoms are monitored and maintained or improved  Outcome: Progressing     Problem: Nutrition  Goal: Nutrient intake appropriate for maintaining nutritional needs  Outcome: Progressing

## 2025-04-16 VITALS
SYSTOLIC BLOOD PRESSURE: 139 MMHG | TEMPERATURE: 97.4 F | WEIGHT: 173.94 LBS | RESPIRATION RATE: 20 BRPM | HEART RATE: 91 BPM | DIASTOLIC BLOOD PRESSURE: 83 MMHG | HEIGHT: 63 IN | OXYGEN SATURATION: 97 % | BODY MASS INDEX: 30.82 KG/M2

## 2025-04-16 PROCEDURE — 2500000002 HC RX 250 W HCPCS SELF ADMINISTERED DRUGS (ALT 637 FOR MEDICARE OP, ALT 636 FOR OP/ED): Performed by: INTERNAL MEDICINE

## 2025-04-16 PROCEDURE — 99239 HOSP IP/OBS DSCHRG MGMT >30: CPT | Performed by: INTERNAL MEDICINE

## 2025-04-16 PROCEDURE — 2500000004 HC RX 250 GENERAL PHARMACY W/ HCPCS (ALT 636 FOR OP/ED): Performed by: INTERNAL MEDICINE

## 2025-04-16 PROCEDURE — 2500000001 HC RX 250 WO HCPCS SELF ADMINISTERED DRUGS (ALT 637 FOR MEDICARE OP): Performed by: INTERNAL MEDICINE

## 2025-04-16 RX ADMIN — PANCRELIPASE 2 CAPSULE: 15000; 3000; 9500 CAPSULE, DELAYED RELEASE ORAL at 12:55

## 2025-04-16 RX ADMIN — LEVETIRACETAM 750 MG: 250 TABLET, FILM COATED ORAL at 08:48

## 2025-04-16 RX ADMIN — ACETAMINOPHEN 975 MG: 325 TABLET, FILM COATED ORAL at 08:49

## 2025-04-16 RX ADMIN — FAMOTIDINE 40 MG: 20 TABLET, FILM COATED ORAL at 08:49

## 2025-04-16 RX ADMIN — SERTRALINE 100 MG: 50 TABLET, FILM COATED ORAL at 08:49

## 2025-04-16 RX ADMIN — POLYETHYLENE GLYCOL 3350 17 G: 17 POWDER, FOR SOLUTION ORAL at 08:48

## 2025-04-16 RX ADMIN — PANCRELIPASE 2 CAPSULE: 15000; 3000; 9500 CAPSULE, DELAYED RELEASE ORAL at 06:06

## 2025-04-16 RX ADMIN — LOSARTAN POTASSIUM 50 MG: 50 TABLET, FILM COATED ORAL at 08:48

## 2025-04-16 RX ADMIN — Medication 50 MCG: at 08:48

## 2025-04-16 RX ADMIN — CLOPIDOGREL 75 MG: 75 TABLET ORAL at 08:49

## 2025-04-16 RX ADMIN — QUETIAPINE FUMARATE 25 MG: 25 TABLET ORAL at 08:48

## 2025-04-16 ASSESSMENT — COGNITIVE AND FUNCTIONAL STATUS - GENERAL
MOVING TO AND FROM BED TO CHAIR: A LOT
TURNING FROM BACK TO SIDE WHILE IN FLAT BAD: A LITTLE
STANDING UP FROM CHAIR USING ARMS: A LOT
DRESSING REGULAR LOWER BODY CLOTHING: A LOT
DAILY ACTIVITIY SCORE: 14
WALKING IN HOSPITAL ROOM: A LOT
MOBILITY SCORE: 13
MOVING FROM LYING ON BACK TO SITTING ON SIDE OF FLAT BED WITH BEDRAILS: A LITTLE
EATING MEALS: A LITTLE
DRESSING REGULAR UPPER BODY CLOTHING: A LITTLE
PERSONAL GROOMING: A LITTLE
CLIMB 3 TO 5 STEPS WITH RAILING: TOTAL
TOILETING: TOTAL
HELP NEEDED FOR BATHING: A LOT

## 2025-04-16 ASSESSMENT — PAIN DESCRIPTION - LOCATION: LOCATION: HAND

## 2025-04-16 ASSESSMENT — PAIN SCALES - GENERAL
PAINLEVEL_OUTOF10: 10 - WORST POSSIBLE PAIN
PAINLEVEL_OUTOF10: 8

## 2025-04-16 ASSESSMENT — PAIN DESCRIPTION - ORIENTATION: ORIENTATION: RIGHT;LEFT

## 2025-04-16 ASSESSMENT — PAIN DESCRIPTION - DESCRIPTORS: DESCRIPTORS: ACHING

## 2025-04-16 NOTE — PROGRESS NOTES
"Janine Sandoval is a 69 y.o. female on day 4 of admission presenting with Elevated troponin.    Subjective   No acute events overnight. MRI neg. Patient appears more alert today.        Objective     VITALS  Blood pressure 139/83, pulse 91, temperature 36.3 °C (97.4 °F), temperature source Temporal, resp. rate 20, height 1.6 m (5' 3\"), weight 78.9 kg (173 lb 15.1 oz), SpO2 97%.  Physical Exam  Constitutional:       Appearance: Normal appearance.   Cardiovascular:      Rate and Rhythm: Normal rate and regular rhythm.   Pulmonary:      Effort: Pulmonary effort is normal.      Breath sounds: Normal breath sounds.   Abdominal:      General: Abdomen is flat. Bowel sounds are normal.      Palpations: Abdomen is soft.   Musculoskeletal:      Cervical back: Normal range of motion.   Skin:     General: Skin is warm.   Neurological:      Mental Status: She is alert.      Comments: Awake, non verbal. Using her R hand to eat and not moving her L much. Difficult to get her to follow commands which appears to be baseline.            Intake/Output last 3 Shifts:  I/O last 3 completed shifts:  In: - (0 mL/kg)   Out: 1400 (17.7 mL/kg) [Urine:1400 (0.5 mL/kg/hr)]  Weight: 78.9 kg     Relevant Results  No results found for this or any previous visit (from the past 24 hours).      Imaging Results  MR brain wo IV contrast   Final Result   No acute ischemic infarct, acute hemorrhage, or intracranial mass   effect.        Moderate burden supratentorial chronic small vessel ischemic white   matter disease and chronic infarct in the left basal ganglia/corona   radiata.        MACRO:   None.        Signed by: Mark Flannery 4/14/2025 11:23 PM   Dictation workstation:   [id number]      XR pelvis 1-2 views   Final Result   1. Unremarkable visualized pelvis within limits of this exam.        Signed by: Ba Dowd 4/12/2025 10:27 AM   Dictation workstation:   [id number]      XR chest 1 view   Final Result   1. No acute process.      "   Signed by: Ba Dowd 4/12/2025 10:26 AM   Dictation workstation:   [id number]      XR shoulder left 2+ views   Final Result   Mild-to-moderate osteoarthritis similar to the prior exam. Otherwise   no acute findings.        MACRO:   None        Signed by: Jony Cortés 4/12/2025 10:29 AM   Dictation workstation:   [id number]      CT cervical spine wo IV contrast   Final Result   Severe multilevel cervical spondylosis with hypertrophic facet and   uncovertebral arthrosis. There is bulging disc with posterior   ossification at C4-5 and C5-6 at the midline contributing to mild to   moderate central canal narrowing at these levels. This overall   appearance is stable from 09/16/2024.        No CT evidence of acute fracture or subluxation.             MACRO:   None        Signed by: Edward Graf 4/12/2025 9:23 AM   Dictation workstation:   [id number]      CT chest abdomen pelvis w IV contrast   Final Result   Subtle deformity of the lateral left 9th rib the related to motion   though nondisplaced fracture is to be excluded. Correlate to point   tenderness.        Severe pancreatic atrophy and diffuse lobular dilatation of the   pancreatic duct unchanged from the previous CT of 01/08/2025.        Colonic diverticulosis and constipation.        Diffuse distention of the urinary bladder with partial opacification.   There is no wall thickening.        Atherosclerotic arterial calcifications of the coronary arteries.        Otherwise no acute abnormality of the chest, abdomen, or pelvis.             MACRO:   None        Signed by: Edward Graf 4/12/2025 9:39 AM   Dictation workstation:   [id number]      CT lumbar spine retrospective reconstruction protocol   Final Result   Mild-to-moderate multilevel discogenic degenerative changes in the   lumbar spine with multilevel hypertrophic facet arthrosis, severe in   the lower lumbar levels.        Mild-to-moderate multilevel central canal and neural  foraminal   narrowing.        No CT evidence of acute fracture or subluxation.             MACRO:   None        Signed by: Edward Graf 4/12/2025 9:27 AM   Dictation workstation:   [id number]      CT thoracic spine retrospective reconstruction protocol   Final Result   No acute fracture or subluxation.        Mild to moderate multilevel discogenic degenerative changes of the   thoracic spine with dextro convexity.        No CT evidence of significant central canal stenosis.             MACRO:   None        Signed by: Edward Graf 4/12/2025 9:29 AM   Dictation workstation:   [id number]      CT brain attack angio head and neck W and WO IV contrast   Final Result   No evidence of proximal major vessel cut off on CT angiogram of head.   Mildly diminished caliber of left MCA and its branches with paucity   of distal branching may be a sequela of remote infarct.        Focal short-segment narrowing at the left internal carotid artery   origin with less than 50% stenosis as per the NASCET criteria.   Bilateral internal carotid arteries in the neck are mildly narrow in   caliber diffusely throughout the neck without focal superimposed   stenosis. This may be within the range of anatomical variation or may   reflect diffuse mild atherosclerotic narrowing versus diminished flow.        MACRO:   None        Signed by: Eunice Mccabe 4/12/2025 8:28 AM   Dictation workstation:   [id number]      CT brain attack head wo IV contrast   Final Result   1. No acute mass effect or acute intracranial hemorrhage   2. Remote infarct with encephalomalacia of the left basal ganglia and   left corona radiata/centrum semiovale   3. Diffuse calvarial thickening with hyperostosis frontalis interna.   4. Right mastoid effusion and fluid in the right middle ear cavity   similar to the prior exam.   5. Nonspecific changes of the white matter most likely due to chronic   microvascular disease        MACRO:   Cecy Courtney discussed  the significance and urgency of this critical   finding epic secure chat with  JASMINE VERONICA on 4/12/2025 at 8:07 am.   (**-RCF-**) Findings:  See findings.             Signed by: Cecy Courtney 4/12/2025 8:08 AM   Dictation workstation:   [id number]          Medications:  atorvastatin, 80 mg, oral, Nightly  cholecalciferol, 50 mcg, oral, Daily  clopidogrel, 75 mg, oral, Daily  enoxaparin, 40 mg, subcutaneous, q24h  famotidine, 40 mg, oral, q AM  levETIRAcetam, 750 mg, oral, BID  lipase-protease-amylase, 2 capsule, oral, TID AC  losartan, 50 mg, oral, Daily  melatonin, 6 mg, oral, Nightly  phenytoin ER, 300 mg, oral, Nightly  polyethylene glycol, 17 g, oral, Daily  QUEtiapine, 200 mg, oral, Nightly  QUEtiapine, 25 mg, oral, BID  sertraline, 100 mg, oral, Daily       PRN medications: acetaminophen, albuterol        Assessment/Plan          Assessment & Plan  Elevated troponin  Suspect breakthrough seizure  -cont home AEDs, increase Keppra dose   -appreciate neuro recs      Hx L CVA, 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   -MRI without acute changes     Elevated trops likely secondary to htn urgency  -trended down  -BP appropriate      HypoK... replace.     Bladder distention on CT  -per ED team pt has voided with purwick and no sharma placed.      Pancreatic atrophy on CT  -being worked up OP  -home creon.     Dysphagia diet  -bite size pieces, soft, s/s eval.      ? 9th rib fx  -no pain, if genuine it may be related to this or her other recent falls.      Home regimen for chronic conditions  Pt/ot         DVT Prophylaxis:  Lovenox subq    Disposition:  Medically ready for DC. Awaiting new placement     Jose Barone, DO [facility]

## 2025-04-16 NOTE — CARE PLAN
The patient's goals for the shift include having a restful night.    The clinical goals for the shift include no signs of seizure activity.    Over the shift, the patient did not make progress toward the following goals. Barriers to progression include n/a. Recommendations to address these barriers include n/a.

## 2025-04-16 NOTE — CARE PLAN
The patient's goals for the shift include pt remain safe and free of falls    The clinical goals for the shift include P will remain safe and free from falls this shift    Over the shift, the patient did make progress toward the following goals.

## 2025-04-16 NOTE — CARE PLAN
The patient's goals for the shift include pt remain safe and free of falls    The clinical goals for the shift include no signs of seizure activity.

## 2025-04-16 NOTE — NURSING NOTE
1400 PATIENT DISCHARGED TO [facility] REPORT CALLED TO NURSING STAFF. DISCHARGE INSTRUCTIONS REVIEWED TO NURSING STAFF.

## 2025-04-16 NOTE — ASSESSMENT & PLAN NOTE
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   -MRI without acute changes     Elevated trops likely secondary to htn urgency  -trended down  -BP appropriate      HypoK... replace.     Bladder distention on CT  -per ED team pt has voided with purwick and no sharma placed.      Pancreatic atrophy on CT  -being worked up OP  -home creon.     Dysphagia diet  -bite size pieces, soft, s/s eval.      ? 9th rib fx  -no pain, if genuine it may be related to this or her other recent falls.      Home regimen for chronic conditions  Pt/ot

## 2025-04-16 NOTE — PROGRESS NOTES
04/16/25 1250   Discharge Planning   Home or Post Acute Services Post acute facilities (Rehab/SNF/etc)   Type of Post Acute Facility Services Skilled nursing   Expected Discharge Disposition SNF  ([facility])   Does the patient need discharge transport arranged? Yes   RoundTrip coordination needed? Yes   Has discharge transport been arranged? Yes   What day is the transport expected? 04/16/25   What time is the transport expected? 1300  (Report #:[phone], bedside RN aware. Facility, care team, patient, group home, and POA aware. DSC to send dc paperwork to facility.)

## 2025-04-17 ENCOUNTER — APPOINTMENT (OUTPATIENT)
Dept: SURGICAL ONCOLOGY | Facility: CLINIC | Age: 70
End: 2025-04-17
Payer: MEDICARE

## 2025-04-17 NOTE — DISCHARGE SUMMARY
Discharge Diagnosis  Elevated troponin    Issues Requiring Follow-Up  PCP follow up   Neuro follow up     Discharge Meds     Your medication list        CHANGE how you take these medications        Instructions Last Dose Given Next Dose Due   acetaminophen 500 mg tablet  Commonly known as: Tylenol  What changed: Another medication with the same name was removed. Continue taking this medication, and follow the directions you see here.      TAKE 1 TABLET BY MOUTH EVERY 4 HOURS AS NEEDED FOR MILD-MODERATE PAIN, CAN TAKE 2 TABLETS EVERY 8 HOURS AS NEEDED FOR MODERATE-SEVERE PAIN       lipase-protease-amylase 3,000-9,500- 15,000 unit capsule  Commonly known as: Creon  What changed: Another medication with the same name was removed. Continue taking this medication, and follow the directions you see here.      Take 2 capsules by mouth 3 times a day before meals.       levETIRAcetam 750 mg tablet  Commonly known as: Keppra  What changed:   medication strength  how much to take  when to take this      Take 1 tablet (750 mg) by mouth 2 times a day.       losartan 50 mg tablet  Commonly known as: Cozaar  What changed:   medication strength  how much to take      Take 1 tablet (50 mg) by mouth once daily.              CONTINUE taking these medications        Instructions Last Dose Given Next Dose Due   albuterol 2.5 mg /3 mL (0.083 %) nebulizer solution      Take 3 mL (2.5 mg) by nebulization every 4 hours if needed for wheezing or shortness of breath. 1 VIAL VIA AEROSOL EVERY 4 HOURS AS NEEDED FOR SHORTNESS OF BREATH / CHEST TIGHTNESS       atorvastatin 80 mg tablet  Commonly known as: Lipitor      Take 1 tablet (80 mg) by mouth once daily.       benztropine 0.5 mg tablet  Commonly known as: Cogentin      Take 1-2 tablets (0.5-1 mg) by mouth 2 times a day. TAKE 1 TABLET BY MOUTH IN THE MORNING AND AFTERNOON  AND TAKE 2 TABLETS BY MOUTH EVERY NIGHT AT BEDTIME       cholecalciferol 50 mcg (2,000 units) tablet  Commonly known as:  Vitamin D-3      Take 1 tablet (2,000 Units) by mouth once daily.       clopidogrel 75 mg tablet  Commonly known as: Plavix      Take 1 tablet (75 mg) by mouth once daily.       famotidine 20 mg tablet  Commonly known as: Pepcid      Take 2 tablets (40 mg) by mouth once daily in the morning.       lidocaine 5 % patch  Commonly known as: Lidoderm      APPLY 1 PATCH TOPICALLY TO PAINFUL AREA ONCE DAILY *LEAVE IN PLACE FOR 12 HOURS, REMOVE AFTER 12 HOURS. DO NOT REAPPLY UNTIL 12 HOURS HAVE LAPSED*       melatonin 5 mg capsule      Take 1 capsule (5 mg) by mouth once daily at bedtime.       omeprazole 20 mg DR capsule  Commonly known as: PriLOSEC      Take 2 capsules (40 mg) by mouth once daily at bedtime. Do not crush or chew.       phenytoin  mg capsule  Commonly known as: Dilantin      Take 3 capsules (300 mg) by mouth once daily at bedtime.       QUEtiapine 200 mg tablet  Commonly known as: SEROquel      Take 1 tablet (200 mg) by mouth once daily at bedtime.       QUEtiapine 25 mg tablet  Commonly known as: SEROquel      Take 1-2 tablets (25-50 mg) by mouth 2 times a day.       sertraline 100 mg tablet  Commonly known as: Zoloft      Take 1 tablet (100 mg) by mouth once daily.       Ultra-Light Rollator misc  Generic drug: walker      Use as directed              STOP taking these medications      haloperidol decanoate 100 mg/mL injection  Commonly known as: Haldol Decanoate        ibuprofen 600 mg tablet                  Where to Get Your Medications        Information about where to get these medications is not yet available    Ask your nurse or doctor about these medications  levETIRAcetam 750 mg tablet  losartan 50 mg tablet         Test Results Pending At Discharge  Pending Labs       Order Current Status    Extra Urine Gray Tube Collected (04/12/25 1037)    Urinalysis with Reflex Culture and Microscopic In process            Procedures       Hospital Course   Janine was admitted to hospital with a  "breakthrough seizure.  She was evaluated by the neuro team and her Keppra dose was increased.  She was also evaluated by the speech therapy team who recommended bite-size pieces and soft diet.  She evaluated by PT and OT and will need a stay at a skilled nursing facility.  She initially did have what appears to be a prolonged postictal state and so an MRI of the brain was obtained to make sure there was no further issues as she has had a stroke in the past.  MRI did not show any acute findings.  At this time she is otherwise hemodynamic stable appropriate discharge.  She will be going to a skilled nursing facility.  This plan was discussed with her as well as her sister and they are in agreement.  All questions were answered.    Blood pressure 139/83, pulse 91, temperature 36.3 °C (97.4 °F), temperature source Temporal, resp. rate 20, height 1.6 m (5' 3\"), weight 78.9 kg (173 lb 15.1 oz), SpO2 97%.  Pertinent Physical Exam At Time of Discharge  Physical Exam  Constitutional:       Appearance: Normal appearance.   Cardiovascular:      Rate and Rhythm: Normal rate and regular rhythm.   Pulmonary:      Effort: Pulmonary effort is normal.      Breath sounds: Normal breath sounds.   Abdominal:      General: Abdomen is flat. Bowel sounds are normal.      Palpations: Abdomen is soft.   Musculoskeletal:      Cervical back: Normal range of motion.   Skin:     General: Skin is warm.   Neurological:      Mental Status: She is alert.      Comments: Awake, non verbal. Using her R hand to eat and not moving her L much. Difficult to get her to follow commands which appears to be baseline.          Outpatient Follow-Up  Future Appointments   Date Time Provider Department Center   4/17/2025  2:00 PM Frankie Burden MD [id number] [facility]   4/18/2025  3:00 PM [facility] [id number] [facility]    5/5/2025  3:20 PM Jesi Ramsey MD [id number] [facility]   5/15/2025  2:00 PM Duong Hawk MD PhD [id number] [facility] "         Jose Barone, DO FACP     Time spent during this discharge: >30 min

## 2025-04-18 ENCOUNTER — HOSPITAL ENCOUNTER (OUTPATIENT)
Dept: RADIOLOGY | Facility: CLINIC | Age: 70
End: 2025-04-18
Payer: MEDICARE

## 2025-04-21 ENCOUNTER — APPOINTMENT (OUTPATIENT)
Dept: SURGICAL ONCOLOGY | Facility: CLINIC | Age: 70
End: 2025-04-21
Payer: MEDICARE

## 2025-04-21 VITALS — TEMPERATURE: 97.1 F | HEART RATE: 83 BPM | SYSTOLIC BLOOD PRESSURE: 147 MMHG | DIASTOLIC BLOOD PRESSURE: 73 MMHG

## 2025-04-21 DIAGNOSIS — K86.89 PANCREATIC MASS (HHS-HCC): Primary | ICD-10-CM

## 2025-04-21 DIAGNOSIS — D37.8 NEOPLASM OF UNCERTAIN BEHAVIOR OF PANCREAS: ICD-10-CM

## 2025-04-21 PROCEDURE — 1111F DSCHRG MED/CURRENT MED MERGE: CPT | Performed by: SURGERY

## 2025-04-21 PROCEDURE — 3077F SYST BP >= 140 MM HG: CPT | Performed by: SURGERY

## 2025-04-21 PROCEDURE — 3078F DIAST BP <80 MM HG: CPT | Performed by: SURGERY

## 2025-04-21 PROCEDURE — 1157F ADVNC CARE PLAN IN RCRD: CPT | Performed by: SURGERY

## 2025-04-21 PROCEDURE — 1125F AMNT PAIN NOTED PAIN PRSNT: CPT | Performed by: SURGERY

## 2025-04-21 PROCEDURE — 99215 OFFICE O/P EST HI 40 MIN: CPT | Performed by: SURGERY

## 2025-04-21 PROCEDURE — 1159F MED LIST DOCD IN RCRD: CPT | Performed by: SURGERY

## 2025-04-21 ASSESSMENT — PAIN SCALES - GENERAL: PAINLEVEL_OUTOF10: 10-WORST PAIN EVER

## 2025-04-21 NOTE — PROGRESS NOTES
Merced Sandoval is a 69 y.o. female who is referred by Dr. Solorzano and Alba Herron. This was a 45 minute visit.    We used the [employer]  for sign language.    HPI    The patient is a 69-year-old with multiple medical comorbidities.  She has had a stroke and is on Plavix.  In addition, she is wheelchair-bound, and she has seizures.  The patient presents with a pancreas abnormality concerning for pancreas cancer.  Of note, she underwent an attempt at an EUS but that was interrupted with a desaturation.    Review of Systems   All other systems reviewed and are negative.       She does have some abdominal discomfort today.  It is epigastric and cramping.        Social History     Socioeconomic History    Marital status: Single     Spouse name: Not on file    Number of children: Not on file    Years of education: Not on file    Highest education level: Not on file   Occupational History    Not on file   Tobacco Use    Smoking status: Never     Passive exposure: Never    Smokeless tobacco: Never   Vaping Use    Vaping status: Unknown   Substance and Sexual Activity    Alcohol use: Never    Drug use: Never    Sexual activity: Not on file   Other Topics Concern    Not on file   Social History Narrative    Not on file     Social Drivers of Health     Financial Resource Strain: Patient Unable To Answer (4/14/2025)    Overall Financial Resource Strain (CARDIA)     Difficulty of Paying Living Expenses: Patient unable to answer   Food Insecurity: Patient Unable To Answer (4/12/2025)    Hunger Vital Sign     Worried About Running Out of Food in the Last Year: Patient unable to answer     Ran Out of Food in the Last Year: Patient unable to answer   Transportation Needs: Patient Unable To Answer (4/14/2025)    PRAPARE - Transportation     Lack of Transportation (Medical): Patient unable to answer     Lack of Transportation (Non-Medical): Patient unable to answer   Physical Activity: Patient Declined  (4/12/2025)    Exercise Vital Sign     Days of Exercise per Week: Patient declined     Minutes of Exercise per Session: Patient declined   Stress: Patient Unable To Answer (4/12/2025)    [employer] - Occupational Stress Questionnaire     Feeling of Stress : Patient unable to answer   Social Connections: Patient Unable To Answer (4/12/2025)    Social Connection and Isolation Panel [NHANES]     Frequency of Communication with Friends and Family: Patient unable to answer     Frequency of Social Gatherings with Friends and Family: Patient unable to answer     Attends Baptism Services: Patient unable to answer     Active Member of Clubs or Organizations: Patient unable to answer     Attends Club or Organization Meetings: Patient unable to answer     Marital Status: Patient unable to answer   Intimate Partner Violence: Patient Unable To Answer (4/12/2025)    Humiliation, Afraid, Rape, and Kick questionnaire     Fear of Current or Ex-Partner: Patient unable to answer     Emotionally Abused: Patient unable to answer     Physically Abused: Patient unable to answer     Sexually Abused: Patient unable to answer   Housing Stability: Patient Unable To Answer (4/14/2025)    Housing Stability Vital Sign     Unable to Pay for Housing in the Last Year: Patient unable to answer     Number of Times Moved in the Last Year: 0     Homeless in the Last Year: Patient unable to answer      Medications Ordered Prior to Encounter[1]   Family History[2]   Medical History[3]   Surgical History[4]     Objective     /73 (BP Location: Left arm, Patient Position: Sitting, BP Cuff Size: Large adult)   Pulse 83   Temp 36.2 °C (97.1 °F)      Physical Exam  General: in no acute distress, comfortable.  Obese abdomen.  Eyes: no pallor or scleral icterus  Ears, nose, throat: no oropharyngeal edema  Cardiovascular: normal rate, regular rhythm  Respiratory: no wheezing. Even and unlabored.  Gastrointestinal: abdomen  soft, non-tender, no masses  Musculoskeletal: normal gate, no deformities  Integumentary: no concerning lesions, no jaundice  Neurologic: She is immobile and on a wheelchair.  Psychiatric: cognition intact, mood appropriate    RESULTS  Imaging reveals a CT scan with a dilated main pancreatic duct.  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  Her CA 19-9 is undetectable she is a nonsecretory.    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  I have not been able to prove that.  We discussed that we treat these abnormalities with chemotherapy radiation or surgery.  Sometimes we do multiple modalities and sometimes to do all 3.  She indicated based on the short conversation that she would prefer surgery over the other 2.  I then tried to communicate to her through the [facility]  that surgery was a big operation with high risk and she would be at much increased risk because of her immobility and medical problems.  She understands this but still leans that direction.  We were not offering surgery at this time however because we still need to do some evaluation.    She did not tolerate an initial endoscopic ultrasound.  I would like to try again under general anesthesia.  The rationale is that first, it is an important test that if she can tolerate such a procedure, that would be a requirement before I assess whether she could tolerate an operation.  Another words, if she cannot tolerate an EUS she will not tolerate surgery.  Secondly, because of her increased risk, even though I am suspicious that this is a cancer, I would not move forward with a Whipple operation for her without 100% confirmation that this is a cancer.  It is just too risky to not make a completely informed decision as we sometimes do for patients who are at lower risk and we are suspicious.    Thus, we will arrange if we can  repeat endoscopy under general anesthesia.      This note was created by dictation. Please excuse the typos.        Frankie Burden MD          [1]   Current Outpatient Medications on File Prior to Visit   Medication Sig Dispense Refill    acetaminophen (Tylenol) 500 mg tablet TAKE 1 TABLET BY MOUTH EVERY 4 HOURS AS NEEDED FOR MILD-MODERATE PAIN, CAN TAKE 2 TABLETS EVERY 8 HOURS AS NEEDED FOR MODERATE-SEVERE PAIN 180 tablet 10    albuterol 2.5 mg /3 mL (0.083 %) nebulizer solution Take 3 mL (2.5 mg) by nebulization every 4 hours if needed for wheezing or shortness of breath. 1 VIAL VIA AEROSOL EVERY 4 HOURS AS NEEDED FOR SHORTNESS OF BREATH / CHEST TIGHTNESS 360 mL 5    atorvastatin (Lipitor) 80 mg tablet Take 1 tablet (80 mg) by mouth once daily. 90 tablet 1    cholecalciferol (Vitamin D-3) 50 MCG (2000 UT) tablet Take 1 tablet (2,000 Units) by mouth once daily. 90 tablet 1    clopidogrel (Plavix) 75 mg tablet Take 1 tablet (75 mg) by mouth once daily. 90 tablet 1    famotidine (Pepcid) 20 mg tablet Take 2 tablets (40 mg) by mouth once daily in the morning. 90 tablet 1    levETIRAcetam (Keppra) 750 mg tablet Take 1 tablet (750 mg) by mouth 2 times a day.      lidocaine (Lidoderm) 5 % patch APPLY 1 PATCH TOPICALLY TO PAINFUL AREA ONCE DAILY *LEAVE IN PLACE FOR 12 HOURS, REMOVE AFTER 12 HOURS. DO NOT REAPPLY UNTIL 12 HOURS HAVE LAPSED* 30 patch 10    lipase-protease-amylase (Creon) 3,000-9,500- 15,000 unit capsule Take 2 capsules by mouth 3 times a day before meals. 360 capsule 1    losartan (Cozaar) 50 mg tablet Take 1 tablet (50 mg) by mouth once daily.      melatonin 5 mg capsule Take 1 capsule (5 mg) by mouth once daily at bedtime. 30 capsule 2    omeprazole (PriLOSEC) 20 mg DR capsule Take 2 capsules (40 mg) by mouth once daily at bedtime. Do not crush or chew. 90 capsule 1    QUEtiapine (SEROquel) 200 mg tablet Take 1 tablet (200 mg) by mouth once daily at bedtime. 90 tablet 1    sertraline (Zoloft) 100 mg  tablet Take 1 tablet (100 mg) by mouth once daily. 90 tablet 1    walker (Ultra-Light Rollator) misc Use as directed 1 each 0    benztropine (Cogentin) 0.5 mg tablet Take 1-2 tablets (0.5-1 mg) by mouth 2 times a day. TAKE 1 TABLET BY MOUTH IN THE MORNING AND AFTERNOON  AND TAKE 2 TABLETS BY MOUTH EVERY NIGHT AT BEDTIME 135 tablet 3    phenytoin ER (Dilantin) 100 mg capsule Take 3 capsules (300 mg) by mouth once daily at bedtime. (Patient not taking: Reported on 4/21/2025) 270 capsule 1    QUEtiapine (SEROquel) 25 mg tablet Take 1-2 tablets (25-50 mg) by mouth 2 times a day. (Patient not taking: Reported on 4/21/2025) 360 tablet 1    [DISCONTINUED] acetaminophen (Tylenol) 500 mg tablet Take 2 tablets (1,000 mg) by mouth every 8 hours if needed for mild pain (1 - 3) for up to 7 days. 42 tablet 0    [DISCONTINUED] haloperidol decanoate (Haldol Decanoate) 100 mg/mL injection Inject 2 mL ( 200 MG) into the muscle every 28 days.      [DISCONTINUED] ibuprofen 600 mg tablet Take 1 tablet (600 mg) by mouth every 6 hours if needed for mild pain (1 - 3), moderate pain (4 - 6), fever (temp greater than 38.0 C) or headaches (any pain related to hands or fingers) for up to 5 days. 20 tablet 0    [DISCONTINUED] levETIRAcetam (Keppra) 1,000 mg tablet Take 1 tablet (1,000 mg) by mouth once daily. 90 tablet 1    [DISCONTINUED] lipase-protease-amylase (Creon) 3,000-9,500- 15,000 unit capsule Take 2 capsules by mouth 3 times a day before meals. 180 capsule 0    [DISCONTINUED] losartan (Cozaar) 25 mg tablet Take 1 tablet (25 mg) by mouth once daily. 90 tablet 1     No current facility-administered medications on file prior to visit.   [2]   Family History  Problem Relation Name Age of Onset    Parkinsonism Mother      Other (cardiac disorder) Father      Glaucoma Father      Parkinsonism Father     [3]   Past Medical History:  Diagnosis Date    Anemia     Depression     Gastro-esophageal reflux disease without esophagitis 12/21/2022     Gastroesophageal reflux disease without esophagitis    HL (hearing loss)     Hyperlipidemia, unspecified 09/25/2019    Hyperlipemia    Hypertension     Personal history of transient ischemic attack (TIA), and cerebral infarction without residual deficits 12/21/2022    History of stroke    Shoulder pain 03/14/2025    Stroke (Multi)    [4] No past surgical history on file.

## 2025-04-23 PROCEDURE — 88313 SPECIAL STAINS GROUP 2: CPT

## 2025-04-23 PROCEDURE — 88313 SPECIAL STAINS GROUP 2: CPT | Performed by: DENTIST

## 2025-04-23 PROCEDURE — 88305 TISSUE EXAM BY PATHOLOGIST: CPT | Performed by: DENTIST

## 2025-04-23 PROCEDURE — 88305 TISSUE EXAM BY PATHOLOGIST: CPT

## 2025-04-25 ENCOUNTER — LAB REQUISITION (OUTPATIENT)
Dept: LAB | Facility: HOSPITAL | Age: 70
End: 2025-04-25
Payer: MEDICARE

## 2025-04-25 DIAGNOSIS — L98.0 PYOGENIC GRANULOMA: ICD-10-CM

## 2025-04-28 ENCOUNTER — HOSPITAL ENCOUNTER (OUTPATIENT)
Dept: RADIOLOGY | Facility: CLINIC | Age: 70
End: 2025-04-28
Payer: MEDICARE

## 2025-04-28 DIAGNOSIS — M81.0 POSTMENOPAUSAL BONE LOSS: ICD-10-CM

## 2025-04-28 PROCEDURE — 77080 DXA BONE DENSITY AXIAL: CPT | Performed by: RADIOLOGY

## 2025-04-28 PROCEDURE — 77080 DXA BONE DENSITY AXIAL: CPT

## 2025-05-05 ENCOUNTER — APPOINTMENT (OUTPATIENT)
Dept: PRIMARY CARE | Facility: CLINIC | Age: 70
End: 2025-05-05
Payer: MEDICARE

## 2025-05-14 ENCOUNTER — APPOINTMENT (OUTPATIENT)
Dept: PRIMARY CARE | Facility: CLINIC | Age: 70
End: 2025-05-14
Payer: MEDICARE

## 2025-05-14 VITALS — DIASTOLIC BLOOD PRESSURE: 78 MMHG | SYSTOLIC BLOOD PRESSURE: 136 MMHG | HEART RATE: 80 BPM

## 2025-05-14 DIAGNOSIS — Z09 HOSPITAL DISCHARGE FOLLOW-UP: ICD-10-CM

## 2025-05-14 DIAGNOSIS — M85.80 OSTEOPENIA, UNSPECIFIED LOCATION: Primary | ICD-10-CM

## 2025-05-14 PROCEDURE — 3078F DIAST BP <80 MM HG: CPT | Performed by: INTERNAL MEDICINE

## 2025-05-14 PROCEDURE — 1111F DSCHRG MED/CURRENT MED MERGE: CPT | Performed by: INTERNAL MEDICINE

## 2025-05-14 PROCEDURE — 99215 OFFICE O/P EST HI 40 MIN: CPT | Performed by: INTERNAL MEDICINE

## 2025-05-14 PROCEDURE — G2211 COMPLEX E/M VISIT ADD ON: HCPCS | Performed by: INTERNAL MEDICINE

## 2025-05-14 PROCEDURE — 3075F SYST BP GE 130 - 139MM HG: CPT | Performed by: INTERNAL MEDICINE

## 2025-05-14 PROCEDURE — 1036F TOBACCO NON-USER: CPT | Performed by: INTERNAL MEDICINE

## 2025-05-14 RX ORDER — PSYLLIUM HUSK 0.4 G
1 CAPSULE ORAL DAILY
Qty: 90 TABLET | Refills: 1 | Status: SHIPPED | OUTPATIENT
Start: 2025-05-14

## 2025-05-14 ASSESSMENT — PATIENT HEALTH QUESTIONNAIRE - PHQ9
1. LITTLE INTEREST OR PLEASURE IN DOING THINGS: NOT AT ALL
SUM OF ALL RESPONSES TO PHQ9 QUESTIONS 1 AND 2: 0
2. FEELING DOWN, DEPRESSED OR HOPELESS: NOT AT ALL

## 2025-05-14 ASSESSMENT — LIFESTYLE VARIABLES
HOW OFTEN DO YOU HAVE SIX OR MORE DRINKS ON ONE OCCASION: NEVER
AUDIT-C TOTAL SCORE: 0
HOW MANY STANDARD DRINKS CONTAINING ALCOHOL DO YOU HAVE ON A TYPICAL DAY: PATIENT DOES NOT DRINK
SKIP TO QUESTIONS 9-10: 1
HOW OFTEN DO YOU HAVE A DRINK CONTAINING ALCOHOL: NEVER

## 2025-05-14 NOTE — PROGRESS NOTES
[facility]  Primary Care Visit Note    Patient: Janine Sandoval, Age: 69 y.o., SEX: female , MRN:[id number],   PCP: Jesi Ramsey MD        Resident:  Neo Torres MD   [facility] Pharm[facility]:   [facility] [place] - [address]  [address] [address]  Phone: [phone] Fax: [phone]        Chief Complaint     Patient: Janine Sandoval is a 69 y.o. female who presented to the clinic for   Chief Complaint   Patient presents with    Follow-up        Subjective    Subjective      HPI    Janine Sandoval is a 69 y.o. year old female patient with a PMH significant for  HTN, HLD, Bilateral sensorineural hearing loss, vitamin D deficiency, glaucoma, paranoid schizophrenia, arthritis, cryptogenic stroke with hemiparesis, epilepsy, neuroleptic induced parkinsonism, recurrent falls, colonic polyposis, GERD with dysphagia, currently residing in acute rehab follow-up after hospital visit    Presents to the clinic for patient was previously admitted to [facility] as a stroke alert after being found down in the group home.  She was found unresponsive on the ground and no one witnessed or heard the fall.  Neuro saw the patient and strongly believed that patient had a breakthrough seizure.  Her levetiracetam was increased from 1 g once a day dose Keppra 750 mg twice a day.  Her Dilantin was kept at the previous dose of 300 mg every day. She initially did have what appears to be a prolonged postictal state and so an MRI of the brain was obtained to make sure there was no further issues as she has had a stroke in the past. MRI did not show any acute findings     At today's visit, Martti was used for ASL interpretation.  Patient states currently she has no active/acute complaints.  She is back to her baseline mentation.  She did endorse seeing aliens but stated that they have stayed behind in the hospital.  He used to be in a group home but  was discharged to an acute rehab after hospitalization.  She helps her new rehab facility and plans to stay in there for longer term.  She has a geriatric psych appointment scheduled for tomorrow    ROS   Review of Systems   The patient denies headaches, lightheadedness, changes in speech/vision, photophobia, dysphagia, diaphoresis, Chest pain, dyspnea, Abdominal pain, recent falls or trauma, and changes in bowel/urinary habits.    Past History     PMHx:    has a past medical history of Anemia, Depression, Gastro-esophageal reflux disease without esophagitis (12/21/2022), HL (hearing loss), Hyperlipidemia, unspecified (09/25/2019), Hypertension, Personal history of transient ischemic attack (TIA), and cerebral infarction without residual deficits (12/21/2022), Shoulder pain (03/14/2025), and Stroke (Multi).     Allergies:   Allergies[1]     Surgical Hx:   Surgical History[2]     Social HX:   Social History[3]     Meds:   Current Outpatient Medications   Medication Instructions    acetaminophen (Tylenol) 500 mg tablet TAKE 1 TABLET BY MOUTH EVERY 4 HOURS AS NEEDED FOR MILD-MODERATE PAIN, CAN TAKE 2 TABLETS EVERY 8 HOURS AS NEEDED FOR MODERATE-SEVERE PAIN    albuterol 2.5 mg, nebulization, Every 4 hours PRN, 1 VIAL VIA AEROSOL EVERY 4 HOURS AS NEEDED FOR SHORTNESS OF BREATH / CHEST TIGHTNESS    atorvastatin (LIPITOR) 80 mg, oral, Daily    benztropine (COGENTIN) 0.5-1 mg, oral, 2 times daily, TAKE 1 TABLET BY MOUTH IN THE MORNING AND AFTERNOON  AND TAKE 2 TABLETS BY MOUTH EVERY NIGHT AT BEDTIME    calcium carbonate-vitamin D3 (Oyster Shell Calcium-Vit D3) 500 mg-5 mcg (200 unit) tablet 1 tablet, oral, Daily    clopidogrel (PLAVIX) 75 mg, oral, Daily    famotidine (PEPCID) 40 mg, oral, Every morning    levETIRAcetam (KEPPRA) 750 mg, oral, 2 times daily    lidocaine (Lidoderm) 5 % patch APPLY 1 PATCH TOPICALLY TO PAINFUL AREA ONCE DAILY *LEAVE IN PLACE FOR 12 HOURS, REMOVE AFTER 12 HOURS. DO NOT REAPPLY UNTIL 12 HOURS  HAVE LAPSED*    lipase-protease-amylase (Creon) 3,000-9,500- 15,000 unit capsule 2 capsules, oral, 3 times daily before meals    losartan (COZAAR) 50 mg, oral, Daily    melatonin 5 mg, oral, Nightly    omeprazole (PRILOSEC) 40 mg, oral, Nightly, Do not crush or chew.    phenytoin ER (DILANTIN) 300 mg, oral, Nightly    QUEtiapine (SEROQUEL) 200 mg, oral, Nightly    QUEtiapine (SEROQUEL) 25-50 mg, oral, 2 times daily    sertraline (ZOLOFT) 100 mg, oral, Daily    walker (Ultra-Light Rollator) misc Use as directed        Objective    Objective      Visit Vitals  /78 (BP Location: Right arm, Patient Position: Sitting, BP Cuff Size: Adult)   Pulse 80      BMI Readings from Last 15 Encounters:   04/12/25 30.81 kg/m²   04/07/25 28.00 kg/m²   04/05/25 28.35 kg/m²   03/31/25 28.34 kg/m²   03/25/25 30.86 kg/m²   03/20/25 28.90 kg/m²   03/14/25 28.94 kg/m²   03/14/25 26.39 kg/m²   02/04/25 27.81 kg/m²   01/17/25 27.44 kg/m²   01/07/25 27.46 kg/m²   12/11/24 27.46 kg/m²   10/22/24 26.63 kg/m²   09/16/24 25.29 kg/m²   05/17/24 24.55 kg/m²      Lab Results   Component Value Date    HGBA1C 5.9 (H) 03/14/2025      Lab Results   Component Value Date    HGBA1C 5.9 (H) 03/14/2025    HGBA1C 5.4 11/04/2024    HGBA1C 5.7 (H) 11/27/2023     Lab Results   Component Value Date    LDLCALC 49 03/14/2025    CREATININE 0.78 04/14/2025      Lab Results   Component Value Date    WBC 6.4 04/14/2025    HGB 11.3 (L) 04/14/2025    HCT 34.7 (L) 04/14/2025    MCV 93 04/14/2025     04/14/2025        Chemistry    Lab Results   Component Value Date/Time     04/14/2025 0549     03/14/2025 1137    K 3.7 04/14/2025 0549    K 3.9 03/14/2025 1137     04/14/2025 0549     03/14/2025 1137    CO2 27 04/14/2025 0549    CO2 23 03/14/2025 1137    BUN 16 04/14/2025 0549    BUN 10 03/14/2025 1137    CREATININE 0.78 04/14/2025 0549    CREATININE 0.59 03/14/2025 1137    Lab Results   Component Value Date/Time    CALCIUM 8.8 04/14/2025  0549    CALCIUM 9.0 03/14/2025 1137    ALKPHOS 80 04/12/2025 0826    ALKPHOS 83 03/14/2025 1137    AST 28 04/12/2025 0826    AST 18 03/14/2025 1137    ALT 24 04/12/2025 0826    ALT 28 03/14/2025 1137    BILITOT 0.3 04/12/2025 0826    BILITOT 0.4 03/14/2025 1137           Physical Exam  Physical Exam  Constitutional:       Comments: Wheelchair-bound   Eyes:      Extraocular Movements: Extraocular movements intact.   Cardiovascular:      Heart sounds: No murmur heard.  Pulmonary:      Effort: No respiratory distress.   Abdominal:      General: There is no distension.   Skin:     Coloration: Skin is not jaundiced.   Neurological:      Gait: Gait abnormal.      Comments: Was previously Hallucinating aliens.  But states that the aliens are at the hospital.  Currently denies active         Assessment    Assessment and Plan     The following medical issues were discussed during this encounter  : Janine was seen today for follow-up.    Diagnoses and all orders for this visit:    # Osteopenia, unspecified location (Primary)  -  Reviewed DEXA bone scan from 04/28/2025: According to World Health Organization criteria, classification is low bone mass  -     calcium carbonate-vitamin D3 (Oyster Shell Calcium-Vit D3) 500 mg-5 mcg (200 unit) tablet; Take 1 tablet by mouth once daily.    # Hospital discharge follow-up  -     Admitted to hospital for unwitnessed fall.  Neurology strongly suspects that patient breakthrough seizure and increased her Keppra  -  Recommended follow-up with her primary neurologist  -  Patient has an appointment scheduled with [facility] for tomorrow.  Would appreciate medication adjustments     # Vitamin D deficiency  -     Reviewed vitamin D levels from 03/14/2025: 48  -     Continue cholecalciferol (Vitamin D-3) 50 MCG (2000 UT) tablet; Take 1 tablet (2,000 Units) by mouth once daily.     # Hyperlipidemia, unspecified hyperlipidemia type  -     Reviewed lipid panel from 03/14/2025: Cholesterol 117, HDL  54, triglycerides 60, LDL 49, non-HDL cholesterol 63 (WNL)  -     Continue atorvastatin (Lipitor) 80 mg tablet; Take 1 tablet (80 mg) by mouth once daily.     # Hypertension, unspecified type  -     Continue losartan (Cozaar) 25 mg tablet; Take 1 tablet (25 mg) by mouth once daily.     # Gastroesophageal reflux disease without esophagitis  -     Patient has chronic GERD with concern for dysphagia.  Patient did endorse waking up in the morning with burning sensation on her tongue  -     Patient likely has silent GERD with reflux throughout the night.  The home health care aide did endorse that patient sleeps upright  -     Had discussion with patient.  Will start her on a PPI that she is to take at night and continue her famotidine in the morning  -     Ordered omeprazole (PriLOSEC) 20 mg DR capsule; Take 1 capsule (20 mg) by mouth once daily at bedtime. Do not crush or chew.  -     Refilled famotidine (Pepcid) 20 mg tablet; Take 1 tablet (20 mg) by mouth once daily in the morning.     # Asthma, unspecified asthma severity, unspecified whether complicated, unspecified whether persistent ([facility])  -     Continue albuterol 2.5 mg /3 mL (0.083 %) nebulizer solution; Take 3 mL (2.5 mg) by nebulization every 4 hours if needed for wheezing or shortness of breath. 1 VIAL VIA AEROSOL EVERY 4 HOURS AS NEEDED FOR SHORTNESS OF BREATH / CHEST TIGHTNESS     # Cerebral infarction, unspecified mechanism (Multi)  -     Patient has a remote history of stroke with left sided hemiparesis  -     Continue atorvastatin (Lipitor) 80 mg tablet; Take 1 tablet (80 mg) by mouth once daily.  -     Continue clopidogrel (Plavix) 75 mg tablet; Take 1 tablet (75 mg) by mouth once daily.     # Pancreatic duct dilated ([facility])  -     CT scan abdomen and pelvis done on 01/07 showed cyst pancreatic ductal dilatation.   -     Patient subsequently had MRCP pancreas without IV contrast 01/11 which showed diffusely dilated pancreatic duct measuring up to  1.2 cm with evidence of soft tissue fullness in the region of pancreatic head.  -     Patient had endoscopy scheduled for 03/25/2025 which was terminated early as patient desaturated and had to be medically ventilated  -  Patient scheduled to see Dr. Solorzano on Thursday May 22 12:30 PM   -     Continue lipase-protease-amylase (Creon) 3,000-9,500- 15,000 unit capsule; Take 2 capsules by mouth 3 times a day before meals.     # Partial epilepsy with impairment of consciousness (Multi)  # Seizure disorder (Multi)  -     Continue levETIRAcetam (Keppra) 750 mg tablet; Take 1 tablet (1,000 mg) by mouth twice daily.  -     Continue phenytoin ER (Dilantin) 100 mg capsule; Take 3 capsules (300 mg) by mouth once daily at bedtime.  -  Follow up with neurology      # Schizophrenia, unspecified type  -     Has an appointment with [facility] scheduled for tomorrow  -     Continue QUEtiapine (SEROquel) 25 mg tablet; Take 1-2 tablets (25-50 mg) by mouth 2 times a day.  -     Continue QUEtiapine (SEROquel) 200 mg tablet; Take 1 tablet (200 mg) by mouth once daily at bedtime.     # Neuroleptic induced parkinsonism  # Recurrent Falls  -     Patient is on extensive antipsychotic medications and subsequently has movement disorder with recurrent falls. Patient is also on benztropine.  Would ideally like for her to be off benztropine and be covered with other antiparkinson medication  -     Continue benztropine (Cogentin) 0.5 mg tablet; Take 1-2 tablets (0.5-1 mg) by mouth 2 times a day.      # Depression, unspecified depression type  -     Continue sertraline (Zoloft) 100 mg tablet; Take 1 tablet (100 mg) by mouth once daily.    Health maintenance  Past Medical, Surgical and Family History: reviewed and updated in chart.   Interval History: Patient has not been hospitalized previously.   Medications and Supplements: Review of all medications by a prescribing practitioner or clinical pharmacist (such as prescriptions, OTCs, herbal therapies  and supplements) documented in the medical record.  The patient is not using opioids.   Depression/Suicide Screening: Done, does not endorse depression or suicidal ideation    The following screening tests were reviewed/ ordered:  Up to date on CBC, CMP, lipid panel, HbA1c, vitamin D, TSH, urine albumin to creatinine ratio from 03/14/2025   up-to-date on DEXA bone scan from 0/28/2025  Mammogram ordered last visit on 03/14/2025.  Up-to-date on colonoscopy from 12/21/2023: One 6 mm polyp in transverse colon, diverticulosis in sigmoid colon, nonbleeding internal hemorrhoids, repeat colonoscopy in 5 years I.e 2028    Immunizations:   UTD     Please return on Monday August 25 8:40 AM  or if symptoms worsen     Neo Torres MD  Internal Medicine, PGY- 2  05/14/25 at 3:40 PM          [1]   Allergies  Allergen Reactions    Levofloxacin Dizziness     Question seizure   [2] No past surgical history on file.  [3]   Social History  Tobacco Use    Smoking status: Never     Passive exposure: Never    Smokeless tobacco: Never   Vaping Use    Vaping status: Unknown   Substance Use Topics    Alcohol use: Never    Drug use: Never

## 2025-05-15 ENCOUNTER — APPOINTMENT (OUTPATIENT)
Dept: BEHAVIORAL HEALTH | Facility: CLINIC | Age: 70
End: 2025-05-15
Payer: MEDICARE

## 2025-05-15 DIAGNOSIS — G40.919 BREAKTHROUGH SEIZURE (MULTI): ICD-10-CM

## 2025-05-15 DIAGNOSIS — I10 HYPERTENSION, UNSPECIFIED TYPE: ICD-10-CM

## 2025-05-15 DIAGNOSIS — Z13.9 SCREENING DUE: ICD-10-CM

## 2025-05-15 DIAGNOSIS — T50.905A: ICD-10-CM

## 2025-05-15 DIAGNOSIS — E80.20: ICD-10-CM

## 2025-05-15 DIAGNOSIS — R29.6 RECURRENT FALLS: ICD-10-CM

## 2025-05-15 DIAGNOSIS — E78.5 HYPERLIPIDEMIA, UNSPECIFIED HYPERLIPIDEMIA TYPE: ICD-10-CM

## 2025-05-15 DIAGNOSIS — E55.9 VITAMIN D DEFICIENCY: ICD-10-CM

## 2025-05-15 PROCEDURE — 1111F DSCHRG MED/CURRENT MED MERGE: CPT | Performed by: PSYCHIATRY & NEUROLOGY

## 2025-05-15 NOTE — PROGRESS NOTES
[facility] Nurse Note    Name: Janine Sandoval  MRN: [id number]  YOB: 1955    Date: 05/15/25    Reason for Visit:  No chief complaint on file.    Vitals:       Problem List[1]    Allergies[2]    Encounter Medications[3]    Progress:             [1]   Patient Active Problem List  Diagnosis    Screening for colon cancer    Essential hypertension    Stroke (Multi)    Osteoarthritis, knee    Asthma    Anemia    Vitamin D deficiency    Bilateral sensorineural hearing loss    Cerebral infarction    Chest pain    Chronic cough    Colon polyp    Congenital deafness    Cryptogenic stroke (Multi)    Deaf-mutism    Other specified depressive episodes    Diverticulosis of colon    Epiretinal membrane (ERM) of both eyes    Gastroesophageal reflux disease without esophagitis    Glaucoma suspect of both eyes    Hallucinations    Hemiparesis affecting dominant side as late effect of stroke (Multi)    Impacted cerumen of right ear    Internal hemorrhoids    Neuroleptic induced parkinsonism    Nuclear sclerotic cataract    Partial epilepsy with impairment of consciousness    PVD (posterior vitreous detachment), both eyes    Acute exacerbation of chronic paranoid schizophrenia (Multi)    Urinary incontinence    Arthritis, degenerative    Hyperlipemia    Epilepsy    Bruise    Murmur, cardiac    Breast cancer screening by mammogram    Benign neoplasm of transverse colon    Anemia due to chronic kidney disease    Chronic back pain    Recurrent falls    Frequent falls    Morbid (severe) obesity due to excess calories (Multi)    Breakthrough seizure (Multi)    Generalized weakness    Weakness    Anxiety    Mild intellectual disability    Chronic osteoarthritis    Sensorineural hearing loss (SNHL) of both ears    Seizure disorder (Multi)    Seizure (Multi)    Hypertension    History of cerebrovascular accident    Hyperlipidemia    Depressive disorder    Elevated troponin   [2]   Allergies  Allergen Reactions    Levofloxacin  Dizziness     Question seizure   [3]   Outpatient Encounter Medications as of 5/15/2025   Medication Sig Dispense Refill    acetaminophen (Tylenol) 500 mg tablet TAKE 1 TABLET BY MOUTH EVERY 4 HOURS AS NEEDED FOR MILD-MODERATE PAIN, CAN TAKE 2 TABLETS EVERY 8 HOURS AS NEEDED FOR MODERATE-SEVERE PAIN 180 tablet 10    albuterol 2.5 mg /3 mL (0.083 %) nebulizer solution Take 3 mL (2.5 mg) by nebulization every 4 hours if needed for wheezing or shortness of breath. 1 VIAL VIA AEROSOL EVERY 4 HOURS AS NEEDED FOR SHORTNESS OF BREATH / CHEST TIGHTNESS 360 mL 5    atorvastatin (Lipitor) 80 mg tablet Take 1 tablet (80 mg) by mouth once daily. 90 tablet 1    benztropine (Cogentin) 0.5 mg tablet Take 1-2 tablets (0.5-1 mg) by mouth 2 times a day. TAKE 1 TABLET BY MOUTH IN THE MORNING AND AFTERNOON  AND TAKE 2 TABLETS BY MOUTH EVERY NIGHT AT BEDTIME 135 tablet 3    calcium carbonate-vitamin D3 (Oyster Shell Calcium-Vit D3) 500 mg-5 mcg (200 unit) tablet Take 1 tablet by mouth once daily. 90 tablet 1    clopidogrel (Plavix) 75 mg tablet Take 1 tablet (75 mg) by mouth once daily. 90 tablet 1    famotidine (Pepcid) 20 mg tablet Take 2 tablets (40 mg) by mouth once daily in the morning. 90 tablet 1    levETIRAcetam (Keppra) 750 mg tablet Take 1 tablet (750 mg) by mouth 2 times a day.      lidocaine (Lidoderm) 5 % patch APPLY 1 PATCH TOPICALLY TO PAINFUL AREA ONCE DAILY *LEAVE IN PLACE FOR 12 HOURS, REMOVE AFTER 12 HOURS. DO NOT REAPPLY UNTIL 12 HOURS HAVE LAPSED* 30 patch 10    lipase-protease-amylase (Creon) 3,000-9,500- 15,000 unit capsule Take 2 capsules by mouth 3 times a day before meals. 360 capsule 1    losartan (Cozaar) 50 mg tablet Take 1 tablet (50 mg) by mouth once daily.      melatonin 5 mg capsule Take 1 capsule (5 mg) by mouth once daily at bedtime. 30 capsule 2    omeprazole (PriLOSEC) 20 mg DR capsule Take 2 capsules (40 mg) by mouth once daily at bedtime. Do not crush or chew. 90 capsule 1    phenytoin ER  (Dilantin) 100 mg capsule Take 3 capsules (300 mg) by mouth once daily at bedtime. (Patient not taking: Reported on 4/21/2025) 270 capsule 1    QUEtiapine (SEROquel) 200 mg tablet Take 1 tablet (200 mg) by mouth once daily at bedtime. 90 tablet 1    QUEtiapine (SEROquel) 25 mg tablet Take 1-2 tablets (25-50 mg) by mouth 2 times a day. (Patient not taking: Reported on 4/21/2025) 360 tablet 1    sertraline (Zoloft) 100 mg tablet Take 1 tablet (100 mg) by mouth once daily. 90 tablet 1    walker (Ultra-Light Rollator) misc Use as directed 1 each 0    [DISCONTINUED] cholecalciferol (Vitamin D-3) 50 MCG (2000 UT) tablet Take 1 tablet (2,000 Units) by mouth once daily. 90 tablet 1     No facility-administered encounter medications on file as of 5/15/2025.

## 2025-05-22 ENCOUNTER — TELEPHONE (OUTPATIENT)
Dept: PRIMARY CARE | Facility: CLINIC | Age: 70
End: 2025-05-22

## 2025-05-22 ENCOUNTER — HOSPITAL ENCOUNTER (OUTPATIENT)
Dept: GASTROENTEROLOGY | Facility: HOSPITAL | Age: 70
Discharge: HOME | End: 2025-05-22
Payer: MEDICARE

## 2025-05-22 VITALS
BODY MASS INDEX: 30.65 KG/M2 | RESPIRATION RATE: 18 BRPM | TEMPERATURE: 98.1 F | HEART RATE: 77 BPM | DIASTOLIC BLOOD PRESSURE: 75 MMHG | SYSTOLIC BLOOD PRESSURE: 144 MMHG | HEIGHT: 63 IN | WEIGHT: 173 LBS | OXYGEN SATURATION: 98 %

## 2025-05-22 DIAGNOSIS — H43.813 PVD (POSTERIOR VITREOUS DETACHMENT), BOTH EYES: ICD-10-CM

## 2025-05-22 DIAGNOSIS — K86.89 PANCREATIC MASS (HHS-HCC): ICD-10-CM

## 2025-05-22 DIAGNOSIS — D12.3 BENIGN NEOPLASM OF TRANSVERSE COLON: Primary | ICD-10-CM

## 2025-05-22 DIAGNOSIS — K86.89 PANCREATIC MASS (HHS-HCC): Primary | ICD-10-CM

## 2025-05-22 DIAGNOSIS — K86.89 PANCREATIC DUCT DILATED (HHS-HCC): ICD-10-CM

## 2025-05-22 ASSESSMENT — PAIN SCALES - GENERAL: PAINLEVEL_OUTOF10: 0 - NO PAIN

## 2025-05-22 ASSESSMENT — PAIN - FUNCTIONAL ASSESSMENT: PAIN_FUNCTIONAL_ASSESSMENT: 0-10

## 2025-05-27 ENCOUNTER — TELEPHONE (OUTPATIENT)
Dept: SURGICAL ONCOLOGY | Facility: CLINIC | Age: 70
End: 2025-05-27
Payer: MEDICARE

## 2025-05-27 NOTE — TELEPHONE ENCOUNTER
I had called and spoke to Naomie and she assisted me in reaching Marvin at [facility]. I gave Marvin the pts' new appt date , time and location for her cardiology visit and PAT spoke to her regarding her PAT appt.  [facility] caregiver had assisted initially in communicating the prior EUS , medication restrictions and PCP appts but the pt is currently at the [facility]. Marvin voiced understanding and pending the visit her EUS will be rescheduled.

## 2025-05-30 ENCOUNTER — CLINICAL SUPPORT (OUTPATIENT)
Dept: PREADMISSION TESTING | Facility: HOSPITAL | Age: 70
End: 2025-05-30
Payer: MEDICARE

## 2025-05-30 LAB
ATRIAL RATE: 90 BPM
P AXIS: -11 DEGREES
P OFFSET: 185 MS
P ONSET: 126 MS
PR INTERVAL: 192 MS
Q ONSET: 222 MS
QRS COUNT: 15 BEATS
QRS DURATION: 94 MS
QT INTERVAL: 370 MS
QTC CALCULATION(BAZETT): 452 MS
QTC FREDERICIA: 423 MS
R AXIS: -9 DEGREES
T AXIS: -14 DEGREES
T OFFSET: 407 MS
VENTRICULAR RATE: 90 BPM

## 2025-05-30 NOTE — CPM/PAT H&P
[facility]/PAT Evaluation       Name: Janine Sandoval)  /Age: 1955/69 y.o.     {[facility] Visit Type:91703}      Chief Complaint: ***    HPI    Medical History[1]    Surgical History[2]    Patient  has no history on file for sexual activity.    Family History[3]    Allergies[4]    Prior to Admission medications    Medication Sig Start Date End Date Taking? Authorizing Provider   acetaminophen (Tylenol) 500 mg tablet TAKE 1 TABLET BY MOUTH EVERY 4 HOURS AS NEEDED FOR MILD-MODERATE PAIN, CAN TAKE 2 TABLETS EVERY 8 HOURS AS NEEDED FOR MODERATE-SEVERE PAIN 24   Jesi Ramsey MD   albuterol 2.5 mg /3 mL (0.083 %) nebulizer solution Take 3 mL (2.5 mg) by nebulization every 4 hours if needed for wheezing or shortness of breath. 1 VIAL VIA AEROSOL EVERY 4 HOURS AS NEEDED FOR SHORTNESS OF BREATH / CHEST TIGHTNESS 3/18/25   Jesi Ramsey MD   atorvastatin (Lipitor) 80 mg tablet Take 1 tablet (80 mg) by mouth once daily. 3/18/25   Jesi Ramsey MD   calcium carbonate-vitamin D3 (Oyster Shell Calcium-Vit D3) 500 mg-5 mcg (200 unit) tablet Take 1 tablet by mouth once daily. 25   Neo Torres MD   clopidogrel (Plavix) 75 mg tablet Take 1 tablet (75 mg) by mouth once daily. 3/18/25   Jesi Ramsey MD   famotidine (Pepcid) 20 mg tablet Take 2 tablets (40 mg) by mouth once daily in the morning. 25   Helio Reyes MD   levETIRAcetam (Keppra) 750 mg tablet Take 1 tablet (750 mg) by mouth 2 times a day. 4/15/25   Jose Barone,    lidocaine (Lidoderm) 5 % patch APPLY 1 PATCH TOPICALLY TO PAINFUL AREA ONCE DAILY *LEAVE IN PLACE FOR 12 HOURS, REMOVE AFTER 12 HOURS. DO NOT REAPPLY UNTIL 12 HOURS HAVE LAPSED*  Patient not taking: Reported on 24   Jesi Ramsey MD   lipase-protease-amylase (Creon) 3,000-9,500- 15,000 unit capsule Take 2 capsules by mouth 3 times a day before meals. 3/18/25   Jesi Ramsey MD   losartan (Cozaar) 50 mg  tablet Take 1 tablet (50 mg) by mouth once daily. 4/16/25   Jose Barone DO   melatonin 5 mg capsule Take 1 capsule (5 mg) by mouth once daily at bedtime. 4/8/25 7/7/25  Jesi Ramsey MD   omeprazole (PriLOSEC) 20 mg DR capsule Take 2 capsules (40 mg) by mouth once daily at bedtime. Do not crush or chew. 4/7/25 10/4/25  Helio Reyes MD   phenytoin ER (Dilantin) 100 mg capsule Take 3 capsules (300 mg) by mouth once daily at bedtime.  Patient not taking: Reported on 4/21/2025 3/18/25   Jesi Ramsey MD   QUEtiapine (SEROquel) 200 mg tablet Take 1 tablet (200 mg) by mouth once daily at bedtime. 3/18/25   Jesi Ramsey MD   QUEtiapine (SEROquel) 25 mg tablet Take 1-2 tablets (25-50 mg) by mouth 2 times a day.  Patient not taking: Reported on 4/21/2025 3/18/25   Jesi Ramsey MD   sertraline (Zoloft) 100 mg tablet Take 1 tablet (100 mg) by mouth once daily. 3/18/25   Jesi Ramsey MD   walker (Ultra-Light Rollator) misc Use as directed 9/17/24   Az Stuart DO        PAT ROS     PAT Physical Exam     Airway    Testing/Diagnostic:       Patient Specialist/PCP:   Data only, no medication, providers, or history verified. Information updated based solely on chart review.      Jai Janei  for sign language    PCP   Resident at [facility] [place] [phone]    Surgical Oncology  3/14/25 - JOAN Izaguirre-C - dilated main pancreatic duct, mass in the head of the pancreas. Plan EUS-guided biopsy. She is on Plavix which she will need to hold for 5 days prior.     Oncology  4/21/25 - Frankie Burden MD - did not tolerate an initial endoscopic ultrasound, reschedule under general anesthesia.     Neuro  3/20/25 - Bandar Wagner MD - congenital deaf-mutism, HPL, left BG stroke in 2012 with residual right spastic hemiparesis, and post-stroke epilepsy, drug-induced parkisonism.         Visit Vitals  OB Status Postmenopausal   Smoking Status Never       DASI Risk Score     No data to display       Caprini DVT Assessment      Flowsheet Row ED to Hosp-Admission (Discharged) from 4/12/2025 in [facility] Bldg [facility] 5 with Jose Barone DO and Joel Cloud MD ED to Hosp-Admission (Discharged) from 1/16/2025 in [facility] 7 with Juan Luis Edmondson MD and Forrest Alvarado MD   DVT Score (IF A SCORE IS NOT CALCULATING, MUST SELECT A BMI TO COMPLETE) 5 filed at 04/12/2025 1652 4 filed at 01/16/2025 2342   Medical Factors -- Medical patient at bedrest filed at 01/16/2025 2342   BMI (BMI MUST BE CHOSEN) 31-40 (Obesity) filed at 04/12/2025 1652 30 or less filed at 01/16/2025 2342          Modified Frailty Index    No data to display       [id number] Stroke Risk Points  Current as of just now        N/A 0 to 9 Points:      Last Change: N/A          The [id number] risk score (Lip GH, et al. 2009. © 2010 American College [employer]) quantifies the risk of stroke for a patient with atrial fibrillation. For patients without atrial fibrillation or under the age of 18 this score appears as N/A. Higher score values generally indicate higher risk of stroke.        This score is not applicable to this patient. Components are not calculated.          Revised Cardiac Risk Index    No data to display       Apfel Simplified Score    No data to display       Break-the-Glass       Encounters that might contain data have been deemed confidential and restricted.            Risk Analysis Index Results This Encounter    No data found from available encounters.       Prodigy: High Risk  Total Score: 8              Prodigy Age Score           ARISCAT Score for Postoperative Pulmonary Complications    No data to display       [place] Perioperative Risk for Myocardial Infarction or Cardiac Arrest (CECILY)    No data to display         Assessment and Plan:     {[facility] EMBEDDED ASSESSMENT AND PLAN:68102}             [1]   Past Medical History:  Diagnosis Date    Anemia     Anxiety      Arthritis     Asthma     Cerebral vascular accident (Multi)     Chronic pain disorder     CKD (chronic kidney disease)     Cognitive disorder     Depression     Gastro-esophageal reflux disease without esophagitis 12/21/2022    Gastroesophageal reflux disease without esophagitis    HL (hearing loss)     Hyperlipidemia, unspecified 09/25/2019    Hyperlipemia    Hypertension     Joint pain     Obesity     Pancreatic cancer (Multi)     Personal history of other mental and behavioral disorders     Personal history of transient ischemic attack (TIA), and cerebral infarction without residual deficits 12/21/2022    History of stroke    Schizophrenia     chronic paranoid schizophrenia    Seizure disorder (Multi)     Shoulder pain 03/14/2025    Stroke (Multi)     Syncope    [2]   Past Surgical History:  Procedure Laterality Date    COLONOSCOPY  12/21/2023    UPPER ENDOSCOPIC ULTRASOUND W/ FNA  03/25/2025   [3]   Family History  Problem Relation Name Age of Onset    Parkinsonism Mother      Other (cardiac disorder) Father      Glaucoma Father      Parkinsonism Father     [4]   Allergies  Allergen Reactions    Levofloxacin Dizziness     Question seizure

## 2025-06-05 ENCOUNTER — PRE-ADMISSION TESTING (OUTPATIENT)
Dept: PREADMISSION TESTING | Facility: HOSPITAL | Age: 70
End: 2025-06-05
Payer: MEDICARE

## 2025-06-05 VITALS
HEART RATE: 80 BPM | OXYGEN SATURATION: 98 % | HEIGHT: 62 IN | SYSTOLIC BLOOD PRESSURE: 143 MMHG | BODY MASS INDEX: 31.64 KG/M2 | DIASTOLIC BLOOD PRESSURE: 75 MMHG | TEMPERATURE: 98.4 F

## 2025-06-05 DIAGNOSIS — Z01.818 ENCOUNTER FOR DIAGNOSTIC ENDOSCOPY: Primary | ICD-10-CM

## 2025-06-05 LAB
ABO GROUP (TYPE) IN BLOOD: NORMAL
ANION GAP SERPL CALC-SCNC: 18 MMOL/L (ref 10–20)
ANTIBODY SCREEN: NORMAL
BUN SERPL-MCNC: 12 MG/DL (ref 6–23)
CALCIUM SERPL-MCNC: 9.1 MG/DL (ref 8.6–10.6)
CHLORIDE SERPL-SCNC: 105 MMOL/L (ref 98–107)
CO2 SERPL-SCNC: 21 MMOL/L (ref 21–32)
CREAT SERPL-MCNC: 0.59 MG/DL (ref 0.5–1.05)
EGFRCR SERPLBLD CKD-EPI 2021: >90 ML/MIN/1.73M*2
ERYTHROCYTE [DISTWIDTH] IN BLOOD BY AUTOMATED COUNT: 11.7 % (ref 11.5–14.5)
GLUCOSE SERPL-MCNC: 120 MG/DL (ref 74–99)
HCT VFR BLD AUTO: 37.4 % (ref 36–46)
HGB BLD-MCNC: 12.2 G/DL (ref 12–16)
MCH RBC QN AUTO: 29.3 PG (ref 26–34)
MCHC RBC AUTO-ENTMCNC: 32.6 G/DL (ref 32–36)
MCV RBC AUTO: 90 FL (ref 80–100)
NRBC BLD-RTO: 0 /100 WBCS (ref 0–0)
PLATELET # BLD AUTO: 188 X10*3/UL (ref 150–450)
POTASSIUM SERPL-SCNC: 3.9 MMOL/L (ref 3.5–5.3)
RBC # BLD AUTO: 4.17 X10*6/UL (ref 4–5.2)
RH FACTOR (ANTIGEN D): NORMAL
SODIUM SERPL-SCNC: 140 MMOL/L (ref 136–145)
WBC # BLD AUTO: 6.3 X10*3/UL (ref 4.4–11.3)

## 2025-06-05 PROCEDURE — 85027 COMPLETE CBC AUTOMATED: CPT

## 2025-06-05 PROCEDURE — 86850 RBC ANTIBODY SCREEN: CPT

## 2025-06-05 PROCEDURE — 36415 COLL VENOUS BLD VENIPUNCTURE: CPT

## 2025-06-05 PROCEDURE — 86901 BLOOD TYPING SEROLOGIC RH(D): CPT

## 2025-06-05 PROCEDURE — 80048 BASIC METABOLIC PNL TOTAL CA: CPT

## 2025-06-05 RX ORDER — BENZTROPINE MESYLATE 0.5 MG/1
0.5 TABLET ORAL 2 TIMES DAILY
COMMUNITY

## 2025-06-05 ASSESSMENT — DUKE ACTIVITY SCORE INDEX (DASI)
CAN YOU DO LIGHT WORK AROUND THE HOUSE LIKE DUSTING OR WASHING DISHES: NO
CAN YOU PARTICIPATE IN STRENOUS SPORTS LIKE SWIMMING, SINGLES TENNIS, FOOTBALL, BASKETBALL, OR SKIING: NO
CAN YOU TAKE CARE OF YOURSELF (EAT, DRESS, BATHE, OR USE TOILET): NO
CAN YOU RUN A SHORT DISTANCE: NO
TOTAL_SCORE: 0
CAN YOU CLIMB A FLIGHT OF STAIRS OR WALK UP A HILL: NO
CAN YOU HAVE SEXUAL RELATIONS: NO
CAN YOU DO MODERATE WORK AROUND THE HOUSE LIKE VACUUMING, SWEEPING FLOORS OR CARRYING GROCERIES: NO
CAN YOU DO HEAVY WORK AROUND THE HOUSE LIKE SCRUBBING FLOORS OR LIFTING AND MOVING HEAVY FURNITURE: NO
CAN YOU WALK A BLOCK OR TWO ON LEVEL GROUND: NO
DASI METS SCORE: 2.7
CAN YOU PARTICIPATE IN MODERATE RECREATIONAL ACTIVITIES LIKE GOLF, BOWLING, DANCING, DOUBLES TENNIS OR THROWING A BASEBALL OR FOOTBALL: NO
CAN YOU WALK INDOORS, SUCH AS AROUND YOUR HOUSE: NO
CAN YOU DO YARD WORK LIKE RAKING LEAVES, WEEDING OR PUSHING A MOWER: NO

## 2025-06-05 ASSESSMENT — LIFESTYLE VARIABLES: SMOKING_STATUS: NONSMOKER

## 2025-06-05 NOTE — PREPROCEDURE INSTRUCTIONS
Fasting Guidelines    NPO Instructions:    Do not eat any food after midnight the night before your surgery/procedure.  You may have up to Thirteen ounces of clear liquids until TWO hours before your instructed arrival time to the hospital. This includes water, black tea/coffee, (no milk or cream), apple juice, and/or electrolyte drinks (Gatorade).  You may chew gum up to TWO hours before your surgery/procedure.    Additional Instructions:    Avoid herbal supplements, multivitamins and NSAIDS (non-steroidal anti-inflammatory drugs) such as Advil, Aleve, Ibuprofen, Naproxen, Excedrin, Meloxicam or Celebrex for at least 7 days prior to surgery. May take Tylenol as needed.    Avoid tobacco and alcohol products for 24 hours prior to surgery.    CONTACT SURGEON'S OFFICE IF YOU DEVELOP:  * Fever = 100.4 F   * New respiratory symptoms (e.g. cough, shortness of breath, respiratory distress, sore throat)  * Recent loss of taste or smell  *Flu like symptoms such as headache, fatigue or gastrointestinal symptoms  * You develop any open sores, shingles, burning or painful urination   AND/OR:  * You no longer wish to have the surgery.  * Any other personal circumstances change that may lead to the need to cancel or defer this surgery.  *You were admitted to any hospital within one week of your planned procedure.    Seven/Six Days before Surgery:  Review your medication instructions, stop indicated medications    Day of Surgery:  Review your medication instructions, take indicated medications  Wear comfortable loose fitting clothing  Do not use moisturizers, creams, lotions or perfume  All jewelry and valuables should be left at home        [facility]  [phone]       Preoperative Brain Exercises    What are brain exercises?  A brain exercise is any activity that engages your thinking (cognitive) skills.    What types of activities are considered brain exercises?  Jisharonaw puzzles, crossword  puzzles, word jumble, memory games, word search, and many more.  Many can be found free online or on your phone via a mobile parisa.    Why should I do brain exercises before my surgery?  More recent research has shown brain exercise before surgery can lower the risk of postoperative delirium (confusion) which can be especially important for older adults.  Patients who did brain exercises for 5 to 10 hours the days before surgery, cut their risk of postoperative delirium in half up to 1 week after surgery.         [facility]    Preoperative Deep Breathing Exercises    Why it is important to do deep breathing exercises before my surgery?  Deep breathing exercises strengthen your breathing muscles.  This helps you to recover after your surgery and decreases the chance of breathing complications.      How are the deep breathing exercises done?  Sit straight with your back supported.  Breathe in deeply and slowly through your nose. Your lower rib cage should expand and your abdomen may move forward.  Hold that breath for 3 to 5 seconds.  Breathe out through pursed lips, slowly and completely.  Rest and repeat 10 times every hour while awake.  Rest longer if you become dizzy or lightheaded.         Patient and Family Education             Ways You Can Help Prevent Blood Clots             This handout explains some simple things you can do to help prevent blood clots.      Blood clots are blockages that can form in the body's veins. When a blood clot forms in your deep veins, it may be called a deep vein thrombosis, or DVT for short. Blood clots can happen in any part of the body where blood flows, but they are most common in the arms and legs. If a piece of a blood clot breaks free and travels to the lungs, it is called a pulmonary embolus (PE). A PE can be a very serious problem.         Being in the hospital or having surgery can raise your chances of getting a blood clot because you may not be  well enough to move around as much as you normally do.         Ways you can help prevent blood clots in the hospital         Wearing SCDs. SCDs stands for Sequential Compression Devices.   SCDs are special sleeves that wrap around your legs  They attach to a pump that fills them with air to gently squeeze your legs every few minutes.   This helps return the blood in your legs to your heart.   SCDs should only be taken off when walking or bathing.   SCDs may not be comfortable, but they can help save your life.               Wearing compression stockings - if your doctor orders them. These special snug fitting stockings gently squeeze your legs to help blood flow.       Walking. Walking helps move the blood in your legs.   If your doctor says it is ok, try walking the halls at least   5 times a day. Ask us to help you get up, so you don't fall.      Taking any blood thinning medicines your doctor orders.        Page 1 of 2     [facility]; 3/23   Ways you can help prevent blood clots at home       Wearing compression stockings - if your doctor orders them. ? Walking - to help move the blood in your legs.       Taking any blood thinning medicines your doctor orders.      Signs of a blood clot or PE      Tell your doctor or nurse know right away if you have of the problems listed below.    If you are at home, seek medical care right away. Call 911 for chest pain or problems breathing.               Signs of a blood clot (DVT) - such as pain,  swelling, redness or warmth in your arm or leg      Signs of a pulmonary embolism (PE) - such as chest     pain or feeling short of breath

## 2025-06-05 NOTE — CPM/PAT H&P
CPM/PAT Evaluation       Name: Janine Sandoval (Janine Sandoval)  /Age: 1955/69 y.o.     Visit Type:   In-Person       Chief Complaint: pancreas abnormality concerning for pancreas cancer     HPI  Janine Sandoval is a 69 y.o. female referred to [facility] by Dr. Frankie Burden for perioperative evaluation in advance of of an Endoscopy Under General Anesthesia scheduled on 25.  Patient has a past medical history significant for: GERD, HTN, HLD, CVA, Seizure Disorder, Schizophrenia, pt is wheelchair bound, Neuroleptic induced parkinsonism, Depression, Osteopenia, and mutism.      Medical History[1]    Surgical History[2]    Patient  has no history on file for sexual activity.    Family History[3]    Allergies[4]    Prior to Admission medications    Medication Sig Start Date End Date Taking? Authorizing Provider   acetaminophen (Tylenol) 500 mg tablet TAKE 1 TABLET BY MOUTH EVERY 4 HOURS AS NEEDED FOR MILD-MODERATE PAIN, CAN TAKE 2 TABLETS EVERY 8 HOURS AS NEEDED FOR MODERATE-SEVERE PAIN 24   Jesi Ramsey MD   albuterol 2.5 mg /3 mL (0.083 %) nebulizer solution Take 3 mL (2.5 mg) by nebulization every 4 hours if needed for wheezing or shortness of breath. 1 VIAL VIA AEROSOL EVERY 4 HOURS AS NEEDED FOR SHORTNESS OF BREATH / CHEST TIGHTNESS 3/18/25   Jesi Ramsey MD   atorvastatin (Lipitor) 80 mg tablet Take 1 tablet (80 mg) by mouth once daily. 3/18/25   Jesi Ramsey MD   calcium carbonate-vitamin D3 (Oyster Shell Calcium-Vit D3) 500 mg-5 mcg (200 unit) tablet Take 1 tablet by mouth once daily. 25   Neo Torres MD   clopidogrel (Plavix) 75 mg tablet Take 1 tablet (75 mg) by mouth once daily. 3/18/25   Jesi Ramsey MD   famotidine (Pepcid) 20 mg tablet Take 2 tablets (40 mg) by mouth once daily in the morning. 25   Helio Reyes MD   levETIRAcetam (Keppra) 750 mg tablet Take 1 tablet (750 mg) by mouth 2 times a day. 4/15/25   DO mila Jones  (Lidoderm) 5 % patch APPLY 1 PATCH TOPICALLY TO PAINFUL AREA ONCE DAILY *LEAVE IN PLACE FOR 12 HOURS, REMOVE AFTER 12 HOURS. DO NOT REAPPLY UNTIL 12 HOURS HAVE LAPSED*  Patient not taking: Reported on 5/22/2025 11/27/24   Jesi Ramsey MD   lipase-protease-amylase (Creon) 3,000-9,500- 15,000 unit capsule Take 2 capsules by mouth 3 times a day before meals. 3/18/25   Jesi Ramsey MD   losartan (Cozaar) 50 mg tablet Take 1 tablet (50 mg) by mouth once daily. 4/16/25   Jose Barone DO   melatonin 5 mg capsule Take 1 capsule (5 mg) by mouth once daily at bedtime. 4/8/25 7/7/25  Jesi Ramsey MD   omeprazole (PriLOSEC) 20 mg DR capsule Take 2 capsules (40 mg) by mouth once daily at bedtime. Do not crush or chew. 4/7/25 10/4/25  Helio Reyes MD   phenytoin ER (Dilantin) 100 mg capsule Take 3 capsules (300 mg) by mouth once daily at bedtime.  Patient not taking: Reported on 4/21/2025 3/18/25   Jesi Ramsey MD   QUEtiapine (SEROquel) 200 mg tablet Take 1 tablet (200 mg) by mouth once daily at bedtime. 3/18/25   Jesi Ramsey MD   QUEtiapine (SEROquel) 25 mg tablet Take 1-2 tablets (25-50 mg) by mouth 2 times a day.  Patient not taking: Reported on 4/21/2025 3/18/25   Jesi Ramsey MD   sertraline (Zoloft) 100 mg tablet Take 1 tablet (100 mg) by mouth once daily. 3/18/25   MD beto Youssef (Ultra-Light Rollator) misc Use as directed 9/17/24   DO JOSEPH Garduno ROS     PAT Physical Exam     Airway    Testing/Diagnostic:         Needs Stacey  for sign language    PCP   Resident at [facility]  - [phone]      Oncology  4/21/25 - Frankie Burden MD - did not tolerate an initial endoscopic ultrasound, reschedule under general anesthesia.     Neuro  3/20/25 - Bandar Wagner MD - congenital deaf-mutism, HPL, left BG stroke in 2012 with residual     Visit Vitals  OB Status Postmenopausal   Smoking Status Never       DASI Risk  Score    No data to display       Caprini DVT Assessment      Flowsheet Row ED to Hosp-Admission (Discharged) from 4/12/2025 in [facility] Bldg A 5 with Jose Barone DO and Joel Cloud MD ED to Hosp-Admission (Discharged) from 1/16/2025 in [facility] 7 with Juan Luis Edmondson MD and Forrest Alvarado MD   DVT Score (IF A SCORE IS NOT CALCULATING, MUST SELECT A BMI TO COMPLETE) 5 filed at 04/12/2025 1652 4 filed at 01/16/2025 2342   Medical Factors -- Medical patient at bedrest filed at 01/16/2025 2342   BMI (BMI MUST BE CHOSEN) 31-40 (Obesity) filed at 04/12/2025 1652 30 or less filed at 01/16/2025 2342          Modified Frailty Index    No data to display       [id number] Stroke Risk Points  Current as of just now        N/A 0 to 9 Points:      Last Change: N/A          The [id number] risk score (Lip ALANA, et al. 2009. © 2010 [employer]) quantifies the risk of stroke for a patient with atrial fibrillation. For patients without atrial fibrillation or under the age of 18 this score appears as N/A. Higher score values generally indicate higher risk of stroke.        This score is not applicable to this patient. Components are not calculated.          Revised Cardiac Risk Index    No data to display       Apfel Simplified Score    No data to display       Risk Analysis Index Results This Encounter    No data found in the last 10 encounters.       Prodigy: High Risk  Total Score: 8              Prodigy Age Score           ARISCAT Score for Postoperative Pulmonary Complications    No data to display       [place] Perioperative Risk for Myocardial Infarction or Cardiac Arrest (CECILY)    No data to display         Assessment and Plan:     Anesthesia  The patient denies problems with anesthesia in the past such as PONV, prolonged sedation, awareness, dental damage, aspiration, cardiac arrest, difficult intubation, or unexpected hospital admissions.     Neurology  The patient  has h/o CVA and seizure disorder, right spastic hemiparesis, post stroke epilepsy, drug-induced parkinsonism . The patient is at increased risk for postoperative delirium secondary to age 65 or older, sensory Impairment, decreased functional status, polypharmacy, renal Insufficiency. The patient is at increased risk for perioperative stroke secondary to hypertension , perioperative interruption of antithrombotic, increased age, hyperlipidemia, female gender, general anesthesia, operative time >2.5 hours. Handouts for preoperative brain exercises given to patient.    HEENT/Airway  The patient has mutism. No documented or reported history of airway difficulty.     Cardiovascular  The patient is scheduled for non-cardiac surgery associated with elevated risk. The patient has no major cardiac contraindications to non- cardiac surgery.    EKG  The patient has no EKG or echocardiographic changes concerning for myocardial ischemia.     Heart Failure  The patient has no known history of heart failure.  Additionally, the patient reports no symptoms of heart failure and demonstrates no signs of heart failure.    Hypertension Evaluation  The patient has a known history of hypertension that is controlled.  Patient's hypertension is most consistent with stage 1.    Heart Rhythm Evaluation  The patient has no history of arrhythmias.    Heart Valve Evaluation  The patient has no known history of valvular heart disease. The patient has no symptoms or physical exam findings to suggest valvular heart disease.    Cardiology Evaluation  The patient is not followed by cardiology.    Pulmonary  The patient has asthma. The patient is at increased risk of perioperative pulmonary complications secondary to advanced age greater than 60, functional dependency.    RCRI  The patient meets 0 RCRI criteria and therefor has a 3.9% risk of major adverse cardiac complications.    METS  The patient's functional capacity is less than 4 METS.    CECILY  score which indicates a 0.3% risk of intraoperative or 30-day postoperative MACE (major adverse cardiac event).    The patient has a stop bang score of 1, which places patient at low risk for having LUIS.    ARISCAT 19, low, 1.6% risk of in-hospital postoperative pulmonary complications    PRODIGY 8, intermediate risk of respiratory depression episode. Patient given PI sheet for preoperative deep breathing exercises.    Caprini score 10, high risk of perioperative VTE.     Eat 10- 0,  self-perceived oropharyngeal dysphagia scale (0-40)     Hematology  The patient has pancreatic abnormality concerning for pancreas cancer    Antiplatelet management   The patient is currently receiving antiplatelet therapy for history of CVA/TIA.    Anticoagulation management  The patient is not currently receiving anticoagulation therapy.    Patient instructed to ambulate as soon as possible postoperatively to decrease thromboembolic risk. Initiate mechanical DVT prophylaxis as soon as possible and initiate chemical prophylaxis when deemed safe from a bleeding standpoint post surgery.     Transfusion Evaluation  A type and screen was obtained given the likelihood for perioperative transfusion of blood or blood products.    Gastrointestinal  The patient has GERD    Genitourinary  The patient has incontinent    Renal  The patient has a history of chronic kidney disease most consistent with stage 3.  Patient's renal function appears unchanged when compared to prior labs. The patient has specific risk factors associated with increased risk of perioperative renal complications related to age greater than 55, hypertension, cerebrovascular disease.    Musculoskeletal  The patient has wheelchair bound    Endocrine  Diabetes Evaluation  The patient has no history of diabetes mellitus.    Thyroid Disease Evaluation  The patient has no history of thyroid disease.    ID  No diagnoses or significant findings on chart review or clinical presentation  and evaluation.    -Preoperative medication instructions were provided and reviewed with the patient.  Any additional testing or evaluation was explained to the patient.  NPO Instructions were discussed, and the patient's questions were answered prior to conclusion of this encounter. Patient verbalized understanding of preoperative instructions. After Visit Summary given.      Recent Results (from the past week)   Type And Screen Is this order related to pregnancy or an upcoming surgery? Yes; Where will this surgery/delivery be performed? [facility]; What is the date of the surgery? 6/17/2025; Has this patient ever had a transfusion? No; Has t...    Collection Time: 06/05/25  8:56 AM   Result Value Ref Range    ABO TYPE A     Rh TYPE POS     ANTIBODY SCREEN NEG    CBC    Collection Time: 06/05/25  8:56 AM   Result Value Ref Range    WBC 6.3 4.4 - 11.3 x10*3/uL    nRBC 0.0 0.0 - 0.0 /100 WBCs    RBC 4.17 4.00 - 5.20 x10*6/uL    Hemoglobin 12.2 12.0 - 16.0 g/dL    Hematocrit 37.4 36.0 - 46.0 %    MCV 90 80 - 100 fL    MCH 29.3 26.0 - 34.0 pg    MCHC 32.6 32.0 - 36.0 g/dL    RDW 11.7 11.5 - 14.5 %    Platelets 188 150 - 450 x10*3/uL   Basic Metabolic Panel    Collection Time: 06/05/25  8:56 AM   Result Value Ref Range    Glucose 120 (H) 74 - 99 mg/dL    Sodium 140 136 - 145 mmol/L    Potassium 3.9 3.5 - 5.3 mmol/L    Chloride 105 98 - 107 mmol/L    Bicarbonate 21 21 - 32 mmol/L    Anion Gap 18 10 - 20 mmol/L    Urea Nitrogen 12 6 - 23 mg/dL    Creatinine 0.59 0.50 - 1.05 mg/dL    eGFR >90 >60 mL/min/1.73m*2    Calcium 9.1 8.6 - 10.6 mg/dL             [1]   Past Medical History:  Diagnosis Date    Anemia     Anxiety     Arthritis     Asthma     Cerebral vascular accident (Multi)     Chronic pain disorder     CKD (chronic kidney disease)     Cognitive disorder     Depression     Gastro-esophageal reflux disease without esophagitis 12/21/2022    Gastroesophageal reflux disease without esophagitis    HL  (hearing loss)     Hyperlipidemia, unspecified 09/25/2019    Hyperlipemia    Hypertension     Joint pain     Obesity     Pancreatic cancer (Multi)     Personal history of other mental and behavioral disorders     Personal history of transient ischemic attack (TIA), and cerebral infarction without residual deficits 12/21/2022    History of stroke    Schizophrenia     chronic paranoid schizophrenia    Seizure disorder (Multi)     Shoulder pain 03/14/2025    Stroke (Multi)     Syncope    [2]   Past Surgical History:  Procedure Laterality Date    COLONOSCOPY  12/21/2023    UPPER ENDOSCOPIC ULTRASOUND W/ FNA  03/25/2025   [3]   Family History  Problem Relation Name Age of Onset    Parkinsonism Mother      Other (cardiac disorder) Father      Glaucoma Father      Parkinsonism Father     [4]   Allergies  Allergen Reactions    Levofloxacin Dizziness     Question seizure

## 2025-06-16 ENCOUNTER — OFFICE VISIT (OUTPATIENT)
Dept: CARDIOLOGY | Facility: CLINIC | Age: 70
End: 2025-06-16
Payer: MEDICARE

## 2025-06-16 ENCOUNTER — HOSPITAL ENCOUNTER (OUTPATIENT)
Dept: CARDIOLOGY | Facility: CLINIC | Age: 70
Discharge: HOME | End: 2025-06-16
Payer: MEDICARE

## 2025-06-16 VITALS
SYSTOLIC BLOOD PRESSURE: 127 MMHG | WEIGHT: 152 LBS | BODY MASS INDEX: 25.95 KG/M2 | OXYGEN SATURATION: 93 % | HEART RATE: 76 BPM | DIASTOLIC BLOOD PRESSURE: 82 MMHG | HEIGHT: 64 IN

## 2025-06-16 DIAGNOSIS — H43.813 PVD (POSTERIOR VITREOUS DETACHMENT), BOTH EYES: ICD-10-CM

## 2025-06-16 DIAGNOSIS — I10 HYPERTENSION, UNSPECIFIED TYPE: ICD-10-CM

## 2025-06-16 DIAGNOSIS — E78.5 HYPERLIPIDEMIA, UNSPECIFIED HYPERLIPIDEMIA TYPE: ICD-10-CM

## 2025-06-16 DIAGNOSIS — I25.10 CORONARY ARTERY CALCIFICATION SEEN ON CT SCAN: ICD-10-CM

## 2025-06-16 DIAGNOSIS — R79.89 TROPONIN LEVEL ELEVATED: Primary | ICD-10-CM

## 2025-06-16 PROCEDURE — 99212 OFFICE O/P EST SF 10 MIN: CPT

## 2025-06-16 PROCEDURE — 93010 ELECTROCARDIOGRAM REPORT: CPT | Performed by: INTERNAL MEDICINE

## 2025-06-16 PROCEDURE — 99204 OFFICE O/P NEW MOD 45 MIN: CPT | Performed by: INTERNAL MEDICINE

## 2025-06-16 PROCEDURE — 1126F AMNT PAIN NOTED NONE PRSNT: CPT | Performed by: INTERNAL MEDICINE

## 2025-06-16 PROCEDURE — 1159F MED LIST DOCD IN RCRD: CPT | Performed by: INTERNAL MEDICINE

## 2025-06-16 PROCEDURE — 3078F DIAST BP <80 MM HG: CPT | Performed by: INTERNAL MEDICINE

## 2025-06-16 PROCEDURE — 3008F BODY MASS INDEX DOCD: CPT | Performed by: INTERNAL MEDICINE

## 2025-06-16 PROCEDURE — 3074F SYST BP LT 130 MM HG: CPT | Performed by: INTERNAL MEDICINE

## 2025-06-16 PROCEDURE — 93005 ELECTROCARDIOGRAM TRACING: CPT

## 2025-06-16 PROCEDURE — 1036F TOBACCO NON-USER: CPT | Performed by: INTERNAL MEDICINE

## 2025-06-16 ASSESSMENT — ENCOUNTER SYMPTOMS
MEMORY LOSS: 0
HEADACHES: 0
NAUSEA: 0
MYALGIAS: 0
BLOATING: 0
DIARRHEA: 0
ABDOMINAL PAIN: 0
LOSS OF SENSATION IN FEET: 1
HEMATURIA: 0
CONSTIPATION: 0
HEMOPTYSIS: 0
FEVER: 0
ALTERED MENTAL STATUS: 0
DYSURIA: 0
DEPRESSION: 0
CHILLS: 0
OCCASIONAL FEELINGS OF UNSTEADINESS: 1
COUGH: 0
VOMITING: 0
FALLS: 0
WHEEZING: 0

## 2025-06-16 ASSESSMENT — PAIN SCALES - GENERAL: PAINLEVEL_OUTOF10: 0-NO PAIN

## 2025-06-16 ASSESSMENT — PATIENT HEALTH QUESTIONNAIRE - PHQ9
2. FEELING DOWN, DEPRESSED OR HOPELESS: NOT AT ALL
SUM OF ALL RESPONSES TO PHQ9 QUESTIONS 1 AND 2: 0
1. LITTLE INTEREST OR PLEASURE IN DOING THINGS: NOT AT ALL

## 2025-06-16 NOTE — PROGRESS NOTES
Chief Complaint   Patient presents with    New Patient Visit    Hypertension    Hyperlipidemia       HPI  70 yo BF (deaf) w/ h/o scat CAC, HTN, HLD, GERD, seizures now here for cardiology consult.   No recent chest pain. No dyspnea at rest. No CRISTOBAL. No orthopnea/PND. No palps. No LH/dizzy/syncope. No edema. No claudication. No cough.   ECG 4/25: ST (106), QTc 462  ECG 4/25: ST (101), QTc 492  ECG 6/25: SR (76), nonsp T-wave change, QTc 436  CXR 4/25: no acute abnl  CT chest 4/25: scat CAC, nl  heart size, no peric eff, nl Ao  CT ab 4/25: nl Ao  CTA head/neck 4/25: mildly dim LMCA, BICA mild narrow, no HDSS    Review of Systems   Constitutional: Negative for chills, fever and malaise/fatigue.   HENT:  Positive for hearing loss.    Eyes:  Negative for visual disturbance.   Respiratory:  Negative for cough, hemoptysis and wheezing.    Skin:  Negative for rash.   Musculoskeletal:  Negative for falls and myalgias.   Gastrointestinal:  Negative for bloating, abdominal pain, constipation, diarrhea, dysphagia, nausea and vomiting.   Genitourinary:  Negative for dysuria and hematuria.   Neurological:  Negative for headaches.   Psychiatric/Behavioral:  Negative for altered mental status, depression and memory loss.       Social History     Tobacco Use    Smoking status: Never     Passive exposure: Never    Smokeless tobacco: Never   Substance Use Topics    Alcohol use: Never      Family History[1]     RX Allergies[2]     Current Outpatient Medications   Medication Instructions    acetaminophen (Tylenol) 500 mg tablet TAKE 1 TABLET BY MOUTH EVERY 4 HOURS AS NEEDED FOR MILD-MODERATE PAIN, CAN TAKE 2 TABLETS EVERY 8 HOURS AS NEEDED FOR MODERATE-SEVERE PAIN    albuterol 2.5 mg, nebulization, Every 4 hours PRN, 1 VIAL VIA AEROSOL EVERY 4 HOURS AS NEEDED FOR SHORTNESS OF BREATH / CHEST TIGHTNESS    atorvastatin (LIPITOR) 80 mg, oral, Daily    benztropine (COGENTIN) 0.5 mg, 2 times daily    calcium carbonate-vitamin D3 (Oyster Shell  "Calcium-Vit D3) 500 mg-5 mcg (200 unit) tablet 1 tablet, oral, Daily    clopidogrel (PLAVIX) 75 mg, oral, Daily    famotidine (PEPCID) 40 mg, oral, Every morning    levETIRAcetam (KEPPRA) 750 mg, oral, 2 times daily    lidocaine (Lidoderm) 5 % patch APPLY 1 PATCH TOPICALLY TO PAINFUL AREA ONCE DAILY *LEAVE IN PLACE FOR 12 HOURS, REMOVE AFTER 12 HOURS. DO NOT REAPPLY UNTIL 12 HOURS HAVE LAPSED*    lipase-protease-amylase (Creon) 3,000-9,500- 15,000 unit capsule 2 capsules, oral, 3 times daily before meals    losartan (COZAAR) 50 mg, oral, Daily    melatonin 5 mg, oral, Nightly    omeprazole (PRILOSEC) 40 mg, oral, Nightly, Do not crush or chew.    phenytoin ER (DILANTIN) 300 mg, oral, Nightly    QUEtiapine (SEROQUEL) 200 mg, oral, Nightly    sertraline (ZOLOFT) 100 mg, oral, Daily    walker (Ultra-Light Rollator) misc Use as directed      /82 (BP Location: Right arm, Patient Position: Sitting, BP Cuff Size: Large adult)   Pulse 76   Ht 1.626 m (5' 4\")   Wt 68.9 kg (152 lb)   SpO2 93%   BMI 26.09 kg/m²      Physical Exam  Constitutional:       Appearance: Normal appearance.   HENT:      Head: Normocephalic and atraumatic.      Nose: Nose normal.   Neck:      Vascular: No carotid bruit.   Cardiovascular:      Rate and Rhythm: Normal rate and regular rhythm.      Heart sounds: No murmur heard.  Pulmonary:      Effort: Pulmonary effort is normal.      Breath sounds: Normal breath sounds.   Abdominal:      Palpations: Abdomen is soft.      Tenderness: There is no abdominal tenderness.   Musculoskeletal:      Right lower leg: No edema.      Left lower leg: No edema.   Skin:     General: Skin is warm and dry.   Neurological:      General: No focal deficit present.      Mental Status: She is alert.   Psychiatric:         Mood and Affect: Mood normal.         Judgment: Judgment normal.        Lab Results   Component Value Date    CR 0.59  6/25    HGB 12.2  6/25    K 3.9  6/25    HSTPN  263 4/25    BNP 20  9/24    "   6/25    LDL 49 3/25    TSH 1.05 3/25     Assessment/Plan   70 yo BF (deaf) w/ h/o scat CAC, HTN, HLD, GERD, seizures. Doing well. No recent cardiac symptoms. Due to elevated TPN, check echo. Okay to hold off on stress test or CTA cors unless CP recurs. Continue medical management.  -continue Plavix 75 every day   -continue Losartan 50 every day  -continue Atorva 80 every day   -FU PRN    Gabriel Rubio MD       [1]   Family History  Problem Relation Name Age of Onset    Parkinsonism Mother      Other (cardiac disorder) Father      Glaucoma Father      Parkinsonism Father     [2]   Allergies  Allergen Reactions    Levofloxacin Dizziness, Anaphylaxis and Unknown     Question seizure

## 2025-06-17 ENCOUNTER — HOSPITAL ENCOUNTER (OUTPATIENT)
Dept: GASTROENTEROLOGY | Facility: HOSPITAL | Age: 70
Discharge: HOME | End: 2025-06-17
Payer: MEDICARE

## 2025-06-17 ENCOUNTER — ANESTHESIA (OUTPATIENT)
Dept: GASTROENTEROLOGY | Facility: HOSPITAL | Age: 70
End: 2025-06-17
Payer: MEDICARE

## 2025-06-17 ENCOUNTER — ANESTHESIA EVENT (OUTPATIENT)
Dept: GASTROENTEROLOGY | Facility: HOSPITAL | Age: 70
End: 2025-06-17
Payer: MEDICARE

## 2025-06-17 ENCOUNTER — APPOINTMENT (OUTPATIENT)
Dept: CARDIOLOGY | Facility: CLINIC | Age: 70
End: 2025-06-17
Payer: MEDICARE

## 2025-06-17 VITALS
TEMPERATURE: 96.5 F | SYSTOLIC BLOOD PRESSURE: 140 MMHG | DIASTOLIC BLOOD PRESSURE: 68 MMHG | WEIGHT: 152 LBS | RESPIRATION RATE: 18 BRPM | BODY MASS INDEX: 25.95 KG/M2 | HEIGHT: 64 IN | HEART RATE: 80 BPM | OXYGEN SATURATION: 97 %

## 2025-06-17 DIAGNOSIS — K86.89 PANCREATIC DUCT DILATED (HHS-HCC): ICD-10-CM

## 2025-06-17 DIAGNOSIS — K86.89 PANCREATIC MASS (HHS-HCC): ICD-10-CM

## 2025-06-17 PROBLEM — N18.30 CHRONIC RENAL IMPAIRMENT, STAGE 3 (MODERATE) (MULTI): Status: ACTIVE | Noted: 2025-06-17

## 2025-06-17 PROCEDURE — A43238 PR EDG INTRMURAL US NEEDLE ASPIRATE/BIOPSY ESOPHAGS: Performed by: ANESTHESIOLOGY

## 2025-06-17 PROCEDURE — 7100000009 HC PHASE TWO TIME - INITIAL BASE CHARGE

## 2025-06-17 PROCEDURE — 43238 EGD US FINE NEEDLE BX/ASPIR: CPT | Performed by: INTERNAL MEDICINE

## 2025-06-17 PROCEDURE — 3700000001 HC GENERAL ANESTHESIA TIME - INITIAL BASE CHARGE

## 2025-06-17 PROCEDURE — 2720000007 HC OR 272 NO HCPCS

## 2025-06-17 PROCEDURE — 3700000002 HC GENERAL ANESTHESIA TIME - EACH INCREMENTAL 1 MINUTE

## 2025-06-17 PROCEDURE — 2500000004 HC RX 250 GENERAL PHARMACY W/ HCPCS (ALT 636 FOR OP/ED): Performed by: ANESTHESIOLOGIST ASSISTANT

## 2025-06-17 PROCEDURE — 7100000010 HC PHASE TWO TIME - EACH INCREMENTAL 1 MINUTE

## 2025-06-17 PROCEDURE — A43238 PR EDG INTRMURAL US NEEDLE ASPIRATE/BIOPSY ESOPHAGS: Performed by: ANESTHESIOLOGIST ASSISTANT

## 2025-06-17 RX ORDER — LIDOCAINE HYDROCHLORIDE 20 MG/ML
INJECTION, SOLUTION INFILTRATION; PERINEURAL AS NEEDED
Status: DISCONTINUED | OUTPATIENT
Start: 2025-06-17 | End: 2025-06-17

## 2025-06-17 RX ORDER — PROPOFOL 10 MG/ML
INJECTION, EMULSION INTRAVENOUS AS NEEDED
Status: DISCONTINUED | OUTPATIENT
Start: 2025-06-17 | End: 2025-06-17

## 2025-06-17 RX ORDER — SODIUM CHLORIDE, SODIUM LACTATE, POTASSIUM CHLORIDE, CALCIUM CHLORIDE 600; 310; 30; 20 MG/100ML; MG/100ML; MG/100ML; MG/100ML
INJECTION, SOLUTION INTRAVENOUS CONTINUOUS PRN
Status: DISCONTINUED | OUTPATIENT
Start: 2025-06-17 | End: 2025-06-17

## 2025-06-17 RX ADMIN — PROPOFOL 150 MCG/KG/MIN: 10 INJECTION, EMULSION INTRAVENOUS at 13:41

## 2025-06-17 RX ADMIN — LIDOCAINE HYDROCHLORIDE 3 ML: 20 INJECTION, SOLUTION INFILTRATION; PERINEURAL at 13:40

## 2025-06-17 RX ADMIN — SODIUM CHLORIDE, POTASSIUM CHLORIDE, SODIUM LACTATE AND CALCIUM CHLORIDE: 600; 310; 30; 20 INJECTION, SOLUTION INTRAVENOUS at 13:34

## 2025-06-17 ASSESSMENT — PAIN - FUNCTIONAL ASSESSMENT
PAIN_FUNCTIONAL_ASSESSMENT: 0-10

## 2025-06-17 ASSESSMENT — PAIN SCALES - GENERAL
PAINLEVEL_OUTOF10: 0 - NO PAIN

## 2025-06-17 NOTE — ANESTHESIA PROCEDURE NOTES
Peripheral IV  Date/Time: 6/17/2025 1:30 PM  Inserted by: Merly Arthur MD    Placement  Needle size: 24 G  Laterality: left  Location: wrist  Local anesthetic: injectable  Site prep: alcohol  Technique: anatomical landmarks  Attempts: 1  Difficult Venous Access: Yes  Unsuccessful Attempt Location(s): left wrist

## 2025-06-17 NOTE — DISCHARGE INSTRUCTIONS

## 2025-06-17 NOTE — ANESTHESIA POSTPROCEDURE EVALUATION
Patient: Janine Sandoval    Procedure Summary       Date: 06/17/25 Room / Location: [facility]    Anesthesia Start: 1334 Anesthesia Stop: 1408    Procedure: ENDOSCOPIC ULTRASOUND (UPPER) Diagnosis:       Pancreatic mass ([facility]-H[facility])      Pancreatic duct dilated ([facility]HS-HCC)    Scheduled Providers: Stiven Solorzano MD; Dane Mendes RN; Merly Arthur MD Responsible Provider: Merly Arthur MD    Anesthesia Type: MAC ASA Status: 3            Anesthesia Type: MAC    Vitals Value Taken Time   /68 06/17/25 14:35   Temp 35.8 °C (96.5 °F) 06/17/25 14:05   Pulse 80 06/17/25 14:35   Resp 18 06/17/25 14:35   SpO2 97 % 06/17/25 14:35       Anesthesia Post Evaluation    Patient location during evaluation: PACU  Patient participation: complete - patient participated  Level of consciousness: awake  Pain management: adequate  Airway patency: patent  Cardiovascular status: acceptable  Respiratory status: acceptable  Hydration status: acceptable  Postoperative Nausea and Vomiting: none        No notable events documented.

## 2025-06-17 NOTE — ANESTHESIA PREPROCEDURE EVALUATION
Patient: Janine Sandoval    Procedure Information       Date/Time: 06/17/25 1320    Scheduled providers: Stiven Solorzano MD; Dane Mendes RN; Merly Arthur MD    Procedure: ENDOSCOPIC ULTRASOUND (UPPER)    Location: [facility]            Relevant Problems   Anesthesia (within normal limits)      Cardiac   (+) Chest pain   (+) Essential hypertension   (+) Hyperlipemia   (+) Hyperlipidemia   (+) Hypertension   (+) Murmur, cardiac      Pulmonary   (+) Asthma      Neuro   (+) Acute exacerbation of chronic paranoid schizophrenia (Multi)   (+) Anxiety   (+) Breakthrough seizure (Multi)   (+) Cryptogenic stroke (Multi)   (+) Depressive disorder   (+) Epilepsy   (+) Other specified depressive episodes   (+) Partial epilepsy with impairment of consciousness   (+) Seizure (Multi)   (+) Seizure disorder (Multi)   (+) Stroke (Multi)      GI   (+) Gastroesophageal reflux disease without esophagitis      /Renal   (+) Chronic renal impairment, stage 3 (moderate) (Multi)      Liver (within normal limits)      Endocrine   (+) Morbid (severe) obesity due to excess calories (Multi)      Hematology   (+) Anemia   (+) Anemia due to chronic kidney disease      Musculoskeletal   (+) Arthritis, degenerative   (+) Chronic osteoarthritis   (+) Osteoarthritis, knee      HEENT   (+) Bilateral sensorineural hearing loss   (+) Congenital deafness   (+) Deaf-mutism   (+) Sensorineural hearing loss (SNHL) of both ears      ID (within normal limits)      Skin (within normal limits)      GYN (within normal limits)     69 y.o. female referred to [facility] by Dr. Frankie Burden for perioperative evaluation in advance of of an Endoscopy Under General Anesthesia scheduled on 6-17-25.  Patient has a past medical history significant for: GERD, HTN, HLD, CVA, Seizure Disorder, Schizophrenia, pt is wheelchair bound, Neuroleptic induced parkinsonism, Depression, Osteopenia, and mutism  Clinical information reviewed:                    NPO Detail:  No data recorded     Physical Exam    Airway  Mallampati: III  TM distance: >3 FB  Neck ROM: full  Mouth opening: 3 or more finger widths     Cardiovascular    Dental    Pulmonary    Abdominal          Anesthesia Plan    History of general anesthesia?: yes  History of complications of general anesthesia?: no    ASA 3     MAC     intravenous induction   Anesthetic plan and risks discussed with patient.  Use of blood products discussed with patient who consented to blood products.    Plan discussed with CAA.

## 2025-06-18 LAB
ATRIAL RATE: 76 BPM
P AXIS: 46 DEGREES
P OFFSET: 179 MS
P ONSET: 123 MS
PR INTERVAL: 186 MS
Q ONSET: 216 MS
QRS COUNT: 13 BEATS
QRS DURATION: 104 MS
QT INTERVAL: 388 MS
QTC CALCULATION(BAZETT): 436 MS
QTC FREDERICIA: 419 MS
R AXIS: 15 DEGREES
T AXIS: -7 DEGREES
T OFFSET: 410 MS
VENTRICULAR RATE: 76 BPM

## 2025-06-24 LAB
LAB AP ASR DISCLAIMER: NORMAL
LABORATORY COMMENT REPORT: NORMAL
PATH REPORT.COMMENTS IMP SPEC: NORMAL
PATH REPORT.FINAL DX SPEC: NORMAL
PATH REPORT.GROSS SPEC: NORMAL
PATH REPORT.TOTAL CANCER: NORMAL
RESIDENT REVIEW: NORMAL

## 2025-06-26 ENCOUNTER — APPOINTMENT (OUTPATIENT)
Dept: BEHAVIORAL HEALTH | Facility: CLINIC | Age: 70
End: 2025-06-26
Payer: MEDICARE

## 2025-06-30 ENCOUNTER — APPOINTMENT (OUTPATIENT)
Dept: SURGICAL ONCOLOGY | Facility: CLINIC | Age: 70
End: 2025-06-30
Payer: MEDICARE

## 2025-06-30 ENCOUNTER — HOSPITAL ENCOUNTER (OUTPATIENT)
Dept: CARDIOLOGY | Facility: CLINIC | Age: 70
Discharge: HOME | End: 2025-06-30
Payer: MEDICARE

## 2025-06-30 DIAGNOSIS — R79.89 TROPONIN LEVEL ELEVATED: ICD-10-CM

## 2025-06-30 DIAGNOSIS — I25.10 CORONARY ARTERY CALCIFICATION SEEN ON CT SCAN: ICD-10-CM

## 2025-06-30 DIAGNOSIS — I10 HYPERTENSION, UNSPECIFIED TYPE: ICD-10-CM

## 2025-06-30 LAB
AORTIC VALVE MEAN GRADIENT: 6 MMHG
AORTIC VALVE PEAK VELOCITY: 1.7 M/S
AV PEAK GRADIENT: 12 MMHG
AVA (PEAK VEL): 2.14 CM2
AVA (VTI): 2.26 CM2
EJECTION FRACTION APICAL 4 CHAMBER: 53.3
EJECTION FRACTION: 58 %
LEFT VENTRICLE INTERNAL DIMENSION DIASTOLE: 4.74 CM (ref 3.5–6)
LEFT VENTRICULAR OUTFLOW TRACT DIAMETER: 1.99 CM
MITRAL VALVE E/A RATIO: 0.96
RIGHT VENTRICLE FREE WALL PEAK S': 12 CM/S
TRICUSPID ANNULAR PLANE SYSTOLIC EXCURSION: 2.3 CM

## 2025-06-30 PROCEDURE — 0932T N-INVS DET HRT FAIL AUG ECHO: CPT | Performed by: INTERNAL MEDICINE

## 2025-06-30 PROCEDURE — 93306 TTE W/DOPPLER COMPLETE: CPT

## 2025-06-30 PROCEDURE — 93306 TTE W/DOPPLER COMPLETE: CPT | Performed by: INTERNAL MEDICINE

## 2025-07-03 ENCOUNTER — APPOINTMENT (OUTPATIENT)
Dept: SURGICAL ONCOLOGY | Facility: CLINIC | Age: 70
End: 2025-07-03
Payer: MEDICARE

## 2025-07-03 VITALS
BODY MASS INDEX: 25.2 KG/M2 | DIASTOLIC BLOOD PRESSURE: 79 MMHG | HEART RATE: 83 BPM | SYSTOLIC BLOOD PRESSURE: 123 MMHG | RESPIRATION RATE: 16 BRPM | TEMPERATURE: 97.7 F | WEIGHT: 146.8 LBS

## 2025-07-03 DIAGNOSIS — D3A.8 PRIMARY PANCREATIC NEUROENDOCRINE TUMOR: Primary | ICD-10-CM

## 2025-07-03 PROCEDURE — 1126F AMNT PAIN NOTED NONE PRSNT: CPT | Performed by: SURGERY

## 2025-07-03 PROCEDURE — 99214 OFFICE O/P EST MOD 30 MIN: CPT | Performed by: SURGERY

## 2025-07-03 PROCEDURE — 1036F TOBACCO NON-USER: CPT | Performed by: SURGERY

## 2025-07-03 PROCEDURE — 3078F DIAST BP <80 MM HG: CPT | Performed by: SURGERY

## 2025-07-03 PROCEDURE — 3074F SYST BP LT 130 MM HG: CPT | Performed by: SURGERY

## 2025-07-03 PROCEDURE — 1159F MED LIST DOCD IN RCRD: CPT | Performed by: SURGERY

## 2025-07-03 ASSESSMENT — ENCOUNTER SYMPTOMS
DEPRESSION: 0
LOSS OF SENSATION IN FEET: 0
OCCASIONAL FEELINGS OF UNSTEADINESS: 0

## 2025-07-03 ASSESSMENT — PAIN SCALES - GENERAL: PAINLEVEL_OUTOF10: 0-NO PAIN

## 2025-07-03 NOTE — PROGRESS NOTES
Subjective     Janine Sandoval is a 69 y.o. female who is referred by  Dr. Stiven Solorzano and Dr. Dias. Pancreatic mass.        HPI    The patient is a 69-year-old woman, wheelchair-bound, and deaf.  She has a pancreatic mass.  She underwent an EUS and the biopsy which proved to be a well-differentiated neuroendocrine tumor, about 2.5 cm.  She is here for surgical evaluation.  Clinically she is much improved from a prior visit.    Review of Systems   All other systems reviewed and are negative.         Social History     Socioeconomic History    Marital status: Single     Spouse name: Not on file    Number of children: Not on file    Years of education: Not on file    Highest education level: Not on file   Occupational History    Not on file   Tobacco Use    Smoking status: Never     Passive exposure: Never    Smokeless tobacco: Never   Vaping Use    Vaping status: Unknown   Substance and Sexual Activity    Alcohol use: Never    Drug use: Never    Sexual activity: Defer   Other Topics Concern    Not on file   Social History Narrative    Not on file     Social Drivers of Health     Financial Resource Strain: Patient Unable To Answer (4/14/2025)    Overall Financial Resource Strain (CARDIA)     Difficulty of Paying Living Expenses: Patient unable to answer   Food Insecurity: Unknown (6/19/2025)    Received from [facility]    Hunger Vital Sign     Worried About Running Out of Food in the Last Year: Never true     Ran Out of Food in the Last Year: Not on file   Transportation Needs: Patient Unable To Answer (4/14/2025)    PRAPARE - Transportation     Lack of Transportation (Medical): Patient unable to answer     Lack of Transportation (Non-Medical): Patient unable to answer   Physical Activity: Patient Declined (4/12/2025)    Exercise Vital Sign     Days of Exercise per Week: Patient declined     Minutes of Exercise per Session: Patient declined   Stress: Patient Unable To Answer (4/12/2025)    [employer]  [place] [employer] - Occupational Stress Questionnaire     Feeling of Stress : Patient unable to answer   Social Connections: Patient Unable To Answer (4/12/2025)    Social Connection and Isolation Panel [NHANES]     Frequency of Communication with Friends and Family: Patient unable to answer     Frequency of Social Gatherings with Friends and Family: Patient unable to answer     Attends Yazidi Services: Patient unable to answer     Active Member of Clubs or Organizations: Patient unable to answer     Attends Club or Organization Meetings: Patient unable to answer     Marital Status: Patient unable to answer   Intimate Partner Violence: Patient Unable To Answer (4/12/2025)    Humiliation, Afraid, Rape, and Kick questionnaire     Fear of Current or Ex-Partner: Patient unable to answer     Emotionally Abused: Patient unable to answer     Physically Abused: Patient unable to answer     Sexually Abused: Patient unable to answer   Housing Stability: Patient Unable To Answer (4/14/2025)    Housing Stability Vital Sign     Unable to Pay for Housing in the Last Year: Patient unable to answer     Number of Times Moved in the Last Year: 0     Homeless in the Last Year: Patient unable to answer      Medications Ordered Prior to Encounter[1]   Family History[2]   Medical History[3]   Surgical History[4]     Objective     /79   Pulse 83   Temp 36.5 °C (97.7 °F) (Temporal)   Resp 16   Wt 66.6 kg (146 lb 12.8 oz)   BMI 25.20 kg/m²      Physical Exam  General: in no acute distress, comfortable  Eyes: no pallor or scleral icterus  Ears, nose, throat: no oropharyngeal edema  Cardiovascular: normal rate, regular rhythm  Respiratory: no wheezing. Even and unlabored.  Gastrointestinal: abdomen soft, non-tender, no masses  Musculoskeletal: normal gate, no deformities  Integumentary: no concerning lesions, no jaundice  Neurologic: no gross deficits  Psychiatric: cognition intact, mood appropriate    She is  quite interactive and she performs well on the [employer] . The  is [id number].      RESULTS  Imaging reveals a 3 cm mass in the head of the pancreas seen on EUS and CT scan.  No evidence of metastatic disease.    Assessment/Plan       Patient has an indolent appearing neuroendocrine tumor.  In a younger healthier person we would recommend resection.  In light of her significant comorbidities, her frailty and her wheelchair-bound state, I do not think that a Whipple operation is the right decision in her.  We want her to go surveillance with her over time and we will do yearly imaging studies.  She understands this and I explained the rationale.  I am    This note was created by dictation. Please excuse the typos.        Frankie Burden MD          [1]   Current Outpatient Medications on File Prior to Visit   Medication Sig Dispense Refill    acetaminophen (Tylenol) 500 mg tablet TAKE 1 TABLET BY MOUTH EVERY 4 HOURS AS NEEDED FOR MILD-MODERATE PAIN, CAN TAKE 2 TABLETS EVERY 8 HOURS AS NEEDED FOR MODERATE-SEVERE PAIN 180 tablet 10    albuterol 2.5 mg /3 mL (0.083 %) nebulizer solution Take 3 mL (2.5 mg) by nebulization every 4 hours if needed for wheezing or shortness of breath. 1 VIAL VIA AEROSOL EVERY 4 HOURS AS NEEDED FOR SHORTNESS OF BREATH / CHEST TIGHTNESS 360 mL 5    atorvastatin (Lipitor) 80 mg tablet Take 1 tablet (80 mg) by mouth once daily. 90 tablet 1    benztropine (Cogentin) 0.5 mg tablet Take 1 tablet (0.5 mg) by mouth 2 times a day.      calcium carbonate-vitamin D3 (Oyster Shell Calcium-Vit D3) 500 mg-5 mcg (200 unit) tablet Take 1 tablet by mouth once daily. 90 tablet 1    clopidogrel (Plavix) 75 mg tablet Take 1 tablet (75 mg) by mouth once daily. 90 tablet 1    famotidine (Pepcid) 20 mg tablet Take 2 tablets (40 mg) by mouth once daily in the morning. 90 tablet 1    levETIRAcetam (Keppra) 750 mg tablet Take 1 tablet (750 mg) by mouth 2 times a day.      lidocaine (Lidoderm) 5 % patch  APPLY 1 PATCH TOPICALLY TO PAINFUL AREA ONCE DAILY *LEAVE IN PLACE FOR 12 HOURS, REMOVE AFTER 12 HOURS. DO NOT REAPPLY UNTIL 12 HOURS HAVE LAPSED* 30 patch 10    lipase-protease-amylase (Creon) 3,000-9,500- 15,000 unit capsule Take 2 capsules by mouth 3 times a day before meals. 360 capsule 1    losartan (Cozaar) 50 mg tablet Take 1 tablet (50 mg) by mouth once daily.      melatonin 5 mg capsule Take 1 capsule (5 mg) by mouth once daily at bedtime. 30 capsule 2    omeprazole (PriLOSEC) 20 mg DR capsule Take 2 capsules (40 mg) by mouth once daily at bedtime. Do not crush or chew. 90 capsule 1    phenytoin ER (Dilantin) 100 mg capsule Take 3 capsules (300 mg) by mouth once daily at bedtime. 270 capsule 1    QUEtiapine (SEROquel) 200 mg tablet Take 1 tablet (200 mg) by mouth once daily at bedtime. 90 tablet 1    sertraline (Zoloft) 100 mg tablet Take 1 tablet (100 mg) by mouth once daily. 90 tablet 1    walker (Ultra-Light Rollator) misc Use as directed 1 each 0     No current facility-administered medications on file prior to visit.   [2]   Family History  Problem Relation Name Age of Onset    Parkinsonism Mother      Other (cardiac disorder) Father      Glaucoma Father      Parkinsonism Father     [3]   Past Medical History:  Diagnosis Date    Anemia     Anxiety     Arthritis     Asthma     Cerebral vascular accident (Multi)     Chronic pain disorder     CKD (chronic kidney disease)     Cognitive disorder     Depression     Gastro-esophageal reflux disease without esophagitis 12/21/2022    Gastroesophageal reflux disease without esophagitis    HL (hearing loss)     Hyperlipidemia, unspecified 09/25/2019    Hyperlipemia    Hypertension     Joint pain     Obesity     Pancreatic cancer (Multi)     Personal history of other mental and behavioral disorders     Personal history of transient ischemic attack (TIA), and cerebral infarction without residual deficits 12/21/2022    History of stroke    Schizophrenia     chronic  paranoid schizophrenia    Seizure disorder (Multi)     Shoulder pain 03/14/2025    Stroke (Multi)     Syncope    [4]   Past Surgical History:  Procedure Laterality Date    COLONOSCOPY  12/21/2023    UPPER ENDOSCOPIC ULTRASOUND W/ FNA  03/25/2025

## 2025-07-16 ENCOUNTER — PATIENT OUTREACH (OUTPATIENT)
Dept: CARE COORDINATION | Facility: CLINIC | Age: 70
End: 2025-07-16
Payer: MEDICARE

## 2025-07-16 ENCOUNTER — PATIENT MESSAGE (OUTPATIENT)
Dept: CARE COORDINATION | Facility: CLINIC | Age: 70
End: 2025-07-16
Payer: MEDICARE

## 2025-07-16 NOTE — PROGRESS NOTES
per policy sent written education material to patient's MYCHART per referral from Dr. Dias for GERD.

## 2025-08-25 ENCOUNTER — APPOINTMENT (OUTPATIENT)
Dept: PRIMARY CARE | Facility: CLINIC | Age: 70
End: 2025-08-25
Payer: MEDICARE

## 2025-08-25 VITALS
SYSTOLIC BLOOD PRESSURE: 129 MMHG | BODY MASS INDEX: 25.2 KG/M2 | HEIGHT: 64 IN | HEART RATE: 76 BPM | DIASTOLIC BLOOD PRESSURE: 82 MMHG

## 2025-08-25 DIAGNOSIS — I10 ESSENTIAL HYPERTENSION: ICD-10-CM

## 2025-08-25 DIAGNOSIS — J45.909 ASTHMA, UNSPECIFIED ASTHMA SEVERITY, UNSPECIFIED WHETHER COMPLICATED, UNSPECIFIED WHETHER PERSISTENT (HHS-HCC): ICD-10-CM

## 2025-08-25 DIAGNOSIS — I63.9 CEREBRAL INFARCTION, UNSPECIFIED MECHANISM (MULTI): ICD-10-CM

## 2025-08-25 DIAGNOSIS — F20.9 SCHIZOPHRENIA, UNSPECIFIED TYPE: ICD-10-CM

## 2025-08-25 DIAGNOSIS — K21.9 GASTROESOPHAGEAL REFLUX DISEASE WITHOUT ESOPHAGITIS: ICD-10-CM

## 2025-08-25 DIAGNOSIS — T43.505A NEUROLEPTIC INDUCED PARKINSONISM: ICD-10-CM

## 2025-08-25 DIAGNOSIS — K86.89 PANCREATIC DUCT DILATED (HHS-HCC): ICD-10-CM

## 2025-08-25 DIAGNOSIS — R56.9 SEIZURE (MULTI): ICD-10-CM

## 2025-08-25 DIAGNOSIS — G40.209 PARTIAL EPILEPSY WITH IMPAIRMENT OF CONSCIOUSNESS: ICD-10-CM

## 2025-08-25 DIAGNOSIS — G40.909 SEIZURE DISORDER (MULTI): ICD-10-CM

## 2025-08-25 DIAGNOSIS — G21.11 NEUROLEPTIC INDUCED PARKINSONISM: ICD-10-CM

## 2025-08-25 DIAGNOSIS — F32.A DEPRESSION, UNSPECIFIED DEPRESSION TYPE: ICD-10-CM

## 2025-08-25 DIAGNOSIS — M85.80 OSTEOPENIA, UNSPECIFIED LOCATION: ICD-10-CM

## 2025-08-25 DIAGNOSIS — R25.2 HAND OR FOOT SPASMS: Primary | ICD-10-CM

## 2025-08-25 DIAGNOSIS — E78.5 HYPERLIPIDEMIA, UNSPECIFIED HYPERLIPIDEMIA TYPE: ICD-10-CM

## 2025-08-25 PROCEDURE — 3074F SYST BP LT 130 MM HG: CPT | Performed by: INTERNAL MEDICINE

## 2025-08-25 PROCEDURE — 3079F DIAST BP 80-89 MM HG: CPT | Performed by: INTERNAL MEDICINE

## 2025-08-25 PROCEDURE — G2211 COMPLEX E/M VISIT ADD ON: HCPCS | Performed by: INTERNAL MEDICINE

## 2025-08-25 PROCEDURE — 99214 OFFICE O/P EST MOD 30 MIN: CPT | Performed by: INTERNAL MEDICINE

## 2025-08-25 PROCEDURE — 1036F TOBACCO NON-USER: CPT | Performed by: INTERNAL MEDICINE

## 2025-08-25 RX ORDER — CLOPIDOGREL BISULFATE 75 MG/1
75 TABLET ORAL DAILY
Qty: 90 TABLET | Refills: 1 | Status: SHIPPED | OUTPATIENT
Start: 2025-08-25

## 2025-08-25 RX ORDER — LOSARTAN POTASSIUM 50 MG/1
50 TABLET ORAL DAILY
Qty: 90 TABLET | Refills: 1 | Status: SHIPPED | OUTPATIENT
Start: 2025-08-25

## 2025-08-25 RX ORDER — PANCRELIPASE 15000; 3000; 9500 [USP'U]/1; [USP'U]/1; [USP'U]/1
2 CAPSULE, DELAYED RELEASE ORAL
Qty: 540 CAPSULE | Refills: 1 | Status: SHIPPED | OUTPATIENT
Start: 2025-08-25

## 2025-08-25 RX ORDER — BENZTROPINE MESYLATE 0.5 MG/1
0.5 TABLET ORAL 2 TIMES DAILY
Qty: 180 TABLET | Refills: 1 | Status: SHIPPED | OUTPATIENT
Start: 2025-08-25

## 2025-08-25 RX ORDER — QUETIAPINE FUMARATE 200 MG/1
200 TABLET, FILM COATED ORAL NIGHTLY
Qty: 90 TABLET | Refills: 1 | Status: SHIPPED | OUTPATIENT
Start: 2025-08-25

## 2025-08-25 RX ORDER — ATORVASTATIN CALCIUM 80 MG/1
80 TABLET, FILM COATED ORAL DAILY
Qty: 90 TABLET | Refills: 1 | Status: SHIPPED | OUTPATIENT
Start: 2025-08-25

## 2025-08-25 RX ORDER — OMEPRAZOLE 20 MG/1
40 CAPSULE, DELAYED RELEASE ORAL NIGHTLY
Qty: 90 CAPSULE | Refills: 1 | Status: SHIPPED | OUTPATIENT
Start: 2025-08-25 | End: 2025-08-26

## 2025-08-25 RX ORDER — SERTRALINE HYDROCHLORIDE 100 MG/1
100 TABLET, FILM COATED ORAL DAILY
Qty: 90 TABLET | Refills: 1 | Status: SHIPPED | OUTPATIENT
Start: 2025-08-25

## 2025-08-25 RX ORDER — LEVETIRACETAM 750 MG/1
750 TABLET ORAL 2 TIMES DAILY
Qty: 180 TABLET | Refills: 1 | Status: SHIPPED | OUTPATIENT
Start: 2025-08-25

## 2025-08-25 RX ORDER — ALBUTEROL SULFATE 0.83 MG/ML
2.5 SOLUTION RESPIRATORY (INHALATION) EVERY 4 HOURS PRN
Qty: 360 ML | Refills: 5 | Status: SHIPPED | OUTPATIENT
Start: 2025-08-25

## 2025-08-25 RX ORDER — PHENYTOIN SODIUM 100 MG/1
300 CAPSULE, EXTENDED RELEASE ORAL NIGHTLY
Qty: 270 CAPSULE | Refills: 1 | Status: SHIPPED | OUTPATIENT
Start: 2025-08-25

## 2025-08-25 RX ORDER — FAMOTIDINE 20 MG/1
40 TABLET, FILM COATED ORAL EVERY MORNING
Qty: 90 TABLET | Refills: 1 | Status: SHIPPED | OUTPATIENT
Start: 2025-08-25 | End: 2025-08-26

## 2025-08-25 RX ORDER — PSYLLIUM HUSK 0.4 G
1 CAPSULE ORAL DAILY
Qty: 90 TABLET | Refills: 1 | Status: SHIPPED | OUTPATIENT
Start: 2025-08-25

## 2025-08-25 ASSESSMENT — ENCOUNTER SYMPTOMS
LOSS OF SENSATION IN FEET: 0
DEPRESSION: 0
OCCASIONAL FEELINGS OF UNSTEADINESS: 0

## 2025-08-25 ASSESSMENT — PATIENT HEALTH QUESTIONNAIRE - PHQ9
SUM OF ALL RESPONSES TO PHQ9 QUESTIONS 1 AND 2: 0
2. FEELING DOWN, DEPRESSED OR HOPELESS: NOT AT ALL
1. LITTLE INTEREST OR PLEASURE IN DOING THINGS: NOT AT ALL

## 2025-08-26 DIAGNOSIS — K21.9 GASTROESOPHAGEAL REFLUX DISEASE WITHOUT ESOPHAGITIS: ICD-10-CM

## 2025-08-26 RX ORDER — FAMOTIDINE 20 MG/1
TABLET, FILM COATED ORAL
Qty: 180 TABLET | Refills: 0 | Status: SHIPPED | OUTPATIENT
Start: 2025-08-26

## 2025-08-26 RX ORDER — OMEPRAZOLE 20 MG/1
CAPSULE, DELAYED RELEASE ORAL
Qty: 180 CAPSULE | Refills: 0 | Status: SHIPPED | OUTPATIENT
Start: 2025-08-26

## 2025-10-27 ENCOUNTER — APPOINTMENT (OUTPATIENT)
Dept: PRIMARY CARE | Facility: CLINIC | Age: 70
End: 2025-10-27
Payer: MEDICARE